# Patient Record
Sex: FEMALE | Race: WHITE | NOT HISPANIC OR LATINO | Employment: OTHER | URBAN - METROPOLITAN AREA
[De-identification: names, ages, dates, MRNs, and addresses within clinical notes are randomized per-mention and may not be internally consistent; named-entity substitution may affect disease eponyms.]

---

## 2017-01-04 ENCOUNTER — PRE VISIT (OUTPATIENT)
Dept: SURGERY | Facility: CLINIC | Age: 82
End: 2017-01-04

## 2017-01-04 NOTE — TELEPHONE ENCOUNTER
1.  Date/reason for appt: 17 - Lung Nodule, Mediastinal Mass  2.  Referring provider: Dr Julien  3.  Call to patient (Yes / No - short description): Yes  4.  Previous care at / records requested from: /[facility], [facility] - records in Epic  5.  Recent Imagin16 CT chest, 16 Xray chest, 16 PET whole body, 16 CT chest - in Epic

## 2017-01-12 ENCOUNTER — RECORDS - HEALTHEAST (OUTPATIENT)
Dept: ADMINISTRATIVE | Facility: OTHER | Age: 82
End: 2017-01-12

## 2017-01-12 ENCOUNTER — OFFICE VISIT (OUTPATIENT)
Dept: SURGERY | Facility: CLINIC | Age: 82
End: 2017-01-12
Attending: STUDENT IN AN ORGANIZED HEALTH CARE EDUCATION/TRAINING PROGRAM
Payer: COMMERCIAL

## 2017-01-12 VITALS
OXYGEN SATURATION: 96 % | BODY MASS INDEX: 28.96 KG/M2 | DIASTOLIC BLOOD PRESSURE: 65 MMHG | SYSTOLIC BLOOD PRESSURE: 169 MMHG | WEIGHT: 184.97 LBS | RESPIRATION RATE: 20 BRPM | TEMPERATURE: 98.3 F | HEART RATE: 89 BPM

## 2017-01-12 DIAGNOSIS — J98.59 MEDIASTINAL MASS: Primary | ICD-10-CM

## 2017-01-12 PROCEDURE — 99212 OFFICE O/P EST SF 10 MIN: CPT | Mod: ZF

## 2017-01-12 RX ORDER — CLINDAMYCIN PHOSPHATE 900 MG/50ML
900 INJECTION, SOLUTION INTRAVENOUS SEE ADMIN INSTRUCTIONS
Status: CANCELLED | OUTPATIENT
Start: 2017-01-12

## 2017-01-12 RX ORDER — POLYETHYLENE GLYCOL 3350 17 G/17G
17 POWDER, FOR SOLUTION ORAL DAILY PRN
COMMUNITY

## 2017-01-12 RX ORDER — CLINDAMYCIN PHOSPHATE 900 MG/50ML
900 INJECTION, SOLUTION INTRAVENOUS
Status: CANCELLED | OUTPATIENT
Start: 2017-01-12

## 2017-01-12 ASSESSMENT — PAIN SCALES - GENERAL: PAINLEVEL: NO PAIN (0)

## 2017-01-12 NOTE — NURSING NOTE
"Mary Corral is a 81 year old female who presents for:  Chief Complaint   Patient presents with     Oncology Clinic Visit     Referrel for Mediastinal Mass        Initial Vitals:  /65 mmHg  Pulse 89  Temp(Src) 98.3  F (36.8  C) (Tympanic)  Resp 20  Wt 83.9 kg (184 lb 15.5 oz)  SpO2 96% Estimated body mass index is 28.96 kg/(m^2) as calculated from the following:    Height as of 11/21/16: 1.702 m (5' 7\").    Weight as of this encounter: 83.9 kg (184 lb 15.5 oz).. There is no height on file to calculate BSA. BP completed using cuff size: large  No Pain (0) No LMP recorded. Patient is postmenopausal. Allergies and medications reviewed.     Medications: Medication refills not needed today.  Pharmacy name entered into [employer]: [id number] [[address]    Comments:     6  minutes for nursing intake (face to face time)   Natasha Floyd MA          "

## 2017-01-12 NOTE — Clinical Note
1/12/2017      RE: Mary Corral  [address] [facility] [address]         THORACIC SURGERY - FOLLOW UP OFFICE VISIT           Dear Dr. Hernández,    I saw Ms. Corral in follow up for the evaluation and treatment of a large mediastinal mass    HPI  Ms. Mary Corral is a 81 year old year-old female who initially presented with voice changes and almost lost her voice which prompted her to seek medical attention. 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 Biopsy of this mass is consistent with thyroid tissue. She reports feeling more tired than normal with bothersome dyspnea on exertion. She has been a very active person and the dyspnea is limiting her activities.  She has also been having issues with controlling her blood pressure recently and is seeing her PMD for the same.    Previsit Tests  CT (11/3/2016)     Repeat CT (1/12/2017): Mass stable, right ground glass opacity resolved, left nodule stable    EBUS (11/21/2016): 4R: lymphoid tissue and thyroid tissue, Mediastinal mass: lymphoid tissue and thyroid tissue        PMH  Asthma, COPD, hypertension     Past Medical History     No past medical history on file.         ETOH social  TOB former    Physical examination  BMI 30.7      From a personal perspective, she is here with her niece who is very involved with her care. She lives alone and is very active physically.    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 Corral and her niece, more than 50% of which were spent in counseling, coordination of care, and face-to-face time. I reviewed the plan as  follows:  1.) Procedure planned: Tentative plan for thyroidectomy, thoracotomy vs partial sternotomy for mediastinal component. 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 Hope to arrange a combined case for sometime next month pending evaluation by ENT and anesthesia. Will plan to have patient back to clinic in follow up closer to that date.  2.) Regarding left lower lobe nodule, we will discuss at nodule conference tomorrow and discuss recommendations with the patient afterward.  All expressed questions were answered and all parties present were in agreement with the plan.  I appreciate the opportunity to participate in the care of your patient and will keep you updated.  Sincerely,    Lucia Quinteros MD

## 2017-01-12 NOTE — PROGRESS NOTES
THORACIC SURGERY - FOLLOW UP OFFICE VISIT           Dear Dr. Hernández,    I saw Ms. Corral in follow up for the evaluation and treatment of a large mediastinal mass    HPI  Ms. Mary Corral is a 81 year old year-old female who initially presented with voice changes and almost lost her voice which prompted her to seek medical attention. 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 Biopsy of this mass is consistent with thyroid tissue. She reports feeling more tired than normal with bothersome dyspnea on exertion. She has been a very active person and the dyspnea is limiting her activities.  She has also been having issues with controlling her blood pressure recently and is seeing her PMD for the same.    Previsit Tests  CT (11/3/2016)     Repeat CT (1/12/2017): Mass stable, right ground glass opacity resolved, left nodule stable    EBUS (11/21/2016): 4R: lymphoid tissue and thyroid tissue, Mediastinal mass: lymphoid tissue and thyroid tissue        PMH  Asthma, COPD, hypertension     Past Medical History     No past medical history on file.         ETOH social  TOB former    Physical examination  BMI 30.7      From a personal perspective, she is here with her niece who is very involved with her care. She lives alone and is very active physically.    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 Corral and her niece, more than 50% of which were spent in counseling, coordination of care, and face-to-face time. I reviewed the plan as follows:  1.) Procedure planned: Tentative plan for thyroidectomy, thoracotomy vs  partial sternotomy for mediastinal component. 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 Hope to arrange a combined case for sometime next month pending evaluation by ENT and anesthesia. Will plan to have patient back to clinic in follow up closer to that date.  2.) Regarding left lower lobe nodule, we will discuss at nodule conference tomorrow and discuss recommendations with the patient afterward.  All expressed questions were answered and all parties present were in agreement with the plan.  I appreciate the opportunity to participate in the care of your patient and will keep you updated.  Sincerely,

## 2017-01-12 NOTE — Clinical Note
1/12/2017       RE: Mary Corral  [address] [address]     Dear Colleague,    Thank you for referring your patient, Mary Corral, to the [facility]. Please see a copy of my visit note below.      THORACIC SURGERY - FOLLOW UP OFFICE VISIT           Dear Dr. Hernández,    I saw Ms. Corral in follow up for the evaluation and treatment of a large mediastinal mass    HPI  Ms. Mary Corral is a 81 year old year-old female who initially presented with voice changes and almost lost her voice which prompted her to seek medical attention. 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 Biopsy of this mass is consistent with thyroid tissue. She reports feeling more tired than normal with bothersome dyspnea on exertion. She has been a very active person and the dyspnea is limiting her activities.  She has also been having issues with controlling her blood pressure recently and is seeing her PMD for the same.    Previsit Tests  CT (11/3/2016)     Repeat CT (1/12/2017): Mass stable, right ground glass opacity resolved, left nodule stable    EBUS (11/21/2016): 4R: lymphoid tissue and thyroid tissue, Mediastinal mass: lymphoid tissue and thyroid tissue        PMH  Asthma, COPD, hypertension     Past Medical History     No past medical history on file.         ETOH social  TOB former    Physical examination  BMI 30.7      From a personal perspective, she is here with her niece who is very involved with her care. She lives alone and is very active physically.    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 Corral and her niece, more than 50% of which were spent in counseling, coordination of care, and face-to-face time. I reviewed the plan as follows:  1.) Procedure planned: Tentative plan for thyroidectomy, thoracotomy vs partial sternotomy for mediastinal component. 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 Hope to arrange a combined case for sometime next month pending evaluation by ENT and anesthesia. Will plan to have patient back to clinic in follow up closer to that date.  2.) Regarding left lower lobe nodule, we will discuss at nodule conference tomorrow and discuss recommendations with the patient afterward.  All expressed questions were answered and all parties present were in agreement with the plan.  I appreciate the opportunity to participate in the care of your patient and will keep you updated.  Sincerely,      Lucia Quinteros MD

## 2017-01-13 ENCOUNTER — TEAM CONFERENCE (OUTPATIENT)
Dept: SURGERY | Facility: CLINIC | Age: 82
End: 2017-01-13

## 2017-01-13 DIAGNOSIS — R91.8 LUNG NODULES: Primary | ICD-10-CM

## 2017-01-13 NOTE — TELEPHONE ENCOUNTER
Pulmonary Nodule Conference      Patient Name: Mary Corral    Reason for conference discussion (brief overview): 81 year old female with a mediastinal mass, suspected to be thyroid. Also with LLL nodule that has remained stable on imaging. Was mildly hypermetabolic on PET however, it is very small and centrally located. Former Smoker. Very active.    Specific Question:  What is the recommended follow up/intervention regarding the LLL nodule?    Pertinent Histology:  None    Referring Physician: Dr. Quinteros    The patient's case was presented at the multidisciplinary conference for the above noted reason.        There was a consensus recommendation for the following actions:     The group recommends that a new baseline chest CT be done 3 months after her mediastinal mass is resected. Re-present at this conference at that time.    Case Lead:  Louisa Man    Interventional Radiology Staff Present: Perry Lainez MD

## 2017-01-31 ENCOUNTER — OFFICE VISIT (OUTPATIENT)
Dept: SURGERY | Facility: CLINIC | Age: 82
End: 2017-01-31

## 2017-01-31 ENCOUNTER — OFFICE VISIT (OUTPATIENT)
Dept: OTOLARYNGOLOGY | Facility: CLINIC | Age: 82
End: 2017-01-31

## 2017-01-31 ENCOUNTER — ANESTHESIA EVENT (OUTPATIENT)
Dept: SURGERY | Facility: CLINIC | Age: 82
End: 2017-01-31

## 2017-01-31 ENCOUNTER — ALLIED HEALTH/NURSE VISIT (OUTPATIENT)
Dept: SURGERY | Facility: CLINIC | Age: 82
End: 2017-01-31

## 2017-01-31 ENCOUNTER — RECORDS - HEALTHEAST (OUTPATIENT)
Dept: ADMINISTRATIVE | Facility: OTHER | Age: 82
End: 2017-01-31

## 2017-01-31 VITALS
BODY MASS INDEX: 29.15 KG/M2 | TEMPERATURE: 97.5 F | DIASTOLIC BLOOD PRESSURE: 77 MMHG | RESPIRATION RATE: 16 BRPM | SYSTOLIC BLOOD PRESSURE: 138 MMHG | WEIGHT: 185.7 LBS | OXYGEN SATURATION: 95 % | HEIGHT: 67 IN | HEART RATE: 93 BPM

## 2017-01-31 VITALS — BODY MASS INDEX: 34.23 KG/M2 | HEIGHT: 62 IN | WEIGHT: 186 LBS

## 2017-01-31 DIAGNOSIS — D23.5 BENIGN NEOPLASM OF SKIN OF TRUNK, EXCEPT SCROTUM: Primary | ICD-10-CM

## 2017-01-31 DIAGNOSIS — J98.59 MEDIASTINAL MASS: ICD-10-CM

## 2017-01-31 DIAGNOSIS — Z01.818 PREOP EXAMINATION: Primary | ICD-10-CM

## 2017-01-31 LAB
ERYTHROCYTE [DISTWIDTH] IN BLOOD BY AUTOMATED COUNT: 13.2 % (ref 10–15)
HCT VFR BLD AUTO: 36.3 % (ref 35–47)
HGB BLD-MCNC: 11.8 G/DL (ref 11.7–15.7)
MCH RBC QN AUTO: 29.7 PG (ref 26.5–33)
MCHC RBC AUTO-ENTMCNC: 32.5 G/DL (ref 31.5–36.5)
MCV RBC AUTO: 91 FL (ref 78–100)
PLATELET # BLD AUTO: 165 10E9/L (ref 150–450)
RBC # BLD AUTO: 3.97 10E12/L (ref 3.8–5.2)
WBC # BLD AUTO: 9.2 10E9/L (ref 4–11)

## 2017-01-31 RX ORDER — ALBUTEROL SULFATE 90 UG/1
2 AEROSOL, METERED RESPIRATORY (INHALATION) EVERY 4 HOURS PRN
COMMUNITY

## 2017-01-31 RX ORDER — ACETAMINOPHEN 325 MG/1
975 TABLET ORAL ONCE
Status: CANCELLED | OUTPATIENT
Start: 2017-01-31 | End: 2017-01-31

## 2017-01-31 RX ORDER — GABAPENTIN 300 MG/1
300 CAPSULE ORAL ONCE
Status: CANCELLED | OUTPATIENT
Start: 2017-01-31 | End: 2017-01-31

## 2017-01-31 RX ORDER — CELECOXIB 100 MG/1
200 CAPSULE ORAL ONCE
Status: CANCELLED | OUTPATIENT
Start: 2017-01-31 | End: 2017-01-31

## 2017-01-31 RX ORDER — CHLORHEXIDINE GLUCONATE ORAL RINSE 1.2 MG/ML
15 SOLUTION DENTAL ONCE
Status: CANCELLED | OUTPATIENT
Start: 2017-01-31 | End: 2017-01-31

## 2017-01-31 RX ORDER — AMLODIPINE BESYLATE 5 MG/1
5 TABLET ORAL AT BEDTIME
COMMUNITY

## 2017-01-31 ASSESSMENT — PAIN SCALES - GENERAL: PAINLEVEL: NO PAIN (0)

## 2017-01-31 ASSESSMENT — LIFESTYLE VARIABLES: TOBACCO_USE: 1

## 2017-01-31 ASSESSMENT — ENCOUNTER SYMPTOMS: SEIZURES: 0

## 2017-01-31 ASSESSMENT — COPD QUESTIONNAIRES: COPD: 1

## 2017-01-31 NOTE — MR AVS SNAPSHOT
After Visit Summary   1/31/2017    Mary Corral    MRN: [id number]           Patient Information     Date Of Birth          1935        Visit Information        Provider Department      1/31/2017 9:30 AM Migel Bowling MD [facility]        Today's Diagnoses     Benign neoplasm of skin of trunk, except scrotum    -  1    Mediastinal mass          Care Instructions    The plan will be to have Dr Bowling involved in the upcoming surgery if thoracic needs his help.  For questions or concerns please call the nurse at [phone].        Follow-ups after your visit        Your next 10 appointments already scheduled     Mar 14, 2017   Procedure with Lucia Quinteros MD   [facility], [place], Same Day Surgery (--)    [address] [facility] [place] [address] [phone]              Who to contact     Please call your clinic at [phone] to:    Ask questions about your health    Make or cancel appointments    Discuss your medicines    Learn about your test results    Speak to your doctor   If you have compliments or concerns about an experience at your clinic, or if you wish to file a complaint, please contact [facility] Patient Relations at [phone] or email us at [email or web address]         Additional Information About Your Visit        MyChart Information     [employer] gives you secure access to your electronic health record. If you see a primary care provider, you can also send messages to your care team and make appointments. If you have questions, please call your primary care clinic.  If you do not have a primary care provider, please call [phone] and they will assist you.      [employer] is an electronic gateway that provides easy, online access to your medical records. With [employer], you can request a clinic appointment, read your test results, renew a prescription or communicate with your care team.     To access your existing account,  "please contact your [facility] or call [phone] for assistance.        Care EveryWhere ID     This is your Care EveryWhere ID. This could be used by other organizations to access your [facility] medical records  [id number]        Your Vitals Were     Height BMI (Body Mass Index)                1.57 m (5' 1.81\") 34.23 kg/m2           Blood Pressure from Last 3 Encounters:   01/31/17 138/77   01/12/17 169/65   11/21/16 139/74    Weight from Last 3 Encounters:   01/31/17 84.4 kg (186 lb)   01/31/17 84.2 kg (185 lb 11.2 oz)   01/12/17 83.9 kg (184 lb 15.5 oz)              We Performed the Following     LARYNGOSCOPY FLEX FIBEROPTIC, DIAGNOSTIC        Primary Care Provider Office Phone # Fax #    Jessica Fonseca [phone]       [address] [address]        Thank you!     Thank you for choosing [facility]  for your care. Our goal is always to provide you with excellent care. Hearing back from our patients is one way we can continue to improve our services. Please take a few minutes to complete the written survey that you may receive in the mail after your visit with us. Thank you!             Your Updated Medication List - Protect others around you: Learn how to safely use, store and throw away your medicines at www.disposemymeds.org.          This list is accurate as of: 1/31/17 11:59 PM.  Always use your most recent med list.                   Brand Name Dispense Instructions for use    * albuterol (2.5 MG/3ML) 0.083% neb solution      Take 1 vial by nebulization every 4 hours as needed for shortness of breath / dyspnea or wheezing       * albuterol 108 (90 BASE) MCG/ACT Inhaler    PROAIR HFA/PROVENTIL HFA/VENTOLIN HFA     Inhale 2 puffs into the lungs every 6 hours       ALLOPURINOL PO      Take 100 mg by mouth 2 times daily       AMLODIPINE BESYLATE PO      Take 5 mg by mouth 2 times daily       ASPIRIN PO      " Take 81 mg by mouth daily       cetirizine 10 MG tablet    zyrTEC     Take 10 mg by mouth 2 times daily       cloNIDine 0.1 MG/24HR WK patch    CATAPRES-TTS1     Place 1 patch onto the skin once a week 2 patches daily       EPINEPHrine 0.3 MG/0.3ML injection      Inject 0.3 mg into the muscle as needed for anaphylaxis       fluticasone-salmeterol 250-50 MCG/DOSE diskus inhaler    ADVAIR     Inhale 1 puff into the lungs 2 times daily       INDOMETHACIN PO      Take 25 mg by mouth 3 times daily       IRON SUPPLEMENT PO      Take 45 mg by mouth       LOSARTAN POTASSIUM PO      Take 25 mg by mouth 2 times daily       Multi-vitamin Tabs tablet      Take 1 tablet by mouth daily       polyethylene glycol powder    MIRALAX/GLYCOLAX     Take 17 g by mouth daily as needed for constipation       RANITIDINE HCL PO      Take 150 mg by mouth 2 times daily       SIMVASTATIN PO      Take 20 mg by mouth At Bedtime       VALTREX PO      Take 500 mg by mouth as needed       VITAMIN D3 PO      Take 1,000 Units by mouth daily       * Notice:  This list has 2 medication(s) that are the same as other medications prescribed for you. Read the directions carefully, and ask your doctor or other care provider to review them with you.

## 2017-01-31 NOTE — H&P
Pre-Operative H & P     CC:  Preoperative exam to assess for increased cardiopulmonary risk while undergoing surgery and anesthesia.    Date of Encounter: 1/31/2017  Primary Care Physician:  Jessica Fonseca  Mary Corral is a 81 year old female who presents for pre-operative H & P in preparation for flexible broncoscopy, sternotomy, possible right thoracotomy with Dr. Quinteros on date TBD, at [facility]. Surgical planning continues as patient is to be evaluated by ENT, Dr. Bowling today. History is obtained from the patient.   Patient who presented in 11/2016 with voice changes, almost losing her voice. She was evaluated with CXR, followed by CT which demonstrated a large mediastinal mass and a solitary pulmonary nodule in the RUL, <1 cm and minimally spiculated. Patient is s/p endobronchial and endoscopic US with biopsies. Biopsies taken and revealed lymphocytes with thyroid folllicale. Referred to Dr. Quinteros and ENT with above procedure now planned. A team conference determined that pulmonary nodule should be followed at this time with repeat imaging.  Patient's history is otherwise complicated by HTN, asthma, COPD, and mild renal insufficiency.  Past Medical History  Past Medical History   Diagnosis Date     Hypertension      Uncomplicated asthma      Mediastinal mass      COPD (chronic obstructive pulmonary disease) (H)      CRI (chronic renal insufficiency)      mild     Pulmonary nodule      Seasonal allergies      Gastroesophageal reflux disease      Hearing problem      Allergic rhinitis      Chronic sinusitis      Nasal polyps      Conductive hearing loss        Past Surgical History  Past Surgical History   Procedure Laterality Date     Cholecystectomy       Biopsy mass neck N/A 11/21/2016     Procedure: BIOPSY MASS NECK;  Surgeon: Lucia Quinteros MD;  Location: [facility] OR     Endoscopic ultrasound upper gastrointestinal tract (gi) N/A 11/21/2016     Procedure:  ENDOSCOPIC ULTRASOUND, ESOPHAGOSCOPY / UPPER GASTROINTESTINAL TRACT (GI);  Surgeon: Lucia Quinteros MD;  Location: [facility] OR     Cataract iol, rt/lt Bilateral 2016     Rotator cuff repair rt/lt Right 30 years ago       Hx of Blood transfusions/reactions: None.     Hx of abnormal bleeding or anti-platelet use: ASA 81 mg daily.    Menstrual history: No LMP recorded. Patient is postmenopausal.    Steroid use in the last year: Yes, last in 11/2016    Personal or FH with difficulty with Anesthesia:  Denies.    Prior to Admission Medications  Current Outpatient Prescriptions   Medication Sig Dispense Refill     AMLODIPINE BESYLATE PO Take 5 mg by mouth 2 times daily       cloNIDine (CATAPRES-TTS1) 0.1 MG/24HR WK patch Place 1 patch onto the skin once a week 2 patches daily       polyethylene glycol (MIRALAX/GLYCOLAX) powder Take 17 g by mouth daily as needed for constipation       INDOMETHACIN PO Take 25 mg by mouth 3 times daily       LOSARTAN POTASSIUM PO Take 25 mg by mouth 2 times daily        ALLOPURINOL PO Take 100 mg by mouth 2 times daily        albuterol (2.5 MG/3ML) 0.083% nebulizer solution Take 1 vial by nebulization every 4 hours as needed for shortness of breath / dyspnea or wheezing       fluticasone-salmeterol (ADVAIR) 250-50 MCG/DOSE diskus inhaler Inhale 1 puff into the lungs 2 times daily       Cholecalciferol (VITAMIN D3 PO) Take 1,000 Units by mouth daily       ASPIRIN PO Take 81 mg by mouth daily       EPINEPHrine 0.3 MG/0.3ML injection 2-pack Inject 0.3 mg into the muscle as needed for anaphylaxis       SIMVASTATIN PO Take 20 mg by mouth At Bedtime       cetirizine (ZYRTEC) 10 MG tablet Take 10 mg by mouth 2 times daily       RANITIDINE HCL PO Take 150 mg by mouth 2 times daily       ValACYclovir HCl (VALTREX PO) Take 500 mg by mouth as needed       multivitamin, therapeutic with minerals (MULTI-VITAMIN) TABS Take 1 tablet by mouth daily       Ferrous Sulfate (IRON SUPPLEMENT PO) Take 45 mg by mouth        albuterol (PROAIR HFA/PROVENTIL HFA/VENTOLIN HFA) 108 (90 BASE) MCG/ACT Inhaler Inhale 2 puffs into the lungs every 6 hours       [DISCONTINUED] CLONIDINE HCL PO Take 0.1 mg by mouth 2 times daily         Allergies  Allergies   Allergen Reactions     Amoxicillin Hives     Keflex [Cephalexin] Hives     Lisinopril Shortness Of Breath     Penicillins Hives       Social History  Social History     Social History     Marital Status: Single     Spouse Name: N/A     Number of Children: N/A     Years of Education: N/A     Occupational History     Not on file.     Social History Main Topics     Smoking status: Former Smoker -- 0.50 packs/day for 1 years     Types: Cigarettes     Start date: 09/05/1955     Quit date: 11/18/1958     Smokeless tobacco: Not on file      Comment: smoked twice a week for a couple years while in college, less than 1/2 pack      Alcohol Use: 0.0 oz/week      Comment: a beer a week     Drug Use: Yes     Sexual Activity: No     Other Topics Concern     Not on file     Social History Narrative       Family History  Family History   Problem Relation Age of Onset     Esophageal Cancer Mother      Colon Cancer Mother      Parkinsonism Father      MENTAL ILLNESS Mother      MENTAL ILLNESS Father      MENTAL ILLNESS Sister      Dementia Mother      Dementia Sister      Unknown/Adopted Mother      Unknown/Adopted Father      Unknown/Adopted Sister      Asthma Mother      Asthma Father      Asthma Sister      CANCER Father      DIABETES Mother      Hypertension Father      CEREBROVASCULAR DISEASE Mother      CEREBROVASCULAR DISEASE Father      CEREBROVASCULAR DISEASE Sister      Thyroid Disease Mother      Thyroid Disease Father      Thyroid Disease Sister      Congenital Anomalies Mother      Congenital Anomalies Father      Congenital Anomalies Sister      Bleeding Disorder Mother      Bleeding Disorder Father      Bleeding Disorder Sister      Migraines Mother      Migraines Father      Migraines  "Sister      Muscular Disorder Mother      Muscular Disorder Father      Muscular Disorder Sister        Review of Systems  The complete review of systems is negative other than noted in the HPI or here.   Constitutional: Denies fever, chills, weight loss.  HEENT: Wears glasses for reading. No swallowing difficulties in throat. Sometimes feels that food gets stuck in mid chest. Voice weakness.  Respiratory: Denies cough. Recent increased shortness of breath with exertion. Was walking 2 miles every other day. Asthma triggered by exercise. Currently using Albuterol nebs in am, Advair TWICE DAILY and Albuterol inhaler before exercise. Treated for pneumonia in 11/2016. Seasonal allergies.  CV: Denies chest pain or irregular HR. Has been active until recently with walking and golfing. Reports neg cardiac work up in 3/2016.  GI: Denies abdominal pain or bowel issues. Occasional GERD.  : Denies dysuria.  M/S: History of right rotator cuff surgery 30 years ago. Very good result.    Temp: 97.5  F (36.4  C) Temp src: Oral BP: 138/77 mmHg Pulse: 93   Resp: 16 SpO2: 95 %         185 lbs 11.2 oz  5' 6.5\"   Body mass index is 29.53 kg/(m^2).       Physical Exam  Constitutional: Awake, alert, cooperative, no apparent distress, and appears stated age. Accompanied by friend.  Eyes: Pupils equal, round and reactive to light, extra ocular muscles intact, sclera clear, conjunctiva normal.  HENT: Normocephalic, oral pharynx with moist mucus membranes, good dentition. Mild raspy voice.  Respiratory: Clear to auscultation bilaterally, no crackles or wheezing. Diminished upper lobes.  Cardiovascular: Regular rate and rhythm, normal S1 and S2, and no murmur noted. Carotids, no bruits. No edema. Palpable pulses to radial  DP and PT arteries.   GI: Normal bowel sounds, soft, non-distended, non-tender, no masses palpated, no hepatosplenomegaly.   Lymph/Hematologic: No cervical lymphadenopathy and no supraclavicular " lymphadenopathy.  Genitourinary:  Deferred.  Skin: Warm and dry.  No rashes at anticipated surgical site.   Musculoskeletal: Limited neck extension. There is no redness, warmth, or swelling of the joints. Gross motor strength is normal. Some arthritic changes of bilateral hands.  Neurologic: Awake, alert, oriented to name, place and time. Cranial nerves II-XII are grossly intact. Gait is normal.   Neuropsychiatric: Calm, cooperative. Normal affect.     Labs: (personally reviewed) OSH 2/17/17  WBC 10.0  Hgb: 12.3  Na 142  K 4.6  Cl 110  Glu 100  Cr 1.15  GFR 46  LFTs normal  Protein 6.5  Albumin 3.8  11/7/16 A1C 5.8  11/2016  T3 84  T4 Free 1.0  TSH 0.23  6/22/16  Uric acid 6.5  WBC      9.2   1/31/2017  RBC     3.97   1/31/2017  HGB     11.8   1/31/2017  HCT     36.3   1/31/2017  MCV       91   1/31/2017  MCH     29.7   1/31/2017  MCHC     32.5   1/31/2017  RDW     13.2   1/31/2017  PLT      165   1/31/2017    EKG: Personally reviewed 2/17/16 Sinus rhythm, occasional ectopic ventricular beat, RBBB    PFT's:  1/31/17  Most Recent [place] Pulmonary Function Testing    FVC-PRED   Date Value Ref Range Status   01/31/2017 2.52 L      FVC-PRE   Date Value Ref Range Status   01/31/2017 2.30 L      FVC-%PRED-PRE   Date Value Ref Range Status   01/31/2017 91 %      FEV1-PRE   Date Value Ref Range Status   01/31/2017 1.18 L      FEV1-%PRED-PRE   Date Value Ref Range Status   01/31/2017 60 %      FEV1FVC-PRED   Date Value Ref Range Status   01/31/2017 78 %      FEV1FVC-PRE   Date Value Ref Range Status   01/31/2017 51 %        FEFMAX-PRED   Date Value Ref Range Status   01/31/2017 5.15 L/sec      FEFMAX-PRE   Date Value Ref Range Status   01/31/2017 2.99 L/sec      FEFMAX-%PRED-PRE   Date Value Ref Range Status   01/31/2017 58 %      EXPTIME-PRE   Date Value Ref Range Status   01/31/2017 7.75 sec      FIFMAX-PRE   Date Value Ref Range Status   01/31/2017 3.95 L/sec      FEV1FEV6-PRED   Date Value Ref Range Status   01/31/2017  78 %      FEV1FEV6-PRE   Date Value Ref Range Status   01/31/2017 54 %        CT Chest 1/12/17  There is a large heterogeneous mass in the mediastinum which is  continuous with the left lobe of the thyroid gland and is  multilobulated, partially calcified, and with areas of central  necrosis. Currently the mass measures approximately 8.9 x 4.7 by  approximately 10.5 cm, previously 9.4 x 4.5 10.5 cm. This causes mass  effect on the trachea anteriorly and displaces the esophagus  posteriorly.     There is minimal residual abnormal attenuation in the right upper lobe  in the area of the previously noted part solid nodule (series 5, image  169). Mild bilateral lower lobe bronchial wall thickening. Stable 7 mm  left lower lobe pulmonary nodule (series 5, image 261). Right basilar  platelike atelectasis.     IMPRESSION:    1. 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 There is no significant change in the overall size of  the mass.  2. Interval near complete resolution of groundglass nodule in the  right upper lobe, likely related to resolving infection.  3. Persistent subpleural nodule in the left lower lobe. Attention on  follow up versus repeat dedicated chest CT in 6 months.    11/17/16 PET/CT  IMPRESSION:    1. Right upper lobe spiculated groundglass nodule measuring up to 0.9  cm, not FDG avid with an SUV max of 1.3. This is indeterminate and  recommend continued attention on follow-up in 3 months.  2. Solid subpleural nodule measuring 0.8 centimeters in the left lung  base which is mildly FDG avid. This is also indeterminate and should  be given attention on follow-up.  3. Massive FDG avid avid heterogeneous mass causing associated mass  effect in the superior mediastinum continuous with the left  hemithyroid. Favor that this represents a massive goiter although  thyroid malignancy is not excluded but  "considered less likely.  4. No evidence of disease in the abdomen or pelvis.  Outside records reviewed from: Care Everywhere    ASSESSMENT and PLAN  Mary Corral is a 81 year old female scheduled to undergo  flexible broncoscopy, sternotomy, possible right thoracotomy with Dr. Quinteros on date TBD. She will be seeing Dr. Bowling for continued surgical planning. She has the following specific operative considerations:   - RCRI : No serious cardiac risks.   - Anesthesia considerations:  Refer to PAC assessment in anesthesia records  - VTE risk: 0.5-3%  - ASHLEY # of risks 3/8 = Intermediate  - Risk of PONV score = 2.  If > 2, anti-emetic intervention recommended.     --Mediastinal mass, causing mass effect on the trachea anteriorly and displaces esophagus posteriorly. Above procedure planned. Baseline weak, raspy voice.      --History of difficult airway. Anesthesia record from 11/20/16 notes \"anterior\" airway with CMAC required as adjunct, 2 attempts. Final evaluation and decisions by Anesthesia on DOS.   --HLD. Simvastatin. HTN. Recently more difficult control. Clonidine patch. Will take Amlodipine, but will hold Losartan. ASA 81 mg with planned hold for surgery. No other cardiac history, symptoms or meds. Good activity tolerance. Patient reports neg cardiac work up in 3/2016 but unable to find/obtain records after multiple attempts.   --Former smoker but quit in the 50s. COPD. Asthma, triggered by exertion. Will take Advair and Albuterol neb on DOS. Will bring Albuterol inhaler.   --GERD, occasional. Ranitidine prn.   --Mild CRI Cr 1.15. GFR 46. Will require careful monitoring of renal function during periop period.   --Pain management. Discussed possibility of nerve block if appropriate for patient's procedure. Final counseling and decisions by regional team on DOS.   --Enhanced recovery pathway initiated.  Type and screen drawn today.  Arrival time, NPO, shower and medication instructions provided by nursing staff today. " Preparing For Your Surgery handout given.    Patient was discussed with Dr ALLA Ramos.    DENISE Thorpe CNS  Preoperative Assessment Center  [facility]  [facility]  Phone: [phone]  Fax: [phone]

## 2017-01-31 NOTE — MR AVS SNAPSHOT
After Visit Summary   2017    Mary Corral    MRN: [id number]           Patient Information     Date Of Birth          1935        Visit Information        Provider Department      2017 7:30 AM Rn, [facility]        Care Instructions    Preparing for Your Surgery      Name:  Mary Corral   MRN:  [id number]   :  1935   Today's Date:  2017     Arriving for surgery:  We will call you with date, time, location, and diet  Surgery date:    Surgery time:    Arrival time:           What can I eat or drink? Follow Enhanced Recovery After Surgery instructions below.  -  You may have solid food or milk products until 8 hours prior to your surgery.  -  You may have clear gatorade and  until 2 hours prior to your surgery.    Which medicines can I take? No Aspirin, vitamins, or supplements for 7 days before surgery. No Indomethacin or Ibuprofen for 24 hours before surgery.    -  Do NOT take these medications the day of surgery:Losartan,  Miralax      -  It is OKAY to take these medications (If regularly scheduled morning medications): Amlodipine, Allopurinol, Combivent inhaler, albuterol neb, Advair diskus, Nasonex nasal spray, Simvastatin, Zyrtec, Ranitidine, Valacyclovir, Clonidine patch      How do I prepare myself?  -  Take two showers: one the night before surgery; and one the morning of surgery.         Use Scrubcare or Hibiclens to wash from neck down.  You may use your own soap, shampoo, and conditioner first. No other hair products.   -  Do NOT use lotion, powder, deodorant, or antiperspirant the day of your surgery.  -  Do NOT wear any makeup, fingernail polish or jewelry.  -  Begin using Incentive Spirometer 1 week prior to surgery.  Use 4 times per day, up to 5-10 breaths each time.  Bring Incentive Spirometer to hospital.  -Do not bring your own medications to the hospital, except for inhalers.  -  Bring your ID and insurance  card.    Questions or Concerns:  If you have questions or concerns, please call the  [facility], Monday-Friday 7AM-7PM:  [phone]      Using an Incentive Spirometer  Soon after your surgery, a nurse or therapist will teach you breathing exercises. These keep your lungs clear, strengthen your breathing muscles, and help prevent complications.  The exercises include doing a deep-breathing exercise using a device called an incentive spirometer.  To do these exercises, you will breathe in through your mouth and not your nose. The incentive spirometer only works correctly if you breathe in through your mouth.  Four steps to clear lungs  1. Exhale normally.    Relax and breathe out.  2. Place your lips tightly around the mouthpiece.    Make sure the device is upright and not tilted.  3. Inhale as much air as you can through the mouthpiece (don't breath through your nose).    Inhale slowly and deeply.    Hold your breath long enough to keep the balls or disk raised for at least 3 seconds.    If you re inhaling too quickly, your device may make a tone. If you hear this tone, inhale more slowly.  4. Repeat the exercise regularly.    Do this exercise every hour while you're awake, or as your health care provider instructs.    You will also be taught coughing exercises and be asked to do them regularly on your own.    5928-5295 [employer]. [address] [place] [address], [place] [address]. All rights reserved. This information is not intended as a substitute for professional medical care. Always follow your healthcare professional's instructions.   Enhanced Recovery After Surgery     This is a team effort, including you, to get you back on your feet, eating and drinking normally and out of the hospital as quickly as possible.  The goals are: 1) NO INFECTIONS and   2) RETURN TO NORMAL DIET    How can we achieve these goals?  1) STAY ACTIVE: Walk every day before your surgery; try to increase the  amount every day.  Walk after surgery as much as you can-the nurses will help you.  Walking speeds healing and gets you home quicker, you heal better at home and have less risk of infection.     2) INCENTIVE SPIROMETER: Practice your incentive spirometer 4 times per day with 5-10 repetitions each time.  Using the incentive spirometer can strengthen your muscles between your ribs and help you have a strong cough after surgery.  A more effective cough can help prevent problems with your lungs.    3) STAY HYDRATED: Drink clear liquids up until 2 hours before your surgery. We would like you to purchase a drink such as Gatorade.  Drink this before bedtime and on the way into the hospital, drink between 8-12 ounces or until you feel hydrated.  Keeping well hydrated leads to your veins being plump, you wake up faster, and you are less likely to be nauseated. Start drinking water as soon as you can after surgery and advance to clear liquids and food as tolerated.  IV fluids contain salt, drinking fluids will minimize the amount of IV fluids you need and decrease the amount of salt you get.     4) PAIN MANAGEMENT: If we minimize the amount of opioids and narcotics, and use regional blocks (which numb the area where your surgery is) along with oral pain medications; you will have less side effects of nausea and constipation. Narcotics can slow down your bowels and cause you to stay in the hospital longer.     Our goal is to keep you comfortable; eating and drinking normally and back home safely.     AFTER YOUR SURGERY  Breathing exercises   Breathing exercises help you recover faster. Take deep breaths and let the air out slowly. This will:     Help you wake up after surgery.    Help prevent complications like pneumonia.  Preventing complications will help you go home sooner.   We may give you a breathing device (incentive spirometer) to encourage you to breathe deeply.   Nausea and vomiting   You may feel sick to your  stomach after surgery; if so, let your nurse know.    Pain control:  After surgery, you may have pain. Our goal is to help you manage your pain. Pain medicine will help you feel comfortable enough to do activities that will help you heal.  These activities may include breathing exercises, walking and physical therapy.   To help your health care team treat your pain we will ask: 1) If you have pain  2) where it is located 3) describe your pain in your words  Methods of pain control include medications given by mouth, vein or by nerve block for some surgeries.  We may give you a pain control pump that will:  1) Deliver the medicine through a tube placed in your vein  2) Control the amount of medicine you receive  3) Allow you to push a button to deliver a dose of pain medicine  Sequential Compression Device (SCD) or Pneumo Boots:  You may need to wear SCD S on your legs or feet. These are wraps connected to a machine that pumps in air and releases it. The repeated pumping helps prevent blood clots from forming.               Follow-ups after your visit        Your next 10 appointments already scheduled     Jan 31, 2017  7:30 AM   (Arrive by 7:15 AM)   PAC RN ASSESSMENT with Kan Pac Rn   [facility] [place] ([facility])    [address] [place] [id number]   [phone]            Jan 31, 2017  8:00 AM   (Arrive by 7:45 AM)   PAC EVALUATION with Kan Pac Denice 1   [facility] [place] ([facility])    [address] [place] [address]   [phone]            Jan 31, 2017  8:30 AM   (Arrive by 8:15 AM)   PAC Pharmacist with Kan Pac Pharmacist   [facility] [place] ([facility])    [address] [place] [id number]   [phone]            Jan 31, 2017  9:00 AM   (Arrive by 8:45 AM)   PAC Anesthesia Consult with Kan Pac  Anesthesiologist   [facility] ([facility])    [address] [facility] [id number] [phone]            Jan 31, 2017  9:30 AM   (Arrive by 9:15 AM)   New Patient Visit with Migel Bowling MD   [facility] Ear Nose and Throat ([facility])    [address]  [address] [facility] [id number] [phone]            Jan 31, 2017 10:30 AM   FULL PULMONARY FUNCTION with  PFL B   [facility] Pulmonary Function Testing ([facility])    [address]  [address] [facility] [id number] [phone]              Who to contact     Please call your clinic at [phone] to:    Ask questions about your health    Make or cancel appointments    Discuss your medicines    Learn about your test results    Speak to your doctor   If you have compliments or concerns about an experience at your clinic, or if you wish to file a complaint, please contact [facility] Patient Relations at [phone] or email us at [email or web address]         Additional Information About Your Visit        [employer] Information     [employer] gives you secure access to your electronic health record. If you see a primary care provider, you can also send messages to your care team and make appointments. If you have questions, please call your primary care clinic.  If you do not have a primary care provider, please call [phone] and they will assist you.      [employer] is an electronic gateway that provides easy, online access to your medical records. With [employer], you can request a clinic appointment, read your test results, renew a prescription or communicate with your care team.     To access your existing account, please contact your [facility] Clinic or call [phone] for assistance.        Care EveryWhere ID     This is your Care EveryWhere  ID. This could be used by other organizations to access your [facility] medical records  [id number]         Blood Pressure from Last 3 Encounters:   01/12/17 169/65   11/21/16 139/74   11/16/16 130/71    Weight from Last 3 Encounters:   01/12/17 83.9 kg (184 lb 15.5 oz)   11/21/16 84.5 kg (186 lb 4.6 oz)   11/16/16 86.229 kg (190 lb 1.6 oz)              Today, you had the following     No orders found for display       Primary Care Provider Office Phone # Fax #    Jessica Fonseca [phone]       [facility] [address] [facility] [address]        Thank you!     Thank you for choosing [facility] PREOPER[facility] [place]  for your care. Our goal is always to provide you with excellent care. Hearing back from our patients is one way we can continue to improve our services. Please take a few minutes to complete the written survey that you may receive in the mail after your visit with us. Thank you!             Your Updated Medication List - Protect others around you: Learn how to safely use, store and throw away your medicines at www.disposemymeds.org.          This list is accurate as of: 1/31/17  7:26 AM.  Always use your most recent med list.                   Brand Name Dispense Instructions for use    albuterol (2.5 MG/3ML) 0.083% neb solution      Take 1 vial by nebulization every 4 hours as needed for shortness of breath / dyspnea or wheezing       ALLOPURINOL PO      Take 100 mg by mouth 2 times daily       AMLODIPINE BESYLATE PO      Take 5 mg by mouth 2 times daily       ASPIRIN PO      Take 81 mg by mouth daily       cetirizine 10 MG tablet    zyrTEC     Take 10 mg by mouth 2 times daily       cloNIDine 0.1 MG/24HR WK patch    CATAPRES-TTS1     Place 1 patch onto the skin once a week 2 patches daily       EPINEPHrine 0.3 MG/0.3ML injection      Inject 0.3 mg into the muscle as needed for anaphylaxis       fluticasone-salmeterol 250-50 MCG/DOSE diskus inhaler    ADVAIR      Inhale 1 puff into the lungs 2 times daily       INDOMETHACIN PO      Take 25 mg by mouth 3 times daily       Ipratropium-Albuterol  MCG/ACT inhaler    COMBIVENT RESPIMAT     Inhale 2 puffs into the lungs as needed for shortness of breath / dyspnea or wheezing       IRON SUPPLEMENT PO      Take 45 mg by mouth       LOSARTAN POTASSIUM PO      Take 25 mg by mouth 2 times daily       Multi-vitamin Tabs tablet      Take 1 tablet by mouth daily       polyethylene glycol powder    MIRALAX/GLYCOLAX     Take 17 g by mouth daily as needed for constipation       RANITIDINE HCL PO      Take 150 mg by mouth 2 times daily       SIMVASTATIN PO      Take 20 mg by mouth At Bedtime       VALTREX PO      Take 500 mg by mouth as needed       VITAMIN D3 PO      Take 1,000 Units by mouth daily

## 2017-01-31 NOTE — PATIENT INSTRUCTIONS
Preparing for Your Surgery      Name:  Mary Corral   MRN:  [id number]   :  1935   Today's Date:  2017     Arriving for surgery:  We will call you with date, time, location, and diet  Surgery date:    Surgery time:    Arrival time:           What can I eat or drink? Follow Enhanced Recovery After Surgery instructions below.  -  You may have solid food or milk products until 8 hours prior to your surgery.  -  You may have clear gatorade and  until 2 hours prior to your surgery.    Which medicines can I take? No Aspirin, vitamins, or supplements for 7 days before surgery. No Indomethacin or Ibuprofen for 24 hours before surgery.    -  Do NOT take these medications the day of surgery:Losartan,  Miralax      -  It is OKAY to take these medications (If regularly scheduled morning medications): Amlodipine, Allopurinol, Combivent inhaler, albuterol neb, Advair diskus, Nasonex nasal spray, Simvastatin, Zyrtec, Ranitidine, Valacyclovir, Clonidine patch      How do I prepare myself?  -  Take two showers: one the night before surgery; and one the morning of surgery.         Use Scrubcare or Hibiclens to wash from neck down.  You may use your own soap, shampoo, and conditioner first. No other hair products.   -  Do NOT use lotion, powder, deodorant, or antiperspirant the day of your surgery.  -  Do NOT wear any makeup, fingernail polish or jewelry.  -  Begin using Incentive Spirometer 1 week prior to surgery.  Use 4 times per day, up to 5-10 breaths each time.  Bring Incentive Spirometer to hospital.  -Do not bring your own medications to the hospital, except for inhalers.  -  Bring your ID and insurance card.    Questions or Concerns:  If you have questions or concerns, please call the  [facility], Monday-Friday 7AM-7PM:  [phone]      Using an Incentive Spirometer  Soon after your surgery, a nurse or therapist will teach you breathing exercises. These keep your lungs clear, strengthen your  breathing muscles, and help prevent complications.  The exercises include doing a deep-breathing exercise using a device called an incentive spirometer.  To do these exercises, you will breathe in through your mouth and not your nose. The incentive spirometer only works correctly if you breathe in through your mouth.  Four steps to clear lungs  1. Exhale normally.    Relax and breathe out.  2. Place your lips tightly around the mouthpiece.    Make sure the device is upright and not tilted.  3. Inhale as much air as you can through the mouthpiece (don't breath through your nose).    Inhale slowly and deeply.    Hold your breath long enough to keep the balls or disk raised for at least 3 seconds.    If you re inhaling too quickly, your device may make a tone. If you hear this tone, inhale more slowly.  4. Repeat the exercise regularly.    Do this exercise every hour while you're awake, or as your health care provider instructs.    You will also be taught coughing exercises and be asked to do them regularly on your own.    [id number] [employer]. [address] [place] [address]. All rights reserved. This information is not intended as a substitute for professional medical care. Always follow your healthcare professional's instructions.   Enhanced Recovery After Surgery     This is a team effort, including you, to get you back on your feet, eating and drinking normally and out of the hospital as quickly as possible.  The goals are: 1) NO INFECTIONS and   2) RETURN TO NORMAL DIET    How can we achieve these goals?  1) STAY ACTIVE: Walk every day before your surgery; try to increase the amount every day.  Walk after surgery as much as you can-the nurses will help you.  Walking speeds healing and gets you home quicker, you heal better at home and have less risk of infection.     2) INCENTIVE SPIROMETER: Practice your incentive spirometer 4 times per day with 5-10 repetitions each time.  Using the  incentive spirometer can strengthen your muscles between your ribs and help you have a strong cough after surgery.  A more effective cough can help prevent problems with your lungs.    3) STAY HYDRATED: Drink clear liquids up until 2 hours before your surgery. We would like you to purchase a drink such as Gatorade.  Drink this before bedtime and on the way into the hospital, drink between 8-12 ounces or until you feel hydrated.  Keeping well hydrated leads to your veins being plump, you wake up faster, and you are less likely to be nauseated. Start drinking water as soon as you can after surgery and advance to clear liquids and food as tolerated.  IV fluids contain salt, drinking fluids will minimize the amount of IV fluids you need and decrease the amount of salt you get.     4) PAIN MANAGEMENT: If we minimize the amount of opioids and narcotics, and use regional blocks (which numb the area where your surgery is) along with oral pain medications; you will have less side effects of nausea and constipation. Narcotics can slow down your bowels and cause you to stay in the hospital longer.     Our goal is to keep you comfortable; eating and drinking normally and back home safely.     AFTER YOUR SURGERY  Breathing exercises   Breathing exercises help you recover faster. Take deep breaths and let the air out slowly. This will:     Help you wake up after surgery.    Help prevent complications like pneumonia.  Preventing complications will help you go home sooner.   We may give you a breathing device (incentive spirometer) to encourage you to breathe deeply.   Nausea and vomiting   You may feel sick to your stomach after surgery; if so, let your nurse know.    Pain control:  After surgery, you may have pain. Our goal is to help you manage your pain. Pain medicine will help you feel comfortable enough to do activities that will help you heal.  These activities may include breathing exercises, walking and physical therapy.    To help your health care team treat your pain we will ask: 1) If you have pain  2) where it is located 3) describe your pain in your words  Methods of pain control include medications given by mouth, vein or by nerve block for some surgeries.  We may give you a pain control pump that will:  1) Deliver the medicine through a tube placed in your vein  2) Control the amount of medicine you receive  3) Allow you to push a button to deliver a dose of pain medicine  Sequential Compression Device (SCD) or Pneumo Boots:  You may need to wear SCD S on your legs or feet. These are wraps connected to a machine that pumps in air and releases it. The repeated pumping helps prevent blood clots from forming.

## 2017-01-31 NOTE — ANESTHESIA PREPROCEDURE EVALUATION
Anesthesia Evaluation     . Pt has had prior anesthetic. Type: General and MAC    History of anesthetic complications  - difficult intubation    ROS/MED HX    ENT/Pulmonary:     (+)ASHLEY risk factors snores loudly, hypertension, other ENT- voice changes, tobacco use, Past use quit in the 50s packs/day  asthma Treatment: Inhaler daily, Inhaler prn and Nebulizer prn,  COPD, , . .    Neurologic:  - neg neurologic ROS    (-) seizures and CVA   Cardiovascular: Comment: Patient previously walked 2 miles per day, has had some increased SOB with the mass. Reportedly neg cardiac work up in 3/2016. Unable to obtain records.    (+) Dyslipidemia, hypertension-range: 150/60, ---. Taking blood thinners Pt has received instructions: Instructions Given to patient: Planned hold for surgery. . . :. . Previous cardiac testing date:results:date: results:ECG reviewed date:11/21/17 results:ST, rate 103, RBBB date: results:          METS/Exercise Tolerance:  3 - Able to walk 1-2 blocks without stopping   Hematologic:  - neg hematologic  ROS       Musculoskeletal:   (+) , , other musculoskeletal- History of right rotator cuff surgery      GI/Hepatic: Comment: Occasional GERD    (+) GERD Asymptomatic on medication,      (-) liver disease   Renal/Genitourinary:  - ROS Renal section negative   (+) chronic renal disease, type: CRI, Pt does not require dialysis, Pt has no history of transplant,       Endo:  - neg endo ROS       Psychiatric:  - neg psychiatric ROS       Infectious Disease:  - neg infectious disease ROS       Malignancy:   (+)   Mediastinal mass        Other:    (+) No chance of pregnancy C-spine cleared: N/A, no H/O Chronic Pain,no other significant disability              Physical Exam      Airway   Mallampati: III  TM distance: >3 FB  Neck ROM: limited    Dental   (+) partials  Comment: Upper sides permanent bridges    Cardiovascular   Rhythm and rate: regular and normal      Pulmonary    breath sounds clear to auscultation(+)  "decreased breath sounds       Other findings: For further details of assessment, testing, and physical exam please see H and P completed on same date.             PAC Discussion and Assessment    ASA Classification: 3  Case is suitable for: [place]  Anesthetic techniques and relevant risks discussed: GA and GA with regional block for post-op pain control  Invasive monitoring and risk discussed: Yes  Types:   Possibility and Risk of blood transfusion discussed: Yes  NPO instructions given:   Additional anesthetic preparation and risks discussed:   Needs early admission to pre-op area:   Other:     PAC Resident/NP Anesthesia Assessment:  Mary Corral is a 81 year old female scheduled to undergo  flexible broncoscopy, sternotomy, possible right thoracotomy with Dr. Quinteros on date TBD. She will be seeing Dr. Bowling for continued surgical planning. She has the following specific operative considerations:   - RCRI : No serious cardiac risks.   - VTE risk: 0.5-3%  - ASHLEY # of risks 3/8 = Intermediate  - Risk of PONV score = 2.  If > 2, anti-emetic intervention recommended.     --Mediastinal mass, causing mass effect on the trachea anteriorly and displaces esophagus posteriorly. Above procedure planned. Baseline weak, raspy voice.      --History of difficult airway. Anesthesia record from 11/20/16 notes \"anterior\" airway with CMAC required as adjunct, 2 attempts. Final evaluation and decisions by Anesthesia on DOS.     --HLD. Simvastatin. HTN. Recently more difficult control. Clonidine patch. Will take Amlodipine, but will hold Losartan. ASA 81 mg with planned hold for surgery. No other cardiac history, symptoms or meds. Good activity tolerance. Patient reports neg cardiac work up in 3/2016 but unable to find/obtain records after multiple attempts.     --Former smoker but quit in the 50s. COPD. Asthma, triggered by exertion. Will take Advair and Albuterol neb on DOS. Will bring Albuterol inhaler.     --GERD, occasional. " Ranitidine prn.     --Mild CRI Cr 1.15. GFR 46. Will require careful monitoring of renal function during periop period.     --Pain management. Discussed possibility of nerve block if appropriate for patient's procedure. Final counseling and decisions by regional team on DOS.     --Enhanced recovery pathway initiated.  Type and screen drawn today.    Patient was discussed with Dr ALLA Ramos.        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  She recently underwent GA for endobronchial biopsy without complications.  Perioperative considerations:  A. Cardiac: HTN- controlled, no history of CAD or heart failure. Good functional capacity (patient reports being active, rides stationary bike). HACKETT progressive in past several months likely due to compressing mediastinal mass.  B. Pulmonary: long standing asthma controlled with Advair, Albuterol nebulizer and inhaler prn; Recent pneumonia - resolved.    PFTs pending    Chest CT: large heterogeneous mass in the mediastinum which is continuous with the left lobe of the thyroid gland and is multilobulated, partially calcified, and with areas of central necrosis. Currently the mass measures approximately 8.9 x 4.7 x 10.5 cm, and causes mass effect on the trachea anteriorly and displaces the esophagus posteriorly. There is minimal residual abnormal attenuation in the right upper lobe in the area of the previously noted part solid nodule (series 5, image 169). Mild bilateral lower lobe bronchial wall thickening. Stable 7 mm left lower lobe pulmonary nodule (series 5, image 261). Right basilar platelike atelectasis.      C. Airway; limited neck extension,  "small moth opening, short TMD, dental implants; CMAC used for last intubation, 2 attempts due to \"anterior larynx\"    D. Renal: mild CRI Cr 1.15, GFR 45.    E. ERAS protocol for thoracic surgery    1. Clear liquids until 2 hours prior to surgery  2. Multimodal anesthesia (regional block, epidural or TAP)  3. Limit IV solutions in holding room (willl implement GDT in OR)  4. Avoid premedication with midazolam or fentanyl, can use propofol for sedation    5. Tylenol 1 gm prior to incision IV  6. If epidural in place, use planned postoperative rate/concentration throughout case (no bolus)  7. Goal directed therapy with FloTrac (if arterial line)  8. Remove NG at end of case if surgeon approves    General anesthetic approach for thoracic surgery discussed with patient (including invasive monitors), her questions were answered.  Final plan will be made by the anesthesiologist on DOS. PFTs, labs, T+S pending.    Reviewed and Signed by PAC Anesthesiologist  Anesthesiologist:   Date:   Time:   Pass/Fail:   Disposition: PFTs pending, DOS pending, final surgical plan pending    PAC Pharmacist Assessment:        Pharmacist:   Date:   Time:                           .  "

## 2017-01-31 NOTE — PATIENT INSTRUCTIONS
The plan will be to have Dr Bowling involved in the upcoming surgery if thoracic needs his help.  For questions or concerns please call the nurse at [phone].

## 2017-01-31 NOTE — LETTER
1/31/2017       RE: Mary Corral  [address] [address]     Dear Colleague,    Thank you for referring your patient, Mary Corral, to the [facility] EAR NOSE AND THROAT at [facility]. Please see a copy of my visit note below.    January 31, 2017         Lucia Quinteros MD   [facility]   [address] [id number]      Dear Dr. Quinteros:      I had the pleasure of seeing Mary Corral in clinic today.      HISTORY OF PRESENT ILLNESS:  She is a very interesting lady who has this 8 cm mass in the anterior mediastinum.  It is pedunculated off of the left lower aspect of her thyroid gland.  She has not had any thyroid symptoms at all.  The only symptoms she has had are the ones that have been related to  potential airway distress from compression, and they are not really that distressing but she lacks exercise tolerance at the present time.  No other current complaints at the present time today.      PHYSICAL EXAMINATION:  The patient is alert, oriented x3 and pleasant.  Skin of the face, lips, and neck on her is quite normal.  Neck exam shows no masses or adenopathy.      PROCEDURE:  I did a flexible direct scope exam on her as well with topical anesthesia.  The nasal cavity, nasopharynx, oral cavity, oropharynx, hypopharynx are all clear.  There is good vocal cord mobility throughout.  No other masses or lesions or any other problems are found in the hypopharynx area.      ASSESSMENT AND PLAN:  Patient with what may be a very large goiter pedunculated off the left lower aspect of her thyroid gland.  This may not need a full thyroidectomy.  I am happy to be available to potentially perform hemithyroidectomy or just detach the mass from the inferior aspect of the thyroid gland to make sure it is not that intimately related.  This is a phenomenon that I have seen a couple of times in my career where a person will have not a classic substernal  goiter but they will develop a goiter-like mass that is related to a portion of the thyroid gland, almost like a bud.  There have been a couple of cases like this that I have had to do in the last 20 years with cardiovascular surgical colleagues.      Thank you very much for the opportunity to see this patient.      Sincerely,      Migel Bowling M.D., Ph.D.    Director, Research & Clinical Studies        Otolaryngology-Head and Neck Surgery    [phone]      FO/ms         Last 2 Scores for Patient-Answered VHI Questionnaire  No flowsheet data found.    Last 2 Scores for Patient-Answered CSI Questionnaire  No flowsheet data found.      Last 2 Scores for Patient-Answered EAT Questionnaire  No flowsheet data found.        PAST MEDICAL HISTORY:   Past Medical History   Diagnosis Date     Hypertension      Uncomplicated asthma      Mediastinal mass      COPD (chronic obstructive pulmonary disease) (H)      CRI (chronic renal insufficiency)      mild     Pulmonary nodule      Seasonal allergies      Gastroesophageal reflux disease      Hearing problem      Allergic rhinitis      Chronic sinusitis      Nasal polyps      Conductive hearing loss        PAST SURGICAL HISTORY:   Past Surgical History   Procedure Laterality Date     Cholecystectomy       Biopsy mass neck N/A 11/21/2016     Procedure: BIOPSY MASS NECK;  Surgeon: Lucia Quinteros MD;  Location: [facility] OR     Endoscopic ultrasound upper gastrointestinal tract (gi) N/A 11/21/2016     Procedure: ENDOSCOPIC ULTRASOUND, ESOPHAGOSCOPY / UPPER GASTROINTESTINAL TRACT (GI);  Surgeon: Lucia Quinteros MD;  Location: [facility] OR     Cataract iol, rt/lt Bilateral 2016     Rotator cuff repair rt/lt Right 30 years ago       FAMILY HISTORY:   Family History   Problem Relation Age of Onset     Esophageal Cancer Mother      Colon Cancer Mother      Parkinsonism Father      MENTAL ILLNESS Mother      MENTAL ILLNESS Father      MENTAL ILLNESS Sister      Dementia Mother       Dementia Sister      Unknown/Adopted Mother      Unknown/Adopted Father      Unknown/Adopted Sister      Asthma Mother      Asthma Father      Asthma Sister      CANCER Father      DIABETES Mother      Hypertension Father      CEREBROVASCULAR DISEASE Mother      CEREBROVASCULAR DISEASE Father      CEREBROVASCULAR DISEASE Sister      Thyroid Disease Mother      Thyroid Disease Father      Thyroid Disease Sister      Congenital Anomalies Mother      Congenital Anomalies Father      Congenital Anomalies Sister      Bleeding Disorder Mother      Bleeding Disorder Father      Bleeding Disorder Sister      Migraines Mother      Migraines Father      Migraines Sister      Muscular Disorder Mother      Muscular Disorder Father      Muscular Disorder Sister        SOCIAL HISTORY:   Social History   Substance Use Topics     Smoking status: Former Smoker -- 0.50 packs/day for 1 years     Types: Cigarettes     Start date: 09/05/1955     Quit date: 11/18/1958     Smokeless tobacco: Not on file      Comment: smoked twice a week for a couple years while in college, less than 1/2 pack      Alcohol Use: 0.0 oz/week      Comment: a beer a week       REVIEW OF SYSTEMS: Ten point review of systems was performed and is negative except for:   [facility] ENT ROS 1/31/2017   Constitutional Problems with sleep   Cardiopulmonary Cough, Breathing problems, Wheezing   Endocrine Frequent urination        ALLERGIES: Amoxicillin; Keflex; Lisinopril; and Penicillins    MEDICATIONS:   Current Outpatient Prescriptions   Medication Sig Dispense Refill     AMLODIPINE BESYLATE PO Take 5 mg by mouth 2 times daily       albuterol (PROAIR HFA/PROVENTIL HFA/VENTOLIN HFA) 108 (90 BASE) MCG/ACT Inhaler Inhale 2 puffs into the lungs every 6 hours       cloNIDine (CATAPRES-TTS1) 0.1 MG/24HR WK patch Place 1 patch onto the skin once a week 2 patches daily       polyethylene glycol (MIRALAX/GLYCOLAX) powder Take 17 g by mouth daily as needed for constipation        INDOMETHACIN PO Take 25 mg by mouth 3 times daily       LOSARTAN POTASSIUM PO Take 25 mg by mouth 2 times daily        ALLOPURINOL PO Take 100 mg by mouth 2 times daily        albuterol (2.5 MG/3ML) 0.083% nebulizer solution Take 1 vial by nebulization every 4 hours as needed for shortness of breath / dyspnea or wheezing       fluticasone-salmeterol (ADVAIR) 250-50 MCG/DOSE diskus inhaler Inhale 1 puff into the lungs 2 times daily       Cholecalciferol (VITAMIN D3 PO) Take 1,000 Units by mouth daily       ASPIRIN PO Take 81 mg by mouth daily       EPINEPHrine 0.3 MG/0.3ML injection 2-pack Inject 0.3 mg into the muscle as needed for anaphylaxis       SIMVASTATIN PO Take 20 mg by mouth At Bedtime       cetirizine (ZYRTEC) 10 MG tablet Take 10 mg by mouth 2 times daily       RANITIDINE HCL PO Take 150 mg by mouth 2 times daily       ValACYclovir HCl (VALTREX PO) Take 500 mg by mouth as needed       multivitamin, therapeutic with minerals (MULTI-VITAMIN) TABS Take 1 tablet by mouth daily       Ferrous Sulfate (IRON SUPPLEMENT PO) Take 45 mg by mouth       [DISCONTINUED] CLONIDINE HCL PO Take 0.1 mg by mouth 2 times daily           PHYSICAL EXAMINATION:  She  is awake, alert and in no apparent distress.    Her tympanic membranes are clear and intact bilaterally. External auditory canals are clear.  Nasal exam shows a mild septal deviation without obstruction.  Examination of the oral cavity shows no suspicious lesions.  There is symmetric movement of the tongue and soft palate.    The oropharynx is clear.  Her neck is supple without significant adenopathy.  Pulse is regular.  Upper airway is clear.  Cranial nerves II-XII are grossly intact.              Again, thank you for allowing me to participate in the care of your patient.      Sincerely,    Migel Bowling MD

## 2017-01-31 NOTE — PROGRESS NOTES
January 31, 2017         Lucia Quinteros MD   [id number] [place] [address]      Dear Dr. Quinteros:      I had the pleasure of seeing Mary Corral in clinic today.      HISTORY OF PRESENT ILLNESS:  She is a very interesting lady who has this 8 cm mass in the anterior mediastinum.  It is pedunculated off of the left lower aspect of her thyroid gland.  She has not had any thyroid symptoms at all.  The only symptoms she has had are the ones that have been related to  potential airway distress from compression, and they are not really that distressing but she lacks exercise tolerance at the present time.  No other current complaints at the present time today.      PHYSICAL EXAMINATION:  The patient is alert, oriented x3 and pleasant.  Skin of the face, lips, and neck on her is quite normal.  Neck exam shows no masses or adenopathy.      PROCEDURE:  I did a flexible direct scope exam on her as well with topical anesthesia.  The nasal cavity, nasopharynx, oral cavity, oropharynx, hypopharynx are all clear.  There is good vocal cord mobility throughout.  No other masses or lesions or any other problems are found in the hypopharynx area.      ASSESSMENT AND PLAN:  Patient with what may be a very large goiter pedunculated off the left lower aspect of her thyroid gland.  This may not need a full thyroidectomy.  I am happy to be available to potentially perform hemithyroidectomy or just detach the mass from the inferior aspect of the thyroid gland to make sure it is not that intimately related.  This is a phenomenon that I have seen a couple of times in my career where a person will have not a classic substernal goiter but they will develop a goiter-like mass that is related to a portion of the thyroid gland, almost like a bud.  There have been a couple of cases like this that I have had to do in the last 20 years with cardiovascular surgical colleagues.      Thank you very much for the  opportunity to see this patient.      Sincerely,      Migel Bowling M.D., Ph.D.    Director, Research & Clinical Studies        Otolaryngology-Head and Neck Surgery    [phone]      FO/ms         Last 2 Scores for Patient-Answered VHI Questionnaire  No flowsheet data found.    Last 2 Scores for Patient-Answered CSI Questionnaire  No flowsheet data found.      Last 2 Scores for Patient-Answered EAT Questionnaire  No flowsheet data found.        PAST MEDICAL HISTORY:   Past Medical History   Diagnosis Date     Hypertension      Uncomplicated asthma      Mediastinal mass      COPD (chronic obstructive pulmonary disease) (H)      CRI (chronic renal insufficiency)      mild     Pulmonary nodule      Seasonal allergies      Gastroesophageal reflux disease      Hearing problem      Allergic rhinitis      Chronic sinusitis      Nasal polyps      Conductive hearing loss        PAST SURGICAL HISTORY:   Past Surgical History   Procedure Laterality Date     Cholecystectomy       Biopsy mass neck N/A 11/21/2016     Procedure: BIOPSY MASS NECK;  Surgeon: Lucia Quinteros MD;  Location: [facility] OR     Endoscopic ultrasound upper gastrointestinal tract (gi) N/A 11/21/2016     Procedure: ENDOSCOPIC ULTRASOUND, ESOPHAGOSCOPY / UPPER GASTROINTESTINAL TRACT (GI);  Surgeon: Lucia Quinteros MD;  Location: [facility] OR     Cataract iol, rt/lt Bilateral 2016     Rotator cuff repair rt/lt Right 30 years ago       FAMILY HISTORY:   Family History   Problem Relation Age of Onset     Esophageal Cancer Mother      Colon Cancer Mother      Parkinsonism Father      MENTAL ILLNESS Mother      MENTAL ILLNESS Father      MENTAL ILLNESS Sister      Dementia Mother      Dementia Sister      Unknown/Adopted Mother      Unknown/Adopted Father      Unknown/Adopted Sister      Asthma Mother      Asthma Father      Asthma Sister      CANCER Father      DIABETES Mother      Hypertension Father      CEREBROVASCULAR DISEASE Mother       CEREBROVASCULAR DISEASE Father      CEREBROVASCULAR DISEASE Sister      Thyroid Disease Mother      Thyroid Disease Father      Thyroid Disease Sister      Congenital Anomalies Mother      Congenital Anomalies Father      Congenital Anomalies Sister      Bleeding Disorder Mother      Bleeding Disorder Father      Bleeding Disorder Sister      Migraines Mother      Migraines Father      Migraines Sister      Muscular Disorder Mother      Muscular Disorder Father      Muscular Disorder Sister        SOCIAL HISTORY:   Social History   Substance Use Topics     Smoking status: Former Smoker -- 0.50 packs/day for 1 years     Types: Cigarettes     Start date: 09/05/1955     Quit date: 11/18/1958     Smokeless tobacco: Not on file      Comment: smoked twice a week for a couple years while in college, less than 1/2 pack      Alcohol Use: 0.0 oz/week      Comment: a beer a week       REVIEW OF SYSTEMS: Ten point review of systems was performed and is negative except for:   [facility] E[facility] ROS 1/31/2017   Constitutional Problems with sleep   Cardiopulmonary Cough, Breathing problems, Wheezing   Endocrine Frequent urination        ALLERGIES: Amoxicillin; Keflex; Lisinopril; and Penicillins    MEDICATIONS:   Current Outpatient Prescriptions   Medication Sig Dispense Refill     AMLODIPINE BESYLATE PO Take 5 mg by mouth 2 times daily       albuterol (PROAIR HFA/PROVENTIL HFA/VENTOLIN HFA) 108 (90 BASE) MCG/ACT Inhaler Inhale 2 puffs into the lungs every 6 hours       cloNIDine (CATAPRES-TTS1) 0.1 MG/24HR WK patch Place 1 patch onto the skin once a week 2 patches daily       polyethylene glycol (MIRALAX/GLYCOLAX) powder Take 17 g by mouth daily as needed for constipation       INDOMETHACIN PO Take 25 mg by mouth 3 times daily       LOSARTAN POTASSIUM PO Take 25 mg by mouth 2 times daily        ALLOPURINOL PO Take 100 mg by mouth 2 times daily        albuterol (2.5 MG/3ML) 0.083% nebulizer solution Take 1 vial by nebulization every 4 hours  as needed for shortness of breath / dyspnea or wheezing       fluticasone-salmeterol (ADVAIR) 250-50 MCG/DOSE diskus inhaler Inhale 1 puff into the lungs 2 times daily       Cholecalciferol (VITAMIN D3 PO) Take 1,000 Units by mouth daily       ASPIRIN PO Take 81 mg by mouth daily       EPINEPHrine 0.3 MG/0.3ML injection 2-pack Inject 0.3 mg into the muscle as needed for anaphylaxis       SIMVASTATIN PO Take 20 mg by mouth At Bedtime       cetirizine (ZYRTEC) 10 MG tablet Take 10 mg by mouth 2 times daily       RANITIDINE HCL PO Take 150 mg by mouth 2 times daily       ValACYclovir HCl (VALTREX PO) Take 500 mg by mouth as needed       multivitamin, therapeutic with minerals (MULTI-VITAMIN) TABS Take 1 tablet by mouth daily       Ferrous Sulfate (IRON SUPPLEMENT PO) Take 45 mg by mouth       [DISCONTINUED] CLONIDINE HCL PO Take 0.1 mg by mouth 2 times daily           PHYSICAL EXAMINATION:  She  is awake, alert and in no apparent distress.    Her tympanic membranes are clear and intact bilaterally. External auditory canals are clear.  Nasal exam shows a mild septal deviation without obstruction.  Examination of the oral cavity shows no suspicious lesions.  There is symmetric movement of the tongue and soft palate.    The oropharynx is clear.  Her neck is supple without significant adenopathy.  Pulse is regular.  Upper airway is clear.  Cranial nerves II-XII are grossly intact.

## 2017-02-01 LAB
ABO + RH BLD: NORMAL
ABO + RH BLD: NORMAL
BLD GP AB SCN SERPL QL: NORMAL
BLOOD BANK CMNT PATIENT-IMP: NORMAL
BLOOD BANK CMNT PATIENT-IMP: NORMAL
SPECIMEN EXP DATE BLD: NORMAL

## 2017-02-03 DIAGNOSIS — J98.59 MEDIASTINAL MASS: Primary | ICD-10-CM

## 2017-02-03 RX ORDER — CLINDAMYCIN PHOSPHATE 900 MG/50ML
900 INJECTION, SOLUTION INTRAVENOUS
Status: CANCELLED | OUTPATIENT
Start: 2017-02-03

## 2017-02-03 RX ORDER — CLINDAMYCIN PHOSPHATE 900 MG/50ML
900 INJECTION, SOLUTION INTRAVENOUS SEE ADMIN INSTRUCTIONS
Status: CANCELLED | OUTPATIENT
Start: 2017-02-03

## 2017-02-24 ENCOUNTER — DOCUMENTATION ONLY (OUTPATIENT)
Dept: SURGERY | Facility: CLINIC | Age: 82
End: 2017-02-24

## 2017-02-24 RX ORDER — LOSARTAN POTASSIUM 50 MG/1
50 TABLET ORAL DAILY
Qty: 30 TABLET | COMMUNITY
Start: 2017-02-24 | End: 2024-08-09

## 2017-03-06 LAB
DLCOUNC-%PRED-PRE: 70 %
DLCOUNC-PRE: 14.37 ML/MIN/MMHG
DLCOUNC-PRED: 20.28 ML/MIN/MMHG
ERV-%PRED-PRE: 142 %
ERV-PRE: 0.66 L
ERV-PRED: 0.46 L
EXPTIME-PRE: 7.75 SEC
FEF2575-%PRED-POST: 54 %
FEF2575-%PRED-PRE: 31 %
FEF2575-POST: 0.84 L/SEC
FEF2575-PRE: 0.49 L/SEC
FEF2575-PRED: 1.55 L/SEC
FEFMAX-%PRED-PRE: 58 %
FEFMAX-PRE: 2.99 L/SEC
FEFMAX-PRED: 5.15 L/SEC
FEV1-%PRED-PRE: 60 %
FEV1-PRE: 1.18 L
FEV1FEV6-PRE: 54 %
FEV1FEV6-PRED: 78 %
FEV1FVC-PRE: 51 %
FEV1FVC-PRED: 78 %
FEV1SVC-PRE: 44 %
FEV1SVC-PRED: 62 %
FIFMAX-PRE: 3.95 L/SEC
FRCPLETH-%PRED-PRE: 140 %
FRCPLETH-PRE: 3.97 L
FRCPLETH-PRED: 2.83 L
FVC-%PRED-PRE: 91 %
FVC-PRE: 2.3 L
FVC-PRED: 2.52 L
IC-%PRED-PRE: 75 %
IC-PRE: 2.01 L
IC-PRED: 2.66 L
RVPLETH-%PRED-PRE: 143 %
RVPLETH-PRE: 3.31 L
RVPLETH-PRED: 2.31 L
TLCPLETH-%PRED-PRE: 113 %
TLCPLETH-PRE: 5.98 L
TLCPLETH-PRED: 5.27 L
VA-%PRED-PRE: 82 %
VA-PRE: 4.44 L
VC-%PRED-PRE: 85 %
VC-PRE: 2.67 L
VC-PRED: 3.12 L

## 2017-03-07 DIAGNOSIS — J98.59 MEDIASTINAL MASS: Primary | ICD-10-CM

## 2017-03-09 ENCOUNTER — RADIANT APPOINTMENT (OUTPATIENT)
Dept: CARDIOLOGY | Facility: CLINIC | Age: 82
End: 2017-03-09
Attending: CLINICAL NURSE SPECIALIST

## 2017-03-09 DIAGNOSIS — J98.59 MEDIASTINAL MASS: ICD-10-CM

## 2017-03-12 ENCOUNTER — ANESTHESIA EVENT (OUTPATIENT)
Dept: SURGERY | Facility: CLINIC | Age: 82
End: 2017-03-12
Payer: COMMERCIAL

## 2017-03-13 ENCOUNTER — OFFICE VISIT (OUTPATIENT)
Dept: SURGERY | Facility: CLINIC | Age: 82
End: 2017-03-13

## 2017-03-13 ENCOUNTER — ALLIED HEALTH/NURSE VISIT (OUTPATIENT)
Dept: SURGERY | Facility: CLINIC | Age: 82
End: 2017-03-13

## 2017-03-13 ENCOUNTER — OFFICE VISIT (OUTPATIENT)
Dept: OTOLARYNGOLOGY | Facility: CLINIC | Age: 82
End: 2017-03-13

## 2017-03-13 ENCOUNTER — RECORDS - HEALTHEAST (OUTPATIENT)
Dept: ADMINISTRATIVE | Facility: OTHER | Age: 82
End: 2017-03-13

## 2017-03-13 VITALS — BODY MASS INDEX: 28.77 KG/M2 | HEIGHT: 66 IN | WEIGHT: 179 LBS

## 2017-03-13 VITALS
TEMPERATURE: 98.1 F | WEIGHT: 179 LBS | HEIGHT: 66 IN | RESPIRATION RATE: 18 BRPM | DIASTOLIC BLOOD PRESSURE: 66 MMHG | HEART RATE: 97 BPM | SYSTOLIC BLOOD PRESSURE: 140 MMHG | BODY MASS INDEX: 28.77 KG/M2

## 2017-03-13 DIAGNOSIS — E04.9 GOITER: ICD-10-CM

## 2017-03-13 DIAGNOSIS — E04.9 GOITER: Primary | ICD-10-CM

## 2017-03-13 DIAGNOSIS — Z01.818 PRE-OP EXAM: Primary | ICD-10-CM

## 2017-03-13 LAB
T4 FREE SERPL-MCNC: 1.75 NG/DL (ref 0.76–1.46)
TSH SERPL DL<=0.005 MIU/L-ACNC: <0.01 MU/L (ref 0.4–4)

## 2017-03-13 RX ORDER — IPRATROPIUM BROMIDE AND ALBUTEROL SULFATE 2.5; .5 MG/3ML; MG/3ML
3 SOLUTION RESPIRATORY (INHALATION)
Status: CANCELLED | OUTPATIENT
Start: 2017-03-13

## 2017-03-13 ASSESSMENT — COPD QUESTIONNAIRES
CAT_SEVERITY: MODERATE
COPD: 1

## 2017-03-13 ASSESSMENT — LIFESTYLE VARIABLES: TOBACCO_USE: 1

## 2017-03-13 ASSESSMENT — PAIN SCALES - GENERAL: PAINLEVEL: NO PAIN (0)

## 2017-03-13 NOTE — H&P
Pre-Operative H & P     CC:  Preoperative exam to assess for increased cardiopulmonary risk while undergoing surgery and anesthesia.    Date of Encounter: 3/13/2017  Primary Care Physician:  Jessica Fonseca  Mary Corral is a 81 year old female who presents for pre-operative H & P in preparation for Flexible Bronchoscopy, Right Thoracotomy, Excision Of Mediastinal Mass Possible Neck Exploration Possible Left Hemithyroidectomy Anesthesia general with block  with Dr. Aldair Julien on 03/14/17 at [facility].   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  Biopsies showed lymphocytes with thyroid cells.   Her thyroid studies have been negative and she denies any symptoms, however will re-check today.  She has developed some symptoms of HACKETT d/t the compression from the mass that developed around 11/2016.  Prior to that she has been quite active.  She has a small pulmonary nodule in the RUL that is <1 cm, as of right now pulmonology will continue follow this nodule.    She also has a history of HTN, asthma/COPD and stable renal insufficieny.    She denies current symptoms of cough, congestion, sore throat or recent fever/chills.    Past Medical History  Past Medical History   Diagnosis Date     Allergic rhinitis      Chronic sinusitis      Conductive hearing loss      COPD (chronic obstructive pulmonary disease) (H)      CRI (chronic renal insufficiency)      mild     Gastroesophageal reflux disease      Hearing problem      Hypertension      Mediastinal mass      Nasal polyps      Pulmonary nodule      Seasonal allergies      Uncomplicated asthma        Past Surgical History  Past Surgical History   Procedure Laterality Date     Cholecystectomy       Biopsy mass neck N/A 11/21/2016     Procedure: BIOPSY MASS NECK;  Surgeon: Lucia Quinteros MD;  Location: [facility] OR     Endoscopic ultrasound upper gastrointestinal tract (gi) N/A 11/21/2016      Procedure: ENDOSCOPIC ULTRASOUND, ESOPHAGOSCOPY / UPPER GASTROINTESTINAL TRACT (GI);  Surgeon: Lucia Quinteros MD;  Location: [facility] OR     Cataract iol, rt/lt Bilateral 2016     Rotator cuff repair rt/lt Right 30 years ago       Hx of Blood transfusions/reactions: Denies    Hx of abnormal bleeding or anti-platelet use: Aspirin- has been holding    Menstrual history: No LMP recorded. Patient is postmenopausal.:     Steroid use in the last year: Denies    Personal or FH with difficulty with Anesthesia:  Denies    Prior to Admission Medications  Current Outpatient Prescriptions   Medication Sig Dispense Refill     losartan (COZAAR) 50 MG tablet Take 1 tablet (50 mg) by mouth 2 times daily 30 tablet      cloNIDine (CATAPRES-TTS2) 0.2 MG/24HR WK patch Place 1 patch onto the skin daily 4 patch 1     AMLODIPINE BESYLATE PO Take 5 mg by mouth 2 times daily       albuterol (PROAIR HFA/PROVENTIL HFA/VENTOLIN HFA) 108 (90 BASE) MCG/ACT Inhaler Inhale 2 puffs into the lungs every 6 hours       polyethylene glycol (MIRALAX/GLYCOLAX) powder Take 17 g by mouth daily as needed for constipation       INDOMETHACIN PO Take 25 mg by mouth 3 times daily       ALLOPURINOL PO Take 100 mg by mouth 2 times daily        albuterol (2.5 MG/3ML) 0.083% nebulizer solution Take 1 vial by nebulization every 4 hours as needed for shortness of breath / dyspnea or wheezing       fluticasone-salmeterol (ADVAIR) 250-50 MCG/DOSE diskus inhaler Inhale 1 puff into the lungs 2 times daily       Cholecalciferol (VITAMIN D3 PO) Take 1,000 Units by mouth daily       ASPIRIN PO Take 81 mg by mouth daily       EPINEPHrine 0.3 MG/0.3ML injection 2-pack Inject 0.3 mg into the muscle as needed for anaphylaxis       SIMVASTATIN PO Take 20 mg by mouth At Bedtime       cetirizine (ZYRTEC) 10 MG tablet Take 10 mg by mouth 2 times daily       RANITIDINE HCL PO Take 150 mg by mouth 2 times daily       ValACYclovir HCl (VALTREX PO) Take 500 mg by mouth as needed        multivitamin, therapeutic with minerals (MULTI-VITAMIN) TABS Take 1 tablet by mouth daily       Ferrous Sulfate (IRON SUPPLEMENT PO) Take 45 mg by mouth         Allergies  Allergies   Allergen Reactions     Amoxicillin Hives     Keflex [Cephalexin] Hives     Lisinopril Shortness Of Breath     Penicillins Hives       Social History  Social History     Social History     Marital status: Single     Spouse name: N/A     Number of children: N/A     Years of education: N/A     Occupational History     Not on file.     Social History Main Topics     Smoking status: Former Smoker     Packs/day: 0.50     Years: 1.00     Types: Cigarettes     Start date: 9/5/1955     Quit date: 11/18/1958     Smokeless tobacco: Never Used      Comment: smoked twice a week for a couple years while in college, less than 1/2 pack      Alcohol use 0.0 oz/week      Comment: a beer a week     Drug use: Yes     Sexual activity: No     Other Topics Concern     Not on file     Social History Narrative       Family History  Family History   Problem Relation Age of Onset     Esophageal Cancer Mother      Colon Cancer Mother      Parkinsonism Father      MENTAL ILLNESS Mother      MENTAL ILLNESS Father      MENTAL ILLNESS Sister      Dementia Mother      Dementia Sister      Unknown/Adopted Mother      Unknown/Adopted Father      Unknown/Adopted Sister      Asthma Mother      Asthma Father      Asthma Sister      CANCER Father      DIABETES Mother      Hypertension Father      CEREBROVASCULAR DISEASE Mother      CEREBROVASCULAR DISEASE Father      CEREBROVASCULAR DISEASE Sister      Thyroid Disease Mother      Thyroid Disease Father      Thyroid Disease Sister      Congenital Anomalies Mother      Congenital Anomalies Father      Congenital Anomalies Sister      Bleeding Disorder Mother      Bleeding Disorder Father      Bleeding Disorder Sister      Migraines Mother      Migraines Father      Migraines Sister      Muscular Disorder Mother      Muscular  "Disorder Father      Muscular Disorder Sister        Review of Systems  Functional status: dependent in ADL's.   METS > 4 .  If no, limited by .    The complete review of systems is negative other than noted in the HPI or here.     C: NEGATIVE for fever, chills, change in weight  INTEGUMENTARY/SKIN: NEGATIVE for worrisome rashes, moles or lesions  E/M: NEGATIVE for ear, mouth and throat problems  R: NEGATIVE for significant cough.  See HPI   CV: NEGATIVE for chest pain, palpitations or peripheral edema  GI: NEGATIVE for nausea, abdominal pain, heartburn, or change in bowel habits  MUSCULOSKELETAL: NEGATIVE for significant arthralgias or myalgia  NEURO: NEGATIVE for weakness, dizziness or paresthesias  ENDOCRINE: NEGATIVE for temperature intolerance, skin/hair changes  HEME/ALLERGY/IMMUNE: NEGATIVE for bleeding problems  PSYCHIATRIC: NEGATIVE for changes in mood or affect      Temp: 98.1  F (36.7  C) Temp src: Oral BP: 161/68 Pulse: 97   Resp: 18           179 lbs 0 oz  5' 6\"   Body mass index is 28.89 kg/(m^2).       Physical Exam  Constitutional: Awake, alert, cooperative, no apparent distress, and appears stated age.  Eyes: Pupils equal, round and reactive to light, extra ocular muscles intact, sclera clear, conjunctiva normal.  HENT: Normocephalic, oral pharynx with moist mucus membranes, good dentition. No goiter appreciated.   Respiratory: Clear to auscultation except for mild scattered expiratory wheezing.   Cardiovascular: Regular rate and rhythm, normal S1 and S2, and no murmur noted.  Carotids +2, no bruits. No edema. Palpable pulses to radial  DP and PT arteries.   GI: Normal bowel sounds, soft, non-distended, non-tender, no masses palpated, no hepatosplenomegaly.  Surgical scars:N/A   Lymph/Hematologic: No cervical lymphadenopathy and no supraclavicular lymphadenopathy.  Genitourinary:  N/A  Skin: Warm and dry.  No rashes at anticipated surgical site.   Musculoskeletal: Full ROM of neck. There is no " redness, warmth, or swelling of the joints. Gross motor strength is normal.    Neurologic: Awake, alert, oriented to name, place and time. Cranial nerves II-XII are grossly intact. Gait is normal.   Neuropsychiatric: Calm, cooperative. Normal affect.     Labs: (personally reviewed)  CBC RESULTS:   Recent Labs   Lab Test  01/31/17   1221   WBC  9.2   RBC  3.97   HGB  11.8   HCT  36.3   MCV  91   MCH  29.7   MCHC  32.5   RDW  13.2   PLT  165     11/2016  Lab Units 11/04/16  1525   LN-THYROID STIMULATING HORMONE uIU/mL 0.06*   LN-T3 FREE pg/mL 2.4   LN-FREE T4 ng/dL 1.0        EKG: Sinus Tach, R BBB 11/2016  Cardiac echo: 03/2017 Interpretation Summary  Global and regional left ventricular function is normal with an EF of 60-65%.  Right ventricular function, chamber size, wall motion, and thickness are  normal.  No pericardial effusion is present.  _____________________________________________________________________________    PFT's: 01/2017 The FEV1 and FEV1/FVC ratio are reduced.  FVC is normal.  The inspiratory flow rates are within normal limits.   While the TLC, FRC and SVC are within normal limits, the RV is increased.  Following administration of bronchodilators, there is a   significant response.  The diffusing capacity is normal.  However, the diffusing capacity was not corrected for the patient's hemoglobin.  IMPRESSION:  Moderate  Airflow Obstruction.  There is air trapping without hyperinflation.  Normal diffusion.  ____________________________________________    03/2017 CT-Impression:   1. 9.1 x 5.9 x 12 cm mediastinal mass contiguous with the left lobe of  thyroid. It abuts and causes mass effect on the trachea and the  esophagus. There appears to be a fat plane between this mass and the  thoracic vasculature.  2. Lungs are clear. No mediastinal adenopathy.    ASSESSMENT and PLAN  Mary Corral is a 81 year old female scheduled to undergo Flexible Bronchoscopy, Right Thoracotomy, Excision Of Mediastinal  Mass Possible Neck Exploration Possible Left Hemithyroidectomy Anesthesia general with block  with Dr. Aldair Julien on 03/14/17 at [facility].   . She has the following specific operative considerations:   1. Cardiac- HTN- stable on 3 medications, HLD.  No other cardiac history.  > 4 METS this summer however with compression from tumor she has been limited  - Leave Clonidine patch on per Dr. Freed  -Take Amlodipine the DOS  - Hold Losartan the DOS  -ASA- hold   2. RESP- Asthma/COPD- exercise induced asthma, uses her albuterol inhaler 3-4 times per day. PFTs- moderate airway obstruction.  Former smoker quit in the 1950s.  - Continue Advair and Albuterol the DOS  -Neb ordered the DOS  3. GERD- well managed  - continue Ranitidine the DOS  4. RENAL- Cr. 1.4, GFR -36, has been stable avoid nephrotoxic medications  5. ENDO- no thyroid symptoms.  Thyroid studies for the most part negative in 11/2016- pending today  6. Anesthesia- denies previous complications from anesthesia.  Previous difficult airway, used CMAC.    ERAS pathway started all medications ordered at last visit.     - RCRI : 0.4% risk of major adverse cardiac event.   - Anesthesia considerations:  Refer to PAC assessment in anesthesia records  - VTE risk:0.5%  - ASHLEY # of risks /8 = Low risk  - Risk of PONV score = 2 If > 2, anti-emetic intervention recommended.      Patient was discussed with Dr. Freed.    DENISE Palacio CNP  Preoperative Assessment Center  [facility]  Phone: [phone]  Fax: [phone]

## 2017-03-13 NOTE — LETTER
March 13, 2017         Lucia Quinteros MD   Thoracic Surgery      Franko Guerrero MD   [facility] [address]      Dear Doctors:      Thank you for requesting consultation on Mary Corral.      HISTORY OF PRESENT ILLNESS:  As you know, she is a patient who saw Dr. 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  The patient initially was going to try to live with it as she never knew that she had it.  She has been diagnosed with asthma which may be a false diagnosis given this large mass.  However, she has been finding recently that she has had more difficulty taking her walks and even climbing a flight of stairs, such that she has to rest afterwards.  She thinks she is not getting enough air as a result of this mass.  She denies any odynophagia but occasionally states that food does get stuck but goes down with water.  She says she is hoarse intermittently, but there has been no persistent hoarseness.     HPI      Past Medical History   Diagnosis Date     Allergic rhinitis      Chronic sinusitis      Conductive hearing loss      COPD (chronic obstructive pulmonary disease) (H)      CRI (chronic renal insufficiency)      mild     Gastroesophageal reflux disease      Hearing problem      Hypertension      Mediastinal mass      Nasal polyps      Pulmonary nodule      Seasonal allergies      Uncomplicated asthma      Past Surgical History   Procedure Laterality Date     Cholecystectomy       Biopsy mass neck N/A 11/21/2016     Procedure: BIOPSY MASS NECK;  Surgeon: Lucia Quinteros MD;  Location: [facility] OR     Endoscopic ultrasound upper gastrointestinal tract (gi) N/A 11/21/2016     Procedure: ENDOSCOPIC ULTRASOUND, ESOPHAGOSCOPY / UPPER GASTROINTESTINAL TRACT (GI);  Surgeon: Lucia Quinteros MD;  Location: [facility] OR     Cataract iol, rt/lt Bilateral 2016     Rotator cuff  repair rt/lt Right 30 years ago       Current Outpatient Prescriptions:      losartan (COZAAR) 50 MG tablet, Take 1 tablet (50 mg) by mouth 2 times daily, Disp: 30 tablet, Rfl:      cloNIDine (CATAPRES-TTS2) 0.2 MG/24HR WK patch, Place 1 patch onto the skin daily, Disp: 4 patch, Rfl: 1     AMLODIPINE BESYLATE PO, Take 5 mg by mouth 2 times daily, Disp: , Rfl:      albuterol (PROAIR HFA/PROVENTIL HFA/VENTOLIN HFA) 108 (90 BASE) MCG/ACT Inhaler, Inhale 2 puffs into the lungs every 6 hours, Disp: , Rfl:      polyethylene glycol (MIRALAX/GLYCOLAX) powder, Take 17 g by mouth daily as needed for constipation, Disp: , Rfl:      INDOMETHACIN PO, Take 25 mg by mouth 3 times daily, Disp: , Rfl:      ALLOPURINOL PO, Take 100 mg by mouth 2 times daily , Disp: , Rfl:      albuterol (2.5 MG/3ML) 0.083% nebulizer solution, Take 1 vial by nebulization every 4 hours as needed for shortness of breath / dyspnea or wheezing, Disp: , Rfl:      fluticasone-salmeterol (ADVAIR) 250-50 MCG/DOSE diskus inhaler, Inhale 1 puff into the lungs 2 times daily, Disp: , Rfl:      Cholecalciferol (VITAMIN D3 PO), Take 1,000 Units by mouth daily, Disp: , Rfl:      ASPIRIN PO, Take 81 mg by mouth daily, Disp: , Rfl:      EPINEPHrine 0.3 MG/0.3ML injection 2-pack, Inject 0.3 mg into the muscle as needed for anaphylaxis, Disp: , Rfl:      SIMVASTATIN PO, Take 20 mg by mouth At Bedtime, Disp: , Rfl:      cetirizine (ZYRTEC) 10 MG tablet, Take 10 mg by mouth 2 times daily, Disp: , Rfl:      RANITIDINE HCL PO, Take 150 mg by mouth 2 times daily, Disp: , Rfl:      ValACYclovir HCl (VALTREX PO), Take 500 mg by mouth as needed, Disp: , Rfl:      multivitamin, therapeutic with minerals (MULTI-VITAMIN) TABS, Take 1 tablet by mouth daily, Disp: , Rfl:      Ferrous Sulfate (IRON SUPPLEMENT PO), Take 45 mg by mouth, Disp: , Rfl:   Allergies   Allergen Reactions     Amoxicillin Hives     Keflex [Cephalexin] Hives     Lisinopril Shortness Of Breath     Penicillins  Hives     Social History   Substance Use Topics     Smoking status: Former Smoker     Packs/day: 0.50     Years: 1.00     Types: Cigarettes     Start date: 9/5/1955     Quit date: 11/18/1958     Smokeless tobacco: Never Used      Comment: smoked twice a week for a couple years while in college, less than 1/2 pack      Alcohol use 0.0 oz/week      Comment: a beer a week     ROS    REVIEW OF SYSTEMS:  A 12-point review of systems is negative for additional findings.      PHYSICAL EXAMINATION:  She is well appearing and in no distress.  Weight is 179 pounds.  Examination of the oral cavity reveals normal mucosa of the tongue, floor of mouth, hard and soft palate, gingival and buccal mucosa, retromolar trigone and posterior pharyngeal wall.  Palpation of floor of mouth, tongue and base of tongue are negative.  She cannot tolerate mirror exam.  Examination of the neck reveals some fullness in the left paratracheal region but no discrete mass is palpated today.  There is no lateral neck lymphadenopathy.      PROCEDURE:  Flexible endoscopy is performed through the right nasal cavity after anesthetization and vasoconstriction using Bib-Synephrine and Xylocaine.  This reveals a normal nasopharynx, oropharynx and hypopharynx.  In viewing the larynx, both vocal folds are mobile; however, the left is hypomobile compared to the right.  This prevents them from meeting completely in the midline.      IMAGING:  The patient has an updated chest CT which continues to show a massive thyroid goiter with  posterior and mediastinal extension with significant displacement of the esophagus to the right.      IMPRESSION AND PLAN:    1.  We will plan for attempted transcervical excision tomorrow.  Daryl uQinteros and Cesar will be on standby for possible sternotomy versus VADs to assist with excision of the posterior and inferior aspects.  I did  the patient extensively regarding the risks of surgery which include but are not limited to  bleeding, infection, recurrent laryngeal nerve injury, and hypoparathyroidism.   We currently are only planning to do the left side.  I think the risk of hypoparathyroidism is relatively low; however, there is significant risk to the recurrent nerve given the size of this mass and the fact that it does appear to be stretched based on our physical exam showing a little bit of hypomobility on the left side.  She understands this risk and is willing to proceed.      Sincerely,      Misbah Julien M.D.        Otolaryngology-Head & Neck Surgery    [phone]      SK/ms

## 2017-03-13 NOTE — Clinical Note
Dr Quinteros and Dr. Julien,  I had the pleasure of seeing your patient in the PAC. Please find the attached H&P.  If you have any questions or concerns I can be reached in the PAC.  Best Regards,  NIRAV Molina  [facility] Phone: [phone] Fax: [phone]

## 2017-03-13 NOTE — PATIENT INSTRUCTIONS
Using an Incentive Spirometer: 5x's/day and 5 times each time. Bring to the hospital.  Soon after your surgery, a nurse or therapist will teach you breathing exercises. These keep your lungs clear, strengthen your breathing muscles, and help prevent complications.  The exercises include doing a deep-breathing exercise using a device called an incentive spirometer.  To do these exercises, you will breathe in through your mouth and not your nose. The incentive spirometer only works correctly if you breathe in through your mouth.  Four steps to clear lungs     Deep breathing expands the lungs, aids circulation, and helps prevent pneumonia.   1. Exhale normally.    Relax and breathe out.  2. Place your lips tightly around the mouthpiece.    Make sure the device is upright and not tilted.  3. Inhale as much air as you can through the mouthpiece (don't breath through your nose).    Inhale slowly and deeply.    Hold your breath long enough to keep the balls or disk raised for at least 3 seconds.    If you re inhaling too quickly, your device may make a tone. If you hear this tone, inhale more slowly.  4. Repeat the exercise regularly.    Do this exercise every hour while you're awake, or as your health care provider instructs.    You will also be taught coughing exercises and be asked to do them regularly on your own.    1808-9428 [employer]. [address] [place] [address], [place] [id number]. All rights reserved. This information is not intended as a substitute for professional medical care. Always follow your healthcare professional's instructions.    AFTER YOUR SURGERY  Breathing exercises   Breathing exercises help you recover faster. Take deep breaths and let the air out slowly. This will:     Help you wake up after surgery.    Help prevent complications like pneumonia.  Preventing complications will help you go home sooner.   We may give you a breathing device (incentive spirometer) to encourage you to  breathe deeply.   Nausea and vomiting   You may feel sick to your stomach after surgery; if so, let your nurse know.    Pain control:  After surgery, you may have pain. Our goal is to help you manage your pain. Pain medicine will help you feel comfortable enough to do activities that will help you heal.  These activities may include breathing exercises, walking and physical therapy.   To help your health care team treat your pain we will ask: 1) If you have pain  2) where it is located 3) describe your pain in your words  Methods of pain control include medications given by mouth, vein or by nerve block for some surgeries.  We may give you a pain control pump that will:  1) Deliver the medicine through a tube placed in your vein  2) Control the amount of medicine you receive  3) Allow you to push a button to deliver a dose of pain medicine  Sequential Compression Device (SCD) or Pneumo Boots:  You may need to wear SCD S on your legs or feet. These are wraps connected to a machine that pumps in air and releases it. The repeated pumping helps prevent blood clots from forming.     Enhanced Recovery After Surgery     This is a team effort, including you, to get you back on your feet, eating and drinking normally and out of the hospital as quickly as possible.  The goals are: 1) NO INFECTIONS and   2) RETURN TO NORMAL DIET    How can we achieve these goals?  1) STAY ACTIVE: Walk every day before your surgery; try to increase the amount every day.  Walk after surgery as much as you can-the nurses will help you.  Walking speeds healing and gets you home quicker, you heal better at home and have less risk of infection.     2) INCENTIVE SPIROMETER: Practice your incentive spirometer 4 times per day with 5-10 repetitions each time.  Using the incentive spirometer can strengthen your muscles between your ribs and help you have a strong cough after surgery.  A more effective cough can help prevent problems with your  lungs.    3) STAY HYDRATED: Drink clear liquids up until 2 hours before your surgery. We would like you to purchase a drink such as Gatorade.  Drink this before bedtime and on the way into the hospital, drink between 8-12 ounces or until you feel hydrated.  Keeping well hydrated leads to your veins being plump, you wake up faster, and you are less likely to be nauseated. Start drinking water as soon as you can after surgery and advance to clear liquids and food as tolerated.  IV fluids contain salt, drinking fluids will minimize the amount of IV fluids you need and decrease the amount of salt you get.     4) PAIN MANAGEMENT: If we minimize the amount of opioids and narcotics, and use regional blocks (which numb the area where your surgery is) along with oral pain medications; you will have less side effects of nausea and constipation. Narcotics can slow down your bowels and cause you to stay in the hospital longer.     Our goal is to keep you comfortable; eating and drinking normally and back home safely.     Preparing for Your Surgery      Name:  Mary Corral   MRN:  [id number]   :  1935   Today's Date:  3/13/2017     Arriving for surgery:  Surgery date: 17  Surgery time:  0800 AM  Arrival time: 0600 AM  Please come to:       [facility] [address] [place] [address]  [address]    -   parking is available in front of the hospital from 5:15 am to 8:00 pm    -  Stop at the Information Desk in the lobby    -   Inform the information person that you are here for surgery. An escort to  will be provided. If you would not like an escort, please proceed to 3C on the 3rd floor. [phone]     What can I eat or drink?  -  You may have solid food or milk products until 8 hours prior to your surgery.  12 [place] Monday harpreet.  -  You may have water, apple juice or 7up/Sprite until 2 hours prior to your surgery. 0600 AM Tuesday AM    Which medicines can I take?  -  Do  NOT take these medications in the morning, the day of surgery:   HOLD Losartan, VIt.D, Multi-vit. AM of surg.    -  Please take these medications the day of surgery:  Amlodipine, Leave Clonidine  patch on,   Inhalers and scheduled meds.    How do I prepare myself?  -  Take two showers: one the night before surgery; and one the morning of surgery.         Use Scrubcare or Hibiclens to wash from neck down.  You may use your own shampoo and conditioner. No other hair products.   -  Do NOT use lotion, powder, deodorant, or antiperspirant the day of your surgery.  -  Do NOT wear any makeup, fingernail polish or jewelry.  -  Begin using Incentive Spirometer 1 week prior to surgery.  Use 4 times per day, up to 5-10 breaths each time.  Bring Incentive Spirometer to hospital.  -Do not bring your own medications to the hospital, except for inhalers and eye drops.  -  Bring your ID and insurance card.    Questions or Concerns:  If you have questions or concerns, please call the  Preoperative Assessment Center, Monday-Friday 7AM-7PM:  [phone]

## 2017-03-13 NOTE — MR AVS SNAPSHOT
After Visit Summary   3/13/2017    Mary Corral    MRN: [id number]           Patient Information     Date Of Birth          1935        Visit Information        Provider Department      3/13/2017 11:00 AM Rn, [facility]        Care Instructions      Using an Incentive Spirometer: 5x's/day and 5 times each time. Bring to the hospital.  Soon after your surgery, a nurse or therapist will teach you breathing exercises. These keep your lungs clear, strengthen your breathing muscles, and help prevent complications.  The exercises include doing a deep-breathing exercise using a device called an incentive spirometer.  To do these exercises, you will breathe in through your mouth and not your nose. The incentive spirometer only works correctly if you breathe in through your mouth.  Four steps to clear lungs     Deep breathing expands the lungs, aids circulation, and helps prevent pneumonia.   1. Exhale normally.    Relax and breathe out.  2. Place your lips tightly around the mouthpiece.    Make sure the device is upright and not tilted.  3. Inhale as much air as you can through the mouthpiece (don't breath through your nose).    Inhale slowly and deeply.    Hold your breath long enough to keep the balls or disk raised for at least 3 seconds.    If you re inhaling too quickly, your device may make a tone. If you hear this tone, inhale more slowly.  4. Repeat the exercise regularly.    Do this exercise every hour while you're awake, or as your health care provider instructs.    You will also be taught coughing exercises and be asked to do them regularly on your own.    5625-2937 [employer]. [address] [place] [address], [place] [address]. All rights reserved. This information is not intended as a substitute for professional medical care. Always follow your healthcare professional's instructions.    AFTER YOUR SURGERY  Breathing exercises   Breathing exercises help  you recover faster. Take deep breaths and let the air out slowly. This will:     Help you wake up after surgery.    Help prevent complications like pneumonia.  Preventing complications will help you go home sooner.   We may give you a breathing device (incentive spirometer) to encourage you to breathe deeply.   Nausea and vomiting   You may feel sick to your stomach after surgery; if so, let your nurse know.    Pain control:  After surgery, you may have pain. Our goal is to help you manage your pain. Pain medicine will help you feel comfortable enough to do activities that will help you heal.  These activities may include breathing exercises, walking and physical therapy.   To help your health care team treat your pain we will ask: 1) If you have pain  2) where it is located 3) describe your pain in your words  Methods of pain control include medications given by mouth, vein or by nerve block for some surgeries.  We may give you a pain control pump that will:  1) Deliver the medicine through a tube placed in your vein  2) Control the amount of medicine you receive  3) Allow you to push a button to deliver a dose of pain medicine  Sequential Compression Device (SCD) or Pneumo Boots:  You may need to wear SCD S on your legs or feet. These are wraps connected to a machine that pumps in air and releases it. The repeated pumping helps prevent blood clots from forming.     Enhanced Recovery After Surgery     This is a team effort, including you, to get you back on your feet, eating and drinking normally and out of the hospital as quickly as possible.  The goals are: 1) NO INFECTIONS and   2) RETURN TO NORMAL DIET    How can we achieve these goals?  1) STAY ACTIVE: Walk every day before your surgery; try to increase the amount every day.  Walk after surgery as much as you can-the nurses will help you.  Walking speeds healing and gets you home quicker, you heal better at home and have less risk of infection.     2)  INCENTIVE SPIROMETER: Practice your incentive spirometer 4 times per day with 5-10 repetitions each time.  Using the incentive spirometer can strengthen your muscles between your ribs and help you have a strong cough after surgery.  A more effective cough can help prevent problems with your lungs.    3) STAY HYDRATED: Drink clear liquids up until 2 hours before your surgery. We would like you to purchase a drink such as Gatorade.  Drink this before bedtime and on the way into the hospital, drink between 8-12 ounces or until you feel hydrated.  Keeping well hydrated leads to your veins being plump, you wake up faster, and you are less likely to be nauseated. Start drinking water as soon as you can after surgery and advance to clear liquids and food as tolerated.  IV fluids contain salt, drinking fluids will minimize the amount of IV fluids you need and decrease the amount of salt you get.     4) PAIN MANAGEMENT: If we minimize the amount of opioids and narcotics, and use regional blocks (which numb the area where your surgery is) along with oral pain medications; you will have less side effects of nausea and constipation. Narcotics can slow down your bowels and cause you to stay in the hospital longer.     Our goal is to keep you comfortable; eating and drinking normally and back home safely.     Preparing for Your Surgery      Name:  Mary Corral   MRN:  [id number]   :  1935   Today's Date:  3/13/2017     Arriving for surgery:  Surgery date: 17  Surgery time:  0800 AM  Arrival time: 0600 AM  Please come to:       [facility] [address] [place] [address]  [address]    [facility] parking is available in front of the hospital from 5:15 am to 8:00 pm    -  Stop at the Information Desk in the lobby    -   Inform the information person that you are here for surgery. An escort to 3c will be provided. If you would not like an escort, please proceed to 3C on the   floor. [phone]     What can I eat or drink?  -  You may have solid food or milk products until 8 hours prior to your surgery.  12 [place] Monday harpreet.  -  You may have water, apple juice or 7up/Sprite until 2 hours prior to your surgery. 0600 AM Tuesday AM    Which medicines can I take?  -  Do NOT take these medications in the morning, the day of surgery:   HOLD Losartan, VIt.D, Multi-vit. AM of surg.    -  Please take these medications the day of surgery:  Amlodipine, Leave Clonidine  patch on,   Inhalers and scheduled meds.    How do I prepare myself?  -  Take two showers: one the night before surgery; and one the morning of surgery.         Use Scrubcare or Hibiclens to wash from neck down.  You may use your own shampoo and conditioner. No other hair products.   -  Do NOT use lotion, powder, deodorant, or antiperspirant the day of your surgery.  -  Do NOT wear any makeup, fingernail polish or jewelry.  -  Begin using Incentive Spirometer 1 week prior to surgery.  Use 4 times per day, up to 5-10 breaths each time.  Bring Incentive Spirometer to hospital.  -Do not bring your own medications to the hospital, except for inhalers and eye drops.  -  Bring your ID and insurance card.    Questions or Concerns:  If you have questions or concerns, please call the  Preoperative Assessment Center, Monday-Friday 7AM-7PM:  [phone]                        Follow-ups after your visit        Your next 10 appointments already scheduled     Mar 13, 2017 12:50 PM CDT   (Arrive by 12:35 PM)   PAC Anesthesia Consult with  Pac Anesthesiologist   [facility] ([facility])    [address] [place] [address]  4th Floor  [facility] [address] [phone]            Mar 13, 2017  1:00 PM CDT   LAB with  LAB   [facility] ([facility])    [address] [place] [address] [phone]           Patient must bring picture ID.  Patient  should be prepared to give a urine specimen  Please do not eat 10-12 hours before your appointment if you are coming in fasting for labs on lipids, cholesterol, or glucose (sugar).  Pregnant women should follow their Care Team instructions. Water with medications is okay. Do not drink coffee or other fluids.   If you have concerns about taking  your medications, please ask at office or if scheduling via [employer], send a message by clicking on Secure Messaging, Message Your Care Team.            Mar 14, 2017   Procedure with Lucia Quinteros MD   [facility], [place], Same Day Surgery (--)    [address]  [place] [id number] [phone]              Future tests that were ordered for you today     Open Future Orders        Priority Expected Expires Ordered    TSH with free T4 reflex Routine  3/13/2018 3/13/2017            Who to contact     Please call your clinic at [phone] to:    Ask questions about your health    Make or cancel appointments    Discuss your medicines    Learn about your test results    Speak to your doctor   If you have compliments or concerns about an experience at your clinic, or if you wish to file a complaint, please contact [facility] Patient Relations at [phone] or email us at [email or web address]         Additional Information About Your Visit        JotSpot Information     [employer]Spot gives you secure access to your electronic health record. If you see a primary care provider, you can also send messages to your care team and make appointments. If you have questions, please call your primary care clinic.  If you do not have a primary care provider, please call [phone] and they will assist you.      J[employer]Spot is an electronic gateway that provides easy, online access to your medical records. With J[employer]Spot, you can request a clinic appointment, read your test results, renew a prescription or communicate with your care team.     To access your existing  account, please contact your [facility] or call [phone] for assistance.        Care EveryWhere ID     This is your Care EveryWhere ID. This could be used by other organizations to access your [facility] medical records  [id number]         Blood Pressure from Last 3 Encounters:   03/13/17 161/68   01/31/17 138/77   01/12/17 169/65    Weight from Last 3 Encounters:   03/13/17 81.2 kg (179 lb)   03/13/17 81.2 kg (179 lb)   01/31/17 84.4 kg (186 lb)              Today, you had the following     No orders found for display       Primary Care Provider Office Phone # Fax #    Jessica Fonseca [phone]       [address] [address]        Thank you!     Thank you for choosing [facility] PREOPERATIVE ASSESSMENT [place]  for your care. Our goal is always to provide you with excellent care. Hearing back from our patients is one way we can continue to improve our services. Please take a few minutes to complete the written survey that you may receive in the mail after your visit with us. Thank you!             Your Updated Medication List - Protect others around you: Learn how to safely use, store and throw away your medicines at www.disposemymeds.org.          This list is accurate as of: 3/13/17 12:17 PM.  Always use your most recent med list.                   Brand Name Dispense Instructions for use    * albuterol (2.5 MG/3ML) 0.083% neb solution      Take 1 vial by nebulization every 4 hours as needed for shortness of breath / dyspnea or wheezing       * albuterol 108 (90 BASE) MCG/ACT Inhaler    PROAIR HFA/PROVENTIL HFA/VENTOLIN HFA     Inhale 2 puffs into the lungs every 6 hours       ALLOPURINOL PO      Take 100 mg by mouth 2 times daily       AMLODIPINE BESYLATE PO      Take 5 mg by mouth 2 times daily       ASPIRIN PO      Take 81 mg by mouth daily       cetirizine 10 MG tablet    zyrTEC     Take 10 mg by mouth 2 times daily        cloNIDine 0.2 MG/24HR WK patch    CATAPRES-TTS2    4 patch    Place 1 patch onto the skin daily       EPINEPHrine 0.3 MG/0.3ML injection      Inject 0.3 mg into the muscle as needed for anaphylaxis       fluticasone-salmeterol 250-50 MCG/DOSE diskus inhaler    ADVAIR     Inhale 1 puff into the lungs 2 times daily       INDOMETHACIN PO      Take 25 mg by mouth 3 times daily as needed       IRON SUPPLEMENT PO      Take 45 mg by mouth       losartan 50 MG tablet    COZAAR    30 tablet    Take 1 tablet (50 mg) by mouth 2 times daily       Multi-vitamin Tabs tablet      Take 1 tablet by mouth daily       polyethylene glycol powder    MIRALAX/GLYCOLAX     Take 17 g by mouth daily as needed for constipation       RANITIDINE HCL PO      Take 150 mg by mouth 2 times daily       SIMVASTATIN PO      Take 20 mg by mouth At Bedtime       VALTREX PO      Take 500 mg by mouth as needed       VITAMIN D3 PO      Take 1,000 Units by mouth daily       * Notice:  This list has 2 medication(s) that are the same as other medications prescribed for you. Read the directions carefully, and ask your doctor or other care provider to review them with you.

## 2017-03-13 NOTE — PATIENT INSTRUCTIONS
Surgery scheduled for tomorrow  Follow instructions given  Labs to be done today  You will be called with the results  Call me if ?'s arise [phone] Marcie Lees RN

## 2017-03-13 NOTE — PROGRESS NOTES
March 13, 2017         Lucia Quinteros MD   Thoracic Surgery      Franko Guerrero MD   [facility] [address]      Dear Doctors:      Thank you for requesting consultation on Mary Corral.      HISTORY OF PRESENT ILLNESS:  As you know, she is a patient who saw Dr. 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  The patient initially was going to try to live with it as she never knew that she had it.  She has been diagnosed with asthma which may be a false diagnosis given this large mass.  However, she has been finding recently that she has had more difficulty taking her walks and even climbing a flight of stairs, such that she has to rest afterwards.  She thinks she is not getting enough air as a result of this mass.  She denies any odynophagia but occasionally states that food does get stuck but goes down with water.  She says she is hoarse intermittently, but there has been no persistent hoarseness.     HPI      Past Medical History   Diagnosis Date     Allergic rhinitis      Chronic sinusitis      Conductive hearing loss      COPD (chronic obstructive pulmonary disease) (H)      CRI (chronic renal insufficiency)      mild     Gastroesophageal reflux disease      Hearing problem      Hypertension      Mediastinal mass      Nasal polyps      Pulmonary nodule      Seasonal allergies      Uncomplicated asthma      Past Surgical History   Procedure Laterality Date     Cholecystectomy       Biopsy mass neck N/A 11/21/2016     Procedure: BIOPSY MASS NECK;  Surgeon: Lucia Quinteros MD;  Location: [facility] OR     Endoscopic ultrasound upper gastrointestinal tract (gi) N/A 11/21/2016     Procedure: ENDOSCOPIC ULTRASOUND, ESOPHAGOSCOPY / UPPER GASTROINTESTINAL TRACT (GI);  Surgeon: Lucia Quinteros MD;  Location: [facility] OR     Cataract iol, rt/lt Bilateral 2016     Rotator cuff  repair rt/lt Right 30 years ago       Current Outpatient Prescriptions:      losartan (COZAAR) 50 MG tablet, Take 1 tablet (50 mg) by mouth 2 times daily, Disp: 30 tablet, Rfl:      cloNIDine (CATAPRES-TTS2) 0.2 MG/24HR WK patch, Place 1 patch onto the skin daily, Disp: 4 patch, Rfl: 1     AMLODIPINE BESYLATE PO, Take 5 mg by mouth 2 times daily, Disp: , Rfl:      albuterol (PROAIR HFA/PROVENTIL HFA/VENTOLIN HFA) 108 (90 BASE) MCG/ACT Inhaler, Inhale 2 puffs into the lungs every 6 hours, Disp: , Rfl:      polyethylene glycol (MIRALAX/GLYCOLAX) powder, Take 17 g by mouth daily as needed for constipation, Disp: , Rfl:      INDOMETHACIN PO, Take 25 mg by mouth 3 times daily, Disp: , Rfl:      ALLOPURINOL PO, Take 100 mg by mouth 2 times daily , Disp: , Rfl:      albuterol (2.5 MG/3ML) 0.083% nebulizer solution, Take 1 vial by nebulization every 4 hours as needed for shortness of breath / dyspnea or wheezing, Disp: , Rfl:      fluticasone-salmeterol (ADVAIR) 250-50 MCG/DOSE diskus inhaler, Inhale 1 puff into the lungs 2 times daily, Disp: , Rfl:      Cholecalciferol (VITAMIN D3 PO), Take 1,000 Units by mouth daily, Disp: , Rfl:      ASPIRIN PO, Take 81 mg by mouth daily, Disp: , Rfl:      EPINEPHrine 0.3 MG/0.3ML injection 2-pack, Inject 0.3 mg into the muscle as needed for anaphylaxis, Disp: , Rfl:      SIMVASTATIN PO, Take 20 mg by mouth At Bedtime, Disp: , Rfl:      cetirizine (ZYRTEC) 10 MG tablet, Take 10 mg by mouth 2 times daily, Disp: , Rfl:      RANITIDINE HCL PO, Take 150 mg by mouth 2 times daily, Disp: , Rfl:      ValACYclovir HCl (VALTREX PO), Take 500 mg by mouth as needed, Disp: , Rfl:      multivitamin, therapeutic with minerals (MULTI-VITAMIN) TABS, Take 1 tablet by mouth daily, Disp: , Rfl:      Ferrous Sulfate (IRON SUPPLEMENT PO), Take 45 mg by mouth, Disp: , Rfl:   Allergies   Allergen Reactions     Amoxicillin Hives     Keflex [Cephalexin] Hives     Lisinopril Shortness Of Breath     Penicillins  Hives     Social History   Substance Use Topics     Smoking status: Former Smoker     Packs/day: 0.50     Years: 1.00     Types: Cigarettes     Start date: 9/5/1955     Quit date: 11/18/1958     Smokeless tobacco: Never Used      Comment: smoked twice a week for a couple years while in college, less than 1/2 pack      Alcohol use 0.0 oz/week      Comment: a beer a week     ROS    REVIEW OF SYSTEMS:  A 12-point review of systems is negative for additional findings.      PHYSICAL EXAMINATION:  She is well appearing and in no distress.  Weight is 179 pounds.  Examination of the oral cavity reveals normal mucosa of the tongue, floor of mouth, hard and soft palate, gingival and buccal mucosa, retromolar trigone and posterior pharyngeal wall.  Palpation of floor of mouth, tongue and base of tongue are negative.  She cannot tolerate mirror exam.  Examination of the neck reveals some fullness in the left paratracheal region but no discrete mass is palpated today.  There is no lateral neck lymphadenopathy.      PROCEDURE:  Flexible endoscopy is performed through the right nasal cavity after anesthetization and vasoconstriction using Bib-Synephrine and Xylocaine.  This reveals a normal nasopharynx, oropharynx and hypopharynx.  In viewing the larynx, both vocal folds are mobile; however, the left is hypomobile compared to the right.  This prevents them from meeting completely in the midline.      IMAGING:  The patient has an updated chest CT which continues to show a massive thyroid goiter with  posterior and mediastinal extension with significant displacement of the esophagus to the right.      IMPRESSION AND PLAN:    1.  We will plan for attempted transcervical excision tomorrow.  Daryl Quinteros and Cesar will be on standby for possible sternotomy versus VADs to assist with excision of the posterior and inferior aspects.  I did  the patient extensively regarding the risks of surgery which include but are not limited to  bleeding, infection, recurrent laryngeal nerve injury, and hypoparathyroidism.   We currently are only planning to do the left side.  I think the risk of hypoparathyroidism is relatively low; however, there is significant risk to the recurrent nerve given the size of this mass and the fact that it does appear to be stretched based on our physical exam showing a little bit of hypomobility on the left side.  She understands this risk and is willing to proceed.      Sincerely,      Misbah Julien M.D.        Otolaryngology-Head & Neck Surgery    [phone]      SK/ms

## 2017-03-13 NOTE — ANESTHESIA PREPROCEDURE EVALUATION
Anesthesia Evaluation     . Pt has had prior anesthetic. Type: General    History of anesthetic complications  - difficult intubation    ROS/MED HX    ENT/Pulmonary: Comment: Chronic post nasal    (+)tobacco use, Past use in the 50's packs/day  asthma Treatment: Inhaler daily,  moderate COPD, , . .    Neurologic:  - neg neurologic ROS     Cardiovascular:     (+) Dyslipidemia, hypertension----. : . . . :. .       METS/Exercise Tolerance: Comment: Prior to mass > 4METs,  Played golf     Hematologic:  - neg hematologic  ROS       Musculoskeletal:   (+) arthritis, , , -       GI/Hepatic:     (+) GERD Asymptomatic on medication,       Renal/Genitourinary:     (+) chronic renal disease, type: CRI, Pt does not require dialysis, Pt has no history of transplant,       Endo:  - neg endo ROS       Psychiatric:  - neg psychiatric ROS       Infectious Disease:  - neg infectious disease ROS       Malignancy:   (+) Malignancy History of Skin       - no malignancy   Other:    (+) No chance of pregnancy C-spine cleared: N/A, no H/O Chronic Pain,no other significant disability              Physical Exam  Normal systems: cardiovascular and pulmonary    Airway   Mallampati: II  TM distance: >3 FB  Neck ROM: full    Dental   Comment: Implants  Permanent bridge    Cardiovascular   Rhythm and rate: regular and normal      Pulmonary (+) wheezes     PE comment: Mild expiratory wheezing               PAC Discussion and Assessment    ASA Classification: 3  Case is suitable for: [place]  Anesthetic techniques and relevant risks discussed: GA  Invasive monitoring and risk discussed: Yes  Types:   Possibility and Risk of blood transfusion discussed: Yes  NPO instructions given: NPO after midnight  Additional anesthetic preparation and risks discussed: Invasive monitoring  Needs early admission to pre-op area:   Other:     PAC Resident/NP Anesthesia Assessment:  ASSESSMENT and PLAN  Mary Corral is a 81 year old female scheduled to undergo  "Flexible Bronchoscopy, Right Thoracotomy, Excision Of Mediastinal Mass Possible Neck Exploration Possible Left Hemithyroidectomy Anesthesia general with block  with Dr. Aldair Julien on 03/14/17 at [facility].      She has the following specific operative considerations:   1. Cardiac- HTN- stable on 3 medications, HLD.  No other cardiac history.  > 4 METS this summer however with compression from tumor she has been limited  - Leave Clonidine patch on per Dr. Freed  -Take Amlodipine the DOS  - Hold Losartan the DOS  -ASA- hold   2. RESP- Asthma/COPD- exercise induced asthma, uses her albuterol inhaler 3-4 times per day. PFTs- moderate airway obstruction.  Former smoker quit in the 1950s.  - Continue Advair and Albuterol the DOS  -Neb ordered the DOS  3. GERD- well managed  - continue Ranitidine the DOS   4. RENAL- Cr. 1.4, GFR -36, has been stable avoid nephrotoxic medications  5. ENDO- no thyroid symptoms.  Thyroid studies for the most part negative in 11/2016- pending today   ADD: TSH 0.01 Low today, Free T4- 1.75 mildly elevated  6. Anesthesia- denies previous complications from anesthesia.  Previous difficult airway, used CMAC.    ERAS pathway started (all medications ordered at last visit).     - RCRI : 0.4% risk of major adverse cardiac event.   - VTE risk:0.5%  - ASHLEY # of risks /8 = Low risk  - Risk of PONV score = 2 If > 2, anti-emetic intervention recommended.      Patient was discussed with Dr. Freed.    DENISE Palacio CNP        Mid-Level Provider/Resident:   Date:   Time:     Attending Anesthesiologist Anesthesia Assessment:  STAFF:  81 y.o. Spunky woman with thyroid tumor/disease with mediastinal extension for RESECTION by Dr. Quinteros  using general anesthesia.   History summarized above.  # obstructive \"asthma\" from mass with moderate obstructive airflow pattern by PFT's ... No flow-volume loops that I can find.  The patient can lie flat, but " really is symptomatic with HACKETT.   # Difficult intubation by Kassie LABOY And Russel for her previous procedure.   # NORMAL TFT's as of last November  #HTN  #Stable CRI  Instructions given and questions answered.   Final plans by anesthesiology team on DOS.   ---rcp      Reviewed and Signed by PAC Anesthesiologist  Anesthesiologist: angela  Date: 3/13  Time:   Pass/Fail: Pass  Disposition:     PAC Pharmacist Assessment:        Pharmacist:   Date:   Time:      Anesthesia Plan      History & Physical Review  History and physical reviewed and following examination; no interval change.    ASA Status:  3 .    NPO Status:  > 8 hours    Plan for General and ETT with Intravenous and Propofol induction. Maintenance will be Balanced.    PONV prophylaxis:  Ondansetron (or other 5HT-3) and Dexamethasone or Solumedrol  Additional equipment: Videolaryngoscope, 2nd IV, Arterial Line, Double Lumen ETT, Central Line and Fiberoptic bronchoscope      Postoperative Care  Postoperative pain management:  IV analgesics, Oral pain medications and Multi-modal analgesia.      Consents  Anesthetic plan, risks, benefits and alternatives discussed with:  Patient.  Use of blood products discussed: Yes.   Use of blood products discussed with Patient.  Consented to blood products.  .        I have seen and evaluated the patient myself and agree with the above assessment and plan.  I have explained the risks/benefits of anesthesia to the patient who understands and agrees to proceed.    Kassie Kaufman MD  Staff Anesthesiologist  Pager [phone]                      .

## 2017-03-13 NOTE — MR AVS SNAPSHOT
After Visit Summary   3/13/2017    Mary Corral    MRN: [id number]           Patient Information     Date Of Birth          1935        Visit Information        Provider Department      3/13/2017 10:45 AM Misbah Julien MD  [facility]        Today's Diagnoses     Goiter    -  1      Care Instructions    Surgery scheduled for tomorrow  Follow instructions given  Labs to be done today  You will be called with the results  Call me if ?'s arise [phone] Marcie Lees RN          Follow-ups after your visit        Who to contact     Please call your clinic at [phone] to:    Ask questions about your health    Make or cancel appointments    Discuss your medicines    Learn about your test results    Speak to your doctor   If you have compliments or concerns about an experience at your clinic, or if you wish to file a complaint, please contact [facility] Patient Relations at [phone] or email us at [email or web address]         Additional Information About Your Visit        MyChart Information     [employer] gives you secure access to your electronic health record. If you see a primary care provider, you can also send messages to your care team and make appointments. If you have questions, please call your primary care clinic.  If you do not have a primary care provider, please call [phone] and they will assist you.      [employer] is an electronic gateway that provides easy, online access to your medical records. With [employer], you can request a clinic appointment, read your test results, renew a prescription or communicate with your care team.     To access your existing account, please contact your [facility] Clinic or call [phone] for assistance.        Care EveryWhere ID     This is your Care EveryWhere ID. This could be used by other organizations to access your [place] medical records  [id number]       "  Your Vitals Were     Height BMI (Body Mass Index)                1.67 m (5' 5.75\") 29.11 kg/m2           Blood Pressure from Last 3 Encounters:   03/15/17 118/52   03/13/17 140/66   01/31/17 138/77    Weight from Last 3 Encounters:   03/20/17 80.7 kg (178 lb)   03/14/17 82.8 kg (182 lb 8.7 oz)   03/13/17 81.2 kg (179 lb)              We Performed the Following     LARYNGOSCOPY FLEX FIBEROPTIC, DIAGNOSTIC     LARYNGOSCOPY FLEX FIBEROPTIC, DIAGNOSTIC        Primary Care Provider Office Phone # Fax #    Jessica Fonseca [phone]       [address] [address]        Thank you!     Thank you for choosing [facility]  for your care. Our goal is always to provide you with excellent care. Hearing back from our patients is one way we can continue to improve our services. Please take a few minutes to complete the written survey that you may receive in the mail after your visit with us. Thank you!             Your Updated Medication List - Protect others around you: Learn how to safely use, store and throw away your medicines at www.disposemymeds.org.          This list is accurate as of: 3/13/17 11:59 PM.  Always use your most recent med list.                   Brand Name Dispense Instructions for use    acetaminophen 325 MG tablet    TYLENOL    100 tablet    Take 2 tablets (650 mg) by mouth every 4 hours as needed for other (surgical pain)       * albuterol (2.5 MG/3ML) 0.083% neb solution      Take 1 vial by nebulization every 4 hours as needed for shortness of breath / dyspnea or wheezing       * albuterol 108 (90 BASE) MCG/ACT Inhaler    PROAIR HFA/PROVENTIL HFA/VENTOLIN HFA     Inhale 2 puffs into the lungs every 6 hours       ALLOPURINOL PO      Take 100 mg by mouth 2 times daily       AMLODIPINE BESYLATE PO      Take 5 mg by mouth 2 times daily       ASPIRIN PO      Take 81 mg by mouth daily       cetirizine 10 MG tablet    zyrTEC     Take 10 mg " by mouth 2 times daily       cloNIDine 0.2 MG/24HR WK patch    CATAPRES-TTS2    4 patch    Place 1 patch onto the skin daily       EPINEPHrine 0.3 MG/0.3ML injection      Inject 0.3 mg into the muscle as needed for anaphylaxis       fluticasone-salmeterol 250-50 MCG/DOSE diskus inhaler    ADVAIR     Inhale 1 puff into the lungs 2 times daily       IRON SUPPLEMENT PO      Take 45 mg by mouth       losartan 50 MG tablet    COZAAR    30 tablet    Take 1 tablet (50 mg) by mouth 2 times daily       mineral oil-hydrophilic petrolatum     50 g    Apply topically every 8 hours       Multi-vitamin Tabs tablet      Take 1 tablet by mouth daily       oxyCODONE 5 MG IR tablet    ROXICODONE    40 tablet    Take 1 tablet (5 mg) by mouth every 3 hours as needed for moderate to severe pain       polyethylene glycol powder    MIRALAX/GLYCOLAX     Take 17 g by mouth daily as needed for constipation       RANITIDINE HCL PO      Take 150 mg by mouth 2 times daily       SIMVASTATIN PO      Take 20 mg by mouth At Bedtime       VALTREX PO      Take 500 mg by mouth as needed       VITAMIN D3 PO      Take 1,000 Units by mouth daily       * Notice:  This list has 2 medication(s) that are the same as other medications prescribed for you. Read the directions carefully, and ask your doctor or other care provider to review them with you.

## 2017-03-14 ENCOUNTER — HOSPITAL ENCOUNTER (OUTPATIENT)
Facility: CLINIC | Age: 82
Discharge: HOME | End: 2017-03-15
Attending: STUDENT IN AN ORGANIZED HEALTH CARE EDUCATION/TRAINING PROGRAM | Admitting: OTOLARYNGOLOGY
Payer: COMMERCIAL

## 2017-03-14 ENCOUNTER — APPOINTMENT (OUTPATIENT)
Dept: GENERAL RADIOLOGY | Facility: CLINIC | Age: 82
End: 2017-03-14
Attending: STUDENT IN AN ORGANIZED HEALTH CARE EDUCATION/TRAINING PROGRAM
Payer: COMMERCIAL

## 2017-03-14 ENCOUNTER — ANESTHESIA (OUTPATIENT)
Dept: SURGERY | Facility: CLINIC | Age: 82
End: 2017-03-14
Payer: COMMERCIAL

## 2017-03-14 DIAGNOSIS — Z98.890 POSTOPERATIVE STATE: ICD-10-CM

## 2017-03-14 DIAGNOSIS — G89.18 ACUTE POST-OPERATIVE PAIN: Primary | ICD-10-CM

## 2017-03-14 PROBLEM — E04.9 SUBSTERNAL GOITER: Status: ACTIVE | Noted: 2017-03-14

## 2017-03-14 LAB
ABO + RH BLD: NORMAL
ABO + RH BLD: NORMAL
BASE DEFICIT BLDA-SCNC: 5.5 MMOL/L
BLD GP AB SCN SERPL QL: NORMAL
BLOOD BANK CMNT PATIENT-IMP: NORMAL
CA-I BLD-MCNC: 5.1 MG/DL (ref 4.4–5.2)
CREAT SERPL-MCNC: 1.32 MG/DL (ref 0.52–1.04)
CREAT SERPL-MCNC: 1.5 MG/DL (ref 0.52–1.04)
GFR SERPL CREATININE-BSD FRML MDRD: 33 ML/MIN/1.7M2
GFR SERPL CREATININE-BSD FRML MDRD: 39 ML/MIN/1.7M2
GLUCOSE BLD-MCNC: 137 MG/DL (ref 70–99)
HCO3 BLD-SCNC: 20 MMOL/L (ref 21–28)
HGB BLD-MCNC: 10.2 G/DL (ref 11.7–15.7)
MAGNESIUM SERPL-MCNC: 1.8 MG/DL (ref 1.6–2.3)
O2/TOTAL GAS SETTING VFR VENT: 74 %
PCO2 BLD: 38 MM HG (ref 35–45)
PH BLD: 7.33 PH (ref 7.35–7.45)
PHOSPHATE SERPL-MCNC: 4.4 MG/DL (ref 2.5–4.5)
PLATELET # BLD AUTO: 120 10E9/L (ref 150–450)
PO2 BLD: 307 MM HG (ref 80–105)
POTASSIUM BLD-SCNC: 4 MMOL/L (ref 3.4–5.3)
POTASSIUM SERPL-SCNC: 4 MMOL/L (ref 3.4–5.3)
SODIUM BLD-SCNC: 137 MMOL/L (ref 133–144)
SPECIMEN EXP DATE BLD: NORMAL

## 2017-03-14 PROCEDURE — 25000132 ZZH RX MED GY IP 250 OP 250 PS 637: Performed by: STUDENT IN AN ORGANIZED HEALTH CARE EDUCATION/TRAINING PROGRAM

## 2017-03-14 PROCEDURE — 84100 ASSAY OF PHOSPHORUS: CPT | Performed by: ANESTHESIOLOGY

## 2017-03-14 PROCEDURE — 25000132 ZZH RX MED GY IP 250 OP 250 PS 637: Performed by: CLINICAL NURSE SPECIALIST

## 2017-03-14 PROCEDURE — 36000062 ZZH SURGERY LEVEL 4 1ST 30 MIN - UMMC: Performed by: STUDENT IN AN ORGANIZED HEALTH CARE EDUCATION/TRAINING PROGRAM

## 2017-03-14 PROCEDURE — 36415 COLL VENOUS BLD VENIPUNCTURE: CPT | Performed by: NURSE PRACTITIONER

## 2017-03-14 PROCEDURE — 25000125 ZZHC RX 250: Performed by: NURSE ANESTHETIST, CERTIFIED REGISTERED

## 2017-03-14 PROCEDURE — S0077 INJECTION, CLINDAMYCIN PHOSP: HCPCS | Performed by: CLINICAL NURSE SPECIALIST

## 2017-03-14 PROCEDURE — 25800025 ZZH RX 258: Performed by: STUDENT IN AN ORGANIZED HEALTH CARE EDUCATION/TRAINING PROGRAM

## 2017-03-14 PROCEDURE — 82330 ASSAY OF CALCIUM: CPT | Performed by: STUDENT IN AN ORGANIZED HEALTH CARE EDUCATION/TRAINING PROGRAM

## 2017-03-14 PROCEDURE — 25000128 H RX IP 250 OP 636: Performed by: NURSE ANESTHETIST, CERTIFIED REGISTERED

## 2017-03-14 PROCEDURE — 40000275 ZZH STATISTIC RCP TIME EA 10 MIN

## 2017-03-14 PROCEDURE — 12000008 ZZH R&B INTERMEDIATE UMMC

## 2017-03-14 PROCEDURE — 40000170 ZZH STATISTIC PRE-PROCEDURE ASSESSMENT II: Performed by: STUDENT IN AN ORGANIZED HEALTH CARE EDUCATION/TRAINING PROGRAM

## 2017-03-14 PROCEDURE — 25000128 H RX IP 250 OP 636: Performed by: OTOLARYNGOLOGY

## 2017-03-14 PROCEDURE — 82947 ASSAY GLUCOSE BLOOD QUANT: CPT | Performed by: STUDENT IN AN ORGANIZED HEALTH CARE EDUCATION/TRAINING PROGRAM

## 2017-03-14 PROCEDURE — 25000125 ZZHC RX 250: Performed by: CLINICAL NURSE SPECIALIST

## 2017-03-14 PROCEDURE — 82803 BLOOD GASES ANY COMBINATION: CPT | Performed by: STUDENT IN AN ORGANIZED HEALTH CARE EDUCATION/TRAINING PROGRAM

## 2017-03-14 PROCEDURE — 25000125 ZZHC RX 250: Performed by: STUDENT IN AN ORGANIZED HEALTH CARE EDUCATION/TRAINING PROGRAM

## 2017-03-14 PROCEDURE — 88307 TISSUE EXAM BY PATHOLOGIST: CPT | Performed by: STUDENT IN AN ORGANIZED HEALTH CARE EDUCATION/TRAINING PROGRAM

## 2017-03-14 PROCEDURE — 82565 ASSAY OF CREATININE: CPT | Mod: 91 | Performed by: STUDENT IN AN ORGANIZED HEALTH CARE EDUCATION/TRAINING PROGRAM

## 2017-03-14 PROCEDURE — 86850 RBC ANTIBODY SCREEN: CPT | Performed by: CLINICAL NURSE SPECIALIST

## 2017-03-14 PROCEDURE — 84132 ASSAY OF SERUM POTASSIUM: CPT | Performed by: NURSE PRACTITIONER

## 2017-03-14 PROCEDURE — 25000128 H RX IP 250 OP 636: Performed by: STUDENT IN AN ORGANIZED HEALTH CARE EDUCATION/TRAINING PROGRAM

## 2017-03-14 PROCEDURE — 71000014 ZZH RECOVERY PHASE 1 LEVEL 2 FIRST HR: Performed by: STUDENT IN AN ORGANIZED HEALTH CARE EDUCATION/TRAINING PROGRAM

## 2017-03-14 PROCEDURE — 71010 XR CHEST PORT 1 VW: CPT

## 2017-03-14 PROCEDURE — 85049 AUTOMATED PLATELET COUNT: CPT | Performed by: STUDENT IN AN ORGANIZED HEALTH CARE EDUCATION/TRAINING PROGRAM

## 2017-03-14 PROCEDURE — 40000014 ZZH STATISTIC ARTERIAL MONITORING DAILY

## 2017-03-14 PROCEDURE — 37000009 ZZH ANESTHESIA TECHNICAL FEE, EACH ADDTL 15 MIN: Performed by: STUDENT IN AN ORGANIZED HEALTH CARE EDUCATION/TRAINING PROGRAM

## 2017-03-14 PROCEDURE — 86901 BLOOD TYPING SEROLOGIC RH(D): CPT | Performed by: CLINICAL NURSE SPECIALIST

## 2017-03-14 PROCEDURE — 36000064 ZZH SURGERY LEVEL 4 EA 15 ADDTL MIN - UMMC: Performed by: STUDENT IN AN ORGANIZED HEALTH CARE EDUCATION/TRAINING PROGRAM

## 2017-03-14 PROCEDURE — 84295 ASSAY OF SERUM SODIUM: CPT | Performed by: STUDENT IN AN ORGANIZED HEALTH CARE EDUCATION/TRAINING PROGRAM

## 2017-03-14 PROCEDURE — 36415 COLL VENOUS BLD VENIPUNCTURE: CPT | Performed by: STUDENT IN AN ORGANIZED HEALTH CARE EDUCATION/TRAINING PROGRAM

## 2017-03-14 PROCEDURE — 27210794 ZZH OR GENERAL SUPPLY STERILE: Performed by: STUDENT IN AN ORGANIZED HEALTH CARE EDUCATION/TRAINING PROGRAM

## 2017-03-14 PROCEDURE — 40000196 ZZH STATISTIC RAPCV CVP MONITORING

## 2017-03-14 PROCEDURE — 86900 BLOOD TYPING SEROLOGIC ABO: CPT | Performed by: CLINICAL NURSE SPECIALIST

## 2017-03-14 PROCEDURE — 82565 ASSAY OF CREATININE: CPT | Performed by: NURSE PRACTITIONER

## 2017-03-14 PROCEDURE — 84132 ASSAY OF SERUM POTASSIUM: CPT | Performed by: STUDENT IN AN ORGANIZED HEALTH CARE EDUCATION/TRAINING PROGRAM

## 2017-03-14 PROCEDURE — 83735 ASSAY OF MAGNESIUM: CPT | Performed by: ANESTHESIOLOGY

## 2017-03-14 PROCEDURE — 25000566 ZZH SEVOFLURANE, EA 15 MIN: Performed by: STUDENT IN AN ORGANIZED HEALTH CARE EDUCATION/TRAINING PROGRAM

## 2017-03-14 PROCEDURE — 37000008 ZZH ANESTHESIA TECHNICAL FEE, 1ST 30 MIN: Performed by: STUDENT IN AN ORGANIZED HEALTH CARE EDUCATION/TRAINING PROGRAM

## 2017-03-14 RX ORDER — ACETAMINOPHEN 325 MG/1
975 TABLET ORAL EVERY 8 HOURS
Status: DISCONTINUED | OUTPATIENT
Start: 2017-03-14 | End: 2017-03-15 | Stop reason: HOSPADM

## 2017-03-14 RX ORDER — PROPOFOL 10 MG/ML
INJECTION, EMULSION INTRAVENOUS PRN
Status: DISCONTINUED | OUTPATIENT
Start: 2017-03-14 | End: 2017-03-14

## 2017-03-14 RX ORDER — LIDOCAINE 40 MG/G
CREAM TOPICAL
Status: DISCONTINUED | OUTPATIENT
Start: 2017-03-14 | End: 2017-03-15 | Stop reason: HOSPADM

## 2017-03-14 RX ORDER — CHLORHEXIDINE GLUCONATE ORAL RINSE 1.2 MG/ML
15 SOLUTION DENTAL ONCE
Status: COMPLETED | OUTPATIENT
Start: 2017-03-14 | End: 2017-03-14

## 2017-03-14 RX ORDER — LABETALOL HYDROCHLORIDE 5 MG/ML
10 INJECTION, SOLUTION INTRAVENOUS
Status: DISCONTINUED | OUTPATIENT
Start: 2017-03-14 | End: 2017-03-14 | Stop reason: HOSPADM

## 2017-03-14 RX ORDER — ONDANSETRON 4 MG/1
4 TABLET, ORALLY DISINTEGRATING ORAL EVERY 6 HOURS PRN
Status: DISCONTINUED | OUTPATIENT
Start: 2017-03-14 | End: 2017-03-15 | Stop reason: HOSPADM

## 2017-03-14 RX ORDER — SODIUM CHLORIDE, SODIUM LACTATE, POTASSIUM CHLORIDE, CALCIUM CHLORIDE 600; 310; 30; 20 MG/100ML; MG/100ML; MG/100ML; MG/100ML
INJECTION, SOLUTION INTRAVENOUS CONTINUOUS
Status: DISCONTINUED | OUTPATIENT
Start: 2017-03-14 | End: 2017-03-14 | Stop reason: HOSPADM

## 2017-03-14 RX ORDER — CELECOXIB 200 MG/1
200 CAPSULE ORAL ONCE
Status: COMPLETED | OUTPATIENT
Start: 2017-03-14 | End: 2017-03-14

## 2017-03-14 RX ORDER — SENNOSIDES 8.6 MG
8.6 TABLET ORAL 2 TIMES DAILY PRN
Status: DISCONTINUED | OUTPATIENT
Start: 2017-03-14 | End: 2017-03-15 | Stop reason: HOSPADM

## 2017-03-14 RX ORDER — HYDROMORPHONE HYDROCHLORIDE 1 MG/ML
.3-.5 INJECTION, SOLUTION INTRAMUSCULAR; INTRAVENOUS; SUBCUTANEOUS
Status: DISCONTINUED | OUTPATIENT
Start: 2017-03-14 | End: 2017-03-15 | Stop reason: HOSPADM

## 2017-03-14 RX ORDER — GABAPENTIN 300 MG/1
300 CAPSULE ORAL ONCE
Status: COMPLETED | OUTPATIENT
Start: 2017-03-14 | End: 2017-03-14

## 2017-03-14 RX ORDER — ALLOPURINOL 100 MG/1
100 TABLET ORAL 2 TIMES DAILY
Status: DISCONTINUED | OUTPATIENT
Start: 2017-03-14 | End: 2017-03-15 | Stop reason: HOSPADM

## 2017-03-14 RX ORDER — ALBUTEROL SULFATE 90 UG/1
2 AEROSOL, METERED RESPIRATORY (INHALATION) EVERY 6 HOURS
Status: DISCONTINUED | OUTPATIENT
Start: 2017-03-14 | End: 2017-03-15 | Stop reason: HOSPADM

## 2017-03-14 RX ORDER — GINSENG 100 MG
CAPSULE ORAL EVERY 8 HOURS
Status: DISCONTINUED | OUTPATIENT
Start: 2017-03-14 | End: 2017-03-15 | Stop reason: HOSPADM

## 2017-03-14 RX ORDER — DEXAMETHASONE SODIUM PHOSPHATE 4 MG/ML
INJECTION, SOLUTION INTRA-ARTICULAR; INTRALESIONAL; INTRAMUSCULAR; INTRAVENOUS; SOFT TISSUE PRN
Status: DISCONTINUED | OUTPATIENT
Start: 2017-03-14 | End: 2017-03-14

## 2017-03-14 RX ORDER — MAGNESIUM SULFATE HEPTAHYDRATE 40 MG/ML
INJECTION, SOLUTION INTRAVENOUS PRN
Status: DISCONTINUED | OUTPATIENT
Start: 2017-03-14 | End: 2017-03-14

## 2017-03-14 RX ORDER — AMLODIPINE BESYLATE 5 MG/1
5 TABLET ORAL 2 TIMES DAILY
Status: DISCONTINUED | OUTPATIENT
Start: 2017-03-15 | End: 2017-03-15 | Stop reason: HOSPADM

## 2017-03-14 RX ORDER — CLINDAMYCIN PHOSPHATE 900 MG/50ML
900 INJECTION, SOLUTION INTRAVENOUS SEE ADMIN INSTRUCTIONS
Status: DISCONTINUED | OUTPATIENT
Start: 2017-03-14 | End: 2017-03-14 | Stop reason: HOSPADM

## 2017-03-14 RX ORDER — OXYCODONE HYDROCHLORIDE 5 MG/1
5 TABLET ORAL
Status: DISCONTINUED | OUTPATIENT
Start: 2017-03-14 | End: 2017-03-15 | Stop reason: HOSPADM

## 2017-03-14 RX ORDER — FENTANYL CITRATE 50 UG/ML
25-50 INJECTION, SOLUTION INTRAMUSCULAR; INTRAVENOUS
Status: DISCONTINUED | OUTPATIENT
Start: 2017-03-14 | End: 2017-03-14 | Stop reason: HOSPADM

## 2017-03-14 RX ORDER — MINERAL OIL/HYDROPHIL PETROLAT
OINTMENT (GRAM) TOPICAL EVERY 8 HOURS
Status: DISCONTINUED | OUTPATIENT
Start: 2017-03-15 | End: 2017-03-15 | Stop reason: HOSPADM

## 2017-03-14 RX ORDER — NALOXONE HYDROCHLORIDE 0.4 MG/ML
.1-.4 INJECTION, SOLUTION INTRAMUSCULAR; INTRAVENOUS; SUBCUTANEOUS
Status: DISCONTINUED | OUTPATIENT
Start: 2017-03-14 | End: 2017-03-15 | Stop reason: HOSPADM

## 2017-03-14 RX ORDER — PROPOFOL 10 MG/ML
INJECTION, EMULSION INTRAVENOUS CONTINUOUS PRN
Status: DISCONTINUED | OUTPATIENT
Start: 2017-03-14 | End: 2017-03-14

## 2017-03-14 RX ORDER — ACETAMINOPHEN 325 MG/1
650 TABLET ORAL EVERY 4 HOURS PRN
Status: DISCONTINUED | OUTPATIENT
Start: 2017-03-17 | End: 2017-03-15 | Stop reason: HOSPADM

## 2017-03-14 RX ORDER — EPHEDRINE SULFATE 50 MG/ML
INJECTION, SOLUTION INTRAMUSCULAR; INTRAVENOUS; SUBCUTANEOUS PRN
Status: DISCONTINUED | OUTPATIENT
Start: 2017-03-14 | End: 2017-03-14

## 2017-03-14 RX ORDER — FENTANYL CITRATE 50 UG/ML
INJECTION, SOLUTION INTRAMUSCULAR; INTRAVENOUS PRN
Status: DISCONTINUED | OUTPATIENT
Start: 2017-03-14 | End: 2017-03-14

## 2017-03-14 RX ORDER — ONDANSETRON 4 MG/1
4 TABLET, ORALLY DISINTEGRATING ORAL EVERY 30 MIN PRN
Status: DISCONTINUED | OUTPATIENT
Start: 2017-03-14 | End: 2017-03-14 | Stop reason: HOSPADM

## 2017-03-14 RX ORDER — LOSARTAN POTASSIUM 50 MG/1
50 TABLET ORAL
Status: DISCONTINUED | OUTPATIENT
Start: 2017-03-15 | End: 2017-03-15 | Stop reason: HOSPADM

## 2017-03-14 RX ORDER — ALBUTEROL SULFATE 0.83 MG/ML
1 SOLUTION RESPIRATORY (INHALATION) EVERY 4 HOURS PRN
Status: DISCONTINUED | OUTPATIENT
Start: 2017-03-14 | End: 2017-03-15 | Stop reason: HOSPADM

## 2017-03-14 RX ORDER — CLINDAMYCIN PHOSPHATE 900 MG/50ML
900 INJECTION, SOLUTION INTRAVENOUS
Status: DISCONTINUED | OUTPATIENT
Start: 2017-03-14 | End: 2017-03-14 | Stop reason: HOSPADM

## 2017-03-14 RX ORDER — HYDROMORPHONE HYDROCHLORIDE 1 MG/ML
.3-.5 INJECTION, SOLUTION INTRAMUSCULAR; INTRAVENOUS; SUBCUTANEOUS EVERY 5 MIN PRN
Status: DISCONTINUED | OUTPATIENT
Start: 2017-03-14 | End: 2017-03-14 | Stop reason: HOSPADM

## 2017-03-14 RX ORDER — ASPIRIN 81 MG/1
81 TABLET, CHEWABLE ORAL DAILY
Status: DISCONTINUED | OUTPATIENT
Start: 2017-03-15 | End: 2017-03-15 | Stop reason: HOSPADM

## 2017-03-14 RX ORDER — NALOXONE HYDROCHLORIDE 0.4 MG/ML
.1-.4 INJECTION, SOLUTION INTRAMUSCULAR; INTRAVENOUS; SUBCUTANEOUS
Status: DISCONTINUED | OUTPATIENT
Start: 2017-03-14 | End: 2017-03-14 | Stop reason: HOSPADM

## 2017-03-14 RX ORDER — SODIUM CHLORIDE, SODIUM LACTATE, POTASSIUM CHLORIDE, CALCIUM CHLORIDE 600; 310; 30; 20 MG/100ML; MG/100ML; MG/100ML; MG/100ML
INJECTION, SOLUTION INTRAVENOUS CONTINUOUS PRN
Status: DISCONTINUED | OUTPATIENT
Start: 2017-03-14 | End: 2017-03-14

## 2017-03-14 RX ORDER — DOCUSATE SODIUM 100 MG/1
100 CAPSULE, LIQUID FILLED ORAL 2 TIMES DAILY
Status: DISCONTINUED | OUTPATIENT
Start: 2017-03-14 | End: 2017-03-15 | Stop reason: HOSPADM

## 2017-03-14 RX ORDER — ONDANSETRON 2 MG/ML
4 INJECTION INTRAMUSCULAR; INTRAVENOUS EVERY 6 HOURS PRN
Status: DISCONTINUED | OUTPATIENT
Start: 2017-03-14 | End: 2017-03-15 | Stop reason: HOSPADM

## 2017-03-14 RX ORDER — ONDANSETRON 2 MG/ML
INJECTION INTRAMUSCULAR; INTRAVENOUS PRN
Status: DISCONTINUED | OUTPATIENT
Start: 2017-03-14 | End: 2017-03-14

## 2017-03-14 RX ORDER — ONDANSETRON 2 MG/ML
4 INJECTION INTRAMUSCULAR; INTRAVENOUS EVERY 30 MIN PRN
Status: DISCONTINUED | OUTPATIENT
Start: 2017-03-14 | End: 2017-03-14 | Stop reason: HOSPADM

## 2017-03-14 RX ORDER — ACETAMINOPHEN 325 MG/1
975 TABLET ORAL ONCE
Status: COMPLETED | OUTPATIENT
Start: 2017-03-14 | End: 2017-03-14

## 2017-03-14 RX ORDER — SIMVASTATIN 20 MG
20 TABLET ORAL AT BEDTIME
Status: DISCONTINUED | OUTPATIENT
Start: 2017-03-14 | End: 2017-03-15 | Stop reason: HOSPADM

## 2017-03-14 RX ORDER — IPRATROPIUM BROMIDE AND ALBUTEROL SULFATE 2.5; .5 MG/3ML; MG/3ML
3 SOLUTION RESPIRATORY (INHALATION)
Status: DISCONTINUED | OUTPATIENT
Start: 2017-03-14 | End: 2017-03-14 | Stop reason: HOSPADM

## 2017-03-14 RX ORDER — CLINDAMYCIN PHOSPHATE 900 MG/50ML
900 INJECTION, SOLUTION INTRAVENOUS
Status: COMPLETED | OUTPATIENT
Start: 2017-03-14 | End: 2017-03-14

## 2017-03-14 RX ORDER — LIDOCAINE HYDROCHLORIDE 20 MG/ML
INJECTION, SOLUTION INFILTRATION; PERINEURAL PRN
Status: DISCONTINUED | OUTPATIENT
Start: 2017-03-14 | End: 2017-03-14

## 2017-03-14 RX ADMIN — FENTANYL CITRATE 25 MCG: 50 INJECTION, SOLUTION INTRAMUSCULAR; INTRAVENOUS at 09:44

## 2017-03-14 RX ADMIN — GABAPENTIN 300 MG: 300 CAPSULE ORAL at 07:19

## 2017-03-14 RX ADMIN — PHENYLEPHRINE HYDROCHLORIDE 50 MCG: 10 INJECTION, SOLUTION INTRAMUSCULAR; INTRAVENOUS; SUBCUTANEOUS at 14:15

## 2017-03-14 RX ADMIN — Medication 5 MG: at 15:30

## 2017-03-14 RX ADMIN — CLINDAMYCIN PHOSPHATE 900 MG: 18 INJECTION, SOLUTION INTRAVENOUS at 09:30

## 2017-03-14 RX ADMIN — PHENYLEPHRINE HYDROCHLORIDE 50 MCG: 10 INJECTION, SOLUTION INTRAMUSCULAR; INTRAVENOUS; SUBCUTANEOUS at 13:57

## 2017-03-14 RX ADMIN — CHLORHEXIDINE GLUCONATE 15 ML: 1.2 RINSE ORAL at 07:19

## 2017-03-14 RX ADMIN — PHENYLEPHRINE HYDROCHLORIDE 50 MCG: 10 INJECTION, SOLUTION INTRAMUSCULAR; INTRAVENOUS; SUBCUTANEOUS at 12:54

## 2017-03-14 RX ADMIN — PHENYLEPHRINE HYDROCHLORIDE 100 MCG: 10 INJECTION, SOLUTION INTRAMUSCULAR; INTRAVENOUS; SUBCUTANEOUS at 10:00

## 2017-03-14 RX ADMIN — CELECOXIB 200 MG: 200 CAPSULE ORAL at 07:19

## 2017-03-14 RX ADMIN — PHENYLEPHRINE HYDROCHLORIDE 100 MCG: 10 INJECTION, SOLUTION INTRAMUSCULAR; INTRAVENOUS; SUBCUTANEOUS at 09:08

## 2017-03-14 RX ADMIN — PHENYLEPHRINE HYDROCHLORIDE 100 MCG: 10 INJECTION, SOLUTION INTRAMUSCULAR; INTRAVENOUS; SUBCUTANEOUS at 09:20

## 2017-03-14 RX ADMIN — PHENYLEPHRINE HYDROCHLORIDE 50 MCG: 10 INJECTION, SOLUTION INTRAMUSCULAR; INTRAVENOUS; SUBCUTANEOUS at 13:25

## 2017-03-14 RX ADMIN — PROPOFOL: 10 INJECTION, EMULSION INTRAVENOUS at 11:51

## 2017-03-14 RX ADMIN — PROPOFOL 20 MG: 10 INJECTION, EMULSION INTRAVENOUS at 09:00

## 2017-03-14 RX ADMIN — PHENYLEPHRINE HYDROCHLORIDE 0.03 MCG/KG/MIN: 10 INJECTION, SOLUTION INTRAMUSCULAR; INTRAVENOUS; SUBCUTANEOUS at 10:05

## 2017-03-14 RX ADMIN — MAGNESIUM SULFATE IN WATER 2 G: 40 INJECTION, SOLUTION INTRAVENOUS at 11:15

## 2017-03-14 RX ADMIN — SUCCINYLCHOLINE CHLORIDE 100 MG: 20 INJECTION, SOLUTION INTRAMUSCULAR; INTRAVENOUS at 08:41

## 2017-03-14 RX ADMIN — ONDANSETRON 4 MG: 2 INJECTION INTRAMUSCULAR; INTRAVENOUS at 15:35

## 2017-03-14 RX ADMIN — ACETAMINOPHEN 975 MG: 325 TABLET, FILM COATED ORAL at 18:51

## 2017-03-14 RX ADMIN — SIMVASTATIN 20 MG: 20 TABLET, FILM COATED ORAL at 21:28

## 2017-03-14 RX ADMIN — DOCUSATE SODIUM 100 MG: 100 CAPSULE, LIQUID FILLED ORAL at 21:28

## 2017-03-14 RX ADMIN — PROPOFOL 30 MG: 10 INJECTION, EMULSION INTRAVENOUS at 08:51

## 2017-03-14 RX ADMIN — PHENYLEPHRINE HYDROCHLORIDE 50 MCG: 10 INJECTION, SOLUTION INTRAMUSCULAR; INTRAVENOUS; SUBCUTANEOUS at 13:04

## 2017-03-14 RX ADMIN — FENTANYL CITRATE 25 MCG: 50 INJECTION, SOLUTION INTRAMUSCULAR; INTRAVENOUS at 16:53

## 2017-03-14 RX ADMIN — BACITRACIN: 500 OINTMENT TOPICAL at 18:52

## 2017-03-14 RX ADMIN — PROPOFOL: 10 INJECTION, EMULSION INTRAVENOUS at 14:30

## 2017-03-14 RX ADMIN — SODIUM CHLORIDE, POTASSIUM CHLORIDE, SODIUM LACTATE AND CALCIUM CHLORIDE: 600; 310; 30; 20 INJECTION, SOLUTION INTRAVENOUS at 08:22

## 2017-03-14 RX ADMIN — ACETAMINOPHEN 975 MG: 325 TABLET, FILM COATED ORAL at 07:19

## 2017-03-14 RX ADMIN — PHENYLEPHRINE HYDROCHLORIDE 50 MCG: 10 INJECTION, SOLUTION INTRAMUSCULAR; INTRAVENOUS; SUBCUTANEOUS at 14:03

## 2017-03-14 RX ADMIN — PROPOFOL 20 MG: 10 INJECTION, EMULSION INTRAVENOUS at 08:50

## 2017-03-14 RX ADMIN — FENTANYL CITRATE 75 MCG: 50 INJECTION, SOLUTION INTRAMUSCULAR; INTRAVENOUS at 08:35

## 2017-03-14 RX ADMIN — PHENYLEPHRINE HYDROCHLORIDE 100 MCG: 10 INJECTION, SOLUTION INTRAMUSCULAR; INTRAVENOUS; SUBCUTANEOUS at 09:36

## 2017-03-14 RX ADMIN — LIDOCAINE HYDROCHLORIDE 60 MG: 20 INJECTION, SOLUTION INFILTRATION; PERINEURAL at 08:39

## 2017-03-14 RX ADMIN — PHENYLEPHRINE HYDROCHLORIDE 100 MCG: 10 INJECTION, SOLUTION INTRAMUSCULAR; INTRAVENOUS; SUBCUTANEOUS at 10:17

## 2017-03-14 RX ADMIN — PROPOFOL 30 MG: 10 INJECTION, EMULSION INTRAVENOUS at 15:37

## 2017-03-14 RX ADMIN — PHENYLEPHRINE HYDROCHLORIDE 50 MCG: 10 INJECTION, SOLUTION INTRAMUSCULAR; INTRAVENOUS; SUBCUTANEOUS at 13:44

## 2017-03-14 RX ADMIN — PROPOFOL 30 MG: 10 INJECTION, EMULSION INTRAVENOUS at 09:08

## 2017-03-14 RX ADMIN — SENNOSIDES 8.6 MG: 8.6 TABLET, FILM COATED ORAL at 21:28

## 2017-03-14 RX ADMIN — FENTANYL CITRATE 25 MCG: 50 INJECTION, SOLUTION INTRAMUSCULAR; INTRAVENOUS at 11:20

## 2017-03-14 RX ADMIN — PHENYLEPHRINE HYDROCHLORIDE 100 MCG: 10 INJECTION, SOLUTION INTRAMUSCULAR; INTRAVENOUS; SUBCUTANEOUS at 13:10

## 2017-03-14 RX ADMIN — FENTANYL CITRATE 25 MCG: 50 INJECTION, SOLUTION INTRAMUSCULAR; INTRAVENOUS at 13:29

## 2017-03-14 RX ADMIN — FENTANYL CITRATE 50 MCG: 50 INJECTION, SOLUTION INTRAMUSCULAR; INTRAVENOUS at 15:19

## 2017-03-14 RX ADMIN — PHENYLEPHRINE HYDROCHLORIDE 100 MCG: 10 INJECTION, SOLUTION INTRAMUSCULAR; INTRAVENOUS; SUBCUTANEOUS at 09:01

## 2017-03-14 RX ADMIN — ALBUTEROL SULFATE 2 PUFF: 90 AEROSOL, METERED RESPIRATORY (INHALATION) at 19:03

## 2017-03-14 RX ADMIN — REMIFENTANIL HYDROCHLORIDE 0.05 MCG/KG/MIN: 1 INJECTION, POWDER, LYOPHILIZED, FOR SOLUTION INTRAVENOUS at 09:35

## 2017-03-14 RX ADMIN — PHENYLEPHRINE HYDROCHLORIDE 100 MCG: 10 INJECTION, SOLUTION INTRAMUSCULAR; INTRAVENOUS; SUBCUTANEOUS at 13:36

## 2017-03-14 RX ADMIN — PHENYLEPHRINE HYDROCHLORIDE 100 MCG: 10 INJECTION, SOLUTION INTRAMUSCULAR; INTRAVENOUS; SUBCUTANEOUS at 09:51

## 2017-03-14 RX ADMIN — PHENYLEPHRINE HYDROCHLORIDE 50 MCG: 10 INJECTION, SOLUTION INTRAMUSCULAR; INTRAVENOUS; SUBCUTANEOUS at 13:00

## 2017-03-14 RX ADMIN — RANITIDINE HYDROCHLORIDE 150 MG: 150 TABLET, FILM COATED ORAL at 18:53

## 2017-03-14 RX ADMIN — PHENYLEPHRINE HYDROCHLORIDE 50 MCG: 10 INJECTION, SOLUTION INTRAMUSCULAR; INTRAVENOUS; SUBCUTANEOUS at 12:49

## 2017-03-14 RX ADMIN — PROPOFOL 100 MCG/KG/MIN: 10 INJECTION, EMULSION INTRAVENOUS at 09:35

## 2017-03-14 RX ADMIN — PROPOFOL 100 MG: 10 INJECTION, EMULSION INTRAVENOUS at 08:39

## 2017-03-14 RX ADMIN — DEXAMETHASONE SODIUM PHOSPHATE 10 MG: 4 INJECTION, SOLUTION INTRAMUSCULAR; INTRAVENOUS at 12:25

## 2017-03-14 NOTE — ANESTHESIA PROCEDURE NOTES
Central Line Procedure Note  Staff:     Anesthesiologist:  ATA OHARA    Resident/CRNA:  ZEV DONOVAN    Central line placed by Resident/CRNA in the presence of a teaching physician    Location: In OR after induction  Procedure Start/Stop Times:     patient identified, IV checked, site marked, risks and benefits discussed, informed consent, monitors and equipment checked, pre-op evaluation and at physician/surgeon's request      Correct Patient: Yes      Correct Position: Yes      Correct Site: Yes      Correct Procedure: Yes      Correct Laterality:  Yes    Site Marked:  Yes  Line Placement:     Procedure:  Central Line    Insertion laterality:  Right    Insertion site:  Femoral    Position:  Trendelenburg      Maximal Sterile Barriers: All elements of maximal sterile barrier technique followed      (Maximal sterile barriers include:   Sterile gown, Sterile Gloves, Mask, Cap, Whole body draped, hand hygiene and acceptable skin prep).Skin Prep: Chloraprep         Injection Technique:  Ultrasound guided and Seldinger Technique    Sterile Ultrasound Technique:  Sterile probe cover and Sterile gel    Vein evaluated via U/S for patency/adequacy of catheter insertion and is adequate.  Using realtime U/S imaging the vein was punctured, and needle was observed entering vein on U/S      Permanent Image entered into patient's record      Local skin infiltration:  None    Catheter size:  9 Fr, 2 lumen 11.5 cm (MAC)    Cath secured with: suture      Dressing:  Biopatch and Tegaderm    Complications:  None obvious    Blood aspirated all lumens: Yes      All Lumens Flushed: Yes      Tip termination: other

## 2017-03-14 NOTE — ANESTHESIA PROCEDURE NOTES
Arterial Line Procedure Note  Staff:     Anesthesiologist:  ATA OHARA    Resident/CRNA:  ZEV DONOVAN    Arterial line performed by resident/CRNA in presence of a teaching physician    Location: In OR After Induction  Procedure Start/Stop Times:     patient identified, IV checked, site marked, risks and benefits discussed, informed consent, monitors and equipment checked, pre-op evaluation and at physician/surgeon's request      Correct Patient: Yes      Correct Position: Yes      Correct Site: Yes      Correct Procedure: Yes      Correct Laterality:  Yes    Site Marked:  Yes  Line Placement:     Procedure:  Arterial Line    Insertion Site:  Radial    Insertion laterality:  Right    Skin Prep: Chloraprep      Patient Prep: patient draped, mask and sterile gloves      Local skin infiltration:  None    Ultrasound Guided?: No      Catheter size:  20 gauge, Quick cath    Dressing:  Tegaderm    Complications:  None obvious    Arterial waveform: Yes      IBP within 10% of NIBP: Yes

## 2017-03-14 NOTE — BRIEF OP NOTE
[facility], [place]    Brief Operative Note    Pre-operative diagnosis: Mediastinal Mass   Post-operative diagnosis Substernal thyroid goiter  Procedure: Procedure(s):  Expoloration and Left Hemithyriodectomy, EGD - Wound Class: I-Clean  Surgeon: Surgeon(s) and Role:  Panel 1:     * Lucia Quinteros MD - Primary     * Franko Guerrero MD - Assisting     * Chun Macedo - Resident - Assisting    Panel 2:     * Misbah Julien MD - Primary     * Ivory Bruno DDS - Resident - Observing  Anesthesia: General   Estimated blood loss: 100 ml  Drains: None  Specimens:   ID Type Source Tests Collected by Time Destination   A : LEFT THYROID Tissue Neck SURGICAL PATHOLOGY EXAM Lucia Quinteros MD 3/14/2017 12:07 PM      Findings:     1. 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 Left recurrent laryngeal nerve identified and protected, able to stimulate at the most proximal point in identified at the surgical field with 0.5 mA  3. EGD by the end of the case did not show esophageal perforation  Complications: None.  Implants: None.

## 2017-03-14 NOTE — ANESTHESIA CARE TRANSFER NOTE
Patient: Mary Corral    Procedure(s):  Expoloration and Left Hemithyriodectomy, EGD - Wound Class: I-Clean    Diagnosis: Mediastinal Mass   Diagnosis Additional Information: No value filed.    Anesthesia Type:   General, ETT     Note:  Airway :Face Mask  Patient transferred to:PACU  Comments: To PACU on supplemental O2 with + air exchange, placed on monitor. Report given to RN, questions answered, VSS, patient alert and comfortable.      Vitals: (Last set prior to Anesthesia Care Transfer)    CRNA VITALS  3/14/2017 1531 - 3/14/2017 1607      3/14/2017             Resp Rate (set): 10            Electronically Signed By: DENISE Blackman CRNA  March 14, 2017  4:07 PM

## 2017-03-14 NOTE — ANESTHESIA POSTPROCEDURE EVALUATION
Patient: Mary Corral    Procedure(s):   Left Hemithyriodectomy,  Sub Sternal Goiter Removal, EGD - Wound Class: I-Clean    Diagnosis:Mediastinal Mass   Diagnosis Additional Information: No value filed.    Anesthesia Type:  General, ETT    Note:  Anesthesia Post Evaluation    Patient location during evaluation: PACU  Patient participation: Able to fully participate in evaluation  Level of consciousness: awake and alert  Pain management: adequate  Airway patency: patent  Cardiovascular status: acceptable  Respiratory status: acceptable  Hydration status: acceptable  PONV: none     Anesthetic complications: None          Last vitals:  Vitals:    03/14/17 1630 03/14/17 1645 03/14/17 1700   BP: 131/58 133/76    Resp: 14 16 16   Temp: 36.7  C (98  F)     SpO2: 98% 97% 96%         Electronically Signed By: Jose Brennan MD  March 14, 2017  5:03 PM

## 2017-03-14 NOTE — IP AVS SNAPSHOT
[address] [address]    Phone:  [phone]                                       After Visit Summary   3/14/2017    Mary Corral    MRN: [id number]           After Visit Summary Signature Page     I have received my discharge instructions, and my questions have been answered. I have discussed any challenges I see with this plan with the nurse or doctor.    ..........................................................................................................................................  Patient/Patient Representative Signature      ..........................................................................................................................................  Patient Representative Print Name and Relationship to Patient    ..................................................               ................................................  Date                                            Time    ..........................................................................................................................................  Reviewed by Signature/Title    ...................................................              ..............................................  Date                                                            Time

## 2017-03-14 NOTE — IP AVS SNAPSHOT
"                  MRN:[id number]                      After Visit Summary   3/14/2017    Mary Corral    MRN: [id number]           Thank you!     Thank you for choosing [facility] for your care. Our goal is always to provide you with excellent care. Hearing back from our patients is one way we can continue to improve our services. Please take a few minutes to complete the written survey that you may receive in the mail after you visit with us. Thank you!        Patient Information     Date Of Birth          1935        About your hospital stay     You were admitted on:  March 14, 2017 You last received care in the:  [address] [facility]    You were discharged on:  March 15, 2017        Reason for your hospital stay       Post operative care                  Who to Call     For medical emergencies, please call 911.  For non-urgent questions about your medical care, please call your primary care provider or clinic, [phone]  For questions related to your surgery, please call your surgery clinic        Attending Provider     Provider Lucia Harkins MD Thoracic Diseases    Misbah Julien MD Otolaryngology       Primary Care Provider Office Phone # Fax #    Jessica Fonseca [phone]       [address] [address]         When to contact your care team       Please notify your doctor if you experience wound breakdown, sustained bleeding from the wound site, or increasing redness, swelling, and/or purulent malorodorous discharge from the wound site which may indicate infection. If you feel it is acute, or experience sudden changes in breathing, chest pain, or excessive sleepiness/somnolence please return to the emergency department or call 911. If you have questions or concerns during the day please call ENT clinic and [phone]. If at night you can call [facility] at [phone] and ask for the \"ENT resident on call\".             "      After Care Instructions     Activity       Your activity upon discharge: No heavy lifting greater than 10 lbs and no strenuous exercise for 2 weeks or until follow up appointment. No driving while taking narcotic pain medications.            Diet       Follow this diet upon discharge: Regular diet            Monitor and record       You are going home with surgical drains in place. Empty the drains and record output 3 times per day. Squeeze the bulb of the drain and replace cap.  If you have multiple drains, record the outputs separately. Once output is less than 30 mL in 24 hours, please contact the ENT clinic to schedule an appointment for drain removal.            Wound care and dressings       Instructions to care for your wound at home: Keep incisions clean and dry. Apply Aquaphor ointment to incisions three times daily to keep moist. You may shower, do not soak, scrub, or submerge incisions under water. If you have a surgical drain, do not get the drain site wet until 24 hours after the drain has been removed.                  Follow-up Appointments     Adult [facility] Follow-up and recommended labs and tests       Follow up in ENT clinic with Dr. Julien on Monday 3/20/17. Please call the clinic with questions/concerns: [phone].    Otolaryngology/ENT Clinic:  [facility]  [address] [place] [address]      Appointments on [place] [facility]or [facility] (with [facility] or [facility] provider or service). Call [phone] if you haven't heard regarding these appointments within 7 days of discharge.                  Pending Results     Date and Time Order Name Status Description    3/14/2017 1448 Surgical pathology exam In process             Statement of Approval     Ordered          03/15/17 3816  I have reviewed and agree with all the recommendations and orders detailed in this document.  EFFECTIVE NOW     Approved and electronically  "signed by:  Imelda Seaman PA-C             Admission Information     Date & Time Provider Department Dept. Phone    3/14/2017 Misbah Julien MD [address] [facility] [phone]      Your Vitals Were     Blood Pressure Pulse Temperature Respirations Height Weight    118/52 (BP Location: Left arm) 80 98.8  F (37.1  C) (Oral) 18 1.676 m (5' 6\") 82.8 kg (182 lb 8.7 oz)    Pulse Oximetry BMI (Body Mass Index)                93% 29.46 kg/m2          MyChart Information     [facility] gives you secure access to your electronic health record. If you see a primary care provider, you can also send messages to your care team and make appointments. If you have questions, please call your primary care clinic.  If you do not have a primary care provider, please call [phone] and they will assist you.        Care EveryWhere ID     This is your Care EveryWhere ID. This could be used by other organizations to access your [facility] medical records  [id number]           Review of your medicines      START taking        Dose / Directions    acetaminophen 325 MG tablet   Commonly known as:  TYLENOL   Used for:  Acute post-operative pain        Dose:  650 mg   Start taking on:  3/17/2017   Take 2 tablets (650 mg) by mouth every 4 hours as needed for other (surgical pain)   Quantity:  100 tablet   Refills:  0       mineral oil-hydrophilic petrolatum   Used for:  Postoperative state        Apply topically every 8 hours   Quantity:  50 g   Refills:  0       oxyCODONE 5 MG IR tablet   Commonly known as:  ROXICODONE   Used for:  Acute post-operative pain        Dose:  5 mg   Take 1 tablet (5 mg) by mouth every 3 hours as needed for moderate to severe pain   Quantity:  40 tablet   Refills:  0         CONTINUE these medicines which have NOT CHANGED        Dose / Directions    * albuterol (2.5 MG/3ML) 0.083% neb solution        Dose:  1 vial   Take 1 vial by nebulization every 4 hours as needed for shortness of breath / dyspnea or " wheezing   Refills:  0       * albuterol 108 (90 BASE) MCG/ACT Inhaler   Commonly known as:  PROAIR HFA/PROVENTIL HFA/VENTOLIN HFA        Dose:  2 puff   Inhale 2 puffs into the lungs every 6 hours   Refills:  0       ALLOPURINOL PO        Dose:  100 mg   Take 100 mg by mouth 2 times daily   Refills:  0       AMLODIPINE BESYLATE PO        Dose:  5 mg   Take 5 mg by mouth 2 times daily   Refills:  0       ASPIRIN PO        Dose:  81 mg   Take 81 mg by mouth daily   Refills:  0       cetirizine 10 MG tablet   Commonly known as:  zyrTEC        Dose:  10 mg   Take 10 mg by mouth 2 times daily   Refills:  0       cloNIDine 0.2 MG/24HR WK patch   Commonly known as:  CATAPRES-TTS2   Indication:  Changes every Sunday Morning        Dose:  1 patch   Place 1 patch onto the skin daily   Quantity:  4 patch   Refills:  1       EPINEPHrine 0.3 MG/0.3ML injection        Dose:  0.3 mg   Inject 0.3 mg into the muscle as needed for anaphylaxis   Refills:  0       fluticasone-salmeterol 250-50 MCG/DOSE diskus inhaler   Commonly known as:  ADVAIR        Dose:  1 puff   Inhale 1 puff into the lungs 2 times daily   Refills:  0       IRON SUPPLEMENT PO        Dose:  45 mg   Take 45 mg by mouth   Refills:  0       losartan 50 MG tablet   Commonly known as:  COZAAR        Dose:  50 mg   Take 1 tablet (50 mg) by mouth 2 times daily   Quantity:  30 tablet   Refills:  0       Multi-vitamin Tabs tablet        Dose:  1 tablet   Take 1 tablet by mouth daily   Refills:  0       polyethylene glycol powder   Commonly known as:  MIRALAX/GLYCOLAX        Dose:  17 g   Take 17 g by mouth daily as needed for constipation   Refills:  0       RANITIDINE HCL PO        Dose:  150 mg   Take 150 mg by mouth 2 times daily   Refills:  0       SIMVASTATIN PO        Dose:  20 mg   Take 20 mg by mouth At Bedtime   Refills:  0       VALTREX PO        Dose:  500 mg   Take 500 mg by mouth as needed   Refills:  0       VITAMIN D3 PO        Dose:  1000 Units   Take  1,000 Units by mouth daily   Refills:  0       * Notice:  This list has 2 medication(s) that are the same as other medications prescribed for you. Read the directions carefully, and ask your doctor or other care provider to review them with you.         Where to get your medicines      These medications were sent to [facility] -  - [address] [facility]  [address] [facility] [address]     Phone:  [phone]     acetaminophen 325 MG tablet    mineral oil-hydrophilic petrolatum         Some of these will need a paper prescription and others can be bought over the counter. Ask your nurse if you have questions.     Bring a paper prescription for each of these medications     oxyCODONE 5 MG IR tablet                Protect others around you: Learn how to safely use, store and throw away your medicines at www.disposemymeds.org.             Medication List: This is a list of all your medications and when to take them. Check marks below indicate your daily home schedule. Keep this list as a reference.      Medications           Morning Afternoon Evening Bedtime As Needed    acetaminophen 325 MG tablet   Commonly known as:  TYLENOL   Take 2 tablets (650 mg) by mouth every 4 hours as needed for other (surgical pain)   Start taking on:  3/17/2017   Last time this was given:  975 mg on 3/15/2017 11:38 AM                                * albuterol (2.5 MG/3ML) 0.083% neb solution   Take 1 vial by nebulization every 4 hours as needed for shortness of breath / dyspnea or wheezing   Last time this was given:  2.5 mg on 3/15/2017  6:29 AM                                * albuterol 108 (90 BASE) MCG/ACT Inhaler   Commonly known as:  PROAIR HFA/PROVENTIL HFA/VENTOLIN HFA   Inhale 2 puffs into the lungs every 6 hours   Last time this was given:  1 puff on 3/15/2017  1:37 PM                                ALLOPURINOL PO   Take 100 mg by mouth 2 times daily                                 AMLODIPINE BESYLATE PO   Take 5 mg by mouth 2 times daily   Last time this was given:  5 mg on 3/15/2017  8:10 AM                                ASPIRIN PO   Take 81 mg by mouth daily   Last time this was given:  81 mg on 3/15/2017  8:09 AM                                cetirizine 10 MG tablet   Commonly known as:  zyrTEC   Take 10 mg by mouth 2 times daily                                cloNIDine 0.2 MG/24HR WK patch   Commonly known as:  CATAPRES-TTS2   Place 1 patch onto the skin daily                                EPINEPHrine 0.3 MG/0.3ML injection   Inject 0.3 mg into the muscle as needed for anaphylaxis                                fluticasone-salmeterol 250-50 MCG/DOSE diskus inhaler   Commonly known as:  ADVAIR   Inhale 1 puff into the lungs 2 times daily                                IRON SUPPLEMENT PO   Take 45 mg by mouth                                losartan 50 MG tablet   Commonly known as:  COZAAR   Take 1 tablet (50 mg) by mouth 2 times daily   Last time this was given:  50 mg on 3/15/2017  8:10 AM                                mineral oil-hydrophilic petrolatum   Apply topically every 8 hours                                Multi-vitamin Tabs tablet   Take 1 tablet by mouth daily                                oxyCODONE 5 MG IR tablet   Commonly known as:  ROXICODONE   Take 1 tablet (5 mg) by mouth every 3 hours as needed for moderate to severe pain                                polyethylene glycol powder   Commonly known as:  MIRALAX/GLYCOLAX   Take 17 g by mouth daily as needed for constipation                                RANITIDINE HCL PO   Take 150 mg by mouth 2 times daily   Last time this was given:  150 mg on 3/14/2017  6:53 PM                                SIMVASTATIN PO   Take 20 mg by mouth At Bedtime   Last time this was given:  20 mg on 3/14/2017  9:28 PM                                VALTREX PO   Take 500 mg by mouth as needed                                VITAMIN D3 PO    Take 1,000 Units by mouth daily                                * Notice:  This list has 2 medication(s) that are the same as other medications prescribed for you. Read the directions carefully, and ask your doctor or other care provider to review them with you.

## 2017-03-15 ENCOUNTER — CARE COORDINATION (OUTPATIENT)
Dept: OTOLARYNGOLOGY | Facility: CLINIC | Age: 82
End: 2017-03-15

## 2017-03-15 VITALS
WEIGHT: 182.54 LBS | HEIGHT: 66 IN | SYSTOLIC BLOOD PRESSURE: 118 MMHG | OXYGEN SATURATION: 93 % | HEART RATE: 80 BPM | BODY MASS INDEX: 29.34 KG/M2 | DIASTOLIC BLOOD PRESSURE: 52 MMHG | TEMPERATURE: 98.8 F | RESPIRATION RATE: 18 BRPM

## 2017-03-15 PROBLEM — E89.0 S/P THYROIDECTOMY: Status: ACTIVE | Noted: 2017-03-15

## 2017-03-15 LAB
ANION GAP SERPL CALCULATED.3IONS-SCNC: 12 MMOL/L (ref 3–14)
BUN SERPL-MCNC: 41 MG/DL (ref 7–30)
CALCIUM SERPL-MCNC: 8.5 MG/DL (ref 8.5–10.1)
CHLORIDE SERPL-SCNC: 112 MMOL/L (ref 94–109)
CO2 SERPL-SCNC: 18 MMOL/L (ref 20–32)
CREAT SERPL-MCNC: 1.42 MG/DL (ref 0.52–1.04)
ERYTHROCYTE [DISTWIDTH] IN BLOOD BY AUTOMATED COUNT: 13.5 % (ref 10–15)
GFR SERPL CREATININE-BSD FRML MDRD: 36 ML/MIN/1.7M2
GLUCOSE BLDC GLUCOMTR-MCNC: 161 MG/DL (ref 70–99)
GLUCOSE SERPL-MCNC: 167 MG/DL (ref 70–99)
HCT VFR BLD AUTO: 31.2 % (ref 35–47)
HGB BLD-MCNC: 10.4 G/DL (ref 11.7–15.7)
MCH RBC QN AUTO: 28.9 PG (ref 26.5–33)
MCHC RBC AUTO-ENTMCNC: 33.3 G/DL (ref 31.5–36.5)
MCV RBC AUTO: 87 FL (ref 78–100)
PLATELET # BLD AUTO: 127 10E9/L (ref 150–450)
POTASSIUM SERPL-SCNC: 4.6 MMOL/L (ref 3.4–5.3)
RBC # BLD AUTO: 3.6 10E12/L (ref 3.8–5.2)
SODIUM SERPL-SCNC: 142 MMOL/L (ref 133–144)
WBC # BLD AUTO: 9.6 10E9/L (ref 4–11)

## 2017-03-15 PROCEDURE — 80048 BASIC METABOLIC PNL TOTAL CA: CPT | Performed by: STUDENT IN AN ORGANIZED HEALTH CARE EDUCATION/TRAINING PROGRAM

## 2017-03-15 PROCEDURE — 82962 GLUCOSE BLOOD TEST: CPT

## 2017-03-15 PROCEDURE — 25000128 H RX IP 250 OP 636: Performed by: STUDENT IN AN ORGANIZED HEALTH CARE EDUCATION/TRAINING PROGRAM

## 2017-03-15 PROCEDURE — 36415 COLL VENOUS BLD VENIPUNCTURE: CPT | Performed by: STUDENT IN AN ORGANIZED HEALTH CARE EDUCATION/TRAINING PROGRAM

## 2017-03-15 PROCEDURE — 25000132 ZZH RX MED GY IP 250 OP 250 PS 637: Performed by: STUDENT IN AN ORGANIZED HEALTH CARE EDUCATION/TRAINING PROGRAM

## 2017-03-15 PROCEDURE — 94640 AIRWAY INHALATION TREATMENT: CPT

## 2017-03-15 PROCEDURE — 25000132 ZZH RX MED GY IP 250 OP 250 PS 637: Performed by: OTOLARYNGOLOGY

## 2017-03-15 PROCEDURE — 25000125 ZZHC RX 250: Performed by: STUDENT IN AN ORGANIZED HEALTH CARE EDUCATION/TRAINING PROGRAM

## 2017-03-15 PROCEDURE — 40000275 ZZH STATISTIC RCP TIME EA 10 MIN

## 2017-03-15 PROCEDURE — 85027 COMPLETE CBC AUTOMATED: CPT | Performed by: STUDENT IN AN ORGANIZED HEALTH CARE EDUCATION/TRAINING PROGRAM

## 2017-03-15 RX ORDER — OXYCODONE HYDROCHLORIDE 5 MG/1
5 TABLET ORAL
Qty: 40 TABLET | Refills: 0 | Status: ON HOLD | OUTPATIENT
Start: 2017-03-15 | End: 2024-04-27

## 2017-03-15 RX ORDER — BISACODYL 10 MG
10 SUPPOSITORY, RECTAL RECTAL ONCE
Status: DISCONTINUED | OUTPATIENT
Start: 2017-03-15 | End: 2017-03-15

## 2017-03-15 RX ORDER — ACETAMINOPHEN 325 MG/1
650 TABLET ORAL EVERY 4 HOURS PRN
Qty: 100 TABLET | Refills: 0 | Status: SHIPPED | OUTPATIENT
Start: 2017-03-17

## 2017-03-15 RX ORDER — MINERAL OIL/HYDROPHIL PETROLAT
OINTMENT (GRAM) TOPICAL EVERY 8 HOURS
Qty: 50 G | Refills: 0 | Status: ON HOLD | OUTPATIENT
Start: 2017-03-15 | End: 2024-04-27

## 2017-03-15 RX ADMIN — AMLODIPINE BESYLATE 5 MG: 5 TABLET ORAL at 08:10

## 2017-03-15 RX ADMIN — ENOXAPARIN SODIUM 40 MG: 40 INJECTION SUBCUTANEOUS at 10:12

## 2017-03-15 RX ADMIN — DOCUSATE SODIUM 100 MG: 100 CAPSULE, LIQUID FILLED ORAL at 08:10

## 2017-03-15 RX ADMIN — ALBUTEROL SULFATE 2 PUFF: 90 AEROSOL, METERED RESPIRATORY (INHALATION) at 00:18

## 2017-03-15 RX ADMIN — ALBUTEROL SULFATE 1 PUFF: 90 AEROSOL, METERED RESPIRATORY (INHALATION) at 13:37

## 2017-03-15 RX ADMIN — FLUTICASONE FUROATE AND VILANTEROL TRIFENATATE 1 PUFF: 100; 25 POWDER RESPIRATORY (INHALATION) at 08:10

## 2017-03-15 RX ADMIN — ASPIRIN 81 MG CHEWABLE TABLET 81 MG: 81 TABLET CHEWABLE at 08:09

## 2017-03-15 RX ADMIN — BACITRACIN: 500 OINTMENT TOPICAL at 10:05

## 2017-03-15 RX ADMIN — ACETAMINOPHEN 975 MG: 325 TABLET, FILM COATED ORAL at 11:38

## 2017-03-15 RX ADMIN — LOSARTAN POTASSIUM 50 MG: 50 TABLET, FILM COATED ORAL at 08:10

## 2017-03-15 RX ADMIN — BACITRACIN: 500 OINTMENT TOPICAL at 00:25

## 2017-03-15 RX ADMIN — ALBUTEROL SULFATE 2.5 MG: 2.5 SOLUTION RESPIRATORY (INHALATION) at 06:29

## 2017-03-15 RX ADMIN — ACETAMINOPHEN 975 MG: 325 TABLET, FILM COATED ORAL at 03:13

## 2017-03-15 ASSESSMENT — ACTIVITIES OF DAILY LIVING (ADL)
AMBULATION: 0-->INDEPENDENT
RETIRED_COMMUNICATION: 0-->UNDERSTANDS/COMMUNICATES WITHOUT DIFFICULTY
COGNITION: 0 - NO COGNITION ISSUES REPORTED
TOILETING: 0-->INDEPENDENT
SWALLOWING: 0-->SWALLOWS FOODS/LIQUIDS WITHOUT DIFFICULTY
FALL_HISTORY_WITHIN_LAST_SIX_MONTHS: YES
DRESS: 0-->INDEPENDENT
RETIRED_EATING: 0-->INDEPENDENT
BATHING: 0-->INDEPENDENT
TRANSFERRING: 0-->INDEPENDENT
NUMBER_OF_TIMES_PATIENT_HAS_FALLEN_WITHIN_LAST_SIX_MONTHS: 1

## 2017-03-15 NOTE — PROGRESS NOTES
Otolaryngology Progress Note  March 15, 2017    S:   No acute events overnight.  Pain is well controlled. Coughed up green phlegm. Tolerating full liquid diet.    O:  Temp:  [96  F (35.6  C)-98.7  F (37.1  C)] 96  F (35.6  C)  Pulse:  [80] 80  Heart Rate:  [76-86] 86  Resp:  [14-18] 16  BP: (102-135)/(47-76) 135/56  SpO2:  [95 %-100 %] 95 %    I/O last 3 completed shifts:  In: 2760 [P.O.:460; I.V.:2300]  Out: 723 [Urine:525; Drains:98; Blood:100]    Constitutional: Lying in bed comfortably, no acute distress, pleasant affect  HEENT: Neck incision c/d/i, neck flat no signs of hematoma. DANIEL holding suction, sanguinous output.  Pulmonary: Non-labored breathing in room air. No stridor. Voice strong    Labs:  Results for orders placed or performed during the hospital encounter of 03/14/17 (from the past 24 hour(s))   Magnesium   Result Value Ref Range    Magnesium 1.8 1.6 - 2.3 mg/dL   Phosphorus   Result Value Ref Range    Phosphorus 4.4 2.5 - 4.5 mg/dL   Arterial Panel   Result Value Ref Range    pH Arterial 7.33 (L) 7.35 - 7.45 pH    pCO2 Arterial 38 35 - 45 mm Hg    pO2 Arterial 307 (H) 80 - 105 mm Hg    Bicarbonate Arterial 20 (L) 21 - 28 mmol/L    Base Deficit Art 5.5 mmol/L    FIO2 74     Sodium 137 133 - 144 mmol/L    Potassium 4.0 3.4 - 5.3 mmol/L    Hemoglobin 10.2 (L) 11.7 - 15.7 g/dL    Glucose 137 (H) 70 - 99 mg/dL    Calcium Ionized Whole Blood 5.1 4.4 - 5.2 mg/dL   Platelet count   Result Value Ref Range    Platelet Count 120 (L) 150 - 450 10e9/L   Creatinine   Result Value Ref Range    Creatinine 1.32 (H) 0.52 - 1.04 mg/dL    GFR Estimate 39 (L) >60 mL/min/1.7m2    GFR Estimate If Black 47 (L) >60 mL/min/1.7m2   Glucose by meter   Result Value Ref Range    Glucose 161 (H) 70 - 99 mg/dL       Images:  3/15/17 Chest XR: result pending    A/P:  81 year old female with PMH of HTN, Ashtma, COPD, GERD, CKD, large substernal thyroid goiter, POD #1 s/p left hemithyroidectomy and removal of substernal goiter. 1 DANIEL  drain in place     Neuro:   - Pain control: acetaminophen PRN, oxycodone PRN, IV dilaudid for breakthrough pain  HEENT:  - Neck incision wound care with Bacitracin for 24 hours then transition to Aquaphor  - Monitor DANIEL drain output  - Monitor voice  CV:   - HDS, continue to monitor  - PMH of HTN, HLD, Resumed home ASA, Amlodipine, Losartan, Simvastatin  Resp:   - Supplemental O2 PRN to keep sats > 92%  - IS and deep breathing  - PMH of asthma and COPD, resumed home Albuterol, Advir  GI/FEN:   - ADAT  - Bowel regimen: Senna, Colace  - Resumed home Ranitidine  Renal:   - UOP is adequate, Cr pending   Endo:   - No history of diabetes  Heme:   - Post op Hgb pending  ID:    - No fever  - No antibiotics needed  PPX: SCD, Lovenox  Dispo: Pending DANIEL management, PT eval, likely d/c later today or tomorrow    Assessment and plan discussed with staff Dr. Anaya Puente  Otolaryngology Resident - PGY3  [phone]  Please page ENT with questions by dialing [phone] and entering job code [phone] when prompted

## 2017-03-15 NOTE — PLAN OF CARE
Problem: Goal Outcome Summary  Goal: Goal Outcome Summary  Outcome: Improving  Patient is POD 1 transcervical resection of mediastinal goiter. VSS. Neuros intact. Anterior neck incision is sutured and intact with some redness around site, FAUSTINA. JPx1 with serosanguinous drainage. Bacitracin applied. Pain controlled with scheduled Tylenol. On one liter of oxygen, instructed patient to use incentive spirometer. Advanced to full liquids for the morning. Up with assist of 1 and walker. Voiding spontaneously. Will continue to monitor.

## 2017-03-15 NOTE — PLAN OF CARE
Problem: Goal Outcome Summary  Goal: Goal Outcome Summary  Transcervical resection of mediastinal goiter  Pt AVSS, sats 96% on RA . Sl x2. slt hypotensive late in shift, L)neck DANIEL w/ serosang. Drainage to bulb SX, anterior neck incision CDI / sutured and FAUSTINA/ bacitracin applied. Voice clear and strong, able to tolerate clr liquids w/ out problems. VDSP on bed pan then spilt ( bladderscan for 140mls  PVR)

## 2017-03-15 NOTE — PROGRESS NOTES
"  Care Coordinator Progress Note     Admission Date/Time:  3/14/2017  Attending MD:  Dr Misbah Julien     Data  Received call from Utilization Review; pt has been changed from Inpatient to Outpatient status and needs notification.   Spoke with MELINDA Artis and plan is discharge today.     Patient was admitted for:  Large thyroid/mediastinal goiter.  Procedures 3-14-17:  Transcervical resection of mediastinal goiter; left thyroid lobectomy with removal of mediastinal & retrotracheal component. (Combined surgery with ENT & Thoracic Surgery.)     Concerns with insurance coverage for discharge needs: None.  Pt's insurance is [employer].   Current Living Situation: Patient lives alone.  Transportation: Family or Friend will provide  Barriers to Discharge:  None    Coordination of Care and Referrals   Introduced myself to pt. Explained she was changed from Inpatient to Outpatient Status. Gave her the \"What is Outpatient/Observation?\" notice.     Pt said she plans to discharge home today. Alert, pleasant, visitors present.        Assessment  Pt notified of Outpatient Status. No discharge needs at this time.      Plan  Anticipated Discharge Date:  Today  Anticipated Discharge Plan:   Discharge home.       Adelita Varela RN Care Coordinator  [address], [facility]            "

## 2017-03-15 NOTE — PROGRESS NOTES
DISCHARGE:    D:  Pt has completed orders for discharge and friends here to provide ride home.  I:  Reviewed all discharge paperwork including medications and schedule, follow-up appointments, instructions, and home care for drain and incision.  Provided with supplies for drain and incision care along with written DANIEL care instructions.  PIVs removed from R and L hands.  Copies of discharge papers given to pt and retained in chart.  Room cleared of all clothing and personal belongings.  A: Pt is alert, oriented x4.  States understanding of all medications, follow-up appointments, post-surgery instructions, and cares.  Pt also stated understanding of whom to contact with questions or concerns after returning home.  Verbalized accurately the steps for emptying drain and performing incision care.  P:  Follow up appointment with surgeon scheduled for 3.20.17.  Medications ready for  at discharge pharmacy.  Pt left unit in w/c accompanied by friend, Ynes.

## 2017-03-15 NOTE — PROGRESS NOTES
THORACIC & FOREGUT SURGERY    S:  No acute overnight events.  Pt seen at bedside resting comfortably.       O:  Vitals:    03/14/17 2043 03/14/17 2244 03/15/17 0324 03/15/17 0803   BP: 114/54 102/47 135/56 129/50   BP Location: Left arm  Left arm Left arm   Pulse:       Resp: 16 18 16 18   Temp: 97.7  F (36.5  C) 98.7  F (37.1  C) 96  F (35.6  C) 96.6  F (35.9  C)   TempSrc: Oral Axillary Oral Oral   SpO2: 97% 96% 95% 93%   Weight:       Height:           A&Ox3, NAD  Breathing non-labored  Soft, NDNT  Distal extremities warm    A/P: Mary Corral is a 81 year old female POD# 1 s/p left hemithyroidectomy and removal of substernal goiter. Thoracic surgery assisted with chest portion of goiter.  -Incisions healthy appearing  -Thoracic surgery to follow peripherally  -Please feel free to call with questions     Thiago Taveras PA-C  P: [phone]

## 2017-03-15 NOTE — OP NOTE
DATE OF SERVICE:  03/14/2017      ATTENDING SURGEON:  Misbah Julien MD      OTHER SURGEONS:  Lucia Quinteros MD, and Franko Guerrero MD.      ASSISTANT:  Chun Macedo MD.      POSTOPERATIVE DIAGNOSIS:  Massive left-sided thyroid goiter with significant retrotracheal extension and mediastinal extension.      ANESTHESIA:  General.      PROCEDURE:     1.  Left thyroid lobectomy with removal of mediastinal and retrotracheal component, assisted by doctors Aldair and Cesar.     2.  Recurrent laryngeal nerve monitoring from 10:00 a.m. to 12:30 p.m.   3.  Superior mediastinal dissection done by Thoracic colleague.      ANESTHESIA:  General.      OPERATIVE INDICATIONS:  Mary Corral is a patient who recently identified a large thyroid goiter which was initially managed expectantly but she began having symptoms such as shortness of breath when she goes for a walk or climbs the stairs, and it was attributed to this mass.  She therefore requests that it be removed after initial attempt at watchful waiting did not allow her to resume her activities of daily living.      OPERATIVE PROCEDURE:  The patient was brought to the operating room and induced under general endotracheal anesthesia.  A timeout was performed.  I should note that we used a nerve monitoring tube and hooked it up for monitoring from 10 a.m. to 12:30 p.m.  Anesthesia placed lines in the groin and an arterial line.  She was prepped and draped in the usual sterile fashion and we designed a curvilinear incision about 1 fingerbreadth below the cricoid cartilage.  After performing a timeout, we made the above-named incision, raised subplatysmal flaps in superior and inferior direction and divided the strap muscle and identified the left thyroid lobe.  We dissected around the goiterous superior pole and ligated the superior pole vessels.  Because of the massive retrotracheal extension, I was concerned that it would be very difficult to find the nerve from a lateral approach  which is typical.  Therefore, I elected to perform a combination of lateral and medial approach with the expectation of finding the nerve from a medial approach.  This started by dividing the thyroid at the isthmus and lifting it off of the trachea towards Berry's ligament.  The medial portions of Berry's ligament were divided and the adhesions and fascial bands attaching it to the trachea were divided as well.  We then spent 30-60 minutes trying to identify the recurrent laryngeal nerve and trying to steadily increase our exposure by taking down portions of the gland which became very difficult as I looked very carefully in the typical position of the nerve, which was the cricothyroid joint and did not identify the nerve here.  Certainly I was concerned that it would be out of position given the massive thyroid goiter present and also the fact that her preoperative exam did show a little bit sluggish nature to the left vocal fold.  I then began dissecting bluntly between the trachea and the mass.  The mass had significant retrotracheal extension all the way over to the right side and I bluntly dissected this with my finger.  This allowed me to free up the trachea somewhat here and after 60 minutes of searching for the nerve, we identified the nerve in a highly atypical retrotracheal position lying on the retrotracheal portion of the thyroid gland.  This was a very risky position to the nerve as it could easily have been ligated in this out-of-natural-position state.  Nevertheless, the nerve was identified and preserved at this portion of the procedure and preserved until the end of the case.  I then continued to attempt to extract the mediastinal portion of the gland out of the chest.  It did experience some breakage of the capsule.  At this point, I did ask Dr. Quinteros to take a look and see if she would have any additional luck getting this out of the chest without rupturing the gland any further.  Dr. Guerrero  entered the case also and offered the suggestion of the TEMLA retractor, which was then used.  I exited the case for some time at this point with the recurrent laryngeal nerve now safely protected.  The thoracic team then removed the remainder of the gland after I had removed the superior pole and a portion of the mediastinal component.  We then placed a #10 flat drain and closed using 3-0 Vicryl and 4-0 nylon.  Blood loss for my portion of the procedure was about 75 mL.      COUNTS:  All sponge, instrument and needle counts were correct.      COMPLICATIONS:  There were no immediate complications.      Please see Dr. Quinteros's and Dr. Guerrero's note for details of their portion of the procedure.         JENNIFER CADENA MD             D: 2017 22:13   T: 03/15/2017 01:03   MT:       Name:     GOSIA PEREZ   MRN:      [id number]        Account:        [id number]   :      1935           Procedure Date: 2017      Document: [id number]

## 2017-03-15 NOTE — PLAN OF CARE
Problem: Goal Outcome Summary  Goal: Goal Outcome Summary  PT 6A: Stopped by to address functional concerns with pt and she has none. Ok to d/c home. RN in agreement. Will sign off.

## 2017-03-15 NOTE — UTILIZATION REVIEW
"  Admission Status; Secondary Review Determination     Admission Date: 3/14/2017  5:56 AM      Under the authority of the Utilization Management Committee, the utilization review process indicated a secondary review on the above patient.  The review outcome is based on review of the medical records, discussions with staff, and applying clinical experience noted on the date of the review.        ()      Inpatient Status Appropriate - This patient's medical care is consistent with medical management for inpatient care and reasonable inpatient medical practice.      (x) Outpatient Status Appropriate - This patient does not meet hospital inpatient criteria and is placed in outpatient status. If this patient's primary payer is Medicare and was admitted as an inpatient, Condition Code 44 should be used and patient status changed to \"outpatient\".   () Admission Status NOT Appropriate - This patient's medical care is not consistent with medical management for Inpatient or Observation Status.          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  Procedure was uncomplicated.  She was initially placed in the hospital as an inpatient.  This procedure is reported to usually be done in the outpatient setting.  Patient is expected to discharge from the hospital later today.  As this patient underwent the above-noted procedure, which is usually done in the outpatient setting, has not had any noted complications, and is expected to discharge from the hospital later today, outpatient status at the hospital is the appropriate hospital status for this patient's routine postoperative care.      The severity of illness, intensity of service provided, expected LOS and risk for adverse outcome make the care " appropriate for outpatient level care at the hospital.        The information on this document is developed by the utilization review team in order for the business office to ensure compliance.  This only denotes the appropriateness of proper admission status and does not reflect the quality of care rendered.         The definitions of Inpatient Status and Observation Status used in making the determination above are those provided in the CMS Coverage Manual, Chapter 1 and Chapter 6, section 70.4.      Sincerely,     Tomás Lowe D.O.  Utilization Review/ Case Management  [facility].

## 2017-03-15 NOTE — PROGRESS NOTES
I called  to discuss how she was doing post operative;y.  She was glad to be home.  We discussed her pain medications  At this time she is only taking tylenol and it is covering her pain.  She has had a bowel movement  She understands how to empty her drainage tube  I will call her Friday morning to check on the drainage output and get her in for drain removal.  She will f/u with Dr. John on Monday the 20th at 945am

## 2017-03-15 NOTE — OP NOTE
DATE OF PROCEDURE:  2017      PREOPERATIVE DIAGNOSIS:  Large mediastinal goiter.      PROCEDURE  PERFORMED:  Transcervical resection of mediastinal goiter.      SURGEON:  Yenni Valerio MD (I am dictating as cosurgeon for Dr. Julien and Dr. Quinteros, I only helped Dr. Quinteros with the final dissection of the goiter on the right posterior tracheal portion).      DESCRIPTION OF PROCEDURE:  I assisted Dr. Quinteros with the final dissection of the retrotracheal goiter.  Dr. Quinteros and the ENT team had dissected the recurrent laryngeal nerve off the mass in its entirety and had removed the cervical portion and mobilized all the left paratracheal portion of it.  I helped with the final dissection.  I bluntly dissected down the prevertebral plane and then the retrotracheal plane and was able to scoop out the whole retrotracheal and right-sided portion of the goiter in its entirety.      Please refer to Dr. Quinteros's operative report for further details.         YENNI VALERIO MD             D: 2017 20:21   T: 2017 22:04   MT: FARAZ      Name:     GOSIA PEREZ   MRN:      [id number]        Account:        [id number]   :      1935           Procedure Date: 2017      Document: [id number]       cc: [facility] Billing

## 2017-03-15 NOTE — PLAN OF CARE
Problem: Skin and Soft Tissue Infection (Adult)  Goal: Signs and Symptoms of Listed Potential Problems Will be Absent or Manageable (Skin and Soft Tissue Infection)  Signs and symptoms of listed potential problems will be absent or manageable by discharge/transition of care (reference Skin and Soft Tissue Infection (Adult) CPG).      Afebrile with stable VS.  Weaned off supplemental O2 this AM, sats 93-95% with RA.  Denies pain, continues on scheduled acetaminophen.  Incision remains CDI, open to air with all sutures intact.  DANIEL draining moderate amount sanguinous drainage.  Up in room with SBA, ambulating to bathroom with walker.  Voiding spontaneously, BM x1.  Tolerating regular diet.  Using I.S. independently.  Drain care teaching performed and pt given supplies and written instructions.  Will be discharging to home this afternoon.  Continue plan of care until time of discharge.  Update ENT team prn any concerns or change in pt status.

## 2017-03-15 NOTE — DISCHARGE SUMMARY
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 Left thyroid lobectomy with removal of mediastinal and retrotracheal component, assisted by doctors Aldair and Cesar.   2. Recurrent laryngeal nerve monitoring from 10:00 a.m. to 12:30 p.m.   3. Superior mediastinal dissection done by Thoracic colleague.     Pathology: pending    HPI: Mary Corral is a 81 year old female with history of COPD, asthma, and a large thyroid goiter causing symptoms of shortness of breath. It was recommended that she undergo operative intervention and the patient consented to the above procedure after detailed explanation of the risks and benefits of said procedure.    Hospital Course: The patient was admitted to the hospital and underwent the above mentioned procedure. She tolerated the procedure without any intra- or harjeet-operative complications. Please see the operative report for full details of the procedure. The patient was admitted for post-operative monitoring. Her postoperative course was uneventful. At discharge, the patient's pain was well controlled, the patient was voiding on her own, and was ambulating and tolerating a regular diet.     Discharge Exam:  Vitals:    03/14/17 2043 03/14/17 2244 03/15/17 0324 03/15/17 0803   BP: 114/54 102/47 135/56 129/50   BP Location: Left arm  Left arm Left arm   Pulse:       Resp: 16 18 16 18   Temp: 97.7  F (36.5  C) 98.7  F (37.1  C) 96  F (35.6  C) 96.6  F (35.9  C)   TempSrc: Oral Axillary Oral Oral   SpO2: 97% 96% 95% 93%   Weight:       Height:           General: A&O x 3, No acute distress  HEENT:EOMI. Neck soft and flat without signs of hematoma or fluid collection. Incision is clean, dry, and intact; no bleeding no drainage. DANIEL drain holding bulb suction with serosanguinous drainage in bulb.    Respiratory: Breathing non-labored on room air, no stridor, no accessory muscle use.     Discharge Medications:   Mary Corral   Home Medication Instructions YAYA:[id number]    Printed on:03/15/17 1007   Medication Information                      acetaminophen (TYLENOL) 325 MG tablet  Take 2 tablets (650 mg) by mouth every 4 hours as needed for other (surgical pain)             albuterol (2.5 MG/3ML) 0.083% nebulizer solution  Take 1 vial by nebulization every 4 hours as needed for shortness of breath / dyspnea or wheezing             albuterol (PROAIR HFA/PROVENTIL HFA/VENTOLIN HFA) 108 (90 BASE) MCG/ACT Inhaler  Inhale 2 puffs into the lungs every 6 hours             ALLOPURINOL PO  Take 100 mg by mouth 2 times daily              AMLODIPINE BESYLATE PO  Take 5 mg by mouth 2 times daily             ASPIRIN PO  Take 81 mg by mouth daily             cetirizine (ZYRTEC) 10 MG tablet  Take 10 mg by mouth 2 times daily             Cholecalciferol (VITAMIN D3 PO)  Take 1,000 Units by mouth daily             cloNIDine (CATAPRES-TTS2) 0.2 MG/24HR WK patch  Place 1 patch onto the skin daily              EPINEPHrine 0.3 MG/0.3ML injection 2-pack  Inject 0.3 mg into the muscle as needed for anaphylaxis             Ferrous Sulfate (IRON SUPPLEMENT PO)  Take 45 mg by mouth             fluticasone-salmeterol (ADVAIR) 250-50 MCG/DOSE diskus inhaler  Inhale 1 puff into the lungs 2 times daily             losartan (COZAAR) 50 MG tablet  Take 1 tablet (50 mg) by mouth 2 times daily             mineral oil-hydrophilic petrolatum (AQUAPHOR)  Apply topically every 8 hours             multivitamin, therapeutic with minerals (MULTI-VITAMIN) TABS  Take 1 tablet by mouth daily             oxyCODONE (ROXICODONE) 5 MG IR tablet  Take 1 tablet (5 mg) by mouth every 3 hours as needed for moderate to severe pain             polyethylene glycol (MIRALAX/GLYCOLAX) powder  Take 17 g by mouth daily as needed for constipation            "  RANITIDINE HCL PO  Take 150 mg by mouth 2 times daily             SIMVASTATIN PO  Take 20 mg by mouth At Bedtime             ValACYclovir HCl (VALTREX PO)  Take 500 mg by mouth as needed                   Discharge Procedure Orders  Reason for your hospital stay   Order Comments: Post operative care     Activity   Order Comments: Your activity upon discharge: No heavy lifting greater than 10 lbs and no strenuous exercise for 2 weeks or until follow up appointment. No driving while taking narcotic pain medications.   Order Specific Question Answer Comments   Is discharge order? Yes      When to contact your care team   Order Comments: Please notify your doctor if you experience wound breakdown, sustained bleeding from the wound site, or increasing redness, swelling, and/or purulent malorodorous discharge from the wound site which may indicate infection. If you feel it is acute, or experience sudden changes in breathing, chest pain, or excessive sleepiness/somnolence please return to the emergency department or call 911. If you have questions or concerns during the day please call ENT clinic and [phone]. If at night you can call [facility] at [phone] and ask for the \"ENT resident on call\".     Monitor and record   Order Comments: You are going home with surgical drains in place. Empty the drains and record output 3 times per day. Squeeze the bulb of the drain and replace cap.  If you have multiple drains, record the outputs separately. Once output is less than 30 mL in 24 hours, please contact the ENT clinic to schedule an appointment for drain removal.     Wound care and dressings   Order Comments: Instructions to care for your wound at home: Keep incisions clean and dry. Apply Aquaphor ointment to incisions three times daily to keep moist. You may shower, do not soak, scrub, or submerge incisions under water. If you have a surgical drain, do not get the drain site wet until 24 hours after the " drain has been removed.     Adult [facility] Follow-up and recommended labs and tests   Order Comments: Follow up in ENT clinic with Dr. Julien on Monday 3/20/17. Please call the clinic with questions/concerns: [phone].    Otolaryngology/ENT Clinic:  [facility]  [address] [place] [address]      Appointments on [place] and/or [facility] (with [facility] or [facility] provider or service). Call [phone] if you haven't heard regarding these appointments within 7 days of discharge.     Full Code     Diet   Order Comments: Follow this diet upon discharge: Regular diet   Order Specific Question Answer Comments   Is discharge order? Yes          Dispo: To home in good condition. All of the patient's questions/concerns have been addressed at this time.     Imelda Seaman PA-C  Otolaryngology-Head & Neck Surgery  Please contact ENT by dialing [phone] and entering job code [phone].

## 2017-03-16 ENCOUNTER — CARE COORDINATION (OUTPATIENT)
Dept: CARDIOLOGY | Facility: CLINIC | Age: 82
End: 2017-03-16

## 2017-03-16 LAB — COPATH REPORT: NORMAL

## 2017-03-16 NOTE — PROGRESS NOTES
"[facility]  \"Hello, my name is Lucero Graves , and I am calling from the [facility].  I want to check in and see how you are doing, after leaving the hospital.  You may also receive a call from your Care Coordinator (care team), but I want to make sure you don t have any urgent needs.  I have a couple questions to review with you:     Post-Discharge Outreach                                                    Mary Corral is a 81 year old female     Follow-up Appointments           Adult [facility] Follow-up and recommended labs and tests       Follow up in ENT clinic with Dr. Julien on Monday 3/20/17. Please call the clinic with questions/concerns: [phone].                   Care Team:    Patient Care Team       Relationship Specialty Notifications Start End    Jessica Fonseca PCP - General Family Practice  11/8/16     Phone: [phone] Fax: [phone]         [facility] [address] [facility] [address]    Sergei Hernández   Surgery  11/8/16     Phone: [phone] Fax: [phone]         [facility] [id number] [facility] [address]            Transition of Care Review                                                      Patient was contacted by an RN for post DC follow up so no duplicate post DC follow up call will be made        Marcie Lees, RN   Nurse        I called  to discuss how she was doing post operatively. She was glad to be home. We discussed her pain medications At this time she is only taking tylenol and it is covering her pain. She has had a bowel movement She understands how to empty her drainage tube I will call her Friday morning to check on the drainage output and get her in for drain removal. She will f/u with Dr. John on Monday the 20th at 945am                    Plan                                                      Thanks for your time.  Your Care Coordinator may follow-up within the next couple " days.  In the meantime if you have questions, concerns or problems call your care team.        Lucero Graves

## 2017-03-17 ENCOUNTER — ALLIED HEALTH/NURSE VISIT (OUTPATIENT)
Dept: OTOLARYNGOLOGY | Facility: CLINIC | Age: 82
End: 2017-03-17

## 2017-03-17 DIAGNOSIS — E04.9 GOITER: Primary | ICD-10-CM

## 2017-03-17 NOTE — MR AVS SNAPSHOT
After Visit Summary   3/17/2017    Mary Corral    MRN: [id number]           Patient Information     Date Of Birth          1935        Visit Information        Provider Department      3/17/2017 10:00 AM Nurse, [facility] Ear Nose and Throat        Today's Diagnoses     Goiter    -  1      Care Instructions    Follow up with Dr. Julien on Monday  Call me if ?'s arise [phone]  Marcie Lees rn        Follow-ups after your visit        Who to contact     Please call your clinic at [phone] to:    Ask questions about your health    Make or cancel appointments    Discuss your medicines    Learn about your test results    Speak to your doctor   If you have compliments or concerns about an experience at your clinic, or if you wish to file a complaint, please contact [facility] Patient Relations at [phone] or email us at [email or web address]         Additional Information About Your Visit        MyChart Information     [employer] gives you secure access to your electronic health record. If you see a primary care provider, you can also send messages to your care team and make appointments. If you have questions, please call your primary care clinic.  If you do not have a primary care provider, please call [phone] and they will assist you.      [employer] is an electronic gateway that provides easy, online access to your medical records. With [employer], you can request a clinic appointment, read your test results, renew a prescription or communicate with your care team.     To access your existing account, please contact your [facility] or call [phone] for assistance.        Care EveryWhere ID     This is your Care EveryWhere ID. This could be used by other organizations to access your [place] medical records  [id number]         Blood Pressure from Last 3 Encounters:   03/15/17 118/52   03/13/17 140/66   01/31/17  138/77    Weight from Last 3 Encounters:   03/20/17 80.7 kg (178 lb)   03/14/17 82.8 kg (182 lb 8.7 oz)   03/13/17 81.2 kg (179 lb)              Today, you had the following     No orders found for display       Primary Care Provider Office Phone # Fax #    Jessica Fonseca [phone]       [facility] [address] [facility] [address]        Thank you!     Thank you for choosing [facility] EAR NOSE AND THROAT  for your care. Our goal is always to provide you with excellent care. Hearing back from our patients is one way we can continue to improve our services. Please take a few minutes to complete the written survey that you may receive in the mail after your visit with us. Thank you!             Your Updated Medication List - Protect others around you: Learn how to safely use, store and throw away your medicines at www.disposemymeds.org.          This list is accurate as of: 3/17/17 11:59 PM.  Always use your most recent med list.                   Brand Name Dispense Instructions for use    acetaminophen 325 MG tablet    TYLENOL    100 tablet    Take 2 tablets (650 mg) by mouth every 4 hours as needed for other (surgical pain)       * albuterol (2.5 MG/3ML) 0.083% neb solution      Take 1 vial by nebulization every 4 hours as needed for shortness of breath / dyspnea or wheezing       * albuterol 108 (90 BASE) MCG/ACT Inhaler    PROAIR HFA/PROVENTIL HFA/VENTOLIN HFA     Inhale 2 puffs into the lungs every 6 hours       ALLOPURINOL PO      Take 100 mg by mouth 2 times daily       AMLODIPINE BESYLATE PO      Take 5 mg by mouth 2 times daily       ASPIRIN PO      Take 81 mg by mouth daily       cetirizine 10 MG tablet    zyrTEC     Take 10 mg by mouth 2 times daily       cloNIDine 0.2 MG/24HR WK patch    CATAPRES-TTS2    4 patch    Place 1 patch onto the skin daily       EPINEPHrine 0.3 MG/0.3ML injection      Inject 0.3 mg into the muscle as needed for anaphylaxis        fluticasone-salmeterol 250-50 MCG/DOSE diskus inhaler    ADVAIR     Inhale 1 puff into the lungs 2 times daily       IRON SUPPLEMENT PO      Take 45 mg by mouth       losartan 50 MG tablet    COZAAR    30 tablet    Take 1 tablet (50 mg) by mouth 2 times daily       mineral oil-hydrophilic petrolatum     50 g    Apply topically every 8 hours       Multi-vitamin Tabs tablet      Take 1 tablet by mouth daily       oxyCODONE 5 MG IR tablet    ROXICODONE    40 tablet    Take 1 tablet (5 mg) by mouth every 3 hours as needed for moderate to severe pain       polyethylene glycol powder    MIRALAX/GLYCOLAX     Take 17 g by mouth daily as needed for constipation       RANITIDINE HCL PO      Take 150 mg by mouth 2 times daily       SIMVASTATIN PO      Take 20 mg by mouth At Bedtime       VALTREX PO      Take 500 mg by mouth as needed       VITAMIN D3 PO      Take 1,000 Units by mouth daily       * Notice:  This list has 2 medication(s) that are the same as other medications prescribed for you. Read the directions carefully, and ask your doctor or other care provider to review them with you.

## 2017-03-17 NOTE — OP NOTE
DATE OF PROCEDURE:  2017       I am dictating this as co-surgeon with Dr. Julien on Gosia Corral.      ASSISTING SURGEON:  Nadir Garcia MD      PREOPERATIVE DIAGNOSIS:  Mediastinal mass.      POSTOPERATIVE DIAGNOSIS:  Mediastinal mass.      PROCEDURE PERFORMED:  Resection of mediastinal mass, likely mediastinal goiter through a transcervical approach.      DESCRIPTION OF PROCEDURE:  I assisted Dr. Julien with Dr. Guerrero with dissection of the retrosternal goiter.  Dr. Julien had dissected the recurrent laryngeal nerve and lifted it off the mass for a good distance.  Using a combination of blunt and sharp dissection, we freed up the lateral and inferior attachments of the mass; however, the capsule here was broken and it was hard to get around the mass.  We proceeded then to put in a hook retractor on the sternum after dissecting the inferior portion of the manubrium and this helped open up the superior mediastinum.  With this device and further extension of the neck, we were able to more clearly visualize the retrosternal goiter and the superior mediastinum.  Dr. Guerrero then assisted with delivering the right-sided extension of this mediastinal goiter and we were able to remove the mass in its entirety leaving the recurrent laryngeal nerve intact.  We then took down the paratracheal attachments and the superior pole with the LigaSure device.  After the mass had been removed, hemostasis was ensured.  The final conclusion had been performed by the ENT team.  Please refer to Dr. Julien's operative report for further details.         GUILLERMINA GOODWIN MD             D: 2017 14:35   T: 2017 18:08   MT:       Name:     GOSIA CORRAL   MRN:      [id number]        Account:        [id number]   :      1935           Procedure Date: 2017      Document: [id number]

## 2017-03-20 ENCOUNTER — OFFICE VISIT (OUTPATIENT)
Dept: OTOLARYNGOLOGY | Facility: CLINIC | Age: 82
End: 2017-03-20

## 2017-03-20 ENCOUNTER — RECORDS - HEALTHEAST (OUTPATIENT)
Dept: ADMINISTRATIVE | Facility: OTHER | Age: 82
End: 2017-03-20

## 2017-03-20 VITALS — WEIGHT: 178 LBS | BODY MASS INDEX: 29.66 KG/M2 | HEIGHT: 65 IN

## 2017-03-20 DIAGNOSIS — E04.9 GOITER: Primary | ICD-10-CM

## 2017-03-20 ASSESSMENT — PAIN SCALES - GENERAL: PAINLEVEL: NO PAIN (0)

## 2017-03-20 NOTE — MR AVS SNAPSHOT
"              After Visit Summary   3/20/2017    Mary Corral    MRN: [id number]           Patient Information     Date Of Birth          1935        Visit Information        Provider Department      3/20/2017 9:45 AM Misbah Julien MD [facility] Ear Nose and Throat        Today's Diagnoses     Goiter    -  1      Care Instructions    Follow up with Dr. Julien as needed  Call me if ?'s arise [phone]  Barb Lees RN        Follow-ups after your visit        Who to contact     Please call your clinic at [phone] to:    Ask questions about your health    Make or cancel appointments    Discuss your medicines    Learn about your test results    Speak to your doctor   If you have compliments or concerns about an experience at your clinic, or if you wish to file a complaint, please contact [facility] Patient Relations at [phone] or email us at [email or web address]         Additional Information About Your Visit        [employer] Information     [employer] gives you secure access to your electronic health record. If you see a primary care provider, you can also send messages to your care team and make appointments. If you have questions, please call your primary care clinic.  If you do not have a primary care provider, please call [phone] and they will assist you.      [employer] is an electronic gateway that provides easy, online access to your medical records. With [employer], you can request a clinic appointment, read your test results, renew a prescription or communicate with your care team.     To access your existing account, please contact your [facility] or call [phone] for assistance.        Care EveryWhere ID     This is your Care EveryWhere ID. This could be used by other organizations to access your [place] medical records  [id number]        Your Vitals Were     Height BMI (Body Mass Index)                1.65 m (5' 4.96\") " 29.66 kg/m2           Blood Pressure from Last 3 Encounters:   03/15/17 118/52   03/13/17 140/66   01/31/17 138/77    Weight from Last 3 Encounters:   03/20/17 80.7 kg (178 lb)   03/14/17 82.8 kg (182 lb 8.7 oz)   03/13/17 81.2 kg (179 lb)              We Performed the Following     LARYNGOSCOPY FLEX FIBEROPTIC, DIAGNOSTIC        Primary Care Provider Office Phone # Fax Etienne Fonseca [phone]       [facility] [address] [facility] [address]        Thank you!     Thank you for choosing [facility] EAR NOSE AND THROAT  for your care. Our goal is always to provide you with excellent care. Hearing back from our patients is one way we can continue to improve our services. Please take a few minutes to complete the written survey that you may receive in the mail after your visit with us. Thank you!             Your Updated Medication List - Protect others around you: Learn how to safely use, store and throw away your medicines at www.disposemymeds.org.          This list is accurate as of: 3/20/17 11:59 PM.  Always use your most recent med list.                   Brand Name Dispense Instructions for use    acetaminophen 325 MG tablet    TYLENOL    100 tablet    Take 2 tablets (650 mg) by mouth every 4 hours as needed for other (surgical pain)       * albuterol (2.5 MG/3ML) 0.083% neb solution      Take 1 vial by nebulization every 4 hours as needed for shortness of breath / dyspnea or wheezing       * albuterol 108 (90 BASE) MCG/ACT Inhaler    PROAIR HFA/PROVENTIL HFA/VENTOLIN HFA     Inhale 2 puffs into the lungs every 6 hours       ALLOPURINOL PO      Take 100 mg by mouth 2 times daily       AMLODIPINE BESYLATE PO      Take 5 mg by mouth 2 times daily       ASPIRIN PO      Take 81 mg by mouth daily       cetirizine 10 MG tablet    zyrTEC     Take 10 mg by mouth 2 times daily       cloNIDine 0.2 MG/24HR WK patch    CATAPRES-TTS2    4 patch    Place 1 patch onto the skin daily        EPINEPHrine 0.3 MG/0.3ML injection      Inject 0.3 mg into the muscle as needed for anaphylaxis       fluticasone-salmeterol 250-50 MCG/DOSE diskus inhaler    ADVAIR     Inhale 1 puff into the lungs 2 times daily       IRON SUPPLEMENT PO      Take 45 mg by mouth       losartan 50 MG tablet    COZAAR    30 tablet    Take 1 tablet (50 mg) by mouth 2 times daily       mineral oil-hydrophilic petrolatum     50 g    Apply topically every 8 hours       Multi-vitamin Tabs tablet      Take 1 tablet by mouth daily       oxyCODONE 5 MG IR tablet    ROXICODONE    40 tablet    Take 1 tablet (5 mg) by mouth every 3 hours as needed for moderate to severe pain       polyethylene glycol powder    MIRALAX/GLYCOLAX     Take 17 g by mouth daily as needed for constipation       RANITIDINE HCL PO      Take 150 mg by mouth 2 times daily       SIMVASTATIN PO      Take 20 mg by mouth At Bedtime       VALTREX PO      Take 500 mg by mouth as needed       VITAMIN D3 PO      Take 1,000 Units by mouth daily       * Notice:  This list has 2 medication(s) that are the same as other medications prescribed for you. Read the directions carefully, and ask your doctor or other care provider to review them with you.

## 2017-03-20 NOTE — LETTER
3/20/2017     RE: Mary Corral  [address] [address]     Dear Colleague,    Thank you for referring your patient, Mary Corral, to the [facility] EAR NOSE AND THROAT at [facility]. Please see a copy of my visit note below.    HISTORY OF PRESENT ILLNESS:  Ms. 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  She was symptomatic from it causing shortness of breath.  She underwent left thyroid lobectomy with removal of the mediastinal and retrotracheal goiter with the assistance of Dr. Quinteros and Dr. Guerrero on March 14, 2017.  She is here today for follow-up.  She reports doing well.  Her pain was minimal and was able to be controlled with Tylenol.  She reports stable mild edema of the anterior neck.  She has been using Aquaphor to the incision site.  She denies any signs of infection such as fevers, chills, drainage from the wound or redness from the wound.  She reports that her breathing is getting better since surgery.        PHYSICAL EXAMINATION:  The patient is sitting in the chair in no acute distress.  Neck incision is well-healed.  Sutures were removed today.  There is the mild expected edema without fluctuance.        PROCEDURE NOTE:  Flexible laryngoscopy was performed and noted that bilateral vocal cords were fully mobile.      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  Healing well.        PLAN:  We would like Ms. Corral to be seen by her primary care physician in 3-4 weeks to have her TSH checked.  She is only one week out from the surgery.  Therefore, we would like her to continue to have weight lifting restrictions and no strenuous activity for an additional 10 days.  We will see her back on an as needed basis.      Chun Macedo MD   Otolaryngology Resident         JENNIFER CADENA MD        D: 03/20/2017 10:03   T: 03/20/2017 15:36   MT: ms    Name:     CHRIS  GOSIA   MRN:      [id number]        Account:      [id number]   :      1935           Service Date: 2017    Document: [id number]

## 2017-03-20 NOTE — PROGRESS NOTES
HISTORY OF PRESENT ILLNESS:  Ms. 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  She was symptomatic from it causing shortness of breath.  She underwent left thyroid lobectomy with removal of the mediastinal and retrotracheal goiter with the assistance of Dr. Quinteros and Dr. Guerrero on March 14, 2017.  She is here today for follow-up.  She reports doing well.  Her pain was minimal and was able to be controlled with Tylenol.  She reports stable mild edema of the anterior neck.  She has been using Aquaphor to the incision site.  She denies any signs of infection such as fevers, chills, drainage from the wound or redness from the wound.  She reports that her breathing is getting better since surgery.        PHYSICAL EXAMINATION:  The patient is sitting in the chair in no acute distress.  Neck incision is well-healed.  Sutures were removed today.  There is the mild expected edema without fluctuance.        PROCEDURE NOTE:  Flexible laryngoscopy was performed and noted that bilateral vocal cords were fully mobile.      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  Healing well.        PLAN:  We would like Ms. Corral to be seen by her primary care physician in 3-4 weeks to have her TSH checked.  She is only one week out from the surgery.  Therefore, we would like her to continue to have weight lifting restrictions and no strenuous activity for an additional 10 days.  We will see her back on an as needed basis.      Chun Macedo MD   Otolaryngology Resident      I, Misbah Julien MD, saw this patient with the resident/fellow and agree with the resident s findings and plan of care as documented in the resident s/fellow s note. I was present with the patient for the entire viewing portion of the endoscopy procedure (including scope insertion and withdrawal) and agree with the interpretation and report as documented by the resident.     MISBAH PRIEST  MD SURINDER             D: 2017 10:03   T: 2017 15:36   MT: ms      Name:     GOSIA PEREZ   MRN:      [id number]        Account:      [id number]   :      1935           Service Date: 2017      Document: [id number]

## 2017-03-23 NOTE — NURSING NOTE
came in post operatively with DANIEL drain  SHe had very little output in the last 24 hours   The drain was removed without a problem.  Instructions given  She will f/u with Dr. Julien on Monday

## 2017-03-31 ENCOUNTER — TEAM CONFERENCE (OUTPATIENT)
Dept: SURGERY | Facility: CLINIC | Age: 82
End: 2017-03-31

## 2017-03-31 DIAGNOSIS — J98.59 MEDIASTINAL MASS: Primary | ICD-10-CM

## 2017-03-31 NOTE — TELEPHONE ENCOUNTER
Pulmonary Nodule Conference      Patient Name: Mary Corral    Reason for conference discussion (brief overview): Patient previously seen by Thoracic Surgery for large mediastinal mass, had resection which was goiter/thyroid, no malignancy.   Incidental small lung nodules noted during work-up.    Specific Question:  What f/u is indicated for lung nodules?    Pertinent Histology:  Multinodular goiter, one benign lymph node    Referring Physician: Dr. Lucia Quinteros    The patient's case was presented at the multidisciplinary conference for the above noted reason.        There was a consensus recommendation for the following actions:     Recommended she get a 3 month chest CT scan as new baseline, follow Fleischner guideline for further f/u.    Case Lead:  Samantha Hunt    Interventional Radiology Staff Present: Amanuel Smith MD

## 2017-03-31 NOTE — TELEPHONE ENCOUNTER
I called Ms. Corral to discuss our lung nodule conference today and the recommendation for her to get a new baseline chest CT scan in 3 months to further monitor small lung nodules noted prior to her surgery for goiter.   She is low risk and should be followed per Fleischner guidelines for a few years.     She asked if her primary MD could do this.   She is seeing her in mid-April and will discuss it with her then.   I will fax relevant recent records and mail images to Dr. Fonseca as requested, along with a letter asking her to assume follow-up of these nodules.   Dr. Fonseca's contact information was added to the SnapShot tab.  Patient appreciated the call and expressed satisfaction with everyone who was involved in her care.

## 2017-03-31 NOTE — LETTER
2017    Regarding:  Mary Corral    :  1935  [address] [id number]      Dear Dr. Fonseca,         I am enclosing relevant medical records from your patients' recent work-up and surgery done for a large mediastinal mass which was a benign goiter.   During the work-up, it was also demonstrated that she had small indeterminate lung nodules that should be followed, per Fleishner guidelines, for 2 years in this low-risk patient.            Ms. Corral is seeing you in April and asked if you could assume follow-up of the lung nodules.   She would be due for a new baseline chest CT w/o contrast in mid-to-late , and then 9-12 months later.  She hoped to get this done at [facility].   I am having the images done here in the past few months (2 chest CT scans and a PET scan) copied and mailed to your office for your records, as well.           It was a pleasure taking care of Ms. Corral (who thinks VERY highly of you, by the way) and we are happy to be involved in the future, as needed.   Please call me if you need any further records or if you would prefer that her lung nodules be followed here and I can arrange that, as well.    Sincerely,    Samantha Hunt, DENISE-CNS   Fax'd to Dr. Fonseca at [phone]

## 2017-06-20 ENCOUNTER — RECORDS - HEALTHEAST (OUTPATIENT)
Dept: LAB | Facility: CLINIC | Age: 82
End: 2017-06-20

## 2017-06-20 LAB
CHOLEST SERPL-MCNC: 190 MG/DL
FASTING STATUS PATIENT QL REPORTED: NORMAL
HDLC SERPL-MCNC: 53 MG/DL
LDLC SERPL CALC-MCNC: 114 MG/DL
TRIGL SERPL-MCNC: 116 MG/DL

## 2017-07-13 ENCOUNTER — DOCUMENTATION ONLY (OUTPATIENT)
Dept: OTHER | Facility: CLINIC | Age: 82
End: 2017-07-13

## 2017-07-13 PROBLEM — Z71.89 ACP (ADVANCE CARE PLANNING): Chronic | Status: ACTIVE | Noted: 2017-07-13

## 2018-02-22 ENCOUNTER — RECORDS - HEALTHEAST (OUTPATIENT)
Dept: LAB | Facility: CLINIC | Age: 83
End: 2018-02-22

## 2018-02-22 LAB
ALBUMIN SERPL-MCNC: 3.6 G/DL (ref 3.5–5)
ALP SERPL-CCNC: 73 U/L (ref 45–120)
ALT SERPL W P-5'-P-CCNC: 19 U/L (ref 0–45)
ANION GAP SERPL CALCULATED.3IONS-SCNC: 9 MMOL/L (ref 5–18)
AST SERPL W P-5'-P-CCNC: 22 U/L (ref 0–40)
BILIRUB SERPL-MCNC: 0.4 MG/DL (ref 0–1)
BUN SERPL-MCNC: 33 MG/DL (ref 8–28)
CALCIUM SERPL-MCNC: 9.4 MG/DL (ref 8.5–10.5)
CHLORIDE BLD-SCNC: 110 MMOL/L (ref 98–107)
CO2 SERPL-SCNC: 22 MMOL/L (ref 22–31)
CREAT SERPL-MCNC: 1.65 MG/DL (ref 0.6–1.1)
GFR SERPL CREATININE-BSD FRML MDRD: 30 ML/MIN/1.73M2
GLUCOSE BLD-MCNC: 82 MG/DL (ref 70–125)
POTASSIUM BLD-SCNC: 4.9 MMOL/L (ref 3.5–5)
PROT SERPL-MCNC: 6.8 G/DL (ref 6–8)
SODIUM SERPL-SCNC: 141 MMOL/L (ref 136–145)
TSH SERPL DL<=0.005 MIU/L-ACNC: 2.19 UIU/ML (ref 0.3–5)

## 2018-05-22 ENCOUNTER — RECORDS - HEALTHEAST (OUTPATIENT)
Dept: LAB | Facility: CLINIC | Age: 83
End: 2018-05-22

## 2018-05-22 LAB
ALBUMIN SERPL-MCNC: 3.7 G/DL (ref 3.5–5)
ALP SERPL-CCNC: 95 U/L (ref 45–120)
ALT SERPL W P-5'-P-CCNC: 15 U/L (ref 0–45)
ANION GAP SERPL CALCULATED.3IONS-SCNC: 11 MMOL/L (ref 5–18)
AST SERPL W P-5'-P-CCNC: 19 U/L (ref 0–40)
BILIRUB SERPL-MCNC: 0.5 MG/DL (ref 0–1)
BUN SERPL-MCNC: 38 MG/DL (ref 8–28)
CALCIUM SERPL-MCNC: 9.6 MG/DL (ref 8.5–10.5)
CHLORIDE BLD-SCNC: 109 MMOL/L (ref 98–107)
CHOLEST SERPL-MCNC: 168 MG/DL
CO2 SERPL-SCNC: 20 MMOL/L (ref 22–31)
CREAT SERPL-MCNC: 1.59 MG/DL (ref 0.6–1.1)
FASTING STATUS PATIENT QL REPORTED: NORMAL
GFR SERPL CREATININE-BSD FRML MDRD: 31 ML/MIN/1.73M2
GLUCOSE BLD-MCNC: 108 MG/DL (ref 70–125)
HDLC SERPL-MCNC: 50 MG/DL
LDLC SERPL CALC-MCNC: 92 MG/DL
POTASSIUM BLD-SCNC: 4.9 MMOL/L (ref 3.5–5)
PROT SERPL-MCNC: 6.9 G/DL (ref 6–8)
SODIUM SERPL-SCNC: 140 MMOL/L (ref 136–145)
TRIGL SERPL-MCNC: 128 MG/DL
TSH SERPL DL<=0.005 MIU/L-ACNC: 1.53 UIU/ML (ref 0.3–5)

## 2018-10-17 ENCOUNTER — RECORDS - HEALTHEAST (OUTPATIENT)
Dept: LAB | Facility: CLINIC | Age: 83
End: 2018-10-17

## 2018-10-17 LAB
ALBUMIN SERPL-MCNC: 3.6 G/DL (ref 3.5–5)
ALP SERPL-CCNC: 80 U/L (ref 45–120)
ALT SERPL W P-5'-P-CCNC: 14 U/L (ref 0–45)
ANION GAP SERPL CALCULATED.3IONS-SCNC: 10 MMOL/L (ref 5–18)
AST SERPL W P-5'-P-CCNC: 17 U/L (ref 0–40)
BILIRUB SERPL-MCNC: 0.7 MG/DL (ref 0–1)
BUN SERPL-MCNC: 28 MG/DL (ref 8–28)
CALCIUM SERPL-MCNC: 9.1 MG/DL (ref 8.5–10.5)
CHLORIDE BLD-SCNC: 109 MMOL/L (ref 98–107)
CO2 SERPL-SCNC: 21 MMOL/L (ref 22–31)
CREAT SERPL-MCNC: 1.56 MG/DL (ref 0.6–1.1)
GFR SERPL CREATININE-BSD FRML MDRD: 32 ML/MIN/1.73M2
GLUCOSE BLD-MCNC: 120 MG/DL (ref 70–125)
POTASSIUM BLD-SCNC: 4.4 MMOL/L (ref 3.5–5)
PROT SERPL-MCNC: 6.5 G/DL (ref 6–8)
SODIUM SERPL-SCNC: 140 MMOL/L (ref 136–145)

## 2019-05-29 ENCOUNTER — RECORDS - HEALTHEAST (OUTPATIENT)
Dept: LAB | Facility: CLINIC | Age: 84
End: 2019-05-29

## 2019-05-29 LAB
ALBUMIN SERPL-MCNC: 3.9 G/DL (ref 3.5–5)
ALP SERPL-CCNC: 75 U/L (ref 45–120)
ALT SERPL W P-5'-P-CCNC: 15 U/L (ref 0–45)
ANION GAP SERPL CALCULATED.3IONS-SCNC: 11 MMOL/L (ref 5–18)
AST SERPL W P-5'-P-CCNC: 20 U/L (ref 0–40)
BILIRUB SERPL-MCNC: 0.4 MG/DL (ref 0–1)
BUN SERPL-MCNC: 39 MG/DL (ref 8–28)
CALCIUM SERPL-MCNC: 9.6 MG/DL (ref 8.5–10.5)
CHLORIDE BLD-SCNC: 109 MMOL/L (ref 98–107)
CHOLEST SERPL-MCNC: 174 MG/DL
CO2 SERPL-SCNC: 23 MMOL/L (ref 22–31)
CREAT SERPL-MCNC: 1.66 MG/DL (ref 0.6–1.1)
FASTING STATUS PATIENT QL REPORTED: ABNORMAL
GFR SERPL CREATININE-BSD FRML MDRD: 30 ML/MIN/1.73M2
GLUCOSE BLD-MCNC: 102 MG/DL (ref 70–125)
HDLC SERPL-MCNC: 55 MG/DL
LDLC SERPL CALC-MCNC: 89 MG/DL
POTASSIUM BLD-SCNC: 4.8 MMOL/L (ref 3.5–5)
PROT SERPL-MCNC: 7.2 G/DL (ref 6–8)
SODIUM SERPL-SCNC: 143 MMOL/L (ref 136–145)
TRIGL SERPL-MCNC: 151 MG/DL
TSH SERPL DL<=0.005 MIU/L-ACNC: 0.64 UIU/ML (ref 0.3–5)

## 2020-02-25 ENCOUNTER — RECORDS - HEALTHEAST (OUTPATIENT)
Dept: LAB | Facility: CLINIC | Age: 85
End: 2020-02-25

## 2020-02-25 LAB
ALBUMIN SERPL-MCNC: 3.7 G/DL (ref 3.5–5)
ALP SERPL-CCNC: 82 U/L (ref 45–120)
ALT SERPL W P-5'-P-CCNC: 13 U/L (ref 0–45)
ANION GAP SERPL CALCULATED.3IONS-SCNC: 10 MMOL/L (ref 5–18)
AST SERPL W P-5'-P-CCNC: 16 U/L (ref 0–40)
BILIRUB SERPL-MCNC: 0.7 MG/DL (ref 0–1)
BUN SERPL-MCNC: 27 MG/DL (ref 8–28)
CALCIUM SERPL-MCNC: 9.4 MG/DL (ref 8.5–10.5)
CHLORIDE BLD-SCNC: 108 MMOL/L (ref 98–107)
CHOLEST SERPL-MCNC: 179 MG/DL
CO2 SERPL-SCNC: 26 MMOL/L (ref 22–31)
CREAT SERPL-MCNC: 1.39 MG/DL (ref 0.6–1.1)
FASTING STATUS PATIENT QL REPORTED: NORMAL
GFR SERPL CREATININE-BSD FRML MDRD: 36 ML/MIN/1.73M2
GLUCOSE BLD-MCNC: 114 MG/DL (ref 70–125)
HDLC SERPL-MCNC: 57 MG/DL
LDLC SERPL CALC-MCNC: 103 MG/DL
POTASSIUM BLD-SCNC: 4.4 MMOL/L (ref 3.5–5)
PROT SERPL-MCNC: 6.8 G/DL (ref 6–8)
SODIUM SERPL-SCNC: 144 MMOL/L (ref 136–145)
TRIGL SERPL-MCNC: 96 MG/DL
TSH SERPL DL<=0.005 MIU/L-ACNC: 1.14 UIU/ML (ref 0.3–5)

## 2020-03-10 ENCOUNTER — HEALTH MAINTENANCE LETTER (OUTPATIENT)
Age: 85
End: 2020-03-10

## 2020-09-09 ENCOUNTER — RECORDS - HEALTHEAST (OUTPATIENT)
Dept: LAB | Facility: CLINIC | Age: 85
End: 2020-09-09

## 2020-09-09 LAB
ALBUMIN SERPL-MCNC: 3.6 G/DL (ref 3.5–5)
ALP SERPL-CCNC: 82 U/L (ref 45–120)
ALT SERPL W P-5'-P-CCNC: 13 U/L (ref 0–45)
ANION GAP SERPL CALCULATED.3IONS-SCNC: 13 MMOL/L (ref 5–18)
AST SERPL W P-5'-P-CCNC: 18 U/L (ref 0–40)
BILIRUB SERPL-MCNC: 0.5 MG/DL (ref 0–1)
BUN SERPL-MCNC: 19 MG/DL (ref 8–28)
CALCIUM SERPL-MCNC: 9 MG/DL (ref 8.5–10.5)
CHLORIDE BLD-SCNC: 111 MMOL/L (ref 98–107)
CO2 SERPL-SCNC: 20 MMOL/L (ref 22–31)
CREAT SERPL-MCNC: 1.51 MG/DL (ref 0.6–1.1)
GFR SERPL CREATININE-BSD FRML MDRD: 33 ML/MIN/1.73M2
GLUCOSE BLD-MCNC: 114 MG/DL (ref 70–125)
POTASSIUM BLD-SCNC: 4.3 MMOL/L (ref 3.5–5)
PROT SERPL-MCNC: 6.8 G/DL (ref 6–8)
SODIUM SERPL-SCNC: 144 MMOL/L (ref 136–145)

## 2020-12-27 ENCOUNTER — HEALTH MAINTENANCE LETTER (OUTPATIENT)
Age: 85
End: 2020-12-27

## 2021-03-10 ENCOUNTER — RECORDS - HEALTHEAST (OUTPATIENT)
Dept: LAB | Facility: CLINIC | Age: 86
End: 2021-03-10

## 2021-03-10 LAB
ALBUMIN SERPL-MCNC: 3.8 G/DL (ref 3.5–5)
ALP SERPL-CCNC: 80 U/L (ref 45–120)
ALT SERPL W P-5'-P-CCNC: 17 U/L (ref 0–45)
ANION GAP SERPL CALCULATED.3IONS-SCNC: 11 MMOL/L (ref 5–18)
AST SERPL W P-5'-P-CCNC: 19 U/L (ref 0–40)
BILIRUB SERPL-MCNC: 0.5 MG/DL (ref 0–1)
BUN SERPL-MCNC: 32 MG/DL (ref 8–28)
CALCIUM SERPL-MCNC: 9.3 MG/DL (ref 8.5–10.5)
CHLORIDE BLD-SCNC: 110 MMOL/L (ref 98–107)
CHOLEST SERPL-MCNC: 172 MG/DL
CO2 SERPL-SCNC: 23 MMOL/L (ref 22–31)
CREAT SERPL-MCNC: 1.5 MG/DL (ref 0.6–1.1)
FASTING STATUS PATIENT QL REPORTED: NORMAL
GFR SERPL CREATININE-BSD FRML MDRD: 33 ML/MIN/1.73M2
GLUCOSE BLD-MCNC: 100 MG/DL (ref 70–125)
HDLC SERPL-MCNC: 54 MG/DL
LDLC SERPL CALC-MCNC: 94 MG/DL
POTASSIUM BLD-SCNC: 4.4 MMOL/L (ref 3.5–5)
PROT SERPL-MCNC: 7 G/DL (ref 6–8)
SODIUM SERPL-SCNC: 144 MMOL/L (ref 136–145)
TRIGL SERPL-MCNC: 121 MG/DL
TSH SERPL DL<=0.005 MIU/L-ACNC: 1.06 UIU/ML (ref 0.3–5)

## 2021-04-24 ENCOUNTER — HEALTH MAINTENANCE LETTER (OUTPATIENT)
Age: 86
End: 2021-04-24

## 2021-05-24 ENCOUNTER — RECORDS - HEALTHEAST (OUTPATIENT)
Dept: ADMINISTRATIVE | Facility: CLINIC | Age: 86
End: 2021-05-24

## 2021-05-25 ENCOUNTER — RECORDS - HEALTHEAST (OUTPATIENT)
Dept: ADMINISTRATIVE | Facility: CLINIC | Age: 86
End: 2021-05-25

## 2021-05-26 ENCOUNTER — RECORDS - HEALTHEAST (OUTPATIENT)
Dept: ADMINISTRATIVE | Facility: CLINIC | Age: 86
End: 2021-05-26

## 2021-05-27 ENCOUNTER — RECORDS - HEALTHEAST (OUTPATIENT)
Dept: ADMINISTRATIVE | Facility: CLINIC | Age: 86
End: 2021-05-27

## 2021-05-28 ENCOUNTER — RECORDS - HEALTHEAST (OUTPATIENT)
Dept: ADMINISTRATIVE | Facility: CLINIC | Age: 86
End: 2021-05-28

## 2021-07-13 ENCOUNTER — RECORDS - HEALTHEAST (OUTPATIENT)
Dept: ADMINISTRATIVE | Facility: CLINIC | Age: 86
End: 2021-07-13

## 2021-07-21 ENCOUNTER — RECORDS - HEALTHEAST (OUTPATIENT)
Dept: ADMINISTRATIVE | Facility: CLINIC | Age: 86
End: 2021-07-21

## 2021-10-09 ENCOUNTER — HEALTH MAINTENANCE LETTER (OUTPATIENT)
Age: 86
End: 2021-10-09

## 2021-10-11 ENCOUNTER — LAB REQUISITION (OUTPATIENT)
Dept: LAB | Facility: CLINIC | Age: 86
End: 2021-10-11

## 2021-10-11 DIAGNOSIS — I12.9 HYPERTENSIVE CHRONIC KIDNEY DISEASE WITH STAGE 1 THROUGH STAGE 4 CHRONIC KIDNEY DISEASE, OR UNSPECIFIED CHRONIC KIDNEY DISEASE: ICD-10-CM

## 2021-10-11 LAB
ALBUMIN SERPL-MCNC: 3.9 G/DL (ref 3.5–5)
ALP SERPL-CCNC: 83 U/L (ref 45–120)
ALT SERPL W P-5'-P-CCNC: 14 U/L (ref 0–45)
ANION GAP SERPL CALCULATED.3IONS-SCNC: 12 MMOL/L (ref 5–18)
AST SERPL W P-5'-P-CCNC: 19 U/L (ref 0–40)
BILIRUB SERPL-MCNC: 0.6 MG/DL (ref 0–1)
BUN SERPL-MCNC: 29 MG/DL (ref 8–28)
CALCIUM SERPL-MCNC: 9.8 MG/DL (ref 8.5–10.5)
CHLORIDE BLD-SCNC: 110 MMOL/L (ref 98–107)
CO2 SERPL-SCNC: 20 MMOL/L (ref 22–31)
CREAT SERPL-MCNC: 1.59 MG/DL (ref 0.6–1.1)
GFR SERPL CREATININE-BSD FRML MDRD: 30 ML/MIN/1.73M2
GLUCOSE BLD-MCNC: 121 MG/DL (ref 70–125)
POTASSIUM BLD-SCNC: 4.6 MMOL/L (ref 3.5–5)
PROT SERPL-MCNC: 7.2 G/DL (ref 6–8)
SODIUM SERPL-SCNC: 142 MMOL/L (ref 136–145)

## 2021-10-11 PROCEDURE — 82040 ASSAY OF SERUM ALBUMIN: CPT | Performed by: FAMILY MEDICINE

## 2022-04-18 ENCOUNTER — LAB REQUISITION (OUTPATIENT)
Dept: LAB | Facility: CLINIC | Age: 87
End: 2022-04-18

## 2022-04-18 DIAGNOSIS — E04.9 NONTOXIC GOITER, UNSPECIFIED: ICD-10-CM

## 2022-04-18 DIAGNOSIS — I12.9 HYPERTENSIVE CHRONIC KIDNEY DISEASE WITH STAGE 1 THROUGH STAGE 4 CHRONIC KIDNEY DISEASE, OR UNSPECIFIED CHRONIC KIDNEY DISEASE: ICD-10-CM

## 2022-04-18 DIAGNOSIS — E78.5 HYPERLIPIDEMIA, UNSPECIFIED: ICD-10-CM

## 2022-04-18 LAB
ALBUMIN SERPL-MCNC: 3.4 G/DL (ref 3.5–5)
ALP SERPL-CCNC: 90 U/L (ref 45–120)
ALT SERPL W P-5'-P-CCNC: 12 U/L (ref 0–45)
ANION GAP SERPL CALCULATED.3IONS-SCNC: 10 MMOL/L (ref 5–18)
AST SERPL W P-5'-P-CCNC: 15 U/L (ref 0–40)
BILIRUB SERPL-MCNC: 0.6 MG/DL (ref 0–1)
BUN SERPL-MCNC: 28 MG/DL (ref 8–28)
CALCIUM SERPL-MCNC: 9.1 MG/DL (ref 8.5–10.5)
CHLORIDE BLD-SCNC: 110 MMOL/L (ref 98–107)
CHOLEST SERPL-MCNC: 166 MG/DL
CO2 SERPL-SCNC: 24 MMOL/L (ref 22–31)
CREAT SERPL-MCNC: 1.53 MG/DL (ref 0.6–1.1)
FASTING STATUS PATIENT QL REPORTED: NORMAL
GFR SERPL CREATININE-BSD FRML MDRD: 33 ML/MIN/1.73M2
GLUCOSE BLD-MCNC: 115 MG/DL (ref 70–125)
HDLC SERPL-MCNC: 54 MG/DL
LDLC SERPL CALC-MCNC: 85 MG/DL
POTASSIUM BLD-SCNC: 4.8 MMOL/L (ref 3.5–5)
PROT SERPL-MCNC: 6.6 G/DL (ref 6–8)
SODIUM SERPL-SCNC: 144 MMOL/L (ref 136–145)
TRIGL SERPL-MCNC: 135 MG/DL
TSH SERPL DL<=0.005 MIU/L-ACNC: 1.26 UIU/ML (ref 0.3–5)

## 2022-04-18 PROCEDURE — 80053 COMPREHEN METABOLIC PANEL: CPT | Performed by: FAMILY MEDICINE

## 2022-04-18 PROCEDURE — 84443 ASSAY THYROID STIM HORMONE: CPT | Performed by: FAMILY MEDICINE

## 2022-04-18 PROCEDURE — 80061 LIPID PANEL: CPT | Performed by: FAMILY MEDICINE

## 2022-05-16 ENCOUNTER — HEALTH MAINTENANCE LETTER (OUTPATIENT)
Age: 87
End: 2022-05-16

## 2022-09-11 ENCOUNTER — HEALTH MAINTENANCE LETTER (OUTPATIENT)
Age: 87
End: 2022-09-11

## 2022-10-25 ENCOUNTER — LAB REQUISITION (OUTPATIENT)
Dept: LAB | Facility: CLINIC | Age: 87
End: 2022-10-25

## 2022-10-25 DIAGNOSIS — N18.32 CHRONIC KIDNEY DISEASE, STAGE 3B (H): ICD-10-CM

## 2022-10-25 PROCEDURE — 80053 COMPREHEN METABOLIC PANEL: CPT | Performed by: FAMILY MEDICINE

## 2022-10-26 LAB
ALBUMIN SERPL BCG-MCNC: 4.1 G/DL (ref 3.5–5.2)
ALP SERPL-CCNC: 96 U/L (ref 35–104)
ALT SERPL W P-5'-P-CCNC: 14 U/L (ref 10–35)
ANION GAP SERPL CALCULATED.3IONS-SCNC: 14 MMOL/L (ref 7–15)
AST SERPL W P-5'-P-CCNC: 18 U/L (ref 10–35)
BILIRUB SERPL-MCNC: 0.4 MG/DL
BUN SERPL-MCNC: 32.3 MG/DL (ref 8–23)
CALCIUM SERPL-MCNC: 9.3 MG/DL (ref 8.8–10.2)
CHLORIDE SERPL-SCNC: 108 MMOL/L (ref 98–107)
CREAT SERPL-MCNC: 1.57 MG/DL (ref 0.51–0.95)
DEPRECATED HCO3 PLAS-SCNC: 20 MMOL/L (ref 22–29)
GFR SERPL CREATININE-BSD FRML MDRD: 32 ML/MIN/1.73M2
GLUCOSE SERPL-MCNC: 105 MG/DL (ref 70–99)
POTASSIUM SERPL-SCNC: 4.7 MMOL/L (ref 3.4–5.3)
PROT SERPL-MCNC: 6.5 G/DL (ref 6.4–8.3)
SODIUM SERPL-SCNC: 142 MMOL/L (ref 136–145)

## 2023-01-23 RX ORDER — PREDNISONE 20 MG/1
20 TABLET ORAL DAILY PRN
COMMUNITY

## 2023-01-23 RX ORDER — PANTOPRAZOLE SODIUM 40 MG/1
40 TABLET, DELAYED RELEASE ORAL DAILY PRN
COMMUNITY

## 2023-02-06 ENCOUNTER — HOSPITAL ENCOUNTER (OUTPATIENT)
Facility: HOSPITAL | Age: 88
Discharge: HOME | End: 2023-02-06
Attending: INTERNAL MEDICINE | Admitting: INTERNAL MEDICINE
Payer: COMMERCIAL

## 2023-02-06 VITALS
OXYGEN SATURATION: 95 % | BODY MASS INDEX: 29.82 KG/M2 | TEMPERATURE: 97.7 F | DIASTOLIC BLOOD PRESSURE: 63 MMHG | HEIGHT: 67 IN | HEART RATE: 72 BPM | SYSTOLIC BLOOD PRESSURE: 115 MMHG | RESPIRATION RATE: 18 BRPM | WEIGHT: 190 LBS

## 2023-02-06 LAB — UPPER GI ENDOSCOPY: NORMAL

## 2023-02-06 PROCEDURE — 43245 EGD DILATE STRICTURE: CPT | Performed by: INTERNAL MEDICINE

## 2023-02-06 PROCEDURE — 250N000011 HC RX IP 250 OP 636: Performed by: INTERNAL MEDICINE

## 2023-02-06 PROCEDURE — 43248 EGD GUIDE WIRE INSERTION: CPT | Performed by: INTERNAL MEDICINE

## 2023-02-06 PROCEDURE — G0500 MOD SEDAT ENDO SERVICE >5YRS: HCPCS | Performed by: INTERNAL MEDICINE

## 2023-02-06 RX ORDER — FLUMAZENIL 0.1 MG/ML
0.2 INJECTION, SOLUTION INTRAVENOUS
Status: DISCONTINUED | OUTPATIENT
Start: 2023-02-06 | End: 2023-02-06 | Stop reason: HOSPADM

## 2023-02-06 RX ORDER — ONDANSETRON 4 MG/1
4 TABLET, ORALLY DISINTEGRATING ORAL EVERY 6 HOURS PRN
Status: CANCELLED | OUTPATIENT
Start: 2023-02-06

## 2023-02-06 RX ORDER — EPINEPHRINE 1 MG/ML
0.1 INJECTION, SOLUTION INTRAMUSCULAR; SUBCUTANEOUS
Status: DISCONTINUED | OUTPATIENT
Start: 2023-02-06 | End: 2023-02-06 | Stop reason: HOSPADM

## 2023-02-06 RX ORDER — NALOXONE HYDROCHLORIDE 0.4 MG/ML
0.2 INJECTION, SOLUTION INTRAMUSCULAR; INTRAVENOUS; SUBCUTANEOUS
Status: DISCONTINUED | OUTPATIENT
Start: 2023-02-06 | End: 2023-02-06 | Stop reason: HOSPADM

## 2023-02-06 RX ORDER — FLUMAZENIL 0.1 MG/ML
0.2 INJECTION, SOLUTION INTRAVENOUS
Status: CANCELLED | OUTPATIENT
Start: 2023-02-06 | End: 2023-02-06

## 2023-02-06 RX ORDER — ONDANSETRON 2 MG/ML
4 INJECTION INTRAMUSCULAR; INTRAVENOUS EVERY 6 HOURS PRN
Status: CANCELLED | OUTPATIENT
Start: 2023-02-06

## 2023-02-06 RX ORDER — PROCHLORPERAZINE MALEATE 5 MG
5 TABLET ORAL EVERY 6 HOURS PRN
Status: CANCELLED | OUTPATIENT
Start: 2023-02-06

## 2023-02-06 RX ORDER — ONDANSETRON 2 MG/ML
4 INJECTION INTRAMUSCULAR; INTRAVENOUS
Status: DISCONTINUED | OUTPATIENT
Start: 2023-02-06 | End: 2023-02-06 | Stop reason: HOSPADM

## 2023-02-06 RX ORDER — ATROPINE SULFATE 0.1 MG/ML
1 INJECTION INTRAVENOUS
Status: DISCONTINUED | OUTPATIENT
Start: 2023-02-06 | End: 2023-02-06 | Stop reason: HOSPADM

## 2023-02-06 RX ORDER — DIPHENHYDRAMINE HYDROCHLORIDE 50 MG/ML
25-50 INJECTION INTRAMUSCULAR; INTRAVENOUS
Status: DISCONTINUED | OUTPATIENT
Start: 2023-02-06 | End: 2023-02-06 | Stop reason: HOSPADM

## 2023-02-06 RX ORDER — FENTANYL CITRATE 50 UG/ML
50-100 INJECTION, SOLUTION INTRAMUSCULAR; INTRAVENOUS EVERY 5 MIN PRN
Status: DISCONTINUED | OUTPATIENT
Start: 2023-02-06 | End: 2023-02-06 | Stop reason: HOSPADM

## 2023-02-06 RX ORDER — LIDOCAINE 40 MG/G
CREAM TOPICAL
Status: DISCONTINUED | OUTPATIENT
Start: 2023-02-06 | End: 2023-02-06 | Stop reason: HOSPADM

## 2023-02-06 RX ORDER — NALOXONE HYDROCHLORIDE 0.4 MG/ML
0.4 INJECTION, SOLUTION INTRAMUSCULAR; INTRAVENOUS; SUBCUTANEOUS
Status: DISCONTINUED | OUTPATIENT
Start: 2023-02-06 | End: 2023-02-06 | Stop reason: HOSPADM

## 2023-02-06 RX ORDER — FENTANYL CITRATE 50 UG/ML
INJECTION, SOLUTION INTRAMUSCULAR; INTRAVENOUS PRN
Status: DISCONTINUED | OUTPATIENT
Start: 2023-02-06 | End: 2023-02-06 | Stop reason: HOSPADM

## 2023-02-06 RX ORDER — SIMETHICONE 40MG/0.6ML
133 SUSPENSION, DROPS(FINAL DOSAGE FORM)(ML) ORAL
Status: DISCONTINUED | OUTPATIENT
Start: 2023-02-06 | End: 2023-02-06 | Stop reason: HOSPADM

## 2023-02-06 ASSESSMENT — ACTIVITIES OF DAILY LIVING (ADL): ADLS_ACUITY_SCORE: 35

## 2023-02-06 NOTE — PROGRESS NOTES
PRE-PROCEDURE NOTE      REASON FOR PROCEDURE: Dysphagia    History and Physical: Reviewed, no changes    Pre-sedation Assessment:     VS: AVSS  General: Alert, NAD  Airway: normal  Heart: RRR  Lungs: CTA    Previous Reaction to Sedation: None    Sedation Plan Based on Assessment: Moderate sedation    Mallampati: II    ASA Classification: 3      Impression: Patient deemed adequate candidate for moderate sedation    Plan: esophagogastroduodenoscopy              Kodak Huerta MD  Thank you for the opportunity to participate in the care of this patient.   Please feel free to call me with any questions or concerns.  Phone number [phone].            2/6/2023 8:50 AM

## 2023-02-15 ENCOUNTER — LAB REQUISITION (OUTPATIENT)
Dept: LAB | Facility: CLINIC | Age: 88
End: 2023-02-15

## 2023-02-15 DIAGNOSIS — R06.09 OTHER FORMS OF DYSPNEA: ICD-10-CM

## 2023-02-15 LAB
ALBUMIN SERPL BCG-MCNC: 4 G/DL (ref 3.5–5.2)
ALP SERPL-CCNC: 93 U/L (ref 35–104)
ALT SERPL W P-5'-P-CCNC: 13 U/L (ref 10–35)
ANION GAP SERPL CALCULATED.3IONS-SCNC: 14 MMOL/L (ref 7–15)
AST SERPL W P-5'-P-CCNC: 15 U/L (ref 10–35)
BILIRUB SERPL-MCNC: 0.4 MG/DL
BUN SERPL-MCNC: 24.2 MG/DL (ref 8–23)
CALCIUM SERPL-MCNC: 9.1 MG/DL (ref 8.8–10.2)
CHLORIDE SERPL-SCNC: 111 MMOL/L (ref 98–107)
CREAT SERPL-MCNC: 1.51 MG/DL (ref 0.51–0.95)
DEPRECATED HCO3 PLAS-SCNC: 21 MMOL/L (ref 22–29)
GFR SERPL CREATININE-BSD FRML MDRD: 34 ML/MIN/1.73M2
GLUCOSE SERPL-MCNC: 110 MG/DL (ref 70–99)
NT-PROBNP SERPL-MCNC: 1239 PG/ML (ref 0–1800)
POTASSIUM SERPL-SCNC: 4.6 MMOL/L (ref 3.4–5.3)
PROT SERPL-MCNC: 6.6 G/DL (ref 6.4–8.3)
SODIUM SERPL-SCNC: 146 MMOL/L (ref 136–145)
TSH SERPL DL<=0.005 MIU/L-ACNC: 1.28 UIU/ML (ref 0.3–4.2)

## 2023-02-15 PROCEDURE — 80053 COMPREHEN METABOLIC PANEL: CPT | Performed by: FAMILY MEDICINE

## 2023-02-15 PROCEDURE — 83880 ASSAY OF NATRIURETIC PEPTIDE: CPT | Performed by: FAMILY MEDICINE

## 2023-02-15 PROCEDURE — 84443 ASSAY THYROID STIM HORMONE: CPT | Performed by: FAMILY MEDICINE

## 2023-09-08 NOTE — SIGNIFICANT EVENT
SPIRITUAL HEALTH SERVICES Progress Note  [facility] 6A  Initial visit with pt and her relatives per request of [facility] [employer] . Pt confirmed her Uats[employer] fransico, and said that she was a member of the [employer] [facility] in [place]. She had a surgery yesterday and is hoping to be discharged today. Pt said that she had been anointed two weeks ago. Today she received the Holy Communion. Her Uatsdin fransico is very important for her. Pt appreciated the visit and expressed gratitude.                                                                                                                Father Stuart Engle     Medical Necessity Statement: Based on my medical judgement, Mohs surgery is the most appropriate treatment for this cancer compared to other treatments.

## 2023-10-26 ENCOUNTER — LAB REQUISITION (OUTPATIENT)
Dept: LAB | Facility: CLINIC | Age: 88
End: 2023-10-26

## 2023-10-26 DIAGNOSIS — E78.5 HYPERLIPIDEMIA, UNSPECIFIED: ICD-10-CM

## 2023-10-26 DIAGNOSIS — I12.9 HYPERTENSIVE CHRONIC KIDNEY DISEASE WITH STAGE 1 THROUGH STAGE 4 CHRONIC KIDNEY DISEASE, OR UNSPECIFIED CHRONIC KIDNEY DISEASE: ICD-10-CM

## 2023-10-26 LAB
ALBUMIN SERPL BCG-MCNC: 3.8 G/DL (ref 3.5–5.2)
ALP SERPL-CCNC: 71 U/L (ref 35–104)
ALT SERPL W P-5'-P-CCNC: 16 U/L (ref 0–50)
ANION GAP SERPL CALCULATED.3IONS-SCNC: 13 MMOL/L (ref 7–15)
AST SERPL W P-5'-P-CCNC: 17 U/L (ref 0–45)
BILIRUB SERPL-MCNC: 0.3 MG/DL
BUN SERPL-MCNC: 32.1 MG/DL (ref 8–23)
CALCIUM SERPL-MCNC: 9.4 MG/DL (ref 8.8–10.2)
CHLORIDE SERPL-SCNC: 106 MMOL/L (ref 98–107)
CHOLEST SERPL-MCNC: 160 MG/DL
CREAT SERPL-MCNC: 1.33 MG/DL (ref 0.51–0.95)
DEPRECATED HCO3 PLAS-SCNC: 21 MMOL/L (ref 22–29)
EGFRCR SERPLBLD CKD-EPI 2021: 39 ML/MIN/1.73M2
GLUCOSE SERPL-MCNC: 130 MG/DL (ref 70–99)
HDLC SERPL-MCNC: 64 MG/DL
LDLC SERPL CALC-MCNC: 72 MG/DL
NONHDLC SERPL-MCNC: 96 MG/DL
POTASSIUM SERPL-SCNC: 5 MMOL/L (ref 3.4–5.3)
PROT SERPL-MCNC: 6.9 G/DL (ref 6.4–8.3)
SODIUM SERPL-SCNC: 140 MMOL/L (ref 135–145)
TRIGL SERPL-MCNC: 119 MG/DL

## 2023-10-26 PROCEDURE — 80053 COMPREHEN METABOLIC PANEL: CPT | Performed by: FAMILY MEDICINE

## 2023-10-26 PROCEDURE — 80061 LIPID PANEL: CPT | Performed by: FAMILY MEDICINE

## 2023-12-16 ENCOUNTER — HEALTH MAINTENANCE LETTER (OUTPATIENT)
Age: 88
End: 2023-12-16

## 2024-04-02 ENCOUNTER — LAB REQUISITION (OUTPATIENT)
Dept: LAB | Facility: CLINIC | Age: 89
End: 2024-04-02

## 2024-04-02 DIAGNOSIS — I12.9 HYPERTENSIVE CHRONIC KIDNEY DISEASE WITH STAGE 1 THROUGH STAGE 4 CHRONIC KIDNEY DISEASE, OR UNSPECIFIED CHRONIC KIDNEY DISEASE: ICD-10-CM

## 2024-04-02 DIAGNOSIS — E04.9 NONTOXIC GOITER, UNSPECIFIED: ICD-10-CM

## 2024-04-02 LAB
ALBUMIN SERPL BCG-MCNC: 3.9 G/DL (ref 3.5–5.2)
ALP SERPL-CCNC: 93 U/L (ref 40–150)
ALT SERPL W P-5'-P-CCNC: 12 U/L (ref 0–50)
ANION GAP SERPL CALCULATED.3IONS-SCNC: 14 MMOL/L (ref 7–15)
AST SERPL W P-5'-P-CCNC: 16 U/L (ref 0–45)
BILIRUB SERPL-MCNC: 0.4 MG/DL
BUN SERPL-MCNC: 31.3 MG/DL (ref 8–23)
CALCIUM SERPL-MCNC: 9.2 MG/DL (ref 8.8–10.2)
CHLORIDE SERPL-SCNC: 108 MMOL/L (ref 98–107)
CREAT SERPL-MCNC: 1.36 MG/DL (ref 0.51–0.95)
DEPRECATED HCO3 PLAS-SCNC: 21 MMOL/L (ref 22–29)
EGFRCR SERPLBLD CKD-EPI 2021: 38 ML/MIN/1.73M2
GLUCOSE SERPL-MCNC: 114 MG/DL (ref 70–99)
POTASSIUM SERPL-SCNC: 5.5 MMOL/L (ref 3.4–5.3)
PROT SERPL-MCNC: 6.8 G/DL (ref 6.4–8.3)
SODIUM SERPL-SCNC: 143 MMOL/L (ref 135–145)
TSH SERPL DL<=0.005 MIU/L-ACNC: 2.04 UIU/ML (ref 0.3–4.2)

## 2024-04-02 PROCEDURE — 84443 ASSAY THYROID STIM HORMONE: CPT | Performed by: FAMILY MEDICINE

## 2024-04-02 PROCEDURE — 80053 COMPREHEN METABOLIC PANEL: CPT | Performed by: FAMILY MEDICINE

## 2024-04-27 ENCOUNTER — HOSPITAL ENCOUNTER (INPATIENT)
Facility: HOSPITAL | Age: 89
LOS: 6 days | Discharge: HOME | DRG: 322 | End: 2024-05-03
Attending: STUDENT IN AN ORGANIZED HEALTH CARE EDUCATION/TRAINING PROGRAM | Admitting: STUDENT IN AN ORGANIZED HEALTH CARE EDUCATION/TRAINING PROGRAM
Payer: COMMERCIAL

## 2024-04-27 ENCOUNTER — TRANSFERRED RECORDS (OUTPATIENT)
Dept: HEALTH INFORMATION MANAGEMENT | Facility: CLINIC | Age: 89
End: 2024-04-27

## 2024-04-27 DIAGNOSIS — Z95.5 PRESENCE OF DRUG-ELUTING STENT IN LEFT CIRCUMFLEX CORONARY ARTERY: ICD-10-CM

## 2024-04-27 DIAGNOSIS — I21.4 NSTEMI (NON-ST ELEVATED MYOCARDIAL INFARCTION) (H): Primary | ICD-10-CM

## 2024-04-27 PROBLEM — R07.9 CHEST PAIN: Status: ACTIVE | Noted: 2024-04-27

## 2024-04-27 LAB
ANION GAP SERPL CALCULATED.3IONS-SCNC: 11 MMOL/L (ref 7–15)
BUN SERPL-MCNC: 25.1 MG/DL (ref 8–23)
CALCIUM SERPL-MCNC: 9.2 MG/DL (ref 8.8–10.2)
CHLORIDE SERPL-SCNC: 106 MMOL/L (ref 98–107)
CHOLEST SERPL-MCNC: 151 MG/DL
CREAT SERPL-MCNC: 1.16 MG/DL (ref 0.51–0.95)
DEPRECATED HCO3 PLAS-SCNC: 21 MMOL/L (ref 22–29)
EGFRCR SERPLBLD CKD-EPI 2021: 46 ML/MIN/1.73M2
ERYTHROCYTE [DISTWIDTH] IN BLOOD BY AUTOMATED COUNT: 14.7 % (ref 10–15)
GLUCOSE BLDC GLUCOMTR-MCNC: 168 MG/DL (ref 70–99)
GLUCOSE SERPL-MCNC: 166 MG/DL (ref 70–99)
HBA1C MFR BLD: 6.1 %
HCT VFR BLD AUTO: 34.7 % (ref 35–47)
HDLC SERPL-MCNC: 57 MG/DL
HGB BLD-MCNC: 11.5 G/DL (ref 11.7–15.7)
HOLD SPECIMEN: NORMAL
LDLC SERPL CALC-MCNC: 84 MG/DL
MAGNESIUM SERPL-MCNC: 1.9 MG/DL (ref 1.7–2.3)
MCH RBC QN AUTO: 31.9 PG (ref 26.5–33)
MCHC RBC AUTO-ENTMCNC: 33.1 G/DL (ref 31.5–36.5)
MCV RBC AUTO: 96 FL (ref 78–100)
NONHDLC SERPL-MCNC: 94 MG/DL
NT-PROBNP SERPL-MCNC: 1076 PG/ML (ref 0–1800)
PHOSPHATE SERPL-MCNC: 3.5 MG/DL (ref 2.5–4.5)
PLATELET # BLD AUTO: 151 10E3/UL (ref 150–450)
PLATELET # BLD AUTO: ABNORMAL 10*3/UL
POTASSIUM SERPL-SCNC: 5.1 MMOL/L (ref 3.4–5.3)
RBC # BLD AUTO: 3.61 10E6/UL (ref 3.8–5.2)
SODIUM SERPL-SCNC: 138 MMOL/L (ref 135–145)
TRIGL SERPL-MCNC: 51 MG/DL
TROPONIN T SERPL HS-MCNC: 676 NG/L
TSH SERPL DL<=0.005 MIU/L-ACNC: 0.81 UIU/ML (ref 0.3–4.2)
WBC # BLD AUTO: 13.4 10E3/UL (ref 4–11)

## 2024-04-27 PROCEDURE — 93005 ELECTROCARDIOGRAM TRACING: CPT

## 2024-04-27 PROCEDURE — 250N000011 HC RX IP 250 OP 636: Performed by: STUDENT IN AN ORGANIZED HEALTH CARE EDUCATION/TRAINING PROGRAM

## 2024-04-27 PROCEDURE — 258N000003 HC RX IP 258 OP 636: Performed by: STUDENT IN AN ORGANIZED HEALTH CARE EDUCATION/TRAINING PROGRAM

## 2024-04-27 PROCEDURE — 83036 HEMOGLOBIN GLYCOSYLATED A1C: CPT | Performed by: STUDENT IN AN ORGANIZED HEALTH CARE EDUCATION/TRAINING PROGRAM

## 2024-04-27 PROCEDURE — 120N000004 HC R&B MS OVERFLOW

## 2024-04-27 PROCEDURE — 80061 LIPID PANEL: CPT | Performed by: STUDENT IN AN ORGANIZED HEALTH CARE EDUCATION/TRAINING PROGRAM

## 2024-04-27 PROCEDURE — 250N000013 HC RX MED GY IP 250 OP 250 PS 637: Performed by: STUDENT IN AN ORGANIZED HEALTH CARE EDUCATION/TRAINING PROGRAM

## 2024-04-27 PROCEDURE — 84484 ASSAY OF TROPONIN QUANT: CPT | Performed by: STUDENT IN AN ORGANIZED HEALTH CARE EDUCATION/TRAINING PROGRAM

## 2024-04-27 PROCEDURE — 83735 ASSAY OF MAGNESIUM: CPT | Performed by: STUDENT IN AN ORGANIZED HEALTH CARE EDUCATION/TRAINING PROGRAM

## 2024-04-27 PROCEDURE — 85048 AUTOMATED LEUKOCYTE COUNT: CPT | Performed by: STUDENT IN AN ORGANIZED HEALTH CARE EDUCATION/TRAINING PROGRAM

## 2024-04-27 PROCEDURE — 84443 ASSAY THYROID STIM HORMONE: CPT | Performed by: STUDENT IN AN ORGANIZED HEALTH CARE EDUCATION/TRAINING PROGRAM

## 2024-04-27 PROCEDURE — 99223 1ST HOSP IP/OBS HIGH 75: CPT | Performed by: STUDENT IN AN ORGANIZED HEALTH CARE EDUCATION/TRAINING PROGRAM

## 2024-04-27 PROCEDURE — 80048 BASIC METABOLIC PNL TOTAL CA: CPT | Performed by: STUDENT IN AN ORGANIZED HEALTH CARE EDUCATION/TRAINING PROGRAM

## 2024-04-27 PROCEDURE — 36415 COLL VENOUS BLD VENIPUNCTURE: CPT | Performed by: STUDENT IN AN ORGANIZED HEALTH CARE EDUCATION/TRAINING PROGRAM

## 2024-04-27 PROCEDURE — 93010 ELECTROCARDIOGRAM REPORT: CPT | Performed by: INTERNAL MEDICINE

## 2024-04-27 PROCEDURE — 84100 ASSAY OF PHOSPHORUS: CPT | Performed by: STUDENT IN AN ORGANIZED HEALTH CARE EDUCATION/TRAINING PROGRAM

## 2024-04-27 PROCEDURE — 83880 ASSAY OF NATRIURETIC PEPTIDE: CPT | Performed by: STUDENT IN AN ORGANIZED HEALTH CARE EDUCATION/TRAINING PROGRAM

## 2024-04-27 RX ORDER — OXYCODONE HYDROCHLORIDE 5 MG/1
5 TABLET ORAL EVERY 4 HOURS PRN
Status: DISCONTINUED | OUTPATIENT
Start: 2024-04-27 | End: 2024-04-29

## 2024-04-27 RX ORDER — HEPARIN SODIUM 10000 [USP'U]/100ML
0-5000 INJECTION, SOLUTION INTRAVENOUS CONTINUOUS
Status: DISCONTINUED | OUTPATIENT
Start: 2024-04-27 | End: 2024-04-29

## 2024-04-27 RX ORDER — NALOXONE HYDROCHLORIDE 0.4 MG/ML
0.4 INJECTION, SOLUTION INTRAMUSCULAR; INTRAVENOUS; SUBCUTANEOUS
Status: DISCONTINUED | OUTPATIENT
Start: 2024-04-27 | End: 2024-04-29

## 2024-04-27 RX ORDER — PANTOPRAZOLE SODIUM 40 MG/1
40 TABLET, DELAYED RELEASE ORAL DAILY PRN
Status: DISCONTINUED | OUTPATIENT
Start: 2024-04-27 | End: 2024-05-03 | Stop reason: HOSPADM

## 2024-04-27 RX ORDER — HYDRALAZINE HYDROCHLORIDE 20 MG/ML
10 INJECTION INTRAMUSCULAR; INTRAVENOUS EVERY 6 HOURS PRN
Status: DISCONTINUED | OUTPATIENT
Start: 2024-04-27 | End: 2024-04-30

## 2024-04-27 RX ORDER — LEVOTHYROXINE SODIUM 25 UG/1
50 TABLET ORAL DAILY
Status: DISCONTINUED | OUTPATIENT
Start: 2024-04-28 | End: 2024-05-03 | Stop reason: HOSPADM

## 2024-04-27 RX ORDER — MONTELUKAST SODIUM 10 MG/1
10 TABLET ORAL AT BEDTIME
Status: DISCONTINUED | OUTPATIENT
Start: 2024-04-27 | End: 2024-05-03 | Stop reason: HOSPADM

## 2024-04-27 RX ORDER — CLONIDINE HYDROCHLORIDE 0.1 MG/1
0.1 TABLET ORAL 2 TIMES DAILY
Status: DISCONTINUED | OUTPATIENT
Start: 2024-04-27 | End: 2024-04-28

## 2024-04-27 RX ORDER — AMOXICILLIN 250 MG
2 CAPSULE ORAL 2 TIMES DAILY PRN
Status: DISCONTINUED | OUTPATIENT
Start: 2024-04-27 | End: 2024-05-03 | Stop reason: HOSPADM

## 2024-04-27 RX ORDER — SIMVASTATIN 10 MG
20 TABLET ORAL AT BEDTIME
Status: DISCONTINUED | OUTPATIENT
Start: 2024-04-27 | End: 2024-04-27

## 2024-04-27 RX ORDER — LIDOCAINE 40 MG/G
CREAM TOPICAL
Status: DISCONTINUED | OUTPATIENT
Start: 2024-04-27 | End: 2024-05-03 | Stop reason: HOSPADM

## 2024-04-27 RX ORDER — ATORVASTATIN CALCIUM 40 MG/1
40 TABLET, FILM COATED ORAL EVERY EVENING
Status: DISCONTINUED | OUTPATIENT
Start: 2024-04-28 | End: 2024-05-03 | Stop reason: HOSPADM

## 2024-04-27 RX ORDER — AMOXICILLIN 250 MG
1 CAPSULE ORAL 2 TIMES DAILY PRN
Status: DISCONTINUED | OUTPATIENT
Start: 2024-04-27 | End: 2024-05-03 | Stop reason: HOSPADM

## 2024-04-27 RX ORDER — LEVOTHYROXINE SODIUM 50 UG/1
50 TABLET ORAL DAILY
COMMUNITY

## 2024-04-27 RX ORDER — METOPROLOL TARTRATE 25 MG/1
25 TABLET, FILM COATED ORAL 2 TIMES DAILY
Status: DISCONTINUED | OUTPATIENT
Start: 2024-04-27 | End: 2024-04-30

## 2024-04-27 RX ORDER — ALLOPURINOL 100 MG/1
100 TABLET ORAL 2 TIMES DAILY PRN
Status: DISCONTINUED | OUTPATIENT
Start: 2024-04-27 | End: 2024-05-03 | Stop reason: HOSPADM

## 2024-04-27 RX ORDER — ONDANSETRON 2 MG/ML
4 INJECTION INTRAMUSCULAR; INTRAVENOUS EVERY 6 HOURS PRN
Status: DISCONTINUED | OUTPATIENT
Start: 2024-04-27 | End: 2024-04-29

## 2024-04-27 RX ORDER — MONTELUKAST SODIUM 10 MG/1
10 TABLET ORAL AT BEDTIME
COMMUNITY

## 2024-04-27 RX ORDER — SODIUM CHLORIDE 9 MG/ML
INJECTION, SOLUTION INTRAVENOUS CONTINUOUS
Status: DISCONTINUED | OUTPATIENT
Start: 2024-04-27 | End: 2024-04-28

## 2024-04-27 RX ORDER — CLONIDINE HYDROCHLORIDE 0.1 MG/1
0.1 TABLET ORAL 2 TIMES DAILY
Status: ON HOLD | COMMUNITY
End: 2024-05-03

## 2024-04-27 RX ORDER — AMLODIPINE BESYLATE 5 MG/1
5 TABLET ORAL AT BEDTIME
Status: DISCONTINUED | OUTPATIENT
Start: 2024-04-27 | End: 2024-05-03 | Stop reason: HOSPADM

## 2024-04-27 RX ORDER — CALCIUM CARBONATE 500 MG/1
1000 TABLET, CHEWABLE ORAL 4 TIMES DAILY PRN
Status: DISCONTINUED | OUTPATIENT
Start: 2024-04-27 | End: 2024-05-03 | Stop reason: HOSPADM

## 2024-04-27 RX ORDER — UREA 10 %
45 LOTION (ML) TOPICAL DAILY
COMMUNITY

## 2024-04-27 RX ORDER — ONDANSETRON 4 MG/1
4 TABLET, ORALLY DISINTEGRATING ORAL EVERY 6 HOURS PRN
Status: DISCONTINUED | OUTPATIENT
Start: 2024-04-27 | End: 2024-04-29

## 2024-04-27 RX ORDER — NALOXONE HYDROCHLORIDE 0.4 MG/ML
0.2 INJECTION, SOLUTION INTRAMUSCULAR; INTRAVENOUS; SUBCUTANEOUS
Status: DISCONTINUED | OUTPATIENT
Start: 2024-04-27 | End: 2024-04-29

## 2024-04-27 RX ORDER — LOSARTAN POTASSIUM 50 MG/1
50 TABLET ORAL DAILY
Status: DISCONTINUED | OUTPATIENT
Start: 2024-04-28 | End: 2024-05-03 | Stop reason: HOSPADM

## 2024-04-27 RX ORDER — ACETAMINOPHEN 325 MG/1
650 TABLET ORAL EVERY 4 HOURS PRN
Status: DISCONTINUED | OUTPATIENT
Start: 2024-04-27 | End: 2024-04-29

## 2024-04-27 RX ADMIN — SODIUM CHLORIDE: 9 INJECTION, SOLUTION INTRAVENOUS at 23:54

## 2024-04-27 RX ADMIN — SIMVASTATIN 20 MG: 10 TABLET, FILM COATED ORAL at 21:21

## 2024-04-27 RX ADMIN — CLONIDINE HYDROCHLORIDE 0.1 MG: 0.1 TABLET ORAL at 21:21

## 2024-04-27 RX ADMIN — AMLODIPINE BESYLATE 5 MG: 5 TABLET ORAL at 21:21

## 2024-04-27 RX ADMIN — HEPARIN SODIUM 1200 UNITS/HR: 10000 INJECTION, SOLUTION INTRAVENOUS at 19:25

## 2024-04-27 RX ADMIN — MONTELUKAST 10 MG: 10 TABLET, FILM COATED ORAL at 21:21

## 2024-04-27 RX ADMIN — OXYCODONE HYDROCHLORIDE 5 MG: 5 TABLET ORAL at 19:42

## 2024-04-27 ASSESSMENT — ACTIVITIES OF DAILY LIVING (ADL)
ADLS_ACUITY_SCORE: 38
ADLS_ACUITY_SCORE: 38
ADLS_ACUITY_SCORE: 21
ADLS_ACUITY_SCORE: 38
ADLS_ACUITY_SCORE: 38

## 2024-04-27 NOTE — Clinical Note
The first balloon was inserted into the circumflex and middle circumflex.Max pressure = 6 petros. Total duration = 4 seconds.     Max pressure = 6 petros. Total duration = 5 seconds.    Balloon reinflated a second time: Max pressure = 6 petros. Total duration = 5 seconds.  Balloon reinflated a third time: Max pressure = 10 petros. Total duration = 5 seconds.

## 2024-04-27 NOTE — Clinical Note
The first balloon was inserted into the circumflex and middle circumflex.Max pressure = 12 petros. Total duration = 9 seconds. PAST MEDICAL HISTORY:  CAD (coronary artery disease)

## 2024-04-28 ENCOUNTER — APPOINTMENT (OUTPATIENT)
Dept: CARDIOLOGY | Facility: HOSPITAL | Age: 89
DRG: 322 | End: 2024-04-28
Attending: STUDENT IN AN ORGANIZED HEALTH CARE EDUCATION/TRAINING PROGRAM
Payer: COMMERCIAL

## 2024-04-28 LAB
ALBUMIN SERPL BCG-MCNC: 3.5 G/DL (ref 3.5–5.2)
ALP SERPL-CCNC: 75 U/L (ref 40–150)
ALT SERPL W P-5'-P-CCNC: 31 U/L (ref 0–50)
ANION GAP SERPL CALCULATED.3IONS-SCNC: 12 MMOL/L (ref 7–15)
AST SERPL W P-5'-P-CCNC: 177 U/L (ref 0–45)
ATRIAL RATE - MUSE: 74 BPM
BILIRUB SERPL-MCNC: 0.4 MG/DL
BUN SERPL-MCNC: 21.5 MG/DL (ref 8–23)
CALCIUM SERPL-MCNC: 9.1 MG/DL (ref 8.8–10.2)
CHLORIDE SERPL-SCNC: 108 MMOL/L (ref 98–107)
CREAT SERPL-MCNC: 1.1 MG/DL (ref 0.51–0.95)
DEPRECATED HCO3 PLAS-SCNC: 19 MMOL/L (ref 22–29)
DIASTOLIC BLOOD PRESSURE - MUSE: NORMAL MMHG
EGFRCR SERPLBLD CKD-EPI 2021: 49 ML/MIN/1.73M2
ERYTHROCYTE [DISTWIDTH] IN BLOOD BY AUTOMATED COUNT: 14.8 % (ref 10–15)
GLUCOSE SERPL-MCNC: 119 MG/DL (ref 70–99)
HCT VFR BLD AUTO: 33.8 % (ref 35–47)
HGB BLD-MCNC: 11 G/DL (ref 11.7–15.7)
INTERPRETATION ECG - MUSE: NORMAL
LVEF ECHO: NORMAL
MAGNESIUM SERPL-MCNC: 1.8 MG/DL (ref 1.7–2.3)
MCH RBC QN AUTO: 31.3 PG (ref 26.5–33)
MCHC RBC AUTO-ENTMCNC: 32.5 G/DL (ref 31.5–36.5)
MCV RBC AUTO: 96 FL (ref 78–100)
P AXIS - MUSE: 72 DEGREES
PHOSPHATE SERPL-MCNC: 3 MG/DL (ref 2.5–4.5)
PLATELET # BLD AUTO: 156 10E3/UL (ref 150–450)
POTASSIUM SERPL-SCNC: 4.8 MMOL/L (ref 3.4–5.3)
PR INTERVAL - MUSE: 178 MS
PROT SERPL-MCNC: 6.5 G/DL (ref 6.4–8.3)
QRS DURATION - MUSE: 116 MS
QT - MUSE: 408 MS
QTC - MUSE: 452 MS
R AXIS - MUSE: 37 DEGREES
RBC # BLD AUTO: 3.51 10E6/UL (ref 3.8–5.2)
SODIUM SERPL-SCNC: 139 MMOL/L (ref 135–145)
SYSTOLIC BLOOD PRESSURE - MUSE: NORMAL MMHG
T AXIS - MUSE: 43 DEGREES
UFH PPP CHRO-ACNC: 0.1 IU/ML
UFH PPP CHRO-ACNC: 0.28 IU/ML
UFH PPP CHRO-ACNC: 0.65 IU/ML
UFH PPP CHRO-ACNC: >1.1 IU/ML
VENTRICULAR RATE- MUSE: 74 BPM
WBC # BLD AUTO: 13.4 10E3/UL (ref 4–11)

## 2024-04-28 PROCEDURE — 93306 TTE W/DOPPLER COMPLETE: CPT | Mod: 26 | Performed by: INTERNAL MEDICINE

## 2024-04-28 PROCEDURE — 85520 HEPARIN ASSAY: CPT | Performed by: STUDENT IN AN ORGANIZED HEALTH CARE EDUCATION/TRAINING PROGRAM

## 2024-04-28 PROCEDURE — 83735 ASSAY OF MAGNESIUM: CPT | Performed by: STUDENT IN AN ORGANIZED HEALTH CARE EDUCATION/TRAINING PROGRAM

## 2024-04-28 PROCEDURE — 250N000011 HC RX IP 250 OP 636: Performed by: STUDENT IN AN ORGANIZED HEALTH CARE EDUCATION/TRAINING PROGRAM

## 2024-04-28 PROCEDURE — 99233 SBSQ HOSP IP/OBS HIGH 50: CPT | Performed by: INTERNAL MEDICINE

## 2024-04-28 PROCEDURE — 80053 COMPREHEN METABOLIC PANEL: CPT | Performed by: STUDENT IN AN ORGANIZED HEALTH CARE EDUCATION/TRAINING PROGRAM

## 2024-04-28 PROCEDURE — 36415 COLL VENOUS BLD VENIPUNCTURE: CPT | Performed by: STUDENT IN AN ORGANIZED HEALTH CARE EDUCATION/TRAINING PROGRAM

## 2024-04-28 PROCEDURE — 120N000004 HC R&B MS OVERFLOW

## 2024-04-28 PROCEDURE — C8929 TTE W OR WO FOL WCON,DOPPLER: HCPCS

## 2024-04-28 PROCEDURE — 85520 HEPARIN ASSAY: CPT | Performed by: INTERNAL MEDICINE

## 2024-04-28 PROCEDURE — 255N000002 HC RX 255 OP 636: Performed by: INTERNAL MEDICINE

## 2024-04-28 PROCEDURE — 36415 COLL VENOUS BLD VENIPUNCTURE: CPT | Performed by: INTERNAL MEDICINE

## 2024-04-28 PROCEDURE — 85027 COMPLETE CBC AUTOMATED: CPT | Performed by: STUDENT IN AN ORGANIZED HEALTH CARE EDUCATION/TRAINING PROGRAM

## 2024-04-28 PROCEDURE — 250N000013 HC RX MED GY IP 250 OP 250 PS 637: Performed by: INTERNAL MEDICINE

## 2024-04-28 PROCEDURE — 250N000013 HC RX MED GY IP 250 OP 250 PS 637: Performed by: STUDENT IN AN ORGANIZED HEALTH CARE EDUCATION/TRAINING PROGRAM

## 2024-04-28 PROCEDURE — 84100 ASSAY OF PHOSPHORUS: CPT | Performed by: STUDENT IN AN ORGANIZED HEALTH CARE EDUCATION/TRAINING PROGRAM

## 2024-04-28 PROCEDURE — 99222 1ST HOSP IP/OBS MODERATE 55: CPT | Performed by: STUDENT IN AN ORGANIZED HEALTH CARE EDUCATION/TRAINING PROGRAM

## 2024-04-28 RX ORDER — FLUTICASONE FUROATE AND VILANTEROL 100; 25 UG/1; UG/1
1 POWDER RESPIRATORY (INHALATION) DAILY
Status: DISCONTINUED | OUTPATIENT
Start: 2024-04-28 | End: 2024-05-03 | Stop reason: HOSPADM

## 2024-04-28 RX ORDER — ALBUTEROL SULFATE 0.83 MG/ML
2.5 SOLUTION RESPIRATORY (INHALATION) EVERY 4 HOURS PRN
Status: DISCONTINUED | OUTPATIENT
Start: 2024-04-28 | End: 2024-05-03 | Stop reason: HOSPADM

## 2024-04-28 RX ORDER — ALBUTEROL SULFATE 90 UG/1
2 AEROSOL, METERED RESPIRATORY (INHALATION) EVERY 4 HOURS PRN
Status: DISCONTINUED | OUTPATIENT
Start: 2024-04-28 | End: 2024-05-03 | Stop reason: HOSPADM

## 2024-04-28 RX ORDER — ASPIRIN 81 MG/1
81 TABLET ORAL DAILY
Status: DISCONTINUED | OUTPATIENT
Start: 2024-04-28 | End: 2024-05-03 | Stop reason: HOSPADM

## 2024-04-28 RX ADMIN — HYDRALAZINE HYDROCHLORIDE 10 MG: 20 INJECTION INTRAMUSCULAR; INTRAVENOUS at 04:40

## 2024-04-28 RX ADMIN — HEPARIN SODIUM 650 UNITS/HR: 10000 INJECTION, SOLUTION INTRAVENOUS at 19:23

## 2024-04-28 RX ADMIN — LOSARTAN POTASSIUM 50 MG: 50 TABLET, FILM COATED ORAL at 08:09

## 2024-04-28 RX ADMIN — FLUTICASONE FUROATE AND VILANTEROL TRIFENATATE 1 PUFF: 100; 25 POWDER RESPIRATORY (INHALATION) at 13:05

## 2024-04-28 RX ADMIN — ATORVASTATIN CALCIUM 40 MG: 40 TABLET, FILM COATED ORAL at 20:33

## 2024-04-28 RX ADMIN — HEPARIN SODIUM 500 UNITS/HR: 10000 INJECTION, SOLUTION INTRAVENOUS at 09:19

## 2024-04-28 RX ADMIN — METOPROLOL TARTRATE 25 MG: 25 TABLET, FILM COATED ORAL at 20:33

## 2024-04-28 RX ADMIN — AMLODIPINE BESYLATE 5 MG: 5 TABLET ORAL at 20:33

## 2024-04-28 RX ADMIN — METOPROLOL TARTRATE 25 MG: 25 TABLET, FILM COATED ORAL at 08:09

## 2024-04-28 RX ADMIN — LEVOTHYROXINE SODIUM 50 MCG: 0.03 TABLET ORAL at 08:09

## 2024-04-28 RX ADMIN — CLONIDINE HYDROCHLORIDE 0.1 MG: 0.1 TABLET ORAL at 08:09

## 2024-04-28 RX ADMIN — ASPIRIN 81 MG: 81 TABLET, COATED ORAL at 13:05

## 2024-04-28 RX ADMIN — HEPARIN SODIUM 650 UNITS/HR: 10000 INJECTION, SOLUTION INTRAVENOUS at 16:54

## 2024-04-28 RX ADMIN — PERFLUTREN 1.5 ML: 6.52 INJECTION, SUSPENSION INTRAVENOUS at 09:48

## 2024-04-28 RX ADMIN — ACETAMINOPHEN 650 MG: 325 TABLET ORAL at 07:34

## 2024-04-28 RX ADMIN — MONTELUKAST 10 MG: 10 TABLET, FILM COATED ORAL at 20:33

## 2024-04-28 ASSESSMENT — ACTIVITIES OF DAILY LIVING (ADL)
ADLS_ACUITY_SCORE: 21
ADLS_ACUITY_SCORE: 23
ADLS_ACUITY_SCORE: 21
DEPENDENT_IADLS:: INDEPENDENT
ADLS_ACUITY_SCORE: 21
ADLS_ACUITY_SCORE: 23
ADLS_ACUITY_SCORE: 21

## 2024-04-28 NOTE — H&P
[facility]    History and Physical - Hospitalist Service       Date of Admission:  4/27/2024    Assessment & Plan    Assessment:  Mary Corral is a 88 year old female admitted on 4/27/2024. She presented to the urgent care with complaint of chest pain since yesterday, Discussed with cardiology who recommended starting on heparin and metoprolol and cardiology will see patient in a.m., patient is going to be admitted for further evaluation and management of chest pain possibly in setting of NSTEMI.    Problem list and plan:  Chest pain possibly in setting of NSTEMI  Mild leukocytosis most likely reactive in the setting of above   Patient presented to the urgent care with complaint of chest pain since yesterday  She went to urgent care where she had labs done which showed mild leukocytosis, mild anemia, troponin was elevated at 0.085, mildly elevated creatinine  CTA chest was performed to rule out PE and showed no pulmonary embolism  EKG was performed which as per urgent care physician was negative for ischemic process  Patient was subsequently transferred to [facility] for further evaluation and management  In the Hospital patient acidosis and creatinine had improved   Troponin trended up  Leukocytosis slightly worsened   EKG was performed which showed some ST and T wave abnormalities in the anterior and inferior leads  Discussed with cardiology who recommended starting on heparin and metoprolol and cardiology will see patient in a.m.  Echocardiogram ordered  Monitor fever and WBC curve   Monitor on cardiac telemetry monitoring    CKD stage III  Normal anion gap metabolic acidosis  Creatinine currently appears to be near baseline  Monitor renal function closely  Gentle IV hydration as patient appears to be dehydrated  Monitor volume status closely  Monitor for worsening acidosis    Mild hyperglycemia most likely reactive  Follow-up hemoglobin A1c  Monitor blood sugar level  "intermittently    Normocytic anemia  Monitor CBC, follow-up iron panel    History of hypertension  Continue with amlodipine, clonidine, losartan, metoprolol  IV hydralazine as needed for elevated blood pressure  Monitor vital signs as per protocol    History of hyperlipidemia  Continue with patient on simvastatin, discontinued simvastatin and switch to Lipitor 40  Follow-up lipid panel    History of hypothyroidism  Continue with levothyroxine  Follow-up TSH and T4    History of gout  Continue with allopurinol    DVT PPx  Intermittent pneumatic compression  Heparin drip    CODE STATUS  Full code as per discussion with the patient        Diet: Combination Diet Low Saturated Fat Na <2400mg Diet, No Caffeine Diet  NPO per Anesthesia Guidelines for Procedure/Surgery Except for: Meds    DVT Prophylaxis: Pneumatic Compression Devices  Russo Catheter: Not present  Lines: None     Cardiac Monitoring: ACTIVE order. Indication: Chest pain/ ACS rule out (24 hours)  Code Status: Full Code    Mental status: Patient was alert awake and oriented x 3, patient is a good historian most of the history was obtained from the patient, some history was obtained from chart review and the rest of the history was obtained from discussion with the ER physician.  Clinically Significant Risk Factors Present on Admission                  # Hypertension: Home medication list includes antihypertensive(s)      # Obesity: Estimated body mass index is 30.04 kg/m  as calculated from the following:    Height as of this encounter: 1.702 m (5' 7\").    Weight as of this encounter: 87 kg (191 lb 12.8 oz).       # Asthma: noted on problem list      Disposition Plan     Medically Ready for Discharge: Anticipated in 2-4 Days     Saad J. Gondal, MD  Hospitalist Service  [facility]  Securely message with [employer] (more info)  Text page via [employer] Paging/Directory "     ______________________________________________________________________    Chief Complaint   Chest pain    History is obtained from the patient    History of Present Illness   Mary Corral is a 88 year old female with a past medical history documented below presented to the urgent care with complaint of chest pain since yesterday, as per patient she was all right till yesterday when she started having chest pain, it was present in the center of the chest as if something got stuck there, patient denied any radiation of pain or any shortness of breath or any other complaints, she stated that the pain stayed all night and then in the morning she discussed with her friend who told her to go to urgent care, she went to urgent care where she had labs done which showed mild leukocytosis, mild anemia, troponin was elevated at 0.085, mildly elevated creatinine, CTA chest was performed to rule out PE and showed no pulmonary embolism, EKG was performed which as per urgent care physician was negative for ischemic process, patient was subsequently transferred to [facility] for further evaluation and management, in the hospital patient acidosis and creatinine had improved, patient was seen to be having mild hyperglycemia, troponin trended up, leukocytosis slightly worsened but anemia stayed stable, EKG was performed which showed some ST and T wave abnormalities in the anterior and inferior leads, discussed with cardiology who recommended starting on heparin and metoprolol and cardiology will see patient in a.m., patient is going to be admitted for further evaluation and management of chest pain possibly in setting of NSTEMI.      Past Medical History    Past Medical History:   Diagnosis Date    Allergic rhinitis     Chronic sinusitis     Conductive hearing loss     COPD (chronic obstructive pulmonary disease) (H)     CRI (chronic renal insufficiency)     mild    Gastroesophageal reflux disease     Hearing problem      Hypertension     Mediastinal mass     Nasal polyps     Pulmonary nodule     Seasonal allergies     Uncomplicated asthma        Past Surgical History   Past Surgical History:   Procedure Laterality Date    APPENDECTOMY      BIOPSY MASS NECK N/A 11/21/2016    Procedure: BIOPSY MASS NECK;  Surgeon: Lucia Quinteros MD;  Location: [facility] OR    CATARACT IOL, RT/LT Bilateral 2016    ENDOSCOPIC ULTRASOUND UPPER GASTROINTESTINAL TRACT (GI) N/A 11/21/2016    Procedure: ENDOSCOPIC ULTRASOUND, ESOPHAGOSCOPY / UPPER GASTROINTESTINAL TRACT (GI);  Surgeon: Lucia Quinteros MD;  Location: [facility] OR    ESOPHAGOSCOPY, GASTROSCOPY, DUODENOSCOPY (EGD), DILATATION, COMBINED N/A 2/6/2023    Procedure: ESOPHAGOGASTRODUODENOSCOPY with esophageal dilitation;  Surgeon: Kodak Huerta MD, MD;  Location: [facility] GI    ROTATOR CUFF REPAIR RT/LT Right 30 years ago    THYROIDECTOMY N/A 3/14/2017    Procedure: THYROIDECTOMY;  Surgeon: Misbah Julien MD;  Location: [facility] OR       Prior to Admission Medications   Prior to Admission Medications   Prescriptions Last Dose Informant Patient Reported? Taking?   Vitamin D3 (CHOLECALCIFEROL) 25 mcg (1000 units) tablet 4/26/2024 at PM  Yes Yes   Sig: Take 1,000 Units by mouth daily   acetaminophen (TYLENOL) 325 MG tablet Past Week  No Yes   Sig: Take 2 tablets (650 mg) by mouth every 4 hours as needed for other (surgical pain)   acetaminophen-codeine (TYLENOL #3) 300-30 MG tablet More than a month  Yes Yes   Sig: Take 1 tablet by mouth every 6 hours as needed for severe pain (used when she has to walk long distances)   albuterol (2.5 MG/3ML) 0.083% nebulizer solution   Yes Yes   Sig: Take 1 vial by nebulization every 4 hours as needed for shortness of breath / dyspnea or wheezing   albuterol (PROAIR HFA/PROVENTIL HFA/VENTOLIN HFA) 108 (90 BASE) MCG/ACT Inhaler   Yes Yes   Sig: Inhale 2 puffs into the lungs every 4 hours as needed for shortness of breath   allopurinol (ZYLOPRIM) 100 MG tablet More than a month   Yes Yes   Sig: Take 100 mg by mouth 2 times daily as needed Gout flares   amLODIPine (NORVASC) 5 MG tablet 4/26/2024 at hs  Yes Yes   Sig: Take 5 mg by mouth at bedtime   cetirizine (ZYRTEC) 10 MG tablet Past Month  Yes Yes   Sig: Take 10 mg by mouth daily as needed for allergies   cloNIDine (CATAPRES) 0.1 MG tablet 4/27/2024 at am  Yes Yes   Sig: Take 0.1 mg by mouth 2 times daily   ferrous sulfate 45 MG TBCR CR tablet 4/26/2024 at pm  Yes Yes   Sig: Take 45 mg by mouth daily   fluticasone-salmeterol (ADVAIR) 250-50 MCG/DOSE diskus inhaler 4/27/2024 at am  Yes Yes   Sig: Inhale 1 puff into the lungs daily   levothyroxine (SYNTHROID/LEVOTHROID) 50 MCG tablet 4/27/2024 at 0600  Yes Yes   Sig: Take 50 mcg by mouth daily   losartan (COZAAR) 50 MG tablet 4/27/2024 at am  Yes Yes   Sig: Take 50 mg by mouth daily   montelukast (SINGULAIR) 10 MG tablet 4/26/2024 at hs  Yes Yes   Sig: Take 10 mg by mouth at bedtime   multivitamin w/minerals (THERA-VIT-M) tablet 4/26/2024 at PM  Yes Yes   Sig: Take 1 tablet by mouth daily   pantoprazole (PROTONIX) 40 MG EC tablet More than a month  Yes Yes   Sig: Take 40 mg by mouth daily as needed for heartburn   polyethylene glycol (MIRALAX/GLYCOLAX) powder More than a month  Yes Yes   Sig: Take 17 g by mouth daily as needed for constipation   predniSONE (DELTASONE) 20 MG tablet More than a month  Yes Yes   Sig: Take 20 mg by mouth daily as needed (Self doses in winter for lung infections.)   simvastatin (ZOCOR) 20 MG tablet 4/26/2024 at hs  Yes Yes   Sig: Take 20 mg by mouth at bedtime   valACYclovir (VALTREX) 1000 mg tablet More than a month  Yes Yes   Sig: Take 1,000 mg by mouth 2 times daily as needed (for one day)      Facility-Administered Medications: None        Review of Systems    The 10 point Review of Systems is negative other than noted in the HPI or here.  Chest pain    Physical Exam   Vital Signs: Temp: 97.5  F (36.4  C) Temp src: Oral BP: (!) 155/70 Pulse: 55   Resp: 24  SpO2: 99 % O2 Device: Nasal cannula Oxygen Delivery: 1 LPM  Weight: 191 lbs 12.8 oz    GENERAL: Patient was seen and examined at bedside with no acute distress chronically ill-appearing and frail  HENT:  Head is normocephalic, atraumatic.  Sunken eyes, pale conjunctival mucosa  EYES:  Eyes have anicteric sclerae without conjunctival injection   LUNGS: Bilateral air entry, clear to auscultation bilaterally  CARDIOVASCULAR:  Regular rate and rhythm with no murmurs, gallops or rubs.  ABDOMEN:  Normal bowel sounds. Soft; nontender   EXT: no edema    SKIN:  No acute rashes.  Dry scaly wrinkled skin  NEUROLOGIC:  Grossly nonfocal.      Medical Decision Making       80 MINUTES SPENT BY ME on the date of service doing chart review, history, exam, documentation & further activities per the note.      Data     I have personally reviewed the following data over the past 24 hrs:    13.4 (H)  \   11.5 (L)   / N/A     138 106 25.1 (H) /  168 (H)   5.1 21 (L) 1.16 (H) \     Trop: 676 (HH) BNP: 1,076       Imaging results reviewed over the past 24 hrs:   Recent Results (from the past 24 hour(s))   CTA Chest with Contrast    Narrative    For Patients: As a result of the 21st Century Cures Act, medical imaging exams and procedure reports are released immediately into your electronic medical record. You may view this report before your referring provider. If you have questions, please contact your health care provider.    EXAM: CTA CHEST  LOCATION: [facility] [place]  DATE: 4/27/2024    INDICATION: r/o pE, chest pain, positive dimer  COMPARISON: CT chest 03/09/2017  TECHNIQUE: CT chest pulmonary angiogram during arterial phase injection of IV contrast. Multiplanar reformats and MIP reconstructions were performed. Dose reduction techniques were used.   CONTRAST: IOPAMIDOL 300 MG/ML  ML BOTTLE: 100mL    FINDINGS:  ANGIOGRAM CHEST: Pulmonary arteries are normal caliber and negative for pulmonary emboli. Thoracic aorta  is negative for dissection. No CT evidence of right heart strain.    LUNGS AND PLEURA: No pleural effusions or pneumothorax. Scattered atelectasis in the right middle lobe, lingula, and at the lung bases.    MEDIASTINUM/AXILLAE: Small hiatal hernia. No lymphadenopathy.    CORONARY ARTERY CALCIFICATION: Cannot evaluate.    UPPER ABDOMEN: Unremarkable    MUSCULOSKELETAL: Degenerative changes throughout spine.    Impression    1.  No pulmonary embolism.

## 2024-04-28 NOTE — CONSULTS
[facility]ART [facility]   [address], [place] [address]   [email or web address]   OFFICE: [phone]     CARDIOLOGY INPATIENT CONSULT NOTE     Impression and Plan     Assessment/Plan:  Ms. Mary Corral is a 88 year old female with HTN, CKD, HLD, who presented to the hospital with chest pain and diagnosed with NSTEMI.    NSTEMI   - Continue heparin gtt   - Plan on LHC   - TTE to evaluate heart structure and function   - Cont BB   - LDL 84, on simvastatin 20mg at home.  Continue atorvastatin 40mg to target LDL goal <70    HTN   - BP elevated   - Continue current management for now.  Can increase amlodipine or ARB if BP remains high    Will continue to follow    Primary Cardiologist: None    History of Present Illness      Ms. Mary Corral is a 88 year old female with HTN, CKD, HLD, who presented to the hospital with chest pain.  The patient noted sudden onset of chest pain while she was sitting down watching basketball.  She describes a heaviness in the center of her chest, nonradiating.  She denies any other associated symptoms.  She has never had pain like this before.  She is typically pretty active, denies any exertional symptoms.      Review of Systems:  Further review of systems is otherwise negative/noncontributory (based on review of medical record (admission H&P) and 13 point review of systems reviewed. Pertinent positives noted).    Cardiac Diagnostics     ECG: Personally reviewed and interpreted: 4/27/2024  NSR  Low voltage, RBBB    Most recent:  Echocardiogram (results reviewed): 2/18/2018  Narrative & Impression    No previous study for comparison.    Technically somewhat challenging examination.    Normal left ventricular size.    Left ventricle ejection fraction is normal. The calculated left ventricular ejection fraction is 62%. No obvious regional wall motion abnormality detected.    Normal right ventricular systolic function.    Left ventricular diastolic  dysfunction consistent with impaired relaxation    E/e' > 15, suggesting elevated LV filling pressures    No significant valvular abnormality noted.    Trace pericardial effusion. Pericardial fat pad present.        Medical History  Surgical History Family History Social History   Past Medical History:   Diagnosis Date     Allergic rhinitis      Chronic sinusitis      Conductive hearing loss      COPD (chronic obstructive pulmonary disease) (H)      CRI (chronic renal insufficiency)     mild     Gastroesophageal reflux disease      Hearing problem      Hypertension      Mediastinal mass      Nasal polyps      Pulmonary nodule      Seasonal allergies      Uncomplicated asthma      Past Surgical History:   Procedure Laterality Date     APPENDECTOMY       BIOPSY MASS NECK N/A 11/21/2016    Procedure: BIOPSY MASS NECK;  Surgeon: Lucia Quinteros MD;  Location: [facility] OR     CATARACT IOL, RT/LT Bilateral 2016     ENDOSCOPIC ULTRASOUND UPPER GASTROINTESTINAL TRACT (GI) N/A 11/21/2016    Procedure: ENDOSCOPIC ULTRASOUND, ESOPHAGOSCOPY / UPPER GASTROINTESTINAL TRACT (GI);  Surgeon: Lucia Quinteros MD;  [facility]     ESOPHAGOSCOPY, GASTROSCOPY, DUODENOSCOPY (EGD), DILATATION, COMBINED N/A 2/6/2023    Procedure: ESOPHAGOGASTRODUODENOSCOPY with esophageal dilitation;  Surgeon: Kodak Huerta MD, MD;  Location: [facility]     ROTATOR CUFF REPAIR RT/LT Right 30 years ago     THYROIDECTOMY N/A 3/14/2017    Procedure: THYROIDECTOMY;  Surgeon: Misbah Julien MD;  Location: [facility] OR     Family History   Problem Relation Age of Onset     Esophageal Cancer Mother      Colon Cancer Mother      Mental Illness Mother      Dementia Mother      Unknown/Adopted Mother      Asthma Mother      Diabetes Mother      Cerebrovascular Disease Mother      Thyroid Disease Mother      Congenital Anomalies Mother      Bleeding Disorder Mother      Migraines Mother      Muscular Disorder Mother      Parkinsonism Father      Mental Illness Father   "    Unknown/Adopted Father      Asthma Father      Cancer Father      Hypertension Father      Cerebrovascular Disease Father      Thyroid Disease Father      Congenital Anomalies Father      Bleeding Disorder Father      Migraines Father      Muscular Disorder Father      Mental Illness Sister      Dementia Sister      Unknown/Adopted Sister      Asthma Sister      Cerebrovascular Disease Sister      Thyroid Disease Sister      Congenital Anomalies Sister      Bleeding Disorder Sister      Migraines Sister      Muscular Disorder Sister      Cancer Mother         esophageal           Social History     Socioeconomic History     Marital status: Single     Spouse name: Not on file     Number of children: Not on file     Years of education: Not on file     Highest education level: Not on file   Occupational History     Not on file   Tobacco Use     Smoking status: Never     Smokeless tobacco: Never     Tobacco comments:     smoked twice a week for a couple years while in college, less than 1/2 pack    Substance and Sexual Activity     Alcohol use: Yes     Alcohol/week: 0.0 standard drinks of alcohol     Comment: Alcoholic Drinks/day: occasional     Drug use: No     Sexual activity: Never   Other Topics Concern     Not on file   Social History Narrative     Not on file     Social Determinants of Health     Financial Resource Strain: Not on file   Food Insecurity: Not on file   Transportation Needs: Not on file   Physical Activity: Not on file   Stress: Not on file   Social Connections: Not on file   Interpersonal Safety: Not on file   Housing Stability: Not on file             Physical Examination   VITALS: BP (!) 152/66 (BP Location: Left arm)   Pulse 83   Temp 97.7  F (36.5  C) (Oral)   Resp 18   Ht 1.702 m (5' 7\")   Wt 86.8 kg (191 lb 6.4 oz)   SpO2 96%   BMI 29.98 kg/m    BMI: Body mass index is 29.98 kg/m .  Wt Readings from Last 3 Encounters:   04/28/24 86.8 kg (191 lb 6.4 oz)   02/06/23 86.2 kg (190 lb) "   03/20/17 80.7 kg (178 lb)       Intake/Output Summary (Last 24 hours) at 4/28/2024 0844  Last data filed at 4/28/2024 0442  Gross per 24 hour   Intake 200 ml   Output 750 ml   Net -550 ml       General: pleasant female. No acute distress.   HENT: external ears normal. Nares patent. Mucous membranes moist.  Neck: No JVD  Lungs: clear to auscultation  COR:  regular rate and rhythm, No murmurs, rubs, or gallops  Abd: nondistended, BS present  Extrem: No edema         Non-cardiac Imaging Studies Reviewed             Lab Results Reviewed    Chemistry/lipid CBC Cardiac Enzymes/BNP/TSH/INR   Recent Labs   Lab Test 04/27/24 2022   CHOL 151   HDL 57   LDL 84   TRIG 51     Recent Labs   Lab Test 04/27/24  2022 10/26/23  1205 04/18/22  1429   LDL 84 72 85     Recent Labs   Lab Test 04/28/24  0546      POTASSIUM 4.8   CHLORIDE 108*   CO2 19*   *   BUN 21.5   CR 1.10*   GFRESTIMATED 49*   GREG 9.1     Recent Labs   Lab Test 04/28/24  0546 04/27/24 2022 04/02/24  1205   CR 1.10* 1.16* 1.36*     Recent Labs   Lab Test 04/27/24 2022   A1C 6.1*          Recent Labs   Lab Test 04/28/24  0546   WBC 13.4*   HGB 11.0*   HCT 33.8*   MCV 96        Recent Labs   Lab Test 04/28/24  0546 04/27/24  1916 02/18/18  0641   HGB 11.0* 11.5* 10.9*    Recent Labs   Lab Test 02/18/18  0641 02/18/18  0025 02/17/18  2046   TROPONINI 0.02 <0.01 <0.01     Recent Labs   Lab Test 04/27/24  2022 02/15/23  1639   NTBNPI 1,076  --    NTBNP  --  1,239     Recent Labs   Lab Test 04/27/24 2022   TSH 0.81     Recent Labs   Lab Test 02/17/18  1905   INR 1.00           Current Inpatient Scheduled Medications   Scheduled Meds:  Current Facility-Administered Medications   Medication Dose Route Frequency Provider Last Rate Last Admin     amLODIPine (NORVASC) tablet 5 mg  5 mg Oral At Bedtime Gondal, Saad J, MD   5 mg at 04/27/24 2121     atorvastatin (LIPITOR) tablet 40 mg  40 mg Oral QPM Gondal, Saad J, MD         cloNIDine (CATAPRES) tablet  0.1 mg  0.1 mg Oral BID Gondal, Saad J, MD   0.1 mg at 04/28/24 0809     levothyroxine (SYNTHROID/LEVOTHROID) tablet 50 mcg  50 mcg Oral Daily Gondal, Saad J, MD   50 mcg at 04/28/24 0809     losartan (COZAAR) tablet 50 mg  50 mg Oral Daily Gondal, Saad J, MD   50 mg at 04/28/24 0809     metoprolol tartrate (LOPRESSOR) tablet 25 mg  25 mg Oral BID Gondal, Saad J, MD   25 mg at 04/28/24 0809     montelukast (SINGULAIR) tablet 10 mg  10 mg Oral At Bedtime Gondal, Saad J, MD   10 mg at 04/27/24 2121     sodium chloride (PF) 0.9% PF flush 3 mL  3 mL Intracatheter Q8H Gondal, Saad J, MD   3 mL at 04/27/24 1932     Continuous Infusions:  Current Facility-Administered Medications   Medication Dose Route Frequency Provider Last Rate Last Admin     heparin 25,000 units in 0.45% NaCl 250 mL ANTICOAGULANT infusion  0-5,000 Units/hr Intravenous Continuous Gondal, Saad J, MD   Paused at 04/28/24 0804     sodium chloride 0.9 % infusion   Intravenous Continuous Gondal, Saad J, MD 0 mL/hr at 04/28/24 0143 Rate Verify at 04/28/24 0143       No current outpatient medications on file.          Medications Prior to Admission   Prior to Admission medications    Medication Sig Start Date End Date Taking? Authorizing Provider   acetaminophen (TYLENOL) 325 MG tablet Take 2 tablets (650 mg) by mouth every 4 hours as needed for other (surgical pain) 3/17/17  Yes Imelda Calvo PA-C   acetaminophen-codeine (TYLENOL #3) 300-30 MG tablet Take 1 tablet by mouth every 6 hours as needed for severe pain (used when she has to walk long distances)   Yes Reported, Patient   albuterol (2.5 MG/3ML) 0.083% nebulizer solution Take 1 vial by nebulization every 4 hours as needed for shortness of breath / dyspnea or wheezing   Yes Reported, Patient   albuterol (PROAIR HFA/PROVENTIL HFA/VENTOLIN HFA) 108 (90 BASE) MCG/ACT Inhaler Inhale 2 puffs into the lungs every 4 hours as needed for shortness of breath   Yes Reported, Patient   allopurinol (ZYLOPRIM)  100 MG tablet Take 100 mg by mouth 2 times daily as needed Gout flares   Yes Reported, Patient   amLODIPine (NORVASC) 5 MG tablet Take 5 mg by mouth at bedtime   Yes Reported, Patient   cetirizine (ZYRTEC) 10 MG tablet Take 10 mg by mouth daily as needed for allergies   Yes Reported, Patient   cloNIDine (CATAPRES) 0.1 MG tablet Take 0.1 mg by mouth 2 times daily   Yes Unknown, Entered By History   ferrous sulfate 45 MG TBCR CR tablet Take 45 mg by mouth daily   Yes Unknown, Entered By History   fluticasone-salmeterol (ADVAIR) 250-50 MCG/DOSE diskus inhaler Inhale 1 puff into the lungs daily   Yes Reported, Patient   levothyroxine (SYNTHROID/LEVOTHROID) 50 MCG tablet Take 50 mcg by mouth daily   Yes Unknown, Entered By History   losartan (COZAAR) 50 MG tablet Take 50 mg by mouth daily 2/24/17  Yes Louisa Man, DENISE CNS   montelukast (SINGULAIR) 10 MG tablet Take 10 mg by mouth at bedtime   Yes Unknown, Entered By History   multivitamin w/minerals (THERA-VIT-M) tablet Take 1 tablet by mouth daily   Yes Reported, Patient   pantoprazole (PROTONIX) 40 MG EC tablet Take 40 mg by mouth daily as needed for heartburn   Yes Reported, Patient   polyethylene glycol (MIRALAX/GLYCOLAX) powder Take 17 g by mouth daily as needed for constipation   Yes Reported, Patient   predniSONE (DELTASONE) 20 MG tablet Take 20 mg by mouth daily as needed (Self doses in winter for lung infections.)   Yes Reported, Patient   simvastatin (ZOCOR) 20 MG tablet Take 20 mg by mouth at bedtime   Yes Reported, Patient   valACYclovir (VALTREX) 1000 mg tablet Take 1,000 mg by mouth 2 times daily as needed (for one day)   Yes Reported, Patient   Vitamin D3 (CHOLECALCIFEROL) 25 mcg (1000 units) tablet Take 1,000 Units by mouth daily   Yes Reported, Patient          Ata Mann DO formerly [facility]  Non-invasive Cardiologist  [facility]      Clinically Significant Risk Factors Present on Admission                  # Hypertension: Home  "medication list includes antihypertensive(s)      # Overweight: Estimated body mass index is 29.98 kg/m  as calculated from the following:    Height as of this encounter: 1.702 m (5' 7\").    Weight as of this encounter: 86.8 kg (191 lb 6.4 oz).       # Asthma: noted on problem list         "

## 2024-04-28 NOTE — PROGRESS NOTES
Transfer in from the Urgency Room  Pt having Chest pressure since last night she rated the pain 5/10 Also,  Verbalized to having balance issues leading to multiple falls and that she use cane at home .

## 2024-04-28 NOTE — CONSULTS
Care Management Initial Consult    General Information  Assessment completed with: Patient,    Type of CM/SW Visit: Initial Assessment    Primary Care Provider verified and updated as needed: Yes   Readmission within the last 30 days: no previous admission in last 30 days      Reason for Consult: discharge planning  Advance Care Planning:            Communication Assessment  Patient's communication style: spoken language (English or Bilingual)    Hearing Difficulty or Deaf: no   Wear Glasses or Blind: no    Cognitive  Cognitive/Neuro/Behavioral: WDL                      Living Environment:   People in home: alone     Current living Arrangements: house      Able to return to prior arrangements: yes       Family/Social Support:  Care provided by: self  Provides care for: no one  Marital Status: Single   ([place]ieces / Friends)          Description of Support System: Supportive, Involved         Current Resources:   Patient receiving home care services: No     Community Resources: None  Equipment currently used at home: none  Supplies currently used at home: None    Employment/Financial:  Employment Status: retired        Financial Concerns: none   Referral to Financial Worker: No       Does the patient's insurance plan have a 3 day qualifying hospital stay waiver?  No    Lifestyle & Psychosocial Needs:  Social Determinants of Health     Food Insecurity: Not on file   Depression: Not at risk (1/17/2019)    PHQ-2     PHQ-2 Score: 0   Housing Stability: Not on file   Tobacco Use: Low Risk  (2/6/2023)    Patient History     Smoking Tobacco Use: Never     Smokeless Tobacco Use: Never     Passive Exposure: Not on file   Financial Resource Strain: Not on file   Alcohol Use: Not on file   Transportation Needs: Not on file   Physical Activity: Not on file   Interpersonal Safety: Not on file   Stress: Not on file   Social Connections: Not on file   Health Literacy: Not on file       Functional Status:  Prior to admission patient  needed assistance:   Dependent ADLs:: Independent  Dependent IADLs:: Independent       Mental Health Status:  Mental Health Status: No Current Concerns       Chemical Dependency Status:  Chemical Dependency Status: No Current Concerns             Values/Beliefs:  Spiritual, Cultural Beliefs, Judaism Practices, Values that affect care: no               Additional Information:  SW introduced self and CM role to patient. Patient reports living in a house alone. At baseline, patient reports independence with all I/ADLs, uses a cane as needed.  Patient identifies positive support from friends and her nieces.  Patient denies any in home services or concerns regarding discharge to home.  Family transport. CM following care progression to assist as needed with safe discharge planning.     JOSY Camarillo

## 2024-04-28 NOTE — PHARMACY-ADMISSION MEDICATION HISTORY
Pharmacist Admission Medication History    Admission medication history is complete. The information provided in this note is only as accurate as the sources available at the time of the update.    Information Source(s): Patient, Clinic records, and CareEverywhere/SureScripts via in-person    Pertinent Information: none    Changes made to PTA medication list:  Added: clonidine, levothyroxine, montelukast, MVI  Deleted: oxycodone, Aquaphor  Changed: allopurinol, amlodipine, Zyrtec, Advair, losartan, Protonix, Valtrex, Zocor    Allergies reviewed with patient and updates made in EHR: yes    Medication History Completed By: Chi Gan [facility] 4/27/2024 7:43 PM    PTA Med List   Medication Sig Last Dose    acetaminophen (TYLENOL) 325 MG tablet Take 2 tablets (650 mg) by mouth every 4 hours as needed for other (surgical pain) Past Week    acetaminophen-codeine (TYLENOL #3) 300-30 MG tablet Take 1 tablet by mouth every 6 hours as needed for severe pain (used when she has to walk long distances) More than a month    albuterol (2.5 MG/3ML) 0.083% nebulizer solution Take 1 vial by nebulization every 4 hours as needed for shortness of breath / dyspnea or wheezing     albuterol (PROAIR HFA/PROVENTIL HFA/VENTOLIN HFA) 108 (90 BASE) MCG/ACT Inhaler Inhale 2 puffs into the lungs every 4 hours as needed for shortness of breath     allopurinol (ZYLOPRIM) 100 MG tablet Take 100 mg by mouth 2 times daily as needed Gout flares More than a month    amLODIPine (NORVASC) 5 MG tablet Take 5 mg by mouth at bedtime 4/26/2024 at hs    cetirizine (ZYRTEC) 10 MG tablet Take 10 mg by mouth daily as needed for allergies Past Month    cloNIDine (CATAPRES) 0.1 MG tablet Take 0.1 mg by mouth 2 times daily 4/27/2024 at am    ferrous sulfate 45 MG TBCR CR tablet Take 45 mg by mouth daily 4/26/2024 at pm    fluticasone-salmeterol (ADVAIR) 250-50 MCG/DOSE diskus inhaler Inhale 1 puff into the lungs daily 4/27/2024 at am    levothyroxine  (SYNTHROID/LEVOTHROID) 50 MCG tablet Take 50 mcg by mouth daily 4/27/2024 at 0600    losartan (COZAAR) 50 MG tablet Take 50 mg by mouth daily 4/27/2024 at am    montelukast (SINGULAIR) 10 MG tablet Take 10 mg by mouth at bedtime 4/26/2024 at hs    multivitamin w/minerals (THERA-VIT-M) tablet Take 1 tablet by mouth daily 4/26/2024 at PM    pantoprazole (PROTONIX) 40 MG EC tablet Take 40 mg by mouth daily as needed for heartburn More than a month    polyethylene glycol (MIRALAX/GLYCOLAX) powder Take 17 g by mouth daily as needed for constipation More than a month    predniSONE (DELTASONE) 20 MG tablet Take 20 mg by mouth daily as needed (Self doses in winter for lung infections.) More than a month    simvastatin (ZOCOR) 20 MG tablet Take 20 mg by mouth at bedtime 4/26/2024 at hs    valACYclovir (VALTREX) 1000 mg tablet Take 1,000 mg by mouth 2 times daily as needed (for one day) More than a month    Vitamin D3 (CHOLECALCIFEROL) 25 mcg (1000 units) tablet Take 1,000 Units by mouth daily 4/26/2024 at PM

## 2024-04-28 NOTE — PLAN OF CARE
"  Problem: Adult Inpatient Plan of Care  Goal: Plan of Care Review  Description: The Plan of Care Review/Shift note should be completed every shift.  The Outcome Evaluation is a brief statement about your assessment that the patient is improving, declining, or no change.  This information will be displayed automatically on your shift  note.  Outcome: Progressing  Goal: Patient-Specific Goal (Individualized)  Description: You can add care plan individualizations to a care plan. Examples of Individualization might be:  \"Parent requests to be called daily at 9am for status\", \"I have a hard time hearing out of my right ear\", or \"Do not touch me to wake me up as it startles  me\".  4/28/2024 1740 by Liseth Espinal RN  Outcome: Progressing  4/28/2024 1739 by Liseth Espinal RN  Outcome: Progressing  Goal: Absence of Hospital-Acquired Illness or Injury  Intervention: Identify and Manage Fall Risk  Recent Flowsheet Documentation  Taken 4/28/2024 1739 by Liseth Espinal RN  Safety Promotion/Fall Prevention:   activity supervised   mobility aid in reach   lighting adjusted   room door open   supervised activity   toileting scheduled  Taken 4/28/2024 1245 by Liseth Espinal RN  Safety Promotion/Fall Prevention:   activity supervised   mobility aid in reach   lighting adjusted   room door open   supervised activity   toileting scheduled  Taken 4/28/2024 0726 by Liseth Espinal RN  Safety Promotion/Fall Prevention:   activity supervised   mobility aid in reach   lighting adjusted   room door open   supervised activity   toileting scheduled  Intervention: Prevent Skin Injury  Recent Flowsheet Documentation  Taken 4/28/2024 1739 by Liseth Espinal RN  Body Position: position changed independently  Taken 4/28/2024 1245 by Liseth Espinal RN  Body Position: position changed independently  Taken 4/28/2024 0726 by Liseth Espinal RN  Body Position: position changed independently  Goal: Optimal Comfort and " Wellbeing  Intervention: Monitor Pain and Promote Comfort  Recent Flowsheet Documentation  Taken 4/28/2024 0724 by Liseth Espinal, RN  Pain Management Interventions: medication (see MAR)  Goal: Readiness for Transition of Care  Outcome: Progressing   Goal Outcome Evaluation:  Had  a headache with good control with Tylenol no reports of Chest pain will be NPO PMN for possible Angiogram in the AM

## 2024-04-28 NOTE — PROGRESS NOTES
"[facility]    Medicine Progress Note - Hospitalist Service    Date of Admission:  4/27/2024    Assessment & Plan   88-year-old with hypertension, CKD, hyperlipidemia who presenting with chest pain and was diagnosed with non-STEMI  She is currently on IV heparin awaiting angiogram    Chest pain likely due to non-STEMI  Leukocytosis likely due to above  -- Continue IV heparin, but add aspirin.  -- Patient started on atorvastatin and metoprolol in the hospital.  -- Will hold clonidine given little role in non-STEMI    Moderate persistent asthma  --Continue Breo, montelukast as needed nebs and inhaler  --Does not appear to be in exacerbation    GERD.    -Continue home medications    CKD 3  Creatinine   Date Value Ref Range Status   04/28/2024 1.10 (H) 0.51 - 0.95 mg/dL Final   03/15/2017 1.42 (H) 0.52 - 1.04 mg/dL Final     Leukocytosis likely due to 1    Essential hypertension  -Controlled  -- DC clonidine    Hypothyroidism controlled  TSH   Date Value Ref Range Status   04/27/2024 0.81 0.30 - 4.20 uIU/mL Final   04/18/2022 1.26 0.30 - 5.00 uIU/mL Final   03/13/2017 <0.01 (L) 0.40 - 4.00 mU/L Final     Gout--controlled   does not appear to be in exacerbation            Diet: Low Saturated Fat Na <2400 mg  NPO per Anesthesia Guidelines for Procedure/Surgery Except for: Meds    DVT Prophylaxis: IV heparin  Russo Catheter: Not present  Lines: None     Cardiac Monitoring: ACTIVE order. Indication: Chest pain/ ACS rule out (24 hours)  Code Status: Full Code      Clinically Significant Risk Factors Present on Admission                  # Hypertension: Home medication list includes antihypertensive(s)      # Overweight: Estimated body mass index is 29.98 kg/m  as calculated from the following:    Height as of this encounter: 1.702 m (5' 7\").    Weight as of this encounter: 86.8 kg (191 lb 6.4 oz).       # Financial/Environmental Concerns: none  # Asthma: noted on problem list        Disposition Plan "     Medically Ready for Discharge: Anticipated in 2-4 Days             Lucas Gunn MD  Hospitalist Service  [facility]  Securely message with [employer] (more info)  Text page via [employer] Paging/Directory   ______________________________________________________________________    Interval History   Patient new to me.  Chart reviewed.  Denies any chest pain.  Currently heparin drip.  Awaiting angiogram.  She is currently wheezing and asked for home medication.  Restarted home medicines.    Physical Exam   Vital Signs: Temp: 98.9  F (37.2  C) Temp src: Oral BP: (!) 156/67 Pulse: 83   Resp: 18 SpO2: 98 % O2 Device: Nasal cannula Oxygen Delivery: 1 LPM  Weight: 191 lbs 6.4 oz    Minimal wheezing  S1-S2 normal    Medical Decision Making       35 MINUTES SPENT BY ME on the date of service doing chart review, history, exam, documentation & further activities per the note.  MANAGEMENT DISCUSSED with the following over the past 24 hours: Patient   NOTE(S)/MEDICAL RECORDS REVIEWED over the past 24 hours: H&P       Data     I have personally reviewed the following data over the past 24 hrs:    13.4 (H)  \   11.0 (L)   / 156     139 108 (H) 21.5 /  119 (H)   4.8 19 (L) 1.10 (H) \     ALT: 31 AST: 177 (H) AP: 75 TBILI: 0.4   ALB: 3.5 TOT PROTEIN: 6.5 LIPASE: N/A     Trop: 676 (HH) BNP: 1,076     TSH: 0.81 T4: N/A A1C: 6.1 (H)       Imaging results reviewed over the past 24 hrs:   Recent Results (from the past 24 hour(s))   CTA CHEST WITH CONTRAST    Narrative    For Patients: As a result of the 21st Century Cures Act, medical imaging exams and procedure reports are released immediately into your electronic medical record. You may view this report before your referring provider. If you have questions, please contact your health care provider.    EXAM: CTA CHEST  LOCATION: [facility]  DATE: 4/27/2024    INDICATION: r/o pE, chest pain, positive dimer  COMPARISON: CT chest  03/09/2017  TECHNIQUE: CT chest pulmonary angiogram during arterial phase injection of IV contrast. Multiplanar reformats and MIP reconstructions were performed. Dose reduction techniques were used.   CONTRAST: IOPAMIDOL 300 MG/ML  ML BOTTLE: 100mL    FINDINGS:  ANGIOGRAM CHEST: Pulmonary arteries are normal caliber and negative for pulmonary emboli. Thoracic aorta is negative for dissection. No CT evidence of right heart strain.    LUNGS AND PLEURA: No pleural effusions or pneumothorax. Scattered atelectasis in the right middle lobe, lingula, and at the lung bases.    MEDIASTINUM/AXILLAE: Small hiatal hernia. No lymphadenopathy.    CORONARY ARTERY CALCIFICATION: Cannot evaluate.    UPPER ABDOMEN: Unremarkable    MUSCULOSKELETAL: Degenerative changes throughout spine.    Impression    1.  No pulmonary embolism.

## 2024-04-28 NOTE — PLAN OF CARE
Problem: Adult Inpatient Plan of Care  Goal: Absence of Hospital-Acquired Illness or Injury  Outcome: Met  Intervention: Identify and Manage Fall Risk  Recent Flowsheet Documentation  Taken 4/27/2024 2000 by Mirlande Bean RN  Safety Promotion/Fall Prevention:   activity supervised   mobility aid in reach   lighting adjusted   room door open   supervised activity   toileting scheduled  Goal: Optimal Comfort and Wellbeing  Outcome: Met  Intervention: Monitor Pain and Promote Comfort  Recent Flowsheet Documentation  Taken 4/27/2024 2042 by Mirlande Bean RN  Pain Management Interventions:   rest   relaxation techniques promoted  Taken 4/27/2024 1942 by Mirlande Bean RN  Pain Management Interventions: medication (see MAR)     Problem: Risk for Delirium  Goal: Optimal Coping  Outcome: Met   Goal Outcome Evaluation:       Pt. Continues on Heparin drip. Continues on telemetry. Has been sinus arianna in the 50's to NSR. PRN Oxycodone X1 effective for pain control. BP was elevated. MD updated. PRN Hydralazine ordered and given. Resting in bed. Purewick on for voiding. Pt. Using call light for all needs. Will continue to monitor.    Mirlande Bean RN

## 2024-04-29 ENCOUNTER — APPOINTMENT (OUTPATIENT)
Dept: CARDIOLOGY | Facility: HOSPITAL | Age: 89
DRG: 322 | End: 2024-04-29
Attending: NURSE PRACTITIONER
Payer: COMMERCIAL

## 2024-04-29 ENCOUNTER — APPOINTMENT (OUTPATIENT)
Dept: CARDIOLOGY | Facility: HOSPITAL | Age: 89
DRG: 322 | End: 2024-04-29
Attending: INTERNAL MEDICINE
Payer: COMMERCIAL

## 2024-04-29 PROBLEM — I21.4 NSTEMI (NON-ST ELEVATED MYOCARDIAL INFARCTION) (H): Status: ACTIVE | Noted: 2024-04-29

## 2024-04-29 LAB
ABO/RH(D): NORMAL
ACT BLD: 205 SECONDS (ref 74–150)
ACT BLD: 263 SECONDS (ref 74–150)
ACT BLD: 325 SECONDS (ref 74–150)
ANTIBODY SCREEN: NEGATIVE
GLUCOSE BLDC GLUCOMTR-MCNC: 120 MG/DL (ref 70–99)
GLUCOSE BLDC GLUCOMTR-MCNC: 157 MG/DL (ref 70–99)
HOLD SPECIMEN: NORMAL
HOLD SPECIMEN: NORMAL
LVEF ECHO: NORMAL
SPECIMEN EXPIRATION DATE: NORMAL
UFH PPP CHRO-ACNC: 0.18 IU/ML

## 2024-04-29 PROCEDURE — 250N000011 HC RX IP 250 OP 636: Performed by: NURSE PRACTITIONER

## 2024-04-29 PROCEDURE — 93308 TTE F-UP OR LMTD: CPT | Mod: 26 | Performed by: INTERNAL MEDICINE

## 2024-04-29 PROCEDURE — 250N000011 HC RX IP 250 OP 636: Performed by: STUDENT IN AN ORGANIZED HEALTH CARE EDUCATION/TRAINING PROGRAM

## 2024-04-29 PROCEDURE — 93325 DOPPLER ECHO COLOR FLOW MAPG: CPT | Mod: 26 | Performed by: INTERNAL MEDICINE

## 2024-04-29 PROCEDURE — 250N000013 HC RX MED GY IP 250 OP 250 PS 637: Performed by: STUDENT IN AN ORGANIZED HEALTH CARE EDUCATION/TRAINING PROGRAM

## 2024-04-29 PROCEDURE — 99153 MOD SED SAME PHYS/QHP EA: CPT | Performed by: INTERNAL MEDICINE

## 2024-04-29 PROCEDURE — 93325 DOPPLER ECHO COLOR FLOW MAPG: CPT

## 2024-04-29 PROCEDURE — 93458 L HRT ARTERY/VENTRICLE ANGIO: CPT | Performed by: INTERNAL MEDICINE

## 2024-04-29 PROCEDURE — C9600 PERC DRUG-EL COR STENT SING: HCPCS | Performed by: INTERNAL MEDICINE

## 2024-04-29 PROCEDURE — 4A023N7 MEASUREMENT OF CARDIAC SAMPLING AND PRESSURE, LEFT HEART, PERCUTANEOUS APPROACH: ICD-10-PCS | Performed by: INTERNAL MEDICINE

## 2024-04-29 PROCEDURE — 36415 COLL VENOUS BLD VENIPUNCTURE: CPT | Performed by: STUDENT IN AN ORGANIZED HEALTH CARE EDUCATION/TRAINING PROGRAM

## 2024-04-29 PROCEDURE — 027034Z DILATION OF CORONARY ARTERY, ONE ARTERY WITH DRUG-ELUTING INTRALUMINAL DEVICE, PERCUTANEOUS APPROACH: ICD-10-PCS | Performed by: INTERNAL MEDICINE

## 2024-04-29 PROCEDURE — 93458 L HRT ARTERY/VENTRICLE ANGIO: CPT | Mod: 26 | Performed by: INTERNAL MEDICINE

## 2024-04-29 PROCEDURE — 272N000001 HC OR GENERAL SUPPLY STERILE: Performed by: INTERNAL MEDICINE

## 2024-04-29 PROCEDURE — 255N000002 HC RX 255 OP 636: Performed by: INTERNAL MEDICINE

## 2024-04-29 PROCEDURE — 93308 TTE F-UP OR LMTD: CPT | Mod: 26 | Performed by: STUDENT IN AN ORGANIZED HEALTH CARE EDUCATION/TRAINING PROGRAM

## 2024-04-29 PROCEDURE — C1894 INTRO/SHEATH, NON-LASER: HCPCS | Performed by: INTERNAL MEDICINE

## 2024-04-29 PROCEDURE — 86900 BLOOD TYPING SEROLOGIC ABO: CPT | Performed by: STUDENT IN AN ORGANIZED HEALTH CARE EDUCATION/TRAINING PROGRAM

## 2024-04-29 PROCEDURE — 99152 MOD SED SAME PHYS/QHP 5/>YRS: CPT | Performed by: INTERNAL MEDICINE

## 2024-04-29 PROCEDURE — C1887 CATHETER, GUIDING: HCPCS | Performed by: INTERNAL MEDICINE

## 2024-04-29 PROCEDURE — C1725 CATH, TRANSLUMIN NON-LASER: HCPCS | Performed by: INTERNAL MEDICINE

## 2024-04-29 PROCEDURE — C1874 STENT, COATED/COV W/DEL SYS: HCPCS | Performed by: INTERNAL MEDICINE

## 2024-04-29 PROCEDURE — 250N000013 HC RX MED GY IP 250 OP 250 PS 637: Performed by: NURSE PRACTITIONER

## 2024-04-29 PROCEDURE — 250N000013 HC RX MED GY IP 250 OP 250 PS 637: Performed by: INTERNAL MEDICINE

## 2024-04-29 PROCEDURE — 210N000001 HC R&B IMCU HEART CARE

## 2024-04-29 PROCEDURE — 85347 COAGULATION TIME ACTIVATED: CPT

## 2024-04-29 PROCEDURE — 85520 HEPARIN ASSAY: CPT | Performed by: INTERNAL MEDICINE

## 2024-04-29 PROCEDURE — 02C03ZZ EXTIRPATION OF MATTER FROM CORONARY ARTERY, ONE ARTERY, PERCUTANEOUS APPROACH: ICD-10-PCS | Performed by: INTERNAL MEDICINE

## 2024-04-29 PROCEDURE — C1769 GUIDE WIRE: HCPCS | Performed by: INTERNAL MEDICINE

## 2024-04-29 PROCEDURE — 93321 DOPPLER ECHO F-UP/LMTD STD: CPT | Mod: 26 | Performed by: INTERNAL MEDICINE

## 2024-04-29 PROCEDURE — C1757 CATH, THROMBECTOMY/EMBOLECT: HCPCS | Performed by: INTERNAL MEDICINE

## 2024-04-29 PROCEDURE — B2111ZZ FLUOROSCOPY OF MULTIPLE CORONARY ARTERIES USING LOW OSMOLAR CONTRAST: ICD-10-PCS | Performed by: INTERNAL MEDICINE

## 2024-04-29 PROCEDURE — 99207 PR NO BILLABLE SERVICE THIS VISIT: CPT | Performed by: INTERNAL MEDICINE

## 2024-04-29 PROCEDURE — 93010 ELECTROCARDIOGRAM REPORT: CPT | Performed by: INTERNAL MEDICINE

## 2024-04-29 PROCEDURE — 250N000009 HC RX 250: Performed by: INTERNAL MEDICINE

## 2024-04-29 PROCEDURE — 250N000011 HC RX IP 250 OP 636: Performed by: INTERNAL MEDICINE

## 2024-04-29 PROCEDURE — 93005 ELECTROCARDIOGRAM TRACING: CPT

## 2024-04-29 PROCEDURE — 92928 PRQ TCAT PLMT NTRAC ST 1 LES: CPT | Mod: LC | Performed by: INTERNAL MEDICINE

## 2024-04-29 DEVICE — STENT CORONARY DES SYNERGY XD MR US 2.75X32MM H7493941832270: Type: IMPLANTABLE DEVICE | Status: FUNCTIONAL

## 2024-04-29 RX ORDER — ATROPINE SULFATE 0.1 MG/ML
0.5 INJECTION INTRAVENOUS
Status: ACTIVE | OUTPATIENT
Start: 2024-04-29 | End: 2024-04-29

## 2024-04-29 RX ORDER — HEPARIN SODIUM 1000 [USP'U]/ML
INJECTION, SOLUTION INTRAVENOUS; SUBCUTANEOUS
Status: DISCONTINUED | OUTPATIENT
Start: 2024-04-29 | End: 2024-04-29 | Stop reason: HOSPADM

## 2024-04-29 RX ORDER — NALOXONE HYDROCHLORIDE 0.4 MG/ML
0.4 INJECTION, SOLUTION INTRAMUSCULAR; INTRAVENOUS; SUBCUTANEOUS
Status: ACTIVE | OUTPATIENT
Start: 2024-04-29 | End: 2024-04-29

## 2024-04-29 RX ORDER — CLOPIDOGREL BISULFATE 75 MG/1
75 TABLET ORAL DAILY
Qty: 90 TABLET | Refills: 3 | Status: SHIPPED | OUTPATIENT
Start: 2024-04-30

## 2024-04-29 RX ORDER — NITROGLYCERIN 0.4 MG/1
0.4 TABLET SUBLINGUAL EVERY 5 MIN PRN
Status: DISCONTINUED | OUTPATIENT
Start: 2024-04-29 | End: 2024-05-03 | Stop reason: HOSPADM

## 2024-04-29 RX ORDER — ADENOSINE 3 MG/ML
INJECTION, SOLUTION INTRAVENOUS
Status: DISCONTINUED | OUTPATIENT
Start: 2024-04-29 | End: 2024-04-29 | Stop reason: HOSPADM

## 2024-04-29 RX ORDER — ASPIRIN 81 MG/1
81 TABLET ORAL DAILY
Qty: 30 TABLET | Refills: 3 | Status: SHIPPED | OUTPATIENT
Start: 2024-04-30

## 2024-04-29 RX ORDER — ASPIRIN 81 MG/1
243 TABLET, CHEWABLE ORAL ONCE
Status: COMPLETED | OUTPATIENT
Start: 2024-04-29 | End: 2024-04-29

## 2024-04-29 RX ORDER — ATORVASTATIN CALCIUM 40 MG/1
40 TABLET, FILM COATED ORAL DAILY
Qty: 90 TABLET | Refills: 3 | Status: SHIPPED | OUTPATIENT
Start: 2024-04-29

## 2024-04-29 RX ORDER — ASPIRIN 325 MG
325 TABLET ORAL ONCE
Status: COMPLETED | OUTPATIENT
Start: 2024-04-29 | End: 2024-04-29

## 2024-04-29 RX ORDER — FLUMAZENIL 0.1 MG/ML
0.2 INJECTION, SOLUTION INTRAVENOUS
Status: ACTIVE | OUTPATIENT
Start: 2024-04-29 | End: 2024-04-29

## 2024-04-29 RX ORDER — METOPROLOL TARTRATE 1 MG/ML
5 INJECTION, SOLUTION INTRAVENOUS
Status: DISCONTINUED | OUTPATIENT
Start: 2024-04-29 | End: 2024-05-03 | Stop reason: HOSPADM

## 2024-04-29 RX ORDER — IODIXANOL 320 MG/ML
INJECTION, SOLUTION INTRAVASCULAR
Status: DISCONTINUED | OUTPATIENT
Start: 2024-04-29 | End: 2024-04-29 | Stop reason: HOSPADM

## 2024-04-29 RX ORDER — SODIUM CHLORIDE 9 MG/ML
INJECTION, SOLUTION INTRAVENOUS CONTINUOUS
Status: DISCONTINUED | OUTPATIENT
Start: 2024-04-29 | End: 2024-04-29 | Stop reason: HOSPADM

## 2024-04-29 RX ORDER — OXYCODONE HYDROCHLORIDE 5 MG/1
10 TABLET ORAL EVERY 4 HOURS PRN
Status: DISCONTINUED | OUTPATIENT
Start: 2024-04-29 | End: 2024-05-03 | Stop reason: HOSPADM

## 2024-04-29 RX ORDER — HYDRALAZINE HYDROCHLORIDE 20 MG/ML
10 INJECTION INTRAMUSCULAR; INTRAVENOUS EVERY 4 HOURS PRN
Status: DISCONTINUED | OUTPATIENT
Start: 2024-04-29 | End: 2024-05-03 | Stop reason: HOSPADM

## 2024-04-29 RX ORDER — NALOXONE HYDROCHLORIDE 0.4 MG/ML
0.2 INJECTION, SOLUTION INTRAMUSCULAR; INTRAVENOUS; SUBCUTANEOUS
Status: ACTIVE | OUTPATIENT
Start: 2024-04-29 | End: 2024-04-29

## 2024-04-29 RX ORDER — FENTANYL CITRATE 50 UG/ML
INJECTION, SOLUTION INTRAMUSCULAR; INTRAVENOUS
Status: DISCONTINUED | OUTPATIENT
Start: 2024-04-29 | End: 2024-04-29 | Stop reason: HOSPADM

## 2024-04-29 RX ORDER — ACETAMINOPHEN 325 MG/1
650 TABLET ORAL EVERY 4 HOURS PRN
Status: DISCONTINUED | OUTPATIENT
Start: 2024-04-29 | End: 2024-05-03 | Stop reason: HOSPADM

## 2024-04-29 RX ORDER — ONDANSETRON 2 MG/ML
4 INJECTION INTRAMUSCULAR; INTRAVENOUS EVERY 6 HOURS PRN
Status: DISCONTINUED | OUTPATIENT
Start: 2024-04-29 | End: 2024-05-03 | Stop reason: HOSPADM

## 2024-04-29 RX ORDER — LIDOCAINE 40 MG/G
CREAM TOPICAL
Status: DISCONTINUED | OUTPATIENT
Start: 2024-04-29 | End: 2024-04-29 | Stop reason: HOSPADM

## 2024-04-29 RX ORDER — CLOPIDOGREL BISULFATE 75 MG/1
75 TABLET ORAL DAILY
Status: DISCONTINUED | OUTPATIENT
Start: 2024-04-30 | End: 2024-05-03 | Stop reason: HOSPADM

## 2024-04-29 RX ORDER — FENTANYL CITRATE 50 UG/ML
25 INJECTION, SOLUTION INTRAMUSCULAR; INTRAVENOUS
Status: DISCONTINUED | OUTPATIENT
Start: 2024-04-29 | End: 2024-04-29

## 2024-04-29 RX ORDER — CLOPIDOGREL BISULFATE 75 MG/1
TABLET ORAL
Status: DISCONTINUED | OUTPATIENT
Start: 2024-04-29 | End: 2024-04-29 | Stop reason: HOSPADM

## 2024-04-29 RX ORDER — PROTAMINE SULFATE 10 MG/ML
INJECTION, SOLUTION INTRAVENOUS
Status: DISCONTINUED | OUTPATIENT
Start: 2024-04-29 | End: 2024-04-29 | Stop reason: HOSPADM

## 2024-04-29 RX ORDER — ONDANSETRON 4 MG/1
4 TABLET, ORALLY DISINTEGRATING ORAL EVERY 6 HOURS PRN
Status: DISCONTINUED | OUTPATIENT
Start: 2024-04-29 | End: 2024-05-03 | Stop reason: HOSPADM

## 2024-04-29 RX ORDER — SODIUM CHLORIDE 9 MG/ML
INJECTION, SOLUTION INTRAVENOUS CONTINUOUS
Status: ACTIVE | OUTPATIENT
Start: 2024-04-29 | End: 2024-04-29

## 2024-04-29 RX ORDER — EPTIFIBATIDE 2 MG/ML
INJECTION, SOLUTION INTRAVENOUS
Status: DISCONTINUED | OUTPATIENT
Start: 2024-04-29 | End: 2024-04-29 | Stop reason: HOSPADM

## 2024-04-29 RX ORDER — OXYCODONE HYDROCHLORIDE 5 MG/1
5 TABLET ORAL EVERY 4 HOURS PRN
Status: DISCONTINUED | OUTPATIENT
Start: 2024-04-29 | End: 2024-05-03 | Stop reason: HOSPADM

## 2024-04-29 RX ADMIN — AMLODIPINE BESYLATE 5 MG: 5 TABLET ORAL at 20:34

## 2024-04-29 RX ADMIN — FLUTICASONE FUROATE AND VILANTEROL TRIFENATATE 1 PUFF: 100; 25 POWDER RESPIRATORY (INHALATION) at 09:27

## 2024-04-29 RX ADMIN — HYDRALAZINE HYDROCHLORIDE 10 MG: 20 INJECTION INTRAMUSCULAR; INTRAVENOUS at 19:23

## 2024-04-29 RX ADMIN — ASPIRIN 325 MG ORAL TABLET 325 MG: 325 PILL ORAL at 09:21

## 2024-04-29 RX ADMIN — MONTELUKAST 10 MG: 10 TABLET, FILM COATED ORAL at 20:34

## 2024-04-29 RX ADMIN — METOPROLOL TARTRATE 25 MG: 25 TABLET, FILM COATED ORAL at 20:34

## 2024-04-29 RX ADMIN — LOSARTAN POTASSIUM 50 MG: 50 TABLET, FILM COATED ORAL at 09:20

## 2024-04-29 RX ADMIN — ACETAMINOPHEN 650 MG: 325 TABLET ORAL at 20:09

## 2024-04-29 RX ADMIN — ATORVASTATIN CALCIUM 40 MG: 40 TABLET, FILM COATED ORAL at 20:34

## 2024-04-29 RX ADMIN — HEPARIN SODIUM 800 UNITS/HR: 10000 INJECTION, SOLUTION INTRAVENOUS at 07:47

## 2024-04-29 RX ADMIN — METOPROLOL TARTRATE 25 MG: 25 TABLET, FILM COATED ORAL at 09:20

## 2024-04-29 RX ADMIN — LEVOTHYROXINE SODIUM 50 MCG: 0.03 TABLET ORAL at 09:20

## 2024-04-29 ASSESSMENT — EJECTION FRACTION: EF_VALUE: .09

## 2024-04-29 ASSESSMENT — ACTIVITIES OF DAILY LIVING (ADL)
ADLS_ACUITY_SCORE: 29
ADLS_ACUITY_SCORE: 23
ADLS_ACUITY_SCORE: 29
ADLS_ACUITY_SCORE: 23
ADLS_ACUITY_SCORE: 29
ADLS_ACUITY_SCORE: 23
ADLS_ACUITY_SCORE: 23
ADLS_ACUITY_SCORE: 29
ADLS_ACUITY_SCORE: 23
ADLS_ACUITY_SCORE: 29
ADLS_ACUITY_SCORE: 23
ADLS_ACUITY_SCORE: 23
ADLS_ACUITY_SCORE: 29
ADLS_ACUITY_SCORE: 29
ADLS_ACUITY_SCORE: 23
ADLS_ACUITY_SCORE: 23
ADLS_ACUITY_SCORE: 29

## 2024-04-29 NOTE — PROGRESS NOTES
"[facility]    Medicine Progress Note - Hospitalist Service    Date of Admission:  4/27/2024    Assessment & Plan   88-year-old with hypertension, CKD, hyperlipidemia who presenting with chest pain and was diagnosed with non-STEMI  She is currently on IV heparin awaiting angiogram    Chest pain likely due to non-STEMI  Leukocytosis likely due to above  -- Continue IV heparin, and aspirin.  -- Patient started on atorvastatin and metoprolol in the hospital.  -- Will hold clonidine given little role in non-STEMI    Moderate persistent asthma  --Continue Breo, montelukast as needed nebs and inhaler  --Does not appear to be in exacerbation    GERD.    -Continue home medications    CKD 3  Creatinine   Date Value Ref Range Status   04/28/2024 1.10 (H) 0.51 - 0.95 mg/dL Final   03/15/2017 1.42 (H) 0.52 - 1.04 mg/dL Final     Leukocytosis likely due to 1  WBC   Date Value Ref Range Status   03/15/2017 9.6 4.0 - 11.0 10e9/L Final     WBC Count   Date Value Ref Range Status   04/28/2024 13.4 (H) 4.0 - 11.0 10e3/uL Final         Essential hypertension  -Controlled  -- DC clonidine    Hypothyroidism controlled  TSH   Date Value Ref Range Status   04/27/2024 0.81 0.30 - 4.20 uIU/mL Final   04/18/2022 1.26 0.30 - 5.00 uIU/mL Final   03/13/2017 <0.01 (L) 0.40 - 4.00 mU/L Final     Gout--controlled   does not appear to be in exacerbation            Diet: NPO per Anesthesia Guidelines for Procedure/Surgery Except for: Meds    DVT Prophylaxis: IV heparin  Russo Catheter: Not present  Lines: PRESENT           Cardiac Monitoring: None  Code Status: Full Code      Clinically Significant Risk Factors                         # Overweight: Estimated body mass index is 29.04 kg/m  as calculated from the following:    Height as of this encounter: 1.702 m (5' 7\").    Weight as of this encounter: 84.1 kg (185 lb 6.5 oz)., PRESENT ON ADMISSION       # Financial/Environmental Concerns: none  # Asthma: noted on problem list "        Disposition Plan     Medically Ready for Discharge: Anticipated in 2-4 Days             Lucas Gunn MD  Hospitalist Service  [facility]  Securely message with [employer] (more info)  Text page via [employer] Paging/Directory   ______________________________________________________________________    Interval History   Patient gone for coronary angiogram. chart reviewed.    Physical Exam   Vital Signs: Temp: 98.1  F (36.7  C) Temp src: Oral BP: (!) 163/76 Pulse: 69   Resp: 16 SpO2: 94 % O2 Device: Nasal cannula Oxygen Delivery: 2 LPM  Weight: 185 lbs 6.51 oz    Patient is not in the room.    Medical Decision Making       25 MINUTES SPENT BY ME on the date of service doing chart review, history, exam, documentation & further activities per the note.  MANAGEMENT DISCUSSED with the following over the past 24 hours: Patient   NOTE(S)/MEDICAL RECORDS REVIEWED over the past 24 hours: H&P       Data         Imaging results reviewed over the past 24 hrs:   No results found for this or any previous visit (from the past 24 hour(s)).

## 2024-04-29 NOTE — PRE-PROCEDURE
GENERAL PRE-PROCEDURE:   Procedure:  Coronary angiogram, possible percutaneous coronary intervention, left heart catheterization  Date/Time:  4/29/2024 9:30 AM    Written consent obtained?: Yes    Risks and benefits: Risks, benefits and alternatives were discussed    Consent given by:  Patient  Patient states understanding of procedure being performed: Yes    Patient's understanding of procedure matches consent: Yes    Procedure consent matches procedure scheduled: Yes    Expected level of sedation:  Moderate  Appropriately NPO:  Yes  ASA Class:  4 (NSTEMI, HTN, HLD, Asthma, CKD stage IIIb, high BMI 29.04kg/m2)  Mallampati  :  Grade 3- soft palate visible, posterior pharyngeal wall not visible  Lungs:  Lungs clear with good breath sounds bilaterally  Heart:  Normal heart sounds and rate  History & Physical reviewed:  History and physical reviewed and updates made (see comment)  H&P Comments:  Clinically Significant Risk Factors Present on Admission    Cardiovascular : NSTEMI, HTN, HLD, high BMI 29.04kg/m2    Fluid & Electrolyte Disorders : Not present on admission    Gastroenterology : Not present on admission    Hematology/Oncology : Not present on admission    Nephrology : CKD stage IIIb; will minimize contrast    Neurology : Not present on admission    Pulmonology : Asthma    Systemic : Not present on admission    Statement of review:  I have reviewed the lab findings, diagnostic data, medications, and the plan for sedation

## 2024-04-29 NOTE — PLAN OF CARE
Problem: Adult Inpatient Plan of Care  Goal: Plan of Care Review  Description: The Plan of Care Review/Shift note should be completed every shift.  The Outcome Evaluation is a brief statement about your assessment that the patient is improving, declining, or no change.  This information will be displayed automatically on your shift  note.  Outcome: Progressing     Problem: Chest Pain  Goal: Resolution of Chest Pain Symptoms  Outcome: Progressing     Problem: Adult Inpatient Plan of Care  Goal: Absence of Hospital-Acquired Illness or Injury  Intervention: Identify and Manage Fall Risk  Recent Flowsheet Documentation  Taken 4/29/2024 0750 by Cyndy Sandoval RN  Safety Promotion/Fall Prevention: activity supervised     Problem: Risk for Delirium  Goal: Improved Behavioral Control  Intervention: Minimize Safety Risk  Recent Flowsheet Documentation  Taken 4/29/2024 0750 by Cyndy Sandoval RN  Communication Enhancement Strategies: call light answered in person  Enhanced Safety Measures: assistive devices when indicated  Goal: Improved Attention and Thought Clarity  Intervention: Maximize Cognitive Function  Recent Flowsheet Documentation  Taken 4/29/2024 0750 by Cyndy Sandoval RN  Sensory Stimulation Regulation: care clustered  Reorientation Measures: calendar in view     Problem: Cardiac Catheterization (Diagnostic/Interventional)  Goal: Anesthesia/Sedation Recovery  Intervention: Optimize Anesthesia Recovery  Recent Flowsheet Documentation  Taken 4/29/2024 0750 by Cyndy Sandoval RN  Safety Promotion/Fall Prevention: activity supervised  Reorientation Measures: calendar in view  Goal: Absence of Vascular Access Complication  Intervention: Prevent and Manage Access Complications  Recent Flowsheet Documentation  Taken 4/29/2024 0750 by Cyndy Sandoval RN  Activity Management:   activity adjusted per tolerance   up ad ga   Goal Outcome Evaluation:       Patient is alert and able to let her  needs be known. Denies pain when asked. SR on the monitor. On heparin drip, 800u/hr continuous when left unit. VSS and on room air. NPO for angio. Prepped for procedure. Went down for angio around 1000. Currently still in CSC.

## 2024-04-29 NOTE — PLAN OF CARE
Problem: Chest Pain  Goal: Resolution of Chest Pain Symptoms  Outcome: Progressing     Problem: Risk for Delirium  Goal: Improved Attention and Thought Clarity  Outcome: Met  Intervention: Maximize Cognitive Function  Recent Flowsheet Documentation  Taken 4/29/2024 0035 by Mirlande Bean RN  Sensory Stimulation Regulation:   quiet environment promoted   lighting decreased  Reorientation Measures:   calendar in view   clock in view  Taken 4/28/2024 2030 by Mirlande Bean RN  Sensory Stimulation Regulation:   quiet environment promoted   lighting decreased  Reorientation Measures:   calendar in view   clock in view  Goal: Improved Sleep  Outcome: Met   Goal Outcome Evaluation:       Alert and oriented. Telemetry NSR. NPO midnight for angio. Prepcwork done.On Heparin drip. No pain over the night. Vital signs stable. Purewick on for urine. Using call light for all needs. Will continue to monitor.    Mirlande Bean RN

## 2024-04-30 ENCOUNTER — APPOINTMENT (OUTPATIENT)
Dept: OCCUPATIONAL THERAPY | Facility: HOSPITAL | Age: 89
DRG: 322 | End: 2024-04-30
Attending: NURSE PRACTITIONER
Payer: COMMERCIAL

## 2024-04-30 ENCOUNTER — APPOINTMENT (OUTPATIENT)
Dept: CARDIOLOGY | Facility: HOSPITAL | Age: 89
DRG: 322 | End: 2024-04-30
Attending: NURSE PRACTITIONER
Payer: COMMERCIAL

## 2024-04-30 DIAGNOSIS — Z95.5 S/P CORONARY ARTERY STENT PLACEMENT: Primary | ICD-10-CM

## 2024-04-30 LAB
ANION GAP SERPL CALCULATED.3IONS-SCNC: 16 MMOL/L (ref 7–15)
ATRIAL RATE - MUSE: 80 BPM
ATRIAL RATE - MUSE: 87 BPM
BUN SERPL-MCNC: 19.5 MG/DL (ref 8–23)
CALCIUM SERPL-MCNC: 9.3 MG/DL (ref 8.8–10.2)
CHLORIDE SERPL-SCNC: 103 MMOL/L (ref 98–107)
CREAT SERPL-MCNC: 1.15 MG/DL (ref 0.51–0.95)
DEPRECATED HCO3 PLAS-SCNC: 20 MMOL/L (ref 22–29)
DIASTOLIC BLOOD PRESSURE - MUSE: NORMAL MMHG
DIASTOLIC BLOOD PRESSURE - MUSE: NORMAL MMHG
EGFRCR SERPLBLD CKD-EPI 2021: 46 ML/MIN/1.73M2
GLUCOSE BLDC GLUCOMTR-MCNC: 120 MG/DL (ref 70–99)
GLUCOSE BLDC GLUCOMTR-MCNC: 129 MG/DL (ref 70–99)
GLUCOSE BLDC GLUCOMTR-MCNC: 141 MG/DL (ref 70–99)
GLUCOSE SERPL-MCNC: 134 MG/DL (ref 70–99)
INTERPRETATION ECG - MUSE: NORMAL
INTERPRETATION ECG - MUSE: NORMAL
LVEF ECHO: NORMAL
P AXIS - MUSE: 63 DEGREES
P AXIS - MUSE: 67 DEGREES
POTASSIUM SERPL-SCNC: 4.1 MMOL/L (ref 3.4–5.3)
PR INTERVAL - MUSE: 166 MS
PR INTERVAL - MUSE: 172 MS
QRS DURATION - MUSE: 110 MS
QRS DURATION - MUSE: 118 MS
QT - MUSE: 378 MS
QT - MUSE: 406 MS
QTC - MUSE: 454 MS
QTC - MUSE: 468 MS
R AXIS - MUSE: 28 DEGREES
R AXIS - MUSE: 62 DEGREES
SODIUM SERPL-SCNC: 139 MMOL/L (ref 135–145)
SYSTOLIC BLOOD PRESSURE - MUSE: NORMAL MMHG
SYSTOLIC BLOOD PRESSURE - MUSE: NORMAL MMHG
T AXIS - MUSE: 50 DEGREES
T AXIS - MUSE: 55 DEGREES
VENTRICULAR RATE- MUSE: 80 BPM
VENTRICULAR RATE- MUSE: 87 BPM

## 2024-04-30 PROCEDURE — 250N000013 HC RX MED GY IP 250 OP 250 PS 637: Performed by: INTERNAL MEDICINE

## 2024-04-30 PROCEDURE — 93325 DOPPLER ECHO COLOR FLOW MAPG: CPT

## 2024-04-30 PROCEDURE — 97110 THERAPEUTIC EXERCISES: CPT | Mod: GO

## 2024-04-30 PROCEDURE — 36415 COLL VENOUS BLD VENIPUNCTURE: CPT | Performed by: NURSE PRACTITIONER

## 2024-04-30 PROCEDURE — 93005 ELECTROCARDIOGRAM TRACING: CPT

## 2024-04-30 PROCEDURE — 93308 TTE F-UP OR LMTD: CPT | Mod: 26 | Performed by: INTERNAL MEDICINE

## 2024-04-30 PROCEDURE — 97535 SELF CARE MNGMENT TRAINING: CPT | Mod: GO

## 2024-04-30 PROCEDURE — 93325 DOPPLER ECHO COLOR FLOW MAPG: CPT | Mod: 26 | Performed by: INTERNAL MEDICINE

## 2024-04-30 PROCEDURE — 210N000001 HC R&B IMCU HEART CARE

## 2024-04-30 PROCEDURE — 250N000013 HC RX MED GY IP 250 OP 250 PS 637: Performed by: NURSE PRACTITIONER

## 2024-04-30 PROCEDURE — 97166 OT EVAL MOD COMPLEX 45 MIN: CPT | Mod: GO

## 2024-04-30 PROCEDURE — 93010 ELECTROCARDIOGRAM REPORT: CPT | Performed by: INTERNAL MEDICINE

## 2024-04-30 PROCEDURE — 250N000013 HC RX MED GY IP 250 OP 250 PS 637: Performed by: STUDENT IN AN ORGANIZED HEALTH CARE EDUCATION/TRAINING PROGRAM

## 2024-04-30 PROCEDURE — 80048 BASIC METABOLIC PNL TOTAL CA: CPT | Performed by: NURSE PRACTITIONER

## 2024-04-30 PROCEDURE — 93321 DOPPLER ECHO F-UP/LMTD STD: CPT | Mod: 26 | Performed by: INTERNAL MEDICINE

## 2024-04-30 PROCEDURE — 99232 SBSQ HOSP IP/OBS MODERATE 35: CPT | Performed by: INTERNAL MEDICINE

## 2024-04-30 RX ORDER — METOPROLOL TARTRATE 25 MG/1
25 TABLET, FILM COATED ORAL 2 TIMES DAILY
Status: DISCONTINUED | OUTPATIENT
Start: 2024-04-30 | End: 2024-05-03 | Stop reason: HOSPADM

## 2024-04-30 RX ORDER — METOPROLOL TARTRATE 25 MG/1
50 TABLET, FILM COATED ORAL 2 TIMES DAILY
Status: DISCONTINUED | OUTPATIENT
Start: 2024-04-30 | End: 2024-04-30

## 2024-04-30 RX ADMIN — MONTELUKAST 10 MG: 10 TABLET, FILM COATED ORAL at 20:46

## 2024-04-30 RX ADMIN — ATORVASTATIN CALCIUM 40 MG: 40 TABLET, FILM COATED ORAL at 20:46

## 2024-04-30 RX ADMIN — FLUTICASONE FUROATE AND VILANTEROL TRIFENATATE 1 PUFF: 100; 25 POWDER RESPIRATORY (INHALATION) at 08:52

## 2024-04-30 RX ADMIN — METOPROLOL TARTRATE 25 MG: 25 TABLET, FILM COATED ORAL at 08:52

## 2024-04-30 RX ADMIN — AMLODIPINE BESYLATE 5 MG: 5 TABLET ORAL at 20:46

## 2024-04-30 RX ADMIN — LOSARTAN POTASSIUM 50 MG: 50 TABLET, FILM COATED ORAL at 08:52

## 2024-04-30 RX ADMIN — ASPIRIN 81 MG: 81 TABLET, COATED ORAL at 08:51

## 2024-04-30 RX ADMIN — METOPROLOL TARTRATE 25 MG: 25 TABLET, FILM COATED ORAL at 20:47

## 2024-04-30 RX ADMIN — LEVOTHYROXINE SODIUM 50 MCG: 0.03 TABLET ORAL at 08:52

## 2024-04-30 RX ADMIN — CLOPIDOGREL BISULFATE 75 MG: 75 TABLET ORAL at 08:52

## 2024-04-30 ASSESSMENT — ACTIVITIES OF DAILY LIVING (ADL)
ADLS_ACUITY_SCORE: 29

## 2024-04-30 NOTE — PROGRESS NOTES
"[facility]    Medicine Progress Note - Hospitalist Service    Date of Admission:  4/27/2024    Assessment & Plan   88-year-old with hypertension, CKD, hyperlipidemia who presenting with chest pain and was diagnosed with non-STEMI.  Patient admitted for further management.    Non-ST elevation MI;  -- On 4/29, underwent coronary angiogram.  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 Campuzano, continue clinical monitoring, limited echo pending.  --Continue statin for    Leukocytosis, likely due to above;  -- No clinical signs and symptoms of infection.  Trend    Essential hypertension, uncontrolled;  -- PTA amlodipine, losartan.  Patient initiated on metoprolol due to above issue, titrate dose.  PTA clonidine discontinued on admission.  Monitor vitals and adjust medications accordingly.      History of Moderate persistent asthma; not on exacerbation  --Continue Breo, montelukast as needed nebs and inhaler  --Does not appear to be in exacerbation     GERD, PTA medication  CKD stage III, creatinine better than before  History of gout, PTA medication            Diet: Low Saturated Fat Na <2400 mg    DVT Prophylaxis: Pneumatic Compression Devices and Ambulate every shift  Russo Catheter: Not present  Lines: None     Cardiac Monitoring: ACTIVE order. Indication: Post- PCI/Angiogram (24 hours)  Code Status: Full Code      Clinically Significant Risk Factors                         # Overweight: Estimated body mass index is 29.94 kg/m  as calculated from the following:    Height as of this encounter: 1.702 m (5' 7\").    Weight as of this " encounter: 86.7 kg (191 lb 2.2 oz)., PRESENT ON ADMISSION     # Financial/Environmental Concerns: none  # Asthma: noted on problem list        Disposition Plan     Medically Ready for Discharge: Anticipated Tomorrow             Ricky Chavez MD  Hospitalist Service  [facility]  Securely message with [employer] (more info)  Text page via [employer] Paging/Directory   ______________________________________________________________________    Interval History   Patient is new to me.  Patient seen and examined.  Notes, labs, imaging report personally reviewed.  Patient reported that she ambulated with therapy in the hallway and also few steps and denied feeling short of breath, chest pain or dizziness during that activity.  Denied fever or chills.  Denied new concerns or complaints.  Discussed with nursing staff.  Discussed with cardiologist.    Physical Exam   Vital Signs: Temp: 97.9  F (36.6  C) Temp src: Oral BP: (!) 145/73 Pulse: 89   Resp: 20 SpO2: 95 % O2 Device: None (Room air) Oxygen Delivery: 2 LPM  Weight: 191 lbs 2.22 oz      General: Not in obvious distress.  HEENT: Normal cephalic, supple neck  Chest: Clear to auscultation bilateral anteriorly, no wheezing  Heart: S1S2 normal, regular  Abdomen: Soft. NT, ND. Bowel sounds- active.  Extremities: No legs swelling  Neuro: alert and awake, grossly non-focal        Medical Decision Making             Data     I have personally reviewed the following data over the past 24 hrs:    N/A  \   N/A   / N/A     139 103 19.5 /  141 (H)   4.1 20 (L) 1.15 (H) \       Imaging results reviewed over the past 24 hrs:   Recent Results (from the past 24 hour(s))   Cardiac Catheterization    Narrative    Images from the original result were not included.  Mary Corral is a 88 year old old female with HTN, HL, CKD who presented   to the hospital who is here for NSTEMI,    - 2-vessel CAD w/ acute lesion in mLcx now s/p DESx1 and small   dissection/contained perforation in  small OM; mod-severe disease in LAD;   normal L-sided filling pressures  - will re-check TTE in 2 hrs, and if unchanged once more tomorrow  - ASA 81mg daily indefinitely, clopidogrel 600mg once, followed by 75mg   daily for at least 12 months  - LAD can be addressed in a staged procedure 2-4 wks from now if c/w   patient's goals of care. Otherwise, medical management could also be a   consideration  - high dose statin  - continue aggressive risk factor modification          Findings:  LM:no obstruction  LAD:distal 60-70% narrowing  Lcx:mid-vessel acute 99% narrowing, UMESH 2 flow distally  RCA:dominant, mid-vessel 50% narrowing    LVEDP:5    Access:  R Radial artery    PCI:  LMT was engaged w/ a 6F EBU 3.0 Guide catheter and the lesion in Lcx was   wired w/ a Forte wire, lesion was ballooned w/ a 2.5x12mm Emerge at 6-10   petros. There was slow flow in distal vessel, so IC eptifibatide and   adenosine were given. We stented the original lesion w/ a 2.01b52ab   Synergy EES at 11 petros. There still was no good flow in distal small OM   even after aspiration w/ penumbra, so we suspected a dissection. We then   wired w/ another Forte and followed that w/ a Turnpike LP. Tip injection   confirmed luminal position, but when we tried to exchange for a Fielder FC   for a better purchase distally, we noted a contained perforation. Due to   hemodynamic stability, we withdrew the equipment and administered   protamine. STAT echo showed unchanged epicardial fat pad, repeat angio   showed no active extravasation and improved flow into the OM branch, w/   some focal hematoma, hence we chose to stop at this point. Final   angiography showed no dissection or perforation and a UMESH 3 flow.      Closure:   Vasc Band            Echocardiogram Limited    Narrative    [id number]  [id number]  [id number]  [id number]^[address]^[place] [address]     Name: GOSIA PEREZ  MRN: [id number]  :  1935  Study Date: 2024 02:00 PM  Age: 88 yrs  Gender: Female  Patient Location: [facility]  Reason For Study: Pericardial Disease  Ordering Physician: CODY RUTHERFORD  Referring Physician: CODY RUTHERFORD  Performed By:      BSA: 2.0 m2  Height: 67 in  Weight: 185 lb  HR: 76  ______________________________________________________________________________  Procedure  Limited Echo Adult.  ______________________________________________________________________________  Interpretation Summary     Limited views were obtained to evaluate for pericardial effusion     There is no pericardial effusion.  ______________________________________________________________________________  Pericardium  There is no pericardial effusion. Epicardial fat pat noted.  ______________________________________________________________________________  Report approved by: Emely Tavarez 2024 03:12 PM     ______________________________________________________________________________      Echocardiogram Limited   Result Value    LVEF  55-60%    Narrative    [id number]  [id number]  [id number]  [id number]^CYNTHIA^EDIL[place] [address]     Name: GOSIA PEREZ  MRN: [id number]  : 1935  Study Date: 2024 04:00 PM  Age: 88 yrs  Gender: Female  Patient Location: [facility]  Reason For Study: Pericardial Effusion  Ordering Physician: EDILMA MARIE  Referring Physician: SYSTEM, PROVIDER NOT IN  Performed By:      BSA: 2.0 m2  Height: 67 in  Weight: 185 lb  HR: 79  ______________________________________________________________________________  Procedure  Limited Echo Adult. The study was technically difficult. Limited views were  obtained. Compared to prior study, there is no significant change.  ______________________________________________________________________________  Interpretation Summary     The study was technically difficult.  Limited views were  obtained.  The left ventricle is normal in size.  Left ventricular function is normal.The ejection fraction is 55-60%.  Normal right ventricle size and systolic function.  There is no pericardial effusion.  Compared to prior study, there is no significant change.  ______________________________________________________________________________  Left Ventricle  The left ventricle is normal in size. Left ventricular function is normal.The  ejection fraction is 55-60%. Regional wall motion abnormalities cannot be  excluded due to limited visualization.     Right Ventricle  Normal right ventricle size and systolic function.     Atria  Normal left atrial size. Right atrium not well visualized.     Mitral Valve  The mitral valve is not well visualized.     Tricuspid Valve  The tricuspid valve is not well visualized.     Aortic Valve  The aortic valve is not well visualized.     Pulmonic Valve  The pulmonic valve is not well visualized.     Vessels  The aorta root is normal.     Pericardium  There is no pericardial effusion.     ______________________________________________________________________________  Report approved by: Emely Carmichael 04/29/2024 04:35 PM     ______________________________________________________________________________

## 2024-04-30 NOTE — CONSULTS
NUTRITION EDUCATION    REASON FOR ASSESSMENT:  Provider Order-  diet education     NUTRITION HISTORY:  Information obtained from patient.     Met with pt at bedside this AM. Pt reports no hx of diet education. Pt reports not using salt at baseline.     CURRENT DIET:  Low Saturated Fat, na <2400 mg     NUTRITION DIAGNOSIS:  Food- and nutrition-related knowledge deficit R/t NSTEMI AEB need for heart healthy diet education.     INTERVENTIONS:  Implementation:      *  Nutrition Education (Content):   A)  Provided handouts: Heart Healthy Nutrition Therapy, Heart Healthy Label Reading, Sodium free Flavoring tips    B)  Discussed handouts, sources of trans and saturated fats and strategies to avoid/limit, not adding/using salt, reading nutrition labels and ingredient lists.       *  Nutrition Education (Application):   A)  Discussed current eating habits and recommended alternative food choices      *  Anticipate good compliance      *  Diet Education - refer to Education Flowsheet    Goals:      *  Patient will verbalize understanding of diet      *  All of the above goals met during the education session    Follow Up/Monitoring:      *  Provided RD contact information for future questions      *  Recommended Out-Patient Nutrition Referral, if further diet instructions are needed

## 2024-04-30 NOTE — PLAN OF CARE
Problem: Adult Inpatient Plan of Care  Goal: Plan of Care Review  Description: The Plan of Care Review/Shift note should be completed every shift.  The Outcome Evaluation is a brief statement about your assessment that the patient is improving, declining, or no change.  This information will be displayed automatically on your shift  note.  Outcome: Progressing     Problem: Risk for Delirium  Goal: Improved Behavioral Control  Outcome: Progressing  Intervention: Prevent and Manage Agitation  Recent Flowsheet Documentation  Taken 4/30/2024 1249 by Cyndy Sandoval RN  Environment Familiarity/Consistency: daily routine followed  Taken 4/30/2024 0900 by Cyndy Sandoval RN  Environment Familiarity/Consistency: daily routine followed  Intervention: Minimize Safety Risk  Recent Flowsheet Documentation  Taken 4/30/2024 1249 by Cyndy Sandoval RN  Communication Enhancement Strategies: call light answered in person  Enhanced Safety Measures: assistive devices when indicated  Taken 4/30/2024 0900 by Cyndy Sandoval RN  Communication Enhancement Strategies: call light answered in person  Enhanced Safety Measures: assistive devices when indicated     Problem: Chest Pain  Goal: Resolution of Chest Pain Symptoms  Outcome: Progressing     Problem: Cardiac Catheterization (Diagnostic/Interventional)  Goal: Absence of Bleeding  Outcome: Progressing     Problem: Adult Inpatient Plan of Care  Goal: Absence of Hospital-Acquired Illness or Injury  Intervention: Identify and Manage Fall Risk  Recent Flowsheet Documentation  Taken 4/30/2024 1249 by Cyndy Sandoval RN  Safety Promotion/Fall Prevention: activity supervised  Taken 4/30/2024 0900 by Cyndy Sandoval RN  Safety Promotion/Fall Prevention: activity supervised     Problem: Risk for Delirium  Goal: Optimal Coping  Intervention: Optimize Psychosocial Adjustment to Delirium  Recent Flowsheet Documentation  Taken 4/30/2024 1249 by Cyndy Sandoval  RN  Supportive Measures: active listening utilized  Taken 4/30/2024 0900 by Cyndy Sandoval RN  Supportive Measures: active listening utilized  Goal: Improved Attention and Thought Clarity  Intervention: Maximize Cognitive Function  Recent Flowsheet Documentation  Taken 4/30/2024 1249 by Cyndy Sandoval RN  Sensory Stimulation Regulation: care clustered  Reorientation Measures: familiar social contact encouraged  Taken 4/30/2024 0900 by Cyndy Sandoval RN  Sensory Stimulation Regulation: care clustered  Reorientation Measures: familiar social contact encouraged     Problem: Cardiac Catheterization (Diagnostic/Interventional)  Goal: Anesthesia/Sedation Recovery  Intervention: Optimize Anesthesia Recovery  Recent Flowsheet Documentation  Taken 4/30/2024 1249 by Cyndy Sandoval RN  Safety Promotion/Fall Prevention: activity supervised  Administration (IS): instruction provided, follow-up  Reorientation Measures: familiar social contact encouraged  Taken 4/30/2024 0900 by Cyndy Sandoval RN  Safety Promotion/Fall Prevention: activity supervised  Administration (IS): instruction provided, follow-up  Reorientation Measures: familiar social contact encouraged  Goal: Absence of Vascular Access Complication  Intervention: Prevent and Manage Access Complications  Recent Flowsheet Documentation  Taken 4/30/2024 1249 by Cyndy Sandoval RN  Activity Management: activity adjusted per tolerance  Taken 4/30/2024 0900 by Cyndy Sandoval RN  Activity Management: activity adjusted per tolerance   Goal Outcome Evaluation:       Patient is alert and able to let her needs be known. Denies pain when asked. VSS and on room air. Shortness of breath noted with exertion. Up to the bathroom assist of one.Repeat ECHO ordered. Continue plan of care.

## 2024-04-30 NOTE — PROGRESS NOTES
04/30/24 0745   Appointment Info   Signing Clinician's Name / Credentials (OT) Lucero Cid LEX OTR/L CLT   Living Environment   People in Home alone   Current Living Arrangements house   Home Accessibility stairs to enter home   Number of Stairs, Main Entrance 3   Stair Railings, Main Entrance railings safe and in good condition   Transportation Anticipated family or friend will provide   Living Environment Comments I with ADLS,IADLS, drives, A with cleaning and lawn care. Laundry in basement   Self-Care   Regular Exercise Yes   Activity/Exercise Type   (golfs, stationary bike)   Equipment Currently Used at Home cane, straight  (uses cane PRN)   General Information   Onset of Illness/Injury or Date of Surgery 04/27/24   Referring Physician    Additional Occupational Profile Info/Pertinent History of Current Problem NSTEMI, post angio with Stent   Existing Precautions/Restrictions   (R wrist)   Cognitive Status Examination   Affect/Mental Status (Cognitive) WNL   Follows Commands WNL   Bed Mobility   Bed Mobility supine-sit;sit-supine   Supine-Sit [place] (Bed Mobility) independent   Sit-Supine [place] (Bed Mobility) independent   Transfers   Transfers sit-stand transfer   Sit-Stand Transfer   Sit-Stand [place] (Transfers) independent   Balance   Balance Assessment no deficits identified   Activities of Daily Living   BADL Assessment/Intervention   (did not assess during eval, anticipate I with ADLs)   Clinical Impression   Criteria for Skilled Therapeutic Interventions Met (OT) Yes, treatment indicated   OT Diagnosis post angio education   OT Problem List-Impairments impacting ADL problems related to;activity tolerance impaired;post-surgical precautions   Assessment of Occupational Performance 1-3 Performance Deficits   Planned Therapy Interventions (OT) home program guidelines;progressive activity/exercise   Clinical Decision Making Complexity (OT) problem focused assessment/low  complexity   Risk & Benefits of therapy have been explained care plan/treatment goals reviewed   OT Total Evaluation Time   OT Eval, Low Complexity Minutes (74344) 8   OT Goals   Therapy Frequency (OT) Daily   OT Predicted Duration/Target Date for Goal Attainment 05/07/24   OT Goals Cardiac Phase 1   OT: Understanding of cardiac education to maximize quality of life, condition management, and health outcomes Patient;Verbalize   OT: Perform aerobic activity with stable cardiovascular response 5 minutes;ambulation   OT: Functional/aerobic ambulation tolerance with stable cardiovascular response in order to return to home and community environment Supervision/SBA;200 feet   OT: Navigation of stairs simulating home set up with stable cardiovascular response in order to return to home and community environment Supervision/SBA;3 stairs   Interventions   Interventions Quick Adds Self-Care/Home Management;Cardiac Rehab;Therapeutic Procedures/Exercise   Self-Care/Home Management   Self-Care/Home Mgmt/ADL, Compensatory, Meal Prep Minutes (52926) 8   Treatment Detail/Skilled Intervention see CR education   Therapeutic Procedures/Exercise   Therapeutic Procedure: strength, endurance, ROM, flexibillity minutes (82551) 12   Treatment Detail/Skilled Intervention see Amb and Stairs   Ambulation   Workload 150ft, 150ft   Symptoms Dyspnea   Cardiovascular Response Normal   Exercise Details SBA/CGA with first walk- no device, 2nd walk with SEC. Patient has baseline asthma- dysnea noted however patient states its her baseline. Seated rest between each set   Stairs   Workload 3   Symptoms Dyspnea   Cardiovascular Response Normal   Exercise Details SBA/CGA. Instruction on Side step up/down with both hands on railing for safety. No change in breathing per patient report   Cardiac Education   Education Provided Stop light tool;Signs and symptoms;Resuming home activities;Precautions;Outpatient Cardiac Rehab;Home exercise program   OT  Discharge Planning   OT Plan nustep,amb   OT Discharge Recommendation (DC Rec) home with outpatient cardiac rehab   OT Rationale for DC Rec Patient provided post angio education and HEP. Recommend A with laundry and grocery shopping initially. SBA with ambulation.   OT Brief overview of current status SBA   Total Session Time   Timed Code Treatment Minutes 20   Total Session Time (sum of timed and untimed services) 28

## 2024-04-30 NOTE — PLAN OF CARE
Problem: Adult Inpatient Plan of Care  Goal: Plan of Care Review  Description: The Plan of Care Review/Shift note should be completed every shift.  The Outcome Evaluation is a brief statement about your assessment that the patient is improving, declining, or no change.  This information will be displayed automatically on your shift  note.  Outcome: Progressing     Problem: Chest Pain  Goal: Resolution of Chest Pain Symptoms  Outcome: Progressing     Problem: Cardiac Catheterization (Diagnostic/Interventional)  Goal: Stable Heart Rate and Rhythm  Outcome: Progressing   Goal Outcome Evaluation:    A & O x 4, VSS, on RA, NSR. Right radial site soft, no bleeding, no hematoma. CMS intact. Pt denied pain. Slept well through night.

## 2024-04-30 NOTE — PLAN OF CARE
"  Problem: Adult Inpatient Plan of Care  Goal: Plan of Care Review  Description: The Plan of Care Review/Shift note should be completed every shift.  The Outcome Evaluation is a brief statement about your assessment that the patient is improving, declining, or no change.  This information will be displayed automatically on your shift  note.  Outcome: Progressing  Goal: Patient-Specific Goal (Individualized)  Description: You can add care plan individualizations to a care plan. Examples of Individualization might be:  \"Parent requests to be called daily at 9am for status\", \"I have a hard time hearing out of my right ear\", or \"Do not touch me to wake me up as it startles  me\".  Outcome: Progressing  Goal: Readiness for Transition of Care  Outcome: Progressing     Problem: Risk for Delirium  Goal: Improved Behavioral Control  Outcome: Progressing  Intervention: Minimize Safety Risk  Recent Flowsheet Documentation  Taken 4/29/2024 1900 by Rani Rojas RN  Communication Enhancement Strategies: call light answered in person  Enhanced Safety Measures: assistive devices when indicated     Problem: Chest Pain  Goal: Resolution of Chest Pain Symptoms  Outcome: Progressing     Problem: Cardiac Catheterization (Diagnostic/Interventional)  Goal: Absence of Bleeding  Outcome: Progressing  Goal: Absence of Contrast-Induced Injury  Outcome: Progressing  Goal: Stable Heart Rate and Rhythm  Outcome: Progressing  Goal: Absence of Embolism Signs and Symptoms  Outcome: Progressing  Goal: Anesthesia/Sedation Recovery  Outcome: Progressing  Intervention: Optimize Anesthesia Recovery  Recent Flowsheet Documentation  Taken 4/29/2024 1900 by Rani Rojas RN  Safety Promotion/Fall Prevention: activity supervised  Reorientation Measures: clock in view  Goal: Optimal Pain Control and Function  Outcome: Progressing  Intervention: Prevent or Manage Pain  Recent Flowsheet Documentation  Taken 4/29/2024 2009 by Rani Rojas, RN  Pain Management " Interventions: medication (see MAR)  Goal: Absence of Vascular Access Complication  Outcome: Progressing  Intervention: Prevent and Manage Access Complications  Recent Flowsheet Documentation  Taken 4/29/2024 2000 by Rani Rojas, RN  Activity Management: ambulated to bathroom  Taken 4/29/2024 1900 by Rani Rojas, RN  Activity Management: bedrest  Head of Bed (HOB) Positioning: HOB at 20-30 degrees   Goal Outcome Evaluation:       Patient arrived back from angio. BP running 154-174/67-74. Medicated wit Hydralazine 10 mg. Bp mainly 150/69 after medication. Medicated with schedule Norvasc and Metoprolol. TR band was deflated without problems. CMS good-good radial pulse. Patient was incont with Purewick. Did walk to BR and voided-missed hat. Denies chest pain or pressure

## 2024-04-30 NOTE — PROGRESS NOTES
"Care Management Follow Up    Length of Stay (days): 3    Expected Discharge Date: 04/30/2024     Concerns to be Addressed: no discharge needs identified     Patient plan of care discussed at interdisciplinary rounds: Yes    Anticipated Discharge Disposition:       Anticipated Discharge Services:    Anticipated Discharge DME:      Patient/family educated on Medicare website which has current facility and service quality ratings:    Education Provided on the Discharge Plan:    Patient/Family in Agreement with the Plan:      Referrals Placed by CM/SW:    Private pay costs discussed: Not applicable    Additional Information:  OT recommends home with OP cardiac rehab, and assist with laundry and grocery shopping initially.     Social History:   \"Patient lives alone in a house. At baseline, patient reports independence with all I/ADLs, uses a cane as needed. Patient identifies positive support from friends and her nieces. Patient denies any in home services or concerns regarding discharge to home. Family transport.\"    CM following care progression to assist as needed with safe discharge planning.     Janna Hernandez RN      "

## 2024-05-01 ENCOUNTER — APPOINTMENT (OUTPATIENT)
Dept: CARDIOLOGY | Facility: HOSPITAL | Age: 89
DRG: 322 | End: 2024-05-01
Attending: INTERNAL MEDICINE
Payer: COMMERCIAL

## 2024-05-01 ENCOUNTER — APPOINTMENT (OUTPATIENT)
Dept: OCCUPATIONAL THERAPY | Facility: HOSPITAL | Age: 89
DRG: 322 | End: 2024-05-01
Attending: STUDENT IN AN ORGANIZED HEALTH CARE EDUCATION/TRAINING PROGRAM
Payer: COMMERCIAL

## 2024-05-01 ENCOUNTER — APPOINTMENT (OUTPATIENT)
Dept: CT IMAGING | Facility: HOSPITAL | Age: 89
DRG: 322 | End: 2024-05-01
Attending: INTERNAL MEDICINE
Payer: COMMERCIAL

## 2024-05-01 LAB
ANION GAP SERPL CALCULATED.3IONS-SCNC: 16 MMOL/L (ref 7–15)
BUN SERPL-MCNC: 27.6 MG/DL (ref 8–23)
CALCIUM SERPL-MCNC: 8.6 MG/DL (ref 8.8–10.2)
CHLORIDE SERPL-SCNC: 106 MMOL/L (ref 98–107)
CREAT SERPL-MCNC: 1.33 MG/DL (ref 0.51–0.95)
CREAT SERPL-MCNC: 1.35 MG/DL (ref 0.51–0.95)
DEPRECATED HCO3 PLAS-SCNC: 19 MMOL/L (ref 22–29)
EGFRCR SERPLBLD CKD-EPI 2021: 38 ML/MIN/1.73M2
EGFRCR SERPLBLD CKD-EPI 2021: 39 ML/MIN/1.73M2
GLUCOSE SERPL-MCNC: 112 MG/DL (ref 70–99)
HGB BLD-MCNC: 11.3 G/DL (ref 11.7–15.7)
MAGNESIUM SERPL-MCNC: 1.9 MG/DL (ref 1.7–2.3)
POTASSIUM SERPL-SCNC: 4 MMOL/L (ref 3.4–5.3)
SODIUM SERPL-SCNC: 141 MMOL/L (ref 135–145)
WBC # BLD AUTO: 14.4 10E3/UL (ref 4–11)

## 2024-05-01 PROCEDURE — 250N000013 HC RX MED GY IP 250 OP 250 PS 637: Performed by: STUDENT IN AN ORGANIZED HEALTH CARE EDUCATION/TRAINING PROGRAM

## 2024-05-01 PROCEDURE — 97110 THERAPEUTIC EXERCISES: CPT | Mod: GO

## 2024-05-01 PROCEDURE — 210N000001 HC R&B IMCU HEART CARE

## 2024-05-01 PROCEDURE — 99232 SBSQ HOSP IP/OBS MODERATE 35: CPT | Performed by: INTERNAL MEDICINE

## 2024-05-01 PROCEDURE — 250N000013 HC RX MED GY IP 250 OP 250 PS 637: Performed by: NURSE PRACTITIONER

## 2024-05-01 PROCEDURE — 85018 HEMOGLOBIN: CPT | Performed by: INTERNAL MEDICINE

## 2024-05-01 PROCEDURE — 250N000013 HC RX MED GY IP 250 OP 250 PS 637: Performed by: INTERNAL MEDICINE

## 2024-05-01 PROCEDURE — 93325 DOPPLER ECHO COLOR FLOW MAPG: CPT | Mod: 26 | Performed by: INTERNAL MEDICINE

## 2024-05-01 PROCEDURE — 71250 CT THORAX DX C-: CPT

## 2024-05-01 PROCEDURE — 36415 COLL VENOUS BLD VENIPUNCTURE: CPT | Performed by: INTERNAL MEDICINE

## 2024-05-01 PROCEDURE — 82565 ASSAY OF CREATININE: CPT | Performed by: INTERNAL MEDICINE

## 2024-05-01 PROCEDURE — 93325 DOPPLER ECHO COLOR FLOW MAPG: CPT

## 2024-05-01 PROCEDURE — 83735 ASSAY OF MAGNESIUM: CPT | Performed by: INTERNAL MEDICINE

## 2024-05-01 PROCEDURE — 93321 DOPPLER ECHO F-UP/LMTD STD: CPT | Mod: 26 | Performed by: INTERNAL MEDICINE

## 2024-05-01 PROCEDURE — 97535 SELF CARE MNGMENT TRAINING: CPT | Mod: GO

## 2024-05-01 PROCEDURE — 80048 BASIC METABOLIC PNL TOTAL CA: CPT | Performed by: INTERNAL MEDICINE

## 2024-05-01 PROCEDURE — 93308 TTE F-UP OR LMTD: CPT | Mod: 26 | Performed by: INTERNAL MEDICINE

## 2024-05-01 PROCEDURE — 85048 AUTOMATED LEUKOCYTE COUNT: CPT | Performed by: INTERNAL MEDICINE

## 2024-05-01 RX ADMIN — LEVOTHYROXINE SODIUM 50 MCG: 0.03 TABLET ORAL at 08:29

## 2024-05-01 RX ADMIN — METOPROLOL TARTRATE 25 MG: 25 TABLET, FILM COATED ORAL at 20:40

## 2024-05-01 RX ADMIN — ASPIRIN 81 MG: 81 TABLET, COATED ORAL at 08:24

## 2024-05-01 RX ADMIN — AMLODIPINE BESYLATE 5 MG: 5 TABLET ORAL at 20:40

## 2024-05-01 RX ADMIN — METOPROLOL TARTRATE 25 MG: 25 TABLET, FILM COATED ORAL at 08:22

## 2024-05-01 RX ADMIN — FLUTICASONE FUROATE AND VILANTEROL TRIFENATATE 1 PUFF: 100; 25 POWDER RESPIRATORY (INHALATION) at 08:31

## 2024-05-01 RX ADMIN — LOSARTAN POTASSIUM 50 MG: 50 TABLET, FILM COATED ORAL at 08:24

## 2024-05-01 RX ADMIN — ATORVASTATIN CALCIUM 40 MG: 40 TABLET, FILM COATED ORAL at 20:39

## 2024-05-01 RX ADMIN — CLOPIDOGREL BISULFATE 75 MG: 75 TABLET ORAL at 08:23

## 2024-05-01 RX ADMIN — MONTELUKAST 10 MG: 10 TABLET, FILM COATED ORAL at 20:40

## 2024-05-01 ASSESSMENT — ACTIVITIES OF DAILY LIVING (ADL)
ADLS_ACUITY_SCORE: 29
ADLS_ACUITY_SCORE: 25
ADLS_ACUITY_SCORE: 29
ADLS_ACUITY_SCORE: 25
ADLS_ACUITY_SCORE: 29
ADLS_ACUITY_SCORE: 29

## 2024-05-01 NOTE — PROGRESS NOTES
Care Management Follow Up    Length of Stay (days): 4    Expected Discharge Date: 05/01/2024     Concerns to be Addressed: discharge planning     Patient plan of care discussed at interdisciplinary rounds: Yes    Anticipated Discharge Disposition:  home with outpatient cardiac rehab     Anticipated Discharge Services:  outpatient cardiac rehab  Anticipated Discharge DME:  n/a    Patient/family educated on Medicare website which has current facility and service quality ratings: yes   Education Provided on the Discharge Plan: yes   Patient/Family in Agreement with the Plan:  yes    Referrals Placed by CM/SW:  n/a  Private pay costs discussed: Not applicable    Additional Information:  SW intern met with pt in room to discuss transportation to and from outpatient cardiac rehab. Pt states that she normally drives herself places and feel comfortable enough to transport herself. CM to follow for any further discharge planning needs.     RICHARD Mcd[facility]   5/1/2024

## 2024-05-01 NOTE — PROGRESS NOTES
Imp/plan:  S/p PCI with small perforation, echo last night with possible loculated effusion small, lower BP and boarderline HR. Echo prelim this AM no sig effusion, CT chest to screen for loculated sig effusion and observe overnight.  Possibly repeat CT in AM  CAD s/p PCI to Circ, reviewed anigogrpahy with 50-60% mid LAD narrowign, would suggest stress imaging if symptoms     Current Facility-Administered Medications   Medication Dose Route Frequency Provider Last Rate Last Admin    acetaminophen (TYLENOL) tablet 650 mg  650 mg Oral Q4H PRN Leslie Bailey CNP   650 mg at 04/29/24 2009    albuterol (PROVENTIL HFA/VENTOLIN HFA) inhaler  2 puff Inhalation Q4H PRN Lucas Gunn MD        albuterol (PROVENTIL) neb solution 2.5 mg  2.5 mg Nebulization Q4H PRN Lucas Gunn MD        allopurinol (ZYLOPRIM) tablet 100 mg  100 mg Oral BID PRN Gondal, Saad J, MD        amLODIPine (NORVASC) tablet 5 mg  5 mg Oral At Bedtime Ricky Chavez MD   5 mg at 04/30/24 2046    aspirin EC tablet 81 mg  81 mg Oral Daily Lucas Gunn MD   81 mg at 05/01/24 0824    atorvastatin (LIPITOR) tablet 40 mg  40 mg Oral QPM Gondal, Saad J, MD   40 mg at 04/30/24 2046    calcium carbonate (TUMS) chewable tablet 1,000 mg  1,000 mg Oral 4x Daily PRN Gondal, Saad J, MD        clopidogrel (PLAVIX) tablet 75 mg  75 mg Oral Daily Leslie Bailey CNP   75 mg at 05/01/24 0823    Continuing beta blocker from home medication list OR beta blocker order already placed during this visit   Does not apply DOES NOT GO TO Leslie Bobo CNP        Continuing statin from home medication list OR statin order already placed during this visit   Does not apply DOES NOT GO TO Leslie Bobo CNP        fluticasone-vilanterol (BREO ELLIPTA) 100-25 MCG/ACT inhaler 1 puff  1 puff Inhalation Daily Lucas Gunn MD   1 puff at 05/01/24 0831    HOLD: Metformin and metformin containing medications on day of procedure and 48 hours  after IV contrast given for patients with acute kidney injury or severe chronic kidney disease (stage IV or stage V; i.e., eGFR less than 30)   Does not apply HOLD Leslie Bailey CNP        hydrALAZINE (APRESOLINE) injection 10 mg  10 mg Intravenous Q4H PRN Leslie Bailey CNP   10 mg at 04/29/24 1923    levothyroxine (SYNTHROID/LEVOTHROID) tablet 50 mcg  50 mcg Oral Daily Gondal, Saad J, MD   50 mcg at 05/01/24 0829    lidocaine (LMX4) cream   Topical Q1H PRN Gondal, Saad J, MD        lidocaine 1 % 0.1-1 mL  0.1-1 mL Other Q1H PRN Gondal, Saad J, MD        losartan (COZAAR) tablet 50 mg  50 mg Oral Daily Ricky Chavez MD   50 mg at 05/01/24 0824    metoprolol (LOPRESSOR) injection 5 mg  5 mg Intravenous Q15 Min PRN Leslie Bailey CNP        metoprolol tartrate (LOPRESSOR) tablet 25 mg  25 mg Oral BID Ricky Chavez MD   25 mg at 05/01/24 0822    montelukast (SINGULAIR) tablet 10 mg  10 mg Oral At Bedtime Gondal, Saad J, MD   10 mg at 04/30/24 2046    nitroGLYcerin (NITROSTAT) sublingual tablet 0.4 mg  0.4 mg Sublingual Q5 Min PRN Leslie Bailey CNP        ondansetron (ZOFRAN ODT) ODT tab 4 mg  4 mg Oral Q6H PRN Leslie Bailey CNP        Or    ondansetron (ZOFRAN) injection 4 mg  4 mg Intravenous Q6H PRN Leslie Bailey CNP        oxyCODONE (ROXICODONE) tablet 5 mg  5 mg Oral Q4H PRN Leslie Bailey CNP        Or    oxyCODONE (ROXICODONE) tablet 10 mg  10 mg Oral Q4H PRN Leslie Bailey CNP        pantoprazole (PROTONIX) EC tablet 40 mg  40 mg Oral Daily PRN Gondal, Saad J, MD        Percutaneous Coronary Intervention orders placed (this is information for BPA alerting)   Does not apply DOES NOT GO TO Leslie Bobo CNP        reason aspirin not prescribed (intentional)   Other DOES NOT GO TO Leslie Bobo CNP        senna-docusate (SENOKOT-S/PERICOLACE) 8.6-50 MG per tablet 1 tablet  1 tablet Oral BID PRN Gondal, Saad  "MD EZEKIEL        Or    senna-docusate (SENOKOT-S/PERICOLACE) 8.6-50 MG per tablet 2 tablet  2 tablet Oral BID PRN Gondal, Saad J, MD        sodium chloride (PF) 0.9% PF flush 3 mL  3 mL Intracatheter Q8H Gondal, Saad J, MD   3 mL at 05/01/24 0424    sodium chloride (PF) 0.9% PF flush 3 mL  3 mL Intracatheter q1 min prn Gondal, Saad J, MD         Past Medical History:   Diagnosis Date    Allergic rhinitis     Chronic sinusitis     Conductive hearing loss     COPD (chronic obstructive pulmonary disease) (H)     CRI (chronic renal insufficiency)     mild    Gastroesophageal reflux disease     Hearing problem     Hypertension     Mediastinal mass     Nasal polyps     Pulmonary nodule     Seasonal allergies     Uncomplicated asthma         Review of Systems: 12 points negative other than above  vocal cord  /57 (BP Location: Left arm)   Pulse 75   Temp 98.5  F (36.9  C) (Oral)   Resp 20   Ht 1.702 m (5' 7\")   Wt 86 kg (189 lb 9.6 oz)   SpO2 92%   BMI 29.70 kg/m      /58   Pulse 99   Temp 97.7  F (36.5  C) (Oral)   Resp 20   Ht 1.702 m (5' 7\")   Wt 86 kg (189 lb 9.6 oz)   SpO2 92%   BMI 29.70 kg/m    JVP<7cm, carotids normal  Lungs clear  Cor RRR no c,r,m  Abs soft +BS, no mass  Ext no c,c,e    Lab Results   Component Value Date    HGB 11.3 (L) 05/01/2024     Lab Results   Component Value Date     04/28/2024     No results found for: \"CREATININE\"  No components found for: \"K\"          Tomas Campuzano MD  Interventional Cardiology   [facility]  [phone]    "

## 2024-05-01 NOTE — PLAN OF CARE
Goal Outcome Evaluation:  A/Ox4. VSS. Denies pain. Sats in 90s on RA. LS diminished/clear. Dyspneic with activity.Tele NSR. Follow up echo and CT done today. Pt ambulated halls assist x1 with walker/gait. Bed alarm on, call light in reach. Makes needs known.       Problem: Cardiac Catheterization (Diagnostic/Interventional)  Goal: Absence of Bleeding  Outcome: Progressing  Goal: Absence of Contrast-Induced Injury  Outcome: Progressing  Goal: Stable Heart Rate and Rhythm  Outcome: Progressing  Goal: Absence of Embolism Signs and Symptoms  Outcome: Progressing  Goal: Anesthesia/Sedation Recovery  Outcome: Progressing  Intervention: Optimize Anesthesia Recovery  Recent Flowsheet Documentation  Taken 5/1/2024 1500 by Shawna Sandra RN  Safety Promotion/Fall Prevention: activity supervised  Reorientation Measures:   calendar in view   clock in view  Taken 5/1/2024 0900 by Shawna Sandra RN  Safety Promotion/Fall Prevention: activity supervised  Reorientation Measures:   calendar in view   clock in view  Goal: Optimal Pain Control and Function  Outcome: Progressing  Goal: Absence of Vascular Access Complication  Outcome: Progressing  Intervention: Prevent and Manage Access Complications  Recent Flowsheet Documentation  Taken 5/1/2024 1500 by Shawna Sandra RN  Activity Management:   activity adjusted per tolerance   ambulated to bathroom   back to bed  Head of Bed (HOB) Positioning: HOB at 20-30 degrees  Taken 5/1/2024 1300 by Shawna Sandra RN  Activity Management: ambulated outside room  Taken 5/1/2024 0900 by Shawna Sandra RN  Activity Management:   activity adjusted per tolerance   ambulated to bathroom   back to bed  Head of Bed (HOB) Positioning: HOB at 20-30 degrees     Problem: Chest Pain  Goal: Resolution of Chest Pain Symptoms  Outcome: Progressing     Problem: Adult Inpatient Plan of Care  Goal: Plan of Care Review  Description: The Plan of Care Review/Shift note should be completed  "every shift.  The Outcome Evaluation is a brief statement about your assessment that the patient is improving, declining, or no change.  This information will be displayed automatically on your shift  note.  Outcome: Progressing  Goal: Patient-Specific Goal (Individualized)  Description: You can add care plan individualizations to a care plan. Examples of Individualization might be:  \"Parent requests to be called daily at 9am for status\", \"I have a hard time hearing out of my right ear\", or \"Do not touch me to wake me up as it startles  me\".  Outcome: Progressing  Goal: Absence of Hospital-Acquired Illness or Injury  Intervention: Identify and Manage Fall Risk  Recent Flowsheet Documentation  Taken 5/1/2024 1500 by Shawna Sandra RN  Safety Promotion/Fall Prevention: activity supervised  Taken 5/1/2024 0900 by Shawna Sandra RN  Safety Promotion/Fall Prevention: activity supervised  Intervention: Prevent Skin Injury  Recent Flowsheet Documentation  Taken 5/1/2024 1500 by Shawna Sandra RN  Body Position:   position changed independently   supine  Skin Protection: adhesive use limited  Taken 5/1/2024 0900 by Shawna Sandra RN  Body Position:   position changed independently   supine  Skin Protection: adhesive use limited  Intervention: Prevent Infection  Recent Flowsheet Documentation  Taken 5/1/2024 1500 by Shawna Sandra RN  Infection Prevention:   rest/sleep promoted   hand hygiene promoted   environmental surveillance performed   single patient room provided  Taken 5/1/2024 0900 by Shawna Sandra RN  Infection Prevention:   rest/sleep promoted   hand hygiene promoted   environmental surveillance performed   single patient room provided  Goal: Readiness for Transition of Care  Outcome: Progressing     "

## 2024-05-01 NOTE — PROGRESS NOTES
Sandra Corral, pt niece, requested copy of pt's cardiac cath report. Pt consented to have this information given to her niece. LOKI form given to pt and niece to be filled out. Completed form given to EVAN.

## 2024-05-01 NOTE — PLAN OF CARE
Goal Outcome Evaluation:      Plan of Care Reviewed With: patient    Overall Patient Progress: improvingOverall Patient Progress: improving           Problem: Adult Inpatient Plan of Care  Goal: Plan of Care Review  Description: The Plan of Care Review/Shift note should be completed every shift.  The Outcome Evaluation is a brief statement about your assessment that the patient is improving, declining, or no change.  This information will be displayed automatically on your shift  note.  Outcome: Progressing  Flowsheets (Taken 5/1/2024 0253)  Plan of Care Reviewed With: patient  Overall Patient Progress: improving     Problem: Adult Inpatient Plan of Care  Goal: Readiness for Transition of Care  Outcome: Progressing  Flowsheets (Taken 5/1/2024 0253)  Transportation Anticipated: family or friend will provide  Concerns to be Addressed: no discharge needs identified       Pt is alert and oriented x 4 and follows commands. Eager to be discharged today. VSS on RA throughout shift. Normal sinus rhythm on monitor. Pt has shortness of breath on exertion and chronic asthma for which she uses a PRN inhaler. Denied pain and chest pain all shift. R radial access site has no hematoma or bleeding, CMS intact. No barriers to discharge.     See flowsheets for full assessments.

## 2024-05-01 NOTE — PROGRESS NOTES
SPIRITUAL HEALTH SERVICES Progress Note    Saw pt Mary Corral and offered Zoroastrian sacrament of anointing for the healing of the sick.     Fr. Leopoldo Dunn

## 2024-05-01 NOTE — PROGRESS NOTES
"[facility]    Medicine Progress Note - Hospitalist Service    Date of Admission:  4/27/2024    Assessment & Plan   88-year-old with hypertension, CKD, hyperlipidemia who presenting with chest pain and was diagnosed with non-STEMI.  Patient admitted for further management.    Non-ST elevation MI;  -- On 4/29, underwent coronary angiogram.  Reported two-vessel CAD with acute lesion in mLcx s/p CANDICE x1 and a small dissection/contained perforation in a small OM, moderate to severe disease in LAD  --Cardiology recommended aspirin 81 mg daily indefinitely, Plavix 75 mg daily for at least 12 months, also reported LAD disease can be addressed in a staged procedure 2 to 4 weeks from now.  Follow-up with them as recommended  -- Repeat echo reported a small loculated pleural effusion, personally discussed with Dr. Campuzano, ordered repeat limited echo  --Continue statin.    Leukocytosis, likely due to above;  -- No clinical signs and symptoms of infection.  Trend    Essential hypertension, uncontrolled; improving  -- PTA amlodipine, losartan.    --Patient initiated on metoprolol due to above issue, titrate dose.    --PTA clonidine discontinued on admission.  Monitor vitals and adjust medications accordingly.    History of Moderate persistent asthma; not on exacerbation  --Continue Breo, montelukast, as needed nebs and inhaler    GERD, PTA medications  CKD stage III, creatinine fairly stable            Diet: Low Saturated Fat Na <2400 mg    DVT Prophylaxis: Pneumatic Compression Devices and Ambulate every shift  Russo Catheter: Not present  Lines: None     Cardiac Monitoring: ACTIVE order. Indication: Post- PCI/Angiogram (24 hours)  Code Status: Full Code      Clinically Significant Risk Factors                         # Overweight: Estimated body mass index is 29.7 kg/m  as calculated from the following:    Height as of this encounter: 1.702 m (5' 7\").    Weight as of this encounter: 86 kg (189 lb 9.6 " oz)., PRESENT ON ADMISSION     # Financial/Environmental Concerns: none  # Asthma: noted on problem list        Disposition Plan     Medically Ready for Discharge: Anticipated in 2-4 Days             Ricky Chavez MD  Hospitalist Service  [facility]  Securely message with [employer] (more info)  Text page via Walk-in Appointment Scheduler Paging/Directory   ______________________________________________________________________    Interval History   Patient seen and examined.  Notes, labs, imaging report personally reviewed.  No acute issues reported overnight.  Patient denied short of breath, chest pain, dizziness when ambulating with therapy.  Denied new concerns or complaints.  Discussed with nursing staff show  Discussed with cardiologist.  Addendum;  Discussed with patient's niece Ms. Flores at bedside.  She had some concern, questions regarding her coronary angiogram, stenting, postoperative complications and also requesting detail procedure report and pictures.  I tried to explain to her to best of my knowledge and also informed her that most of the things are beyond my scope of practice and will request cardiology to reach out to her again.  Personally discussed with Dr. Campuzano who had conversation with her earlier, requested him for re-visit.  Dr. Campuzano again discussed with patient's niece.  I discussed with Dr. Campuzano again after his visit second visit patient and family members.    Physical Exam   Vital Signs: Temp: 97.7  F (36.5  C) Temp src: Oral BP: 115/58 Pulse: 99   Resp: 20 SpO2: 92 % O2 Device: None (Room air)    Weight: 189 lbs 9.6 oz      General: Not in obvious distress.  HEENT: Normal cephalic, supple neck  Chest: Clear to auscultation bilateral anteriorly, no wheezing  Heart: S1S2 normal, regular  Abdomen: Soft. NT, ND. Bowel sounds- active.  Extremities: No legs swelling  Neuro: alert and awake, grossly non-focal        Medical Decision Making             Data     I have personally reviewed the  following data over the past 24 hrs:    N/A  \   11.3 (L)   / N/A     N/A N/A N/A /  120 (H)   N/A N/A 1.33 (H) \       Imaging results reviewed over the past 24 hrs:   Recent Results (from the past 24 hour(s))   Echocardiogram Limited   Result Value    LVEF  60-65%    Narrative    [id number]  [id number]  [id number]  [id number]^CYNTHIA^EDILMA^[place] [address]     Name: GOSIA PEREZ  MRN: [id number]  : 1935  Study Date: 2024 02:01 PM  Age: 88 yrs  Gender: Female  Patient Location: [facility]  Reason For Study: Pericardial Effusion  Ordering Physician: EDILMA MARIE  Referring Physician: SYSTEM, PROVIDER NOT IN  Performed By: SABAS     BSA: 2.0 m2  Height: 67 in  Weight: 191 lb  HR: 88  BP: 140/69 mmHg  ______________________________________________________________________________  ______________________________________________________________________________  Interpretation Summary     Pericardial fat pad present versus loculated pericardial effusion.  Left ventricular function is normal.The ejection fraction is 60-65%.  Normal right ventricle size and systolic function.  Limited views were obtained. Compared to prior study, there is no significant  change.  ______________________________________________________________________________  Left Ventricle  Left ventricular function is normal.The ejection fraction is 60-65%.     Right Ventricle  Normal right ventricle size and systolic function.     Mitral Valve  Mitral valve leaflets appear normal. There is no evidence of mitral stenosis  or clinically significant mitral regurgitation. There is trace mitral  regurgitation.     Aortic Valve  The aortic valve is trileaflet with aortic valve sclerosis. No hemodynamically  significant valvular aortic stenosis.     Vessels  IVC diameter <2.1 cm collapsing >50% with sniff suggests a normal RA pressure  of 3 mmHg.     Pericardium  Pericardial fat pad present versus  loculated pericardial effusion. There are  no echocardiographic indications of cardiac tamponade.  ______________________________________________________________________________  MMode/2D Measurements & Calculations  IVSd: 1.3 cm  LVIDd: 3.8 cm  LVIDs: 2.6 cm  LVPWd: 1.5 cm  FS: 32.1 %  LV mass(C)d: 196.3 grams  LV mass(C)dI: 99.0 grams/m2  RWT: 0.81     ______________________________________________________________________________  Report approved by: Emely Ferrer 04/30/2024 02:58 PM

## 2024-05-02 ENCOUNTER — APPOINTMENT (OUTPATIENT)
Dept: OCCUPATIONAL THERAPY | Facility: HOSPITAL | Age: 89
DRG: 322 | End: 2024-05-02
Attending: STUDENT IN AN ORGANIZED HEALTH CARE EDUCATION/TRAINING PROGRAM
Payer: COMMERCIAL

## 2024-05-02 ENCOUNTER — APPOINTMENT (OUTPATIENT)
Dept: CT IMAGING | Facility: HOSPITAL | Age: 89
DRG: 322 | End: 2024-05-02
Attending: INTERNAL MEDICINE
Payer: COMMERCIAL

## 2024-05-02 DIAGNOSIS — I31.39 PERICARDIAL EFFUSION: Primary | ICD-10-CM

## 2024-05-02 LAB
ANION GAP SERPL CALCULATED.3IONS-SCNC: 14 MMOL/L (ref 7–15)
BUN SERPL-MCNC: 34.2 MG/DL (ref 8–23)
CALCIUM SERPL-MCNC: 8.7 MG/DL (ref 8.8–10.2)
CHLORIDE SERPL-SCNC: 104 MMOL/L (ref 98–107)
CREAT SERPL-MCNC: 1.57 MG/DL (ref 0.51–0.95)
DEPRECATED HCO3 PLAS-SCNC: 21 MMOL/L (ref 22–29)
EGFRCR SERPLBLD CKD-EPI 2021: 32 ML/MIN/1.73M2
ERYTHROCYTE [DISTWIDTH] IN BLOOD BY AUTOMATED COUNT: 15.2 % (ref 10–15)
GLUCOSE SERPL-MCNC: 115 MG/DL (ref 70–99)
HCT VFR BLD AUTO: 34.8 % (ref 35–47)
HGB BLD-MCNC: 11 G/DL (ref 11.7–15.7)
MCH RBC QN AUTO: 30.5 PG (ref 26.5–33)
MCHC RBC AUTO-ENTMCNC: 31.6 G/DL (ref 31.5–36.5)
MCV RBC AUTO: 96 FL (ref 78–100)
PLATELET # BLD AUTO: 166 10E3/UL (ref 150–450)
POTASSIUM SERPL-SCNC: 3.8 MMOL/L (ref 3.4–5.3)
RBC # BLD AUTO: 3.61 10E6/UL (ref 3.8–5.2)
SODIUM SERPL-SCNC: 139 MMOL/L (ref 135–145)
WBC # BLD AUTO: 11.9 10E3/UL (ref 4–11)

## 2024-05-02 PROCEDURE — 71250 CT THORAX DX C-: CPT

## 2024-05-02 PROCEDURE — 85027 COMPLETE CBC AUTOMATED: CPT | Performed by: INTERNAL MEDICINE

## 2024-05-02 PROCEDURE — 250N000013 HC RX MED GY IP 250 OP 250 PS 637: Performed by: INTERNAL MEDICINE

## 2024-05-02 PROCEDURE — 97535 SELF CARE MNGMENT TRAINING: CPT | Mod: GO

## 2024-05-02 PROCEDURE — 258N000003 HC RX IP 258 OP 636: Performed by: INTERNAL MEDICINE

## 2024-05-02 PROCEDURE — 36415 COLL VENOUS BLD VENIPUNCTURE: CPT | Performed by: INTERNAL MEDICINE

## 2024-05-02 PROCEDURE — 250N000013 HC RX MED GY IP 250 OP 250 PS 637: Performed by: NURSE PRACTITIONER

## 2024-05-02 PROCEDURE — 80048 BASIC METABOLIC PNL TOTAL CA: CPT | Performed by: INTERNAL MEDICINE

## 2024-05-02 PROCEDURE — 99232 SBSQ HOSP IP/OBS MODERATE 35: CPT | Performed by: INTERNAL MEDICINE

## 2024-05-02 PROCEDURE — 250N000013 HC RX MED GY IP 250 OP 250 PS 637: Performed by: STUDENT IN AN ORGANIZED HEALTH CARE EDUCATION/TRAINING PROGRAM

## 2024-05-02 PROCEDURE — 99231 SBSQ HOSP IP/OBS SF/LOW 25: CPT | Performed by: INTERNAL MEDICINE

## 2024-05-02 PROCEDURE — 210N000001 HC R&B IMCU HEART CARE

## 2024-05-02 RX ORDER — SODIUM CHLORIDE 9 MG/ML
INJECTION, SOLUTION INTRAVENOUS CONTINUOUS
Status: DISCONTINUED | OUTPATIENT
Start: 2024-05-02 | End: 2024-05-03

## 2024-05-02 RX ADMIN — METOPROLOL TARTRATE 25 MG: 25 TABLET, FILM COATED ORAL at 20:02

## 2024-05-02 RX ADMIN — LEVOTHYROXINE SODIUM 50 MCG: 0.03 TABLET ORAL at 07:56

## 2024-05-02 RX ADMIN — ASPIRIN 81 MG: 81 TABLET, COATED ORAL at 07:55

## 2024-05-02 RX ADMIN — ATORVASTATIN CALCIUM 40 MG: 40 TABLET, FILM COATED ORAL at 20:02

## 2024-05-02 RX ADMIN — CLOPIDOGREL BISULFATE 75 MG: 75 TABLET ORAL at 07:55

## 2024-05-02 RX ADMIN — METOPROLOL TARTRATE 25 MG: 25 TABLET, FILM COATED ORAL at 07:57

## 2024-05-02 RX ADMIN — SODIUM CHLORIDE 750 ML: 9 INJECTION, SOLUTION INTRAVENOUS at 16:16

## 2024-05-02 RX ADMIN — FLUTICASONE FUROATE AND VILANTEROL TRIFENATATE 1 PUFF: 100; 25 POWDER RESPIRATORY (INHALATION) at 07:54

## 2024-05-02 RX ADMIN — MONTELUKAST 10 MG: 10 TABLET, FILM COATED ORAL at 20:02

## 2024-05-02 RX ADMIN — AMLODIPINE BESYLATE 5 MG: 5 TABLET ORAL at 20:01

## 2024-05-02 ASSESSMENT — ACTIVITIES OF DAILY LIVING (ADL)
ADLS_ACUITY_SCORE: 25
ADLS_ACUITY_SCORE: 26
ADLS_ACUITY_SCORE: 25
ADLS_ACUITY_SCORE: 26
ADLS_ACUITY_SCORE: 25

## 2024-05-02 NOTE — PROGRESS NOTES
Care Management Follow Up    Length of Stay (days): 5    Expected Discharge Date: 05/03/2024     Concerns to be Addressed: discharge planning  Patient plan of care discussed at interdisciplinary rounds: Yes    Anticipated Discharge Disposition:  home w/ outpatient cardiac rehab     Anticipated Discharge Services:  outpatient cardiac rehab  Anticipated Discharge DME:  per team    Patient/family educated on Medicare website which has current facility and service quality ratings:  yes  Education Provided on the Discharge Plan: yes   Patient/Family in Agreement with the Plan:  yes    Referrals Placed by CM/SW:    Private pay costs discussed: Not applicable    Additional Information:  SW and [employer] intern met with pt in room to discuss her plans for discharge and getting to and from Outpatient Cardiac Rehab. Pt states that she feels she is capable and feels comfortable enough to drive or can call an Uber. When pt was asked about feeling safe alone, she stated that she had been living alone since the 60's and stated understanding of her limitations which included not golfing for a month.   SW intern called and left a voicemail for pt meltami Sandra to update her on discharge plan.   CM to follow for further possible discharge needs.     RICHARD Mcdonald [facility]   5/2/2024

## 2024-05-02 NOTE — PROGRESS NOTES
"Imp/plan:  CAD s/p PCI to Circ complicated by very small perforation, no effusion on echo and CT with small effusion, will repeat today and if stable can be discharged home on asp 81mg, clopidogrel (was loaded with 600mg).  Residual moderate dz in LAD and RCA, cont with medical therapy.  Suggest repeat CT chest if symptoms dyspnea, syncope or dizzy spells to screen for effusion.    CV prevention - cont statin, goal LDL< 70    Addendum reviewed CT scan with radiologist and no difference in size from yesterday, noted not loculated.  If stay, plan echo limited David otherwise echo in 1 week. (I put in order for both inpt tomorrow and outpt at [facility] for one week)    Follow up with Dr. Mann and RAYMOND in Cardiology    Review of Systems: 12 points negative other than above    /58   Pulse 99   Temp 98.5  F (36.9  C) (Oral)   Resp 24   Ht 1.702 m (5' 7\")   Wt 84.7 kg (186 lb 12.8 oz)   SpO2 91%   BMI 29.26 kg/m    JVP<7cm, carotids normal  Lungs clear  Cor RRR no c,r,m  Abs soft +BS, no mass  Ext no c,c,e    Lab Results   Component Value Date    HGB 11.0 (L) 05/02/2024     Lab Results   Component Value Date     05/02/2024     No results found for: \"CREATININE\"  No components found for: \"K\"          Tomas Campuzano MD  Interventional Cardiology   [facility]  [phone]    "

## 2024-05-02 NOTE — PLAN OF CARE
Goal Outcome Evaluation:      Plan of Care Reviewed With: patient    Overall Patient Progress: improvingOverall Patient Progress: improving           Problem: Adult Inpatient Plan of Care  Goal: Plan of Care Review  Description: The Plan of Care Review/Shift note should be completed every shift.  The Outcome Evaluation is a brief statement about your assessment that the patient is improving, declining, or no change.  This information will be displayed automatically on your shift  note.  Outcome: Progressing  Flowsheets (Taken 5/2/2024 0307)  Plan of Care Reviewed With: patient  Overall Patient Progress: improving       Problem: Cardiac Catheterization (Diagnostic/Interventional)  Goal: Absence of Embolism Signs and Symptoms  Intervention: Prevent or Manage Embolism  Flowsheets  Taken 5/2/2024 0307  VTE Prevention/Management: SCDs (sequential compression devices) on  Taken 5/1/2024 2000  VTE Prevention/Management: SCDs (sequential compression devices) on         Pt alert and oriented x4. Eager to be discharged and verbalized disappointment about still being hospitalized. Denied pain throughout shift. R radial access site from angiogram free from hematoma and bleeding. Normal sinus rhythm on monitor. Shortness of breath on exertion noted. SBA with gait belt when ambulating to bathroom. Pt is able to make her needs known. SCDs applied yesterday evening. Applied NC early this AM d/t O2 saturation in upper 80s.     See flowsheets for full assessments.

## 2024-05-02 NOTE — PROGRESS NOTES
[facility]    Medicine Progress Note - Hospitalist Service    Date of Admission:  4/27/2024    Assessment & Plan   88-year-old with hypertension, CKD, hyperlipidemia who presenting with chest pain and was diagnosed with non-STEMI.  Patient admitted for further management.    Non-ST elevation MI;  -- On 4/29, underwent coronary angiogram.  Reported two-vessel CAD with acute lesion in mLcx s/p CANDICE x1 and a small dissection/contained perforation in a small OM, moderate to severe disease in LAD  --Cardiology recommended aspirin 81 mg daily indefinitely, Plavix 75 mg daily for at least 12 months, also reported LAD disease can be addressed in a staged procedure 2 to 4 weeks from now.  Follow-up with them as recommended  -- Seated echocardiogram, serial CT chest fairly stable per cardiologist.  Personally discussed with cardiologist.  --Continue statin.    Essential hypertension; fairly stable  -- PTA amlodipine, losartan (hold today).    --Patient initiated on metoprolol due to above issue, titrate dose.    --PTA clonidine discontinued on admission.    --Monitor vitals and adjust medications accordingly.    Leukocytosis, likely due to above;  -- No clinical signs and symptoms of infection.  Improving.    CT imaging reported concerning for aspiration;  -- CT reported mild right lower lobe reticulonodular and groundglass opacities with endobronchial debris.  Personally reviewed CT imaging with radiologist.   - Patient denied coughing or choking with oral intake but reported history of difficulty swallowing in the past.  Currently no cough or congestion, no feeling of fever or chills, WBC count improving so hold on antibiotics.  Discussed with patient and family members and they agreed.  -- Aspiration precaution and speech therapy consulted.    History of Moderate persistent asthma; not on exacerbation  --Continue Breo, montelukast, as needed nebs and inhaler    GERD, PTA medications  Probable CARLYLE on  "CKD stage III, gentle hydration, hold losartan today.  BMP in a.m.            Diet: Low Saturated Fat Na <2400 mg    DVT Prophylaxis: Pneumatic Compression Devices and Ambulate every shift  Russo Catheter: Not present  Lines: None     Cardiac Monitoring: ACTIVE order. Indication: Post- PCI/Angiogram (24 hours)  Code Status: Full Code      Clinically Significant Risk Factors                         # Overweight: Estimated body mass index is 29.26 kg/m  as calculated from the following:    Height as of this encounter: 1.702 m (5' 7\").    Weight as of this encounter: 84.7 kg (186 lb 12.8 oz).      # Financial/Environmental Concerns: none  # Asthma: noted on problem list        Disposition Plan     Medically Ready for Discharge: Anticipated Tomorrow             Ricky Chavez MD  Hospitalist Service  [facility]  Securely message with [employer] (more info)  Text page via [employer] Paging/Directory   ______________________________________________________________________    Interval History   Patient seen and examined.  Notes, labs, imaging report personally reviewed.  Patient denied coughing or choking with oral intake but reports history of difficulty swallowing.  Denied fever or chills.  Denied short of breath or chest pain.  Explained CT findings, labs results and needs close monitoring and keeping on hospital.  Patient was upset that she has to stay in the hospital.  Discussed with cardiologist.  Discussed with radiologist.  Discussed with nursing staff  Detailed plan of care discussed with patient and niece Ms. Flores, about imaging findings, lab findings, management, disposition plan.  She also reported patient not drinking much, started gentle IV fluid    Physical Exam   Vital Signs: Temp: 98.3  F (36.8  C) Temp src: Oral BP: 119/58 Pulse: 79   Resp: 24 SpO2: 95 % O2 Device: None (Room air) Oxygen Delivery: 1 LPM  Weight: 186 lbs 12.8 oz      General: Not in obvious distress.  HEENT: Normocephalic, " supple neck  Chest: Clear to auscultation bilateral anteriorly, no wheezing  Heart: S1S2 normal, regular  Abdomen: Soft. NT, ND. Bowel sounds- active.  Extremities: No legs swelling  Neuro: alert and awake, grossly non-focal        Medical Decision Making           Data     I have personally reviewed the following data over the past 24 hrs:    11.9 (H)  \   11.0 (L)   / 166     139 104 34.2 (H) /  115 (H)   3.8 21 (L) 1.57 (H) \       Imaging results reviewed over the past 24 hrs:   Recent Results (from the past 24 hour(s))   CT Chest w/o Contrast    Narrative    EXAM: CT CHEST W/O CONTRAST  LOCATION: [facility]  DATE: 5/1/2024    INDICATION: Posterior lateral pericardial effusion  COMPARISON: CTA chest 4/27/2024  TECHNIQUE: CT chest without IV contrast. Multiplanar reformats were obtained. Dose reduction techniques were used.  CONTRAST: None.    FINDINGS:   LUNGS AND PLEURA: Linear atelectasis or scarring bilaterally, similar to prior. No focal airspace disease. No pleural effusion or pneumothorax.    LOWER NECK: Status post left thyroidectomy. Right thyroid gland appears unremarkable.    MEDIASTINUM/AXILLAE: No lymphadenopathy. Trace anterior pericardial effusion, measuring up to 0.9 cm in thickness, new from 4/27/2024.    CORONARY ARTERY CALCIFICATION: Previous intervention (stents or CABG).    UPPER ABDOMEN: Unremarkable.    MUSCULOSKELETAL: Multilevel degenerative changes of the spine. Multilevel anterior flowing ossification, compatible with diffuse idiopathic skeletal hyperostosis.      Impression    IMPRESSION:   1.  Trace anterior pericardial effusion, new from 4/27/2024.    2.  Otherwise, no acute findings in the chest.     CT Chest w/o Contrast    Narrative    EXAM: CT CHEST W/O CONTRAST  LOCATION: [facility]  DATE: 5/2/2024    INDICATION: Pericardial effusion  COMPARISON: CT chest 5/1/2024, 4/27/2024 and 3/9/2017  TECHNIQUE: CT chest without IV contrast.  Multiplanar reformats were obtained. Dose reduction techniques were used.  CONTRAST: None.    FINDINGS:   LUNGS AND PLEURA: The central airways are clear. Mild bronchial wall thickening and new mild right lower lobe clustered reticular nodular and groundglass opacities with mild endobronchial debris. Mild parenchymal scarring. Stable right middle lobe   subsegmental atelectasis. No focal pneumonic consolidation or pleural effusion.    MEDIASTINUM/AXILLAE: Stable clustered right breast calcifications. No thoracic adenopathy. Calcified right hilar lymph node. Normal heart size. Slightly enlarging trace anterior pericardial effusion. Small hiatal hernia.    CORONARY ARTERY CALCIFICATION: Previous intervention (stents or CABG).    UPPER ABDOMEN: Gallbladder sludge versus tiny low-density stones.  Bilateral multifocal renal cortical scarring and multiple renal cysts. No follow-up is indicated.    MUSCULOSKELETAL: Spinal and bilateral glenohumeral joint degenerative changes. DISH.      Impression    IMPRESSION:   1.  Minimally enlarging trace anterior pericardial effusion.  2.  New mild right lower lobe reticulonodular and groundglass opacities with endobronchial debris. Aspiration is a primary differential consideration.

## 2024-05-02 NOTE — PLAN OF CARE
Goal Outcome Evaluation:  A/Ox4. VSS. Denies pain. Sats in 90s on RA. LS clear. Ambulated halls assist x1. SOB with exertion. IV fluids running at 50mL/hr. Bladder scanned for 731 at 2227; straight cathed for 550 at 2300. Plan is for SLP to see pt tomorrow, and for repeat echo. Bed alarm on, call light in reach. Makes needs known.       Problem: Cardiac Catheterization (Diagnostic/Interventional)  Goal: Absence of Bleeding  Outcome: Progressing  Goal: Absence of Contrast-Induced Injury  Outcome: Progressing  Goal: Stable Heart Rate and Rhythm  Outcome: Progressing  Goal: Absence of Embolism Signs and Symptoms  Outcome: Progressing  Goal: Anesthesia/Sedation Recovery  Outcome: Progressing  Intervention: Optimize Anesthesia Recovery  Recent Flowsheet Documentation  Taken 5/2/2024 1500 by Shawna Sandra RN  Safety Promotion/Fall Prevention: activity supervised  Reorientation Measures:   calendar in view   clock in view  Taken 5/2/2024 0800 by Shawna Sandra RN  Safety Promotion/Fall Prevention: activity supervised  Reorientation Measures:   calendar in view   clock in view  Goal: Optimal Pain Control and Function  Outcome: Progressing  Goal: Absence of Vascular Access Complication  Outcome: Progressing  Intervention: Prevent and Manage Access Complications  Recent Flowsheet Documentation  Taken 5/2/2024 1500 by Shawna Sandra RN  Activity Management: ambulated to bathroom  Taken 5/2/2024 1155 by Shawna Sandra RN  Activity Management: ambulated outside room  Taken 5/2/2024 0800 by Shawna Sandra RN  Activity Management: ambulated to bathroom     Problem: Chest Pain  Goal: Resolution of Chest Pain Symptoms  Outcome: Progressing     Problem: Adult Inpatient Plan of Care  Goal: Plan of Care Review  Description: The Plan of Care Review/Shift note should be completed every shift.  The Outcome Evaluation is a brief statement about your assessment that the patient is improving, declining, or no  "change.  This information will be displayed automatically on your shift  note.  Outcome: Progressing  Goal: Patient-Specific Goal (Individualized)  Description: You can add care plan individualizations to a care plan. Examples of Individualization might be:  \"Parent requests to be called daily at 9am for status\", \"I have a hard time hearing out of my right ear\", or \"Do not touch me to wake me up as it startles  me\".  Outcome: Progressing  Goal: Absence of Hospital-Acquired Illness or Injury  Intervention: Identify and Manage Fall Risk  Recent Flowsheet Documentation  Taken 5/2/2024 1500 by Shawna Sandra RN  Safety Promotion/Fall Prevention: activity supervised  Taken 5/2/2024 0800 by Shawna Sandra RN  Safety Promotion/Fall Prevention: activity supervised  Intervention: Prevent Skin Injury  Recent Flowsheet Documentation  Taken 5/2/2024 1500 by Shawna Sandra RN  Body Position: sitting up in bed  Taken 5/2/2024 0800 by Shawna Sandra RN  Body Position: sitting up in bed  Intervention: Prevent Infection  Recent Flowsheet Documentation  Taken 5/2/2024 1500 by Shawna Sandra RN  Infection Prevention:   rest/sleep promoted   hand hygiene promoted   environmental surveillance performed   single patient room provided  Taken 5/2/2024 0800 by Shawna Sandra RN  Infection Prevention:   rest/sleep promoted   hand hygiene promoted   environmental surveillance performed   single patient room provided  Goal: Readiness for Transition of Care  Outcome: Progressing     "

## 2024-05-03 ENCOUNTER — APPOINTMENT (OUTPATIENT)
Dept: OCCUPATIONAL THERAPY | Facility: HOSPITAL | Age: 89
DRG: 322 | End: 2024-05-03
Attending: STUDENT IN AN ORGANIZED HEALTH CARE EDUCATION/TRAINING PROGRAM
Payer: COMMERCIAL

## 2024-05-03 ENCOUNTER — APPOINTMENT (OUTPATIENT)
Dept: SPEECH THERAPY | Facility: HOSPITAL | Age: 89
DRG: 322 | End: 2024-05-03
Attending: INTERNAL MEDICINE
Payer: COMMERCIAL

## 2024-05-03 ENCOUNTER — APPOINTMENT (OUTPATIENT)
Dept: CARDIOLOGY | Facility: HOSPITAL | Age: 89
DRG: 322 | End: 2024-05-03
Attending: INTERNAL MEDICINE
Payer: COMMERCIAL

## 2024-05-03 ENCOUNTER — OFFICE VISIT (OUTPATIENT)
Dept: FAMILY MEDICINE | Facility: CLINIC | Age: 89
End: 2024-05-03
Payer: COMMERCIAL

## 2024-05-03 VITALS
WEIGHT: 186 LBS | TEMPERATURE: 97 F | RESPIRATION RATE: 28 BRPM | OXYGEN SATURATION: 96 % | DIASTOLIC BLOOD PRESSURE: 68 MMHG | SYSTOLIC BLOOD PRESSURE: 112 MMHG | HEART RATE: 110 BPM | BODY MASS INDEX: 29.13 KG/M2

## 2024-05-03 VITALS
HEART RATE: 89 BPM | DIASTOLIC BLOOD PRESSURE: 62 MMHG | WEIGHT: 184.3 LBS | RESPIRATION RATE: 16 BRPM | HEIGHT: 67 IN | OXYGEN SATURATION: 95 % | TEMPERATURE: 97.9 F | SYSTOLIC BLOOD PRESSURE: 134 MMHG | BODY MASS INDEX: 28.93 KG/M2

## 2024-05-03 DIAGNOSIS — I31.39 PERICARDIAL EFFUSION: Primary | ICD-10-CM

## 2024-05-03 DIAGNOSIS — R33.9 URINARY RETENTION: Primary | ICD-10-CM

## 2024-05-03 LAB
ANION GAP SERPL CALCULATED.3IONS-SCNC: 12 MMOL/L (ref 7–15)
BUN SERPL-MCNC: 41.9 MG/DL (ref 8–23)
CALCIUM SERPL-MCNC: 8.5 MG/DL (ref 8.8–10.2)
CHLORIDE SERPL-SCNC: 109 MMOL/L (ref 98–107)
CREAT SERPL-MCNC: 1.55 MG/DL (ref 0.51–0.95)
DEPRECATED HCO3 PLAS-SCNC: 21 MMOL/L (ref 22–29)
EGFRCR SERPLBLD CKD-EPI 2021: 32 ML/MIN/1.73M2
ERYTHROCYTE [DISTWIDTH] IN BLOOD BY AUTOMATED COUNT: 14.9 % (ref 10–15)
GLUCOSE BLDC GLUCOMTR-MCNC: 147 MG/DL (ref 70–99)
GLUCOSE SERPL-MCNC: 99 MG/DL (ref 70–99)
HCT VFR BLD AUTO: 33.6 % (ref 35–47)
HGB BLD-MCNC: 10.8 G/DL (ref 11.7–15.7)
MCH RBC QN AUTO: 31.2 PG (ref 26.5–33)
MCHC RBC AUTO-ENTMCNC: 32.1 G/DL (ref 31.5–36.5)
MCV RBC AUTO: 97 FL (ref 78–100)
PLATELET # BLD AUTO: 155 10E3/UL (ref 150–450)
POTASSIUM SERPL-SCNC: 4 MMOL/L (ref 3.4–5.3)
RBC # BLD AUTO: 3.46 10E6/UL (ref 3.8–5.2)
SODIUM SERPL-SCNC: 142 MMOL/L (ref 135–145)
WBC # BLD AUTO: 9.9 10E3/UL (ref 4–11)

## 2024-05-03 PROCEDURE — 99232 SBSQ HOSP IP/OBS MODERATE 35: CPT | Performed by: INTERNAL MEDICINE

## 2024-05-03 PROCEDURE — 93308 TTE F-UP OR LMTD: CPT | Mod: 26 | Performed by: INTERNAL MEDICINE

## 2024-05-03 PROCEDURE — 93321 DOPPLER ECHO F-UP/LMTD STD: CPT | Mod: 26 | Performed by: INTERNAL MEDICINE

## 2024-05-03 PROCEDURE — 93325 DOPPLER ECHO COLOR FLOW MAPG: CPT | Mod: 26 | Performed by: INTERNAL MEDICINE

## 2024-05-03 PROCEDURE — 36415 COLL VENOUS BLD VENIPUNCTURE: CPT | Performed by: INTERNAL MEDICINE

## 2024-05-03 PROCEDURE — 250N000013 HC RX MED GY IP 250 OP 250 PS 637: Performed by: INTERNAL MEDICINE

## 2024-05-03 PROCEDURE — 99204 OFFICE O/P NEW MOD 45 MIN: CPT | Performed by: FAMILY MEDICINE

## 2024-05-03 PROCEDURE — 80048 BASIC METABOLIC PNL TOTAL CA: CPT | Performed by: INTERNAL MEDICINE

## 2024-05-03 PROCEDURE — 99231 SBSQ HOSP IP/OBS SF/LOW 25: CPT | Performed by: INTERNAL MEDICINE

## 2024-05-03 PROCEDURE — 92610 EVALUATE SWALLOWING FUNCTION: CPT | Mod: GN

## 2024-05-03 PROCEDURE — 97535 SELF CARE MNGMENT TRAINING: CPT | Mod: GO

## 2024-05-03 PROCEDURE — 93325 DOPPLER ECHO COLOR FLOW MAPG: CPT

## 2024-05-03 PROCEDURE — 250N000013 HC RX MED GY IP 250 OP 250 PS 637: Performed by: NURSE PRACTITIONER

## 2024-05-03 PROCEDURE — 250N000013 HC RX MED GY IP 250 OP 250 PS 637: Performed by: STUDENT IN AN ORGANIZED HEALTH CARE EDUCATION/TRAINING PROGRAM

## 2024-05-03 PROCEDURE — 85027 COMPLETE CBC AUTOMATED: CPT | Performed by: INTERNAL MEDICINE

## 2024-05-03 RX ORDER — METOPROLOL TARTRATE 25 MG/1
25 TABLET, FILM COATED ORAL 2 TIMES DAILY
Qty: 120 TABLET | Refills: 0 | Status: SHIPPED | OUTPATIENT
Start: 2024-05-03 | End: 2024-08-09

## 2024-05-03 RX ADMIN — FLUTICASONE FUROATE AND VILANTEROL TRIFENATATE 1 PUFF: 100; 25 POWDER RESPIRATORY (INHALATION) at 08:11

## 2024-05-03 RX ADMIN — ASPIRIN 81 MG: 81 TABLET, COATED ORAL at 08:10

## 2024-05-03 RX ADMIN — CLOPIDOGREL BISULFATE 75 MG: 75 TABLET ORAL at 08:10

## 2024-05-03 RX ADMIN — METOPROLOL TARTRATE 25 MG: 25 TABLET, FILM COATED ORAL at 08:10

## 2024-05-03 RX ADMIN — LEVOTHYROXINE SODIUM 50 MCG: 0.03 TABLET ORAL at 08:10

## 2024-05-03 ASSESSMENT — ACTIVITIES OF DAILY LIVING (ADL)
ADLS_ACUITY_SCORE: 26

## 2024-05-03 NOTE — DISCHARGE SUMMARY
[facility] MEDICINE  DISCHARGE SUMMARY     Primary Care Physician: Jessica Fonseca  Admission Date: 4/27/2024   Discharge Provider: Ricky Chavez MD Discharge Date: 5/3/2024   Diet:   Active Diet and Nourishment Order   Procedures     Low Saturated Fat Na <2400 mg     Diet       Code Status: Full Code   Activity:         Condition at Discharge: Good     REASON FOR PRESENTATION(See Admission Note for Details)   Chest pain.  Please refer to H&P for details    PRINCIPAL & ACTIVE DISCHARGE DIAGNOSES     Principal Problem:    NSTEMI (non-ST elevated myocardial infarction) (H)  Active Problems:    Chest pain  Essential hypertension  CKD stage III  Leukocytosis  History of asthma  History of GERD    PENDING LABS     Unresulted Labs Ordered in the Past 30 Days of this Admission       No orders found from 3/28/2024 to 4/28/2024.              PROCEDURES ( this hospitalization only)      Procedure(s):  Left Heart Catheterization  Coronary Angiogram  Percutaneous Coronary Intervention  Percutaneous Coronary Intervention Aspiration Thrombectomy    RECOMMENDATIONS TO OUTPATIENT PROVIDER FOR F/U VISIT     Follow-up Appointments     Follow-up and recommended labs and tests       Follow up with primary care provider, Jessica Fonseca, within 5 to 7   days, to evaluate medication change, for hospital follow- up, and   medication refills. The following labs/tests are recommended: CBC, BMP,   magnesium.  Follow-up with cardiologist as recommended                DISPOSITION     Home    SUMMARY OF HOSPITAL COURSE:      88-year-old with hypertension, CKD, hyperlipidemia who presenting with chest pain and was diagnosed with non-STEMI.  Patient admitted for further management.     Non-ST elevation MI;  -- On 4/29, underwent coronary angiogram.  Reported two-vessel CAD with acute lesion in mLcx s/p CANDICE x1 and a small dissection/contained perforation in a small OM, moderate to severe disease in  LAD  --Cardiology recommended aspirin 81 mg daily indefinitely, Plavix 75 mg daily for at least 12 months, also reported LAD disease can be addressed in a staged procedure 2 to 4 weeks from now.  Follow-up with them as recommended.  -- Personally discussed with cardiologist, serial echocardiogram and serial CT chest fairly stable and they will arrange outpatient follow-up.  --Cardiologist changed PTA Zocor to atorvastatin.  --Aspirin, Plavix and atorvastatin prescription was sent by cardiologist     Essential hypertension;   -- PTA amlodipine, losartan  --Patient initiated on metoprolol due to above issue.  --PTA clonidine discontinued on admission as blood pressure fairly stable with other medications..    -- Monitor blood pressure as an outpatient through PCP    Leukocytosis, likely due to above;  -- No clinical signs and symptoms of infection and improved without intervention     CT imaging reported concerning for aspiration;  -- CT reported mild right lower lobe reticulonodular and groundglass opacities with endobronchial debris.  Personally reviewed CT imaging with radiologist.   - Patient denied coughing or choking with oral intake.  Currently no cough or congestion, no feeling of fever or chills, WBC count improving so hold on antibiotics.  Discussed with patient and family members and they agreed.  Advised patient to call PCP if patient develops cough or fever or shortness of breath.  -- Appreciated speech therapy input, reported no signs and symptoms of aspiration.    History of Moderate persistent asthma; not on exacerbation  --Continue Breo, montelukast, as needed nebs and inhaler     CKD stage III;  --Not much change with gentle hydration, patient reported that she is aware of her CKD stage III and PCP told her to drink fluid.  Reviewed kidney function since last several years, seems creatinine around 1.5.    --Check BMP when follow-up with PCP.    GERD, PTA medication    Discharge Medications with Med  changes:     Current Discharge Medication List        START taking these medications    Details   aspirin 81 MG EC tablet Take 1 tablet (81 mg) by mouth daily Start tomorrow.  Qty: 30 tablet, Refills: 3    Associated Diagnoses: Presence of drug-eluting stent in left circumflex coronary artery      atorvastatin (LIPITOR) 40 MG tablet Take 1 tablet (40 mg) by mouth daily  Qty: 90 tablet, Refills: 3    Associated Diagnoses: Presence of drug-eluting stent in left circumflex coronary artery      clopidogrel (PLAVIX) 75 MG tablet Take 1 tablet (75 mg) by mouth daily Dose to start tomorrow.  Qty: 90 tablet, Refills: 3    Associated Diagnoses: Presence of drug-eluting stent in left circumflex coronary artery      metoprolol tartrate (LOPRESSOR) 25 MG tablet Take 1 tablet (25 mg) by mouth 2 times daily Please call PCP for refill  Qty: 120 tablet, Refills: 0    Associated Diagnoses: NSTEMI (non-ST elevated myocardial infarction) (H)           CONTINUE these medications which have NOT CHANGED    Details   acetaminophen (TYLENOL) 325 MG tablet Take 2 tablets (650 mg) by mouth every 4 hours as needed for other (surgical pain)  Qty: 100 tablet, Refills: 0    Associated Diagnoses: Acute post-operative pain      acetaminophen-codeine (TYLENOL #3) 300-30 MG tablet Take 1 tablet by mouth every 6 hours as needed for severe pain (used when she has to walk long distances)      albuterol (2.5 MG/3ML) 0.083% nebulizer solution Take 1 vial by nebulization every 4 hours as needed for shortness of breath / dyspnea or wheezing      albuterol (PROAIR HFA/PROVENTIL HFA/VENTOLIN HFA) 108 (90 BASE) MCG/ACT Inhaler Inhale 2 puffs into the lungs every 4 hours as needed for shortness of breath      allopurinol (ZYLOPRIM) 100 MG tablet Take 100 mg by mouth 2 times daily as needed Gout flares      amLODIPine (NORVASC) 5 MG tablet Take 5 mg by mouth at bedtime      cetirizine (ZYRTEC) 10 MG tablet Take 10 mg by mouth daily as needed for allergies       ferrous sulfate 45 MG TBCR CR tablet Take 45 mg by mouth daily      fluticasone-salmeterol (ADVAIR) 250-50 MCG/DOSE diskus inhaler Inhale 1 puff into the lungs daily      levothyroxine (SYNTHROID/LEVOTHROID) 50 MCG tablet Take 50 mcg by mouth daily      losartan (COZAAR) 50 MG tablet Take 50 mg by mouth daily  Qty: 30 tablet      montelukast (SINGULAIR) 10 MG tablet Take 10 mg by mouth at bedtime      multivitamin w/minerals (THERA-VIT-M) tablet Take 1 tablet by mouth daily      pantoprazole (PROTONIX) 40 MG EC tablet Take 40 mg by mouth daily as needed for heartburn      polyethylene glycol (MIRALAX/GLYCOLAX) powder Take 17 g by mouth daily as needed for constipation      predniSONE (DELTASONE) 20 MG tablet Take 20 mg by mouth daily as needed (Self doses in winter for lung infections.)      valACYclovir (VALTREX) 1000 mg tablet Take 1,000 mg by mouth 2 times daily as needed (for one day)      Vitamin D3 (CHOLECALCIFEROL) 25 mcg (1000 units) tablet Take 1,000 Units by mouth daily           STOP taking these medications       cloNIDine (CATAPRES) 0.1 MG tablet Comments:   Reason for Stopping:         simvastatin (ZOCOR) 20 MG tablet Comments:   Reason for Stopping:                     Rationale for medication changes:      Please refer to hospital course        Consults       CARDIOLOGY IP CONSULT  PHARMACY IP CONSULT  CARE MANAGEMENT / SOCIAL WORK IP CONSULT  HOSPITALIST IP CONSULT  NUTRITION SERVICES ADULT IP CONSULT  CARDIAC REHAB IP CONSULT  PHARMACY IP CONSULT  SPEECH LANGUAGE PATH ADULT IP CONSULT  SMOKING CESSATION PROGRAM IP CONSULT    Immunizations given this encounter     Most Recent Immunizations   Administered Date(s) Administered     COVID-19 12+ (2023-24) (MODERNA) 10/02/2023     COVID-19 Bivalent 18+ (Moderna) 09/15/2022     COVID-19 Monovalent 18+ (Moderna) 04/06/2022           Anticoagulation Information          SIGNIFICANT IMAGING FINDINGS     Results for orders placed or performed during the  hospital encounter of 04/27/24   CT Chest w/o Contrast    Impression    IMPRESSION:   1.  Trace anterior pericardial effusion, new from 4/27/2024.    2.  Otherwise, no acute findings in the chest.     CT Chest w/o Contrast    Impression    IMPRESSION:   1.  Minimally enlarging trace anterior pericardial effusion.  2.  New mild right lower lobe reticulonodular and groundglass opacities with endobronchial debris. Aspiration is a primary differential consideration.     Echocardiogram Complete   Result Value Ref Range    LVEF  > 65%    Echocardiogram Limited   Result Value Ref Range    LVEF  55-60%    Echocardiogram Limited   Result Value Ref Range    LVEF  60-65%        SIGNIFICANT LABORATORY FINDINGS     Most Recent 3 CBC's:  Recent Labs   Lab Test 05/03/24  0507 05/02/24  0434 05/01/24  0506 04/28/24  0546   WBC 9.9 11.9* 14.4* 13.4*   HGB 10.8* 11.0* 11.3* 11.0*   MCV 97 96  --  96    166  --  156     Most Recent 3 BMP's:  Recent Labs   Lab Test 05/03/24  0801 05/03/24  0507 05/02/24  0434 05/01/24  0506   NA  --  142 139 141   POTASSIUM  --  4.0 3.8 4.0   CHLORIDE  --  109* 104 106   CO2  --  21* 21* 19*   BUN  --  41.9* 34.2* 27.6*   CR  --  1.55* 1.57* 1.35*  1.33*   ANIONGAP  --  12 14 16*   GREG  --  8.5* 8.7* 8.6*   * 99 115* 112*       Discharge Orders        Ambulatory CARDIAC REHAB REFERRAL      Ambulatory Cardiologist Referral      Follow-Up with Cardiology [facility] Referral      Reason for your hospital stay    Patient admitted for non-ST elevation MI     Follow-up and recommended labs and tests     Follow up with primary care provider, Jessica Fonseca, within 5 to 7 days, to evaluate medication change, for hospital follow- up, and medication refills. The following labs/tests are recommended: CBC, BMP, magnesium.  Follow-up with cardiologist as recommended     Activity    Your activity upon discharge: As recommended by cardiac rehab team     Diet    Follow this diet  upon discharge: Orders Placed This Encounter      Low Saturated Fat Na <2400 mg       Examination   Physical Exam   Temp:  [97.7  F (36.5  C)-98.4  F (36.9  C)] 97.9  F (36.6  C)  Pulse:  [78-91] 89  Resp:  [16-22] 16  BP: (116-148)/(60-66) 134/62  SpO2:  [93 %-95 %] 95 %  Wt Readings from Last 1 Encounters:   05/03/24 83.6 kg (184 lb 4.8 oz)       Patient seen and examined.  Notes, labs, imaging report personally reviewed.  Patient denied feeling short of breath or chest pain, cough or congestion, fever or chills.  Denied abdomen pain.  Discussed with nursing staffs.  Discussed with cardiologist at bedside.  Detailed plan of care after discharge discussed with patient and family member at bedside.      General: Not in obvious distress.  HEENT: Normocephalic, supple neck  Chest: Clear to auscultation bilateral anteriorly, no wheezing  Heart: S1S2 normal, regular  Abdomen: Soft. NT, ND. Bowel sounds- active.  Extremities: No legs swelling  Neuro: alert and awake, grossly non-focal        Please see EMR for more detailed significant labs, imaging, consultant notes etc.    IRicky MD, personally saw the patient today and spent greater than 30 minutes discharging this patient.    Ricky Chavez MD  [facility]    CC:Jessica Fonseca

## 2024-05-03 NOTE — PROGRESS NOTES
Care Management Discharge Note    Discharge Date: 05/03/2024       Discharge Disposition: Home    Discharge Services: None    Discharge DME: None    Discharge Transportation: family or friend will provide    Private pay costs discussed: Not applicable    Does the patient's insurance plan have a 3 day qualifying hospital stay waiver?  No    PAS Confirmation Code: N/A  Patient/family educated on Medicare website which has current facility and service quality ratings: no    Education Provided on the Discharge Plan: Yes  Persons Notified of Discharge Plans: patient; family  Patient/Family in Agreement with the Plan: yes    Handoff Referral Completed: Yes    Additional Information:  CM received a voicemail from Pt's niece Sandra who requested a return phone call from CM to discuss Pt's discharge plan as she is very displeased with Pt's care during the hospital stay. Sandra expressed her frustration with the poor communication between the Care Team and felt some testing/procedures unnecessary. Sandra stated she will not have any of her family go to [facility] again and will go to [facility] from now on.  offered Sandra the Patient Advocate contact to file a complaint. Sandra reported it wasn't needed and that she was in a sauna so she wouldn't be able to write it down. Sandra declined any further questions or contact from .    Pt will discharge home with family transport.     CM will sign off. Please contact CM if any additional needs arise prior to discharge.         JOSY Winn

## 2024-05-03 NOTE — PROGRESS NOTES
Patient refused flutter valve. She didn't think she needed it after explanation of therapy.     Mai Cruz, RT

## 2024-05-03 NOTE — PROGRESS NOTES
"Imp/plan:  CAD s/p PCI to Circ complicated by very small perforation, small effusion on echo today, no signs tamponade, await final report.   Residual moderate dz in LAD and RCA, cont with medical therapy.  Plan echo as outpt in one week but if symptoms dyspnea, syncope or dizzy spells can use either echo or CT chest to screen for effusion.   Anemia - Consider follow up CBC in 1-2 weeks with primary care given on clopiodgrel and asp  CV prevention - cont statin, goal LDL< 70    Follow up scheduled with RAYMOND and Dr Mann in Cardiology  Review of Systems: 12 points negative other than above    /62 (BP Location: Left arm)   Pulse 89   Temp 97.9  F (36.6  C) (Oral)   Resp 16   Ht 1.702 m (5' 7\")   Wt 83.6 kg (184 lb 4.8 oz)   SpO2 95%   BMI 28.87 kg/m        Lab Results   Component Value Date    HGB 10.8 (L) 05/03/2024     Lab Results   Component Value Date     05/03/2024     No results found for: \"CREATININE\"  No components found for: \"K\"          Tomas Campuzano MD  Interventional Cardiology   [facility]  [phone]    "

## 2024-05-03 NOTE — PROGRESS NOTES
----- Message -----  From: Tomas Campuzano MD  Sent: 5/3/2024  11:49 AM CDT  To: Terri Stevenson, RN    Going home today - please schedule limited echo in one week and follow up with R[facility] for Dr. Mann after echo  Thank you!!  ----- Message -----  From: Terri Stevenson RN  Sent: 5/3/2024   9:16 AM CDT  To: Tomas Campuzano MD    Per chart review no CT ordered. There is an order from you for a limited echo to be completed 5/9 ordered but not scheduled, as well as a limited echo scheduled today as an IP. The follow-up echo unable to schedule while IP. Await discharge.  -sea  ----- Message -----  From: Tomas Campuzano MD  Sent: 5/3/2024   9:08 AM CDT  To: Terri Stevenson, RN    Could you tell me if an outpatient CT is ordered by me?? If so, would cancel and please order limited echo in 1-2 weeks to screen for effusion.  Thank you!!  Tomas

## 2024-05-03 NOTE — SIGNIFICANT EVENT
Discharge home with family, discharge instructions discussed and given to patient, niece present with discussion, all questions answered and states understanding of follow-up and medication changes

## 2024-05-03 NOTE — PROGRESS NOTES
"Speech-Language Pathology: Clinical Swallow Evaluation     05/03/24 0900   Appointment Info   Signing Clinician's Name / Credentials (SLP) Viky Santiago MA, CCC-SLP   General Information   Onset of Illness/Injury or Date of Surgery 04/27/24   Referring Physician Ricky LÓPEZ MD   Pertinent History of Current Problem Per ordering provider \"Patient seen and examined.  Notes, labs, imaging report personally reviewed.  Patient denied coughing or choking with oral intake but reports history of difficulty swallowing.  Denied fever or chills.  Denied short of breath or chest pain.  Explained CT findings, labs results and needs close monitoring and keeping on hospital.  Patient was upset that she has to stay in the hospital.  Discussed with cardiologist.  Discussed with radiologist.  Discussed with nursing staff  Detailed plan of care discussed with patient and niece Ms. Flores, about imaging findings, lab findings, management, disposition plan.  She also reported patient not drinking much, started gentle IV fluid\".   General Observations Alert and cooperative   Type of Evaluation   Type of Evaluation Swallow Evaluation   Oral Motor   Oral Musculature generally intact   Mucosal Quality good   Dentition (Oral Motor)   Dentition (Oral Motor) adequate dentition   Facial Symmetry (Oral Motor)   Facial Symmetry (Oral Motor) WNL   Lip Function (Oral Motor)   Lip Range of Motion (Oral Motor) WNL   Lip Strength (Oral Motor) WNL   Tongue Function (Oral Motor)   Tongue Strength (Oral Motor) WNL   Tongue Coordination/Speed (Oral Motor) WNL   Tongue ROM (Oral Motor) WNL   Jaw Function (Oral Motor)   Jaw Function (Oral Motor) WNL   Cough/Swallow/Gag Reflex (Oral Motor)   Soft Palate/Velum (Oral Motor) WNL   Volitional Throat Clear/Cough (Oral Motor) WNL   Volitional Swallow (Oral Motor) WNL   Vocal Quality/Secretion Management (Oral Motor)   Vocal Quality (Oral Motor) WNL   Secretion Management (Oral Motor) WNL   General Swallowing " Observations   Past History of Dysphagia RN and pt report no s/s aspiration. Pt stated that she had esophageal dilitation about a year ago d/t sensation of solids feeling stuck mid chest. No issues since.   Respiratory Support room air   Current Diet/Method of Nutritional Intake (General Swallowing Observations, NIS) regular diet;thin liquids (level 0)   Swallowing Evaluation Clinical swallow evaluation   Clinical Swallow Evaluation   Clinical Swallow Evaluation Textures Trialed thin liquids;solid foods   Clinical Swallow Eval: Thin Liquid Texture Trial   Mode of Presentation, Thin Liquids cup;straw   Volume of Liquid or Food Presented 6oz   Oral Phase of Swallow WFL   Pharyngeal Phase of Swallow intact   Diagnostic Statement No overt s/s aspiration   Clinical Swallow Evaluation: Solid Food Texture Trial   Mode of Presentation self-fed   Volume Presented x1 cracker   Oral Phase WFL   Pharyngeal Phase intact   Diagnostic Statement No overt s/s aspiration   Esophageal Phase of Swallow   Patient reports or presents with symptoms of esophageal dysphagia Yes   Esophageal comments No symptoms currently. Esophageal dilitation 2/6/23.   Swallowing Recommendations   Diet Consistency Recommendations regular diet;thin liquids (level 0)   Recommended Feeding/Eating Techniques (Swallow Eval) maintain upright posture during/after eating for 30 minutes   Medication Administration Recommendations, Swallowing (SLP) Per patient preference   Instrumental Assessment Recommendations instrumental evaluation not recommended at this time   Comment, Swallowing Recommendations Patient appears to be at low risk for aspiration when eating/drinking. Could be at some risk for post prandial aspiration, given hx of esophageal dysphagia. However, pt reports no symptoms since last year.   Clinical Impression   Criteria for Skilled Therapeutic Interventions Met (SLP Eval) Evaluation only   Risks & Benefits of therapy have been explained  evaluation/treatment results reviewed;care plan/treatment goals reviewed;participants voiced agreement with care plan;participants included;patient   Clinical Impression Comments Patient participated in Clinical Swallow Evaluation. No s/s aspiration with any intake. Oral motor was WFL. Mastication was adequate. Hyolaryngeal movement was present. Recommend diet listed above. No anticipated SLP needs at this time. Please contact SLP with any questions or concerns.   SLP Total Evaluation Time   Eval: oral/pharyngeal swallow function, clinical swallow Minutes (65914) 20   SLP Discharge Planning   SLP Discharge Recommendation other (see comments)   SLP Rationale for DC Rec No anticipated SLP needs at this time.   SLP Brief overview of current status  Recommend regular textures and thin liquids. No anticipated SLP needs at this time. Please contact SLP with any questions or concerns.   Total Session Time   Total Session Time (sum of timed and untimed services) 20

## 2024-05-03 NOTE — PLAN OF CARE
Problem: Chest Pain  Goal: Resolution of Chest Pain Symptoms  Outcome: Met     Problem: Cardiac Catheterization (Diagnostic/Interventional)  Goal: Stable Heart Rate and Rhythm  Outcome: Met  Goal: Optimal Pain Control and Function  Outcome: Met     Problem: Adult Inpatient Plan of Care  Goal: Absence of Hospital-Acquired Illness or Injury  Intervention: Identify and Manage Fall Risk  Recent Flowsheet Documentation  Taken 5/3/2024 0030 by Mirlande Bean RN  Safety Promotion/Fall Prevention: activity supervised  Intervention: Prevent Infection  Recent Flowsheet Documentation  Taken 5/3/2024 0030 by Mirlande Bean RN  Infection Prevention:   rest/sleep promoted   hand hygiene promoted   environmental surveillance performed   single patient room provided   Goal Outcome Evaluation:       Pt. Alert and oriented. On telemetry NSR. Vital signs stable. Slept well over the night. Using call light for all needs. Denies pain. Speech language pathology consult today and repeat ECHO. Voiding in small amounts. Bladder scan<463 ml. St. Cath. 600 ml. Will continue to monitor.    Mirlande Bean RN

## 2024-05-04 NOTE — PROGRESS NOTES
OUTPATIENT VISIT NOTE                                                   Date of Visit: 5/3/2024     Chief Complaint   Patient presents with:  Urinary Problem: Feels like she has to urinate but unable to since 6AM this morning.             History of Present Illness   Mary Corral is a 88 year old female has been unable to urinate since 6 o'clock this morning.  Recently hospitalized for MI.    Was straight cathed last night and again this morning both  times 1000 ml after ultrasound showed urinary retention.  No pain with urination.  Has been drinking water all day  No abdominal discomfort       MEDICATIONS   Current Outpatient Medications   Medication Sig Dispense Refill    acetaminophen (TYLENOL) 325 MG tablet Take 2 tablets (650 mg) by mouth every 4 hours as needed for other (surgical pain) 100 tablet 0    acetaminophen-codeine (TYLENOL #3) 300-30 MG tablet Take 1 tablet by mouth every 6 hours as needed for severe pain (used when she has to walk long distances)      albuterol (2.5 MG/3ML) 0.083% nebulizer solution Take 1 vial by nebulization every 4 hours as needed for shortness of breath / dyspnea or wheezing      albuterol (PROAIR HFA/PROVENTIL HFA/VENTOLIN HFA) 108 (90 BASE) MCG/ACT Inhaler Inhale 2 puffs into the lungs every 4 hours as needed for shortness of breath      allopurinol (ZYLOPRIM) 100 MG tablet Take 100 mg by mouth 2 times daily as needed Gout flares      amLODIPine (NORVASC) 5 MG tablet Take 5 mg by mouth at bedtime      aspirin 81 MG EC tablet Take 1 tablet (81 mg) by mouth daily Start tomorrow. 30 tablet 3    atorvastatin (LIPITOR) 40 MG tablet Take 1 tablet (40 mg) by mouth daily 90 tablet 3    cetirizine (ZYRTEC) 10 MG tablet Take 10 mg by mouth daily as needed for allergies      clopidogrel (PLAVIX) 75 MG tablet Take 1 tablet (75 mg) by mouth daily Dose to start tomorrow. 90 tablet 3    ferrous sulfate 45 MG TBCR CR tablet Take 45 mg by mouth daily      fluticasone-salmeterol (ADVAIR) 250-50  MCG/DOSE diskus inhaler Inhale 1 puff into the lungs daily      levothyroxine (SYNTHROID/LEVOTHROID) 50 MCG tablet Take 50 mcg by mouth daily      losartan (COZAAR) 50 MG tablet Take 50 mg by mouth daily 30 tablet     metoprolol tartrate (LOPRESSOR) 25 MG tablet Take 1 tablet (25 mg) by mouth 2 times daily Please call PCP for refill 120 tablet 0    montelukast (SINGULAIR) 10 MG tablet Take 10 mg by mouth at bedtime      multivitamin w/minerals (THERA-VIT-M) tablet Take 1 tablet by mouth daily      pantoprazole (PROTONIX) 40 MG EC tablet Take 40 mg by mouth daily as needed for heartburn      polyethylene glycol (MIRALAX/GLYCOLAX) powder Take 17 g by mouth daily as needed for constipation      predniSONE (DELTASONE) 20 MG tablet Take 20 mg by mouth daily as needed (Self doses in winter for lung infections.)      valACYclovir (VALTREX) 1000 mg tablet Take 1,000 mg by mouth 2 times daily as needed (for one day)      Vitamin D3 (CHOLECALCIFEROL) 25 mcg (1000 units) tablet Take 1,000 Units by mouth daily       No current facility-administered medications for this visit.         SOCIAL HISTORY   Social History     Tobacco Use    Smoking status: Never    Smokeless tobacco: Never    Tobacco comments:     smoked twice a week for a couple years while in college, less than 1/2 pack    Substance Use Topics    Alcohol use: Yes     Alcohol/week: 0.0 standard drinks of alcohol     Comment: Alcoholic Drinks/day: occasional           Physical Exam   Vitals:    05/03/24 1947   BP: 112/68   BP Location: Left arm   Patient Position: Sitting   Cuff Size: Adult Large   Pulse: 110   Resp: 28   Temp: 97  F (36.1  C)   TempSrc: Oral   SpO2: 96%   Weight: 84.4 kg (186 lb)        GEN: NAD  ABD: soft nontender.  Bladder not well palpated         Assessment and Plan     Urinary retention  No urination for over 12 hours.  Likely needs mendez catheter.  We do not have the capability here.  Patient doesn't want to go to [facility] ER.  She prefers  to go to the [facility].    The [facility] was called and they stated they have the facilities to provide mendez catheterization if needed.  Patient was sent to the urgency room.                 Discussed signs / symptoms that warrant urgent / emergent medical attention.   Recheck if worsening or not improving.       Abdoul Domínguez MD          Pertinent History     The following portions of the patient's history were reviewed and updated as appropriate: allergies, current medications, past family history, past medical history, past social history, past surgical history and problem list.

## 2024-05-06 ENCOUNTER — HOSPITAL ENCOUNTER (OUTPATIENT)
Dept: CARDIAC REHAB | Facility: HOSPITAL | Age: 89
Discharge: HOME | End: 2024-05-06
Attending: NURSE PRACTITIONER
Payer: COMMERCIAL

## 2024-05-06 DIAGNOSIS — Z95.5 S/P CORONARY ARTERY STENT PLACEMENT: ICD-10-CM

## 2024-05-06 DIAGNOSIS — Z95.5 PRESENCE OF DRUG-ELUTING STENT IN LEFT CIRCUMFLEX CORONARY ARTERY: ICD-10-CM

## 2024-05-06 PROCEDURE — 93797 PHYS/QHP OP CAR RHAB WO ECG: CPT

## 2024-05-06 PROCEDURE — 93798 PHYS/QHP OP CAR RHAB W/ECG: CPT

## 2024-05-08 ENCOUNTER — HOSPITAL ENCOUNTER (OUTPATIENT)
Dept: CARDIOLOGY | Facility: HOSPITAL | Age: 89
Discharge: HOME | End: 2024-05-08
Attending: INTERNAL MEDICINE | Admitting: INTERNAL MEDICINE
Payer: COMMERCIAL

## 2024-05-08 ENCOUNTER — HOSPITAL ENCOUNTER (OUTPATIENT)
Dept: CARDIAC REHAB | Facility: HOSPITAL | Age: 89
Discharge: HOME | End: 2024-05-08
Attending: INTERNAL MEDICINE
Payer: COMMERCIAL

## 2024-05-08 DIAGNOSIS — I31.39 PERICARDIAL EFFUSION: ICD-10-CM

## 2024-05-08 LAB — LVEF ECHO: NORMAL

## 2024-05-08 PROCEDURE — 255N000002 HC RX 255 OP 636: Performed by: INTERNAL MEDICINE

## 2024-05-08 PROCEDURE — 93325 DOPPLER ECHO COLOR FLOW MAPG: CPT | Mod: 26 | Performed by: INTERNAL MEDICINE

## 2024-05-08 PROCEDURE — 999N000208 ECHOCARDIOGRAM LIMITED

## 2024-05-08 PROCEDURE — 93321 DOPPLER ECHO F-UP/LMTD STD: CPT | Mod: 26 | Performed by: INTERNAL MEDICINE

## 2024-05-08 PROCEDURE — 93308 TTE F-UP OR LMTD: CPT | Mod: 26 | Performed by: INTERNAL MEDICINE

## 2024-05-08 PROCEDURE — 93798 PHYS/QHP OP CAR RHAB W/ECG: CPT

## 2024-05-08 RX ADMIN — PERFLUTREN 2 ML: 6.52 INJECTION, SUSPENSION INTRAVENOUS at 11:07

## 2024-05-09 ENCOUNTER — LAB REQUISITION (OUTPATIENT)
Dept: LAB | Facility: CLINIC | Age: 89
End: 2024-05-09

## 2024-05-09 DIAGNOSIS — N18.32 CHRONIC KIDNEY DISEASE, STAGE 3B (H): ICD-10-CM

## 2024-05-09 DIAGNOSIS — R31.0 GROSS HEMATURIA: ICD-10-CM

## 2024-05-09 PROCEDURE — 82374 ASSAY BLOOD CARBON DIOXIDE: CPT | Performed by: FAMILY MEDICINE

## 2024-05-09 PROCEDURE — 87186 SC STD MICRODIL/AGAR DIL: CPT | Performed by: FAMILY MEDICINE

## 2024-05-09 PROCEDURE — 87086 URINE CULTURE/COLONY COUNT: CPT | Performed by: FAMILY MEDICINE

## 2024-05-10 LAB
ANION GAP SERPL CALCULATED.3IONS-SCNC: 15 MMOL/L (ref 7–15)
BACTERIA UR CULT: ABNORMAL
BUN SERPL-MCNC: 36.9 MG/DL (ref 8–23)
CALCIUM SERPL-MCNC: 9.5 MG/DL (ref 8.8–10.2)
CHLORIDE SERPL-SCNC: 106 MMOL/L (ref 98–107)
CREAT SERPL-MCNC: 1.55 MG/DL (ref 0.51–0.95)
DEPRECATED HCO3 PLAS-SCNC: 18 MMOL/L (ref 22–29)
EGFRCR SERPLBLD CKD-EPI 2021: 32 ML/MIN/1.73M2
GLUCOSE SERPL-MCNC: 127 MG/DL (ref 70–99)
POTASSIUM SERPL-SCNC: 5.3 MMOL/L (ref 3.4–5.3)
SODIUM SERPL-SCNC: 139 MMOL/L (ref 135–145)

## 2024-05-13 ENCOUNTER — HOSPITAL ENCOUNTER (OUTPATIENT)
Dept: CARDIAC REHAB | Facility: HOSPITAL | Age: 89
Discharge: HOME | End: 2024-05-13
Attending: INTERNAL MEDICINE
Payer: COMMERCIAL

## 2024-05-13 PROCEDURE — 93798 PHYS/QHP OP CAR RHAB W/ECG: CPT

## 2024-05-15 ENCOUNTER — HOSPITAL ENCOUNTER (OUTPATIENT)
Dept: CARDIAC REHAB | Facility: HOSPITAL | Age: 89
Discharge: HOME | End: 2024-05-15
Attending: INTERNAL MEDICINE
Payer: COMMERCIAL

## 2024-05-15 PROCEDURE — 93798 PHYS/QHP OP CAR RHAB W/ECG: CPT

## 2024-05-17 ENCOUNTER — HOSPITAL ENCOUNTER (OUTPATIENT)
Dept: CARDIAC REHAB | Facility: HOSPITAL | Age: 89
Discharge: HOME | End: 2024-05-17
Attending: INTERNAL MEDICINE
Payer: COMMERCIAL

## 2024-05-17 PROCEDURE — 93798 PHYS/QHP OP CAR RHAB W/ECG: CPT

## 2024-05-20 ENCOUNTER — HOSPITAL ENCOUNTER (OUTPATIENT)
Dept: CARDIAC REHAB | Facility: HOSPITAL | Age: 89
Discharge: HOME | End: 2024-05-20
Attending: INTERNAL MEDICINE
Payer: COMMERCIAL

## 2024-05-20 PROCEDURE — 93798 PHYS/QHP OP CAR RHAB W/ECG: CPT

## 2024-05-22 ENCOUNTER — HOSPITAL ENCOUNTER (OUTPATIENT)
Dept: CARDIAC REHAB | Facility: HOSPITAL | Age: 89
Discharge: HOME | End: 2024-05-22
Attending: INTERNAL MEDICINE
Payer: COMMERCIAL

## 2024-05-22 PROCEDURE — 93798 PHYS/QHP OP CAR RHAB W/ECG: CPT

## 2024-05-24 ENCOUNTER — HOSPITAL ENCOUNTER (OUTPATIENT)
Dept: CARDIAC REHAB | Facility: HOSPITAL | Age: 89
Discharge: HOME | End: 2024-05-24
Attending: INTERNAL MEDICINE
Payer: COMMERCIAL

## 2024-05-24 PROCEDURE — 93798 PHYS/QHP OP CAR RHAB W/ECG: CPT

## 2024-05-29 ENCOUNTER — HOSPITAL ENCOUNTER (OUTPATIENT)
Dept: CARDIAC REHAB | Facility: HOSPITAL | Age: 89
Discharge: HOME | End: 2024-05-29
Attending: INTERNAL MEDICINE
Payer: COMMERCIAL

## 2024-05-29 PROCEDURE — 93798 PHYS/QHP OP CAR RHAB W/ECG: CPT

## 2024-05-31 ENCOUNTER — HOSPITAL ENCOUNTER (OUTPATIENT)
Dept: CARDIAC REHAB | Facility: HOSPITAL | Age: 89
Discharge: HOME | End: 2024-05-31
Attending: INTERNAL MEDICINE
Payer: COMMERCIAL

## 2024-05-31 PROCEDURE — 93798 PHYS/QHP OP CAR RHAB W/ECG: CPT

## 2024-06-05 ENCOUNTER — HOSPITAL ENCOUNTER (OUTPATIENT)
Dept: CARDIAC REHAB | Facility: HOSPITAL | Age: 89
Discharge: HOME | End: 2024-06-05
Attending: INTERNAL MEDICINE
Payer: COMMERCIAL

## 2024-06-05 PROCEDURE — 93798 PHYS/QHP OP CAR RHAB W/ECG: CPT

## 2024-06-07 ENCOUNTER — HOSPITAL ENCOUNTER (OUTPATIENT)
Dept: CARDIAC REHAB | Facility: HOSPITAL | Age: 89
Discharge: HOME | End: 2024-06-07
Attending: INTERNAL MEDICINE
Payer: COMMERCIAL

## 2024-06-07 PROCEDURE — 93798 PHYS/QHP OP CAR RHAB W/ECG: CPT

## 2024-06-10 ENCOUNTER — OFFICE VISIT (OUTPATIENT)
Dept: CARDIOLOGY | Facility: CLINIC | Age: 89
End: 2024-06-10
Payer: COMMERCIAL

## 2024-06-10 VITALS
WEIGHT: 187 LBS | HEIGHT: 67 IN | BODY MASS INDEX: 29.35 KG/M2 | RESPIRATION RATE: 14 BRPM | DIASTOLIC BLOOD PRESSURE: 40 MMHG | SYSTOLIC BLOOD PRESSURE: 138 MMHG | HEART RATE: 80 BPM

## 2024-06-10 DIAGNOSIS — I31.39 PERICARDIAL EFFUSION: ICD-10-CM

## 2024-06-10 DIAGNOSIS — I10 BENIGN ESSENTIAL HYPERTENSION: ICD-10-CM

## 2024-06-10 DIAGNOSIS — Z95.5 S/P DRUG ELUTING CORONARY STENT PLACEMENT: Primary | ICD-10-CM

## 2024-06-10 DIAGNOSIS — I25.10 CORONARY ARTERY DISEASE INVOLVING NATIVE CORONARY ARTERY OF NATIVE HEART WITHOUT ANGINA PECTORIS: ICD-10-CM

## 2024-06-10 DIAGNOSIS — E78.5 DYSLIPIDEMIA, GOAL LDL BELOW 70: ICD-10-CM

## 2024-06-10 PROCEDURE — 99215 OFFICE O/P EST HI 40 MIN: CPT

## 2024-06-10 NOTE — PATIENT INSTRUCTIONS
It was a pleasure taking part in your care today:    - Schedule NM Lexiscan stress test. Await result for treatment recommendations.  - Schedule lab appointment for lipid panel and ALT in 1 month  - Hold morning metoprolol on day of stress test  - Continue current medications  - Continue aspirin and Plavix     - Can increase weight lifting and exercise as tolerated, can golf again.    Please call the [facility] clinic with any questions or concerns at [phone].     Etta Soliman PA-C

## 2024-06-10 NOTE — LETTER
"6/10/2024    Jessica Fonseca MD  [address]  [facility] [address]    RE: Mary Corral       Dear Colleague,     I had the pleasure of seeing Mary Corral in the [facility].          Assessment/Recommendations   Assessment:    1.  Coronary artery disease: S/p NSTEMI and PCI to 99% Lcx stenosis, remaining 60-70% LAD stenosis. Small dissection/contained perforation in small OM and echo monitoring showed no persistent pericardial effusion, LVEF 60 to 65%.  LAD would be amenable to staged PCI.  - On dual antiplatelet therapy with ASA 81 mg indefinitely and Clopidogrel (Plavix) 75 mg daily for 12 months.  Patient denies any chest pain.  Presented with feeling of \"difficulty swallowing\" to ED, no recurrence of this.  Has shortness of breath with asthma that she states has returned to baseline.  - Cardiac rehab has been scheduled  - Reviewed most recent BMP, Hgb, platelet- stable.    2.  Dyslipidemia with LDL goal <70/Obesity with a BMI of 29.29: Mary Corral is on high intensity statin therapy with atorvastatin 40 mg transition from simvastatin. Most recent LDL is 84.     3.  Hypertension: Her blood pressure is Controlled. Currently on amlodipine, metoprolol tartrate, losartan        Plan:  - In-depth shared decision-making discussion with patient regarding treatment of her remaining CAD.  Discussed staged stenting of LAD mod-severe stenosis recommendation given remaining shortness of breath although patient states this is her baseline with asthma.  Discussed risks of procedure including MI, stroke, bleeding, contrast nephropathy.  We also discussed conservative/medical management in which case I would recommend NM Lexiscan stress test to evaluate LAD territory.  Pending results can further discuss necessity of stenting.  Patient cites her advanced age, desire to avoid further procedures and decision to proceed with NM Lexiscan stress test at this time.  Would be open to coronary " angiogram if needed.  Unable to perform treadmill stress test given back pain, uses walker/cane.    - We discussed the importance of antiplatelet therapy and talking with her cardiologist prior to stopping these medications for any reason.  We discussed about utilization of as needed nitroglycerin.   - Encouraged to seek medical attention if recurrent chest pain or shortness of breath.    - Continue current hypertension regimen    - Continue hyperlipidemia regimen. Fasting lipid profile check in 4-8 weeks. Order placed    - Cardiac rehab as scheduled    - We discussed a diet low in saturated fat, weight loss, and exercise along with medication for better control of cholesterol.  Highly encouraged to participate in nutrition class in cardiac rehab.  - Risk factor modification and lifestyle management topics were discussed including managing comorbidities, weight loss, heart healthy diet, exercise, smoking cessation, stress reduction, alcohol use, and drug use.        Follow up with Dr. Mann 8/9/2024     History of Present Illness/Subjective    Ms. Mary Corral is a 88 year old female with a past medical history of CAD, HLD, HTN who is seen at [facility] for post coronary intervention follow up. S/p NSTEMI and PCI to 60-70% Lcx stenosis, remaining 60-70% LAD stenosis. Small dissection/contained perforation in small OM and echo monitoring showed no persistent effusion. Most recent ECHO/Stress test showed an EF of 60-65%.     Patient reports return to baseline function following coronary angiogram.  She has chronically had shortness of breath with asthma.  Is not worsening or changing since procedure.  Scented to ED with feeling that something was stuck in her throat and she can follow, no recurrence of this.  Has never had any chest pain to this process and continues to be without.  Denies effects of new medication.  Continues Plavix and Brilinta with some increased bruising but no  "significant blood in stool or nosebleeds.     She denies lightheadedness, shortness of breath, dyspnea on exertion, orthopnea, PND, palpitations, chest pain, and lower extremity edema.     Coronary Angiogram 4/27/2024 reviewed:  - 2-vessel CAD w/ acute lesion in mLcx now s/p DESx1 and small dissection/contained perforation in small OM; mod-severe disease in LAD; normal L-sided filling pressures  - will re-check TTE in 2 hrs, and if unchanged once more tomorrow  - ASA 81mg daily indefinitely, clopidogrel 600mg once, followed by 75mg daily for at least 12 months  - LAD can be addressed in a staged procedure 2-4 wks from now if c/w patient's goals of care. Otherwise, medical management could also be a consideration  - high dose statin  - continue aggressive risk factor modification     Findings:  LM:no obstruction  LAD:distal 60-70% narrowing  Lcx:mid-vessel acute 99% narrowing, UMESH 2 flow distally  RCA:dominant, mid-vessel 50% narrowing     LVEDP:5     Access:  R Radial artery    ECHO 5/8/2024 Reviewed:   Left ventricular size, wall motion and function are normal. The ejection  fraction is 60-65%.  Normal right ventricle size and systolic function.  No hemodynamically significant valvular abnormalities on 2D or color Doppler  images.  There is no pericardial effusion.     When compared to previous study on 5-3-2024, previous trace pericardial  effusion is resolved.         Physical Examination Review of Systems   /40 (BP Location: Right arm, Patient Position: Sitting, Cuff Size: Adult Regular)   Pulse 80   Resp 14   Ht 1.702 m (5' 7\")   Wt 84.8 kg (187 lb)   BMI 29.29 kg/m    Body mass index is 29.29 kg/m .  Wt Readings from Last 3 Encounters:   06/10/24 84.8 kg (187 lb)   05/03/24 84.4 kg (186 lb)   05/03/24 83.6 kg (184 lb 4.8 oz)     General Appearance:   no distress, normal body habitus   ENT/Mouth: membranes moist, no oral lesions or bleeding gums.      EYES:  no scleral icterus, normal conjunctivae "   Neck: no carotid bruits or thyromegaly   Chest/Lungs:   lungs are clear to auscultation, no rales or wheezing, equal chest wall expansion    Cardiovascular:   Regular. Normal first and second heart sounds with no murmurs, rubs, or gallops; the carotid, radial and posterior tibial pulses are intact, absent edema bilaterally        Extremities  Puncture Site: no cyanosis or clubbing  Right radial site is soft without bruising.  Radial pulses and Pedal pulses intact and symmetrical.  CMS intact.   Skin: no xanthelasma, warm.    Neurologic: no tremors     Psychiatric: alert and oriented x3, calm                                                        Negative unless noted in HPI     Medical History  Surgical History Family History Social History   Past Medical History:   Diagnosis Date    Allergic rhinitis     Chronic sinusitis     Conductive hearing loss     COPD (chronic obstructive pulmonary disease) (H)     CRI (chronic renal insufficiency)     mild    Gastroesophageal reflux disease     Hearing problem     Hypertension     Mediastinal mass     Nasal polyps     Pulmonary nodule     Seasonal allergies     Uncomplicated asthma     Past Surgical History:   Procedure Laterality Date    APPENDECTOMY      BIOPSY MASS NECK N/A 11/21/2016    Procedure: BIOPSY MASS NECK;  Surgeon: Lucia Quinteros MD;  Location: [facility] OR    CATARACT IOL, RT/LT Bilateral 2016    CV CORONARY ANGIOGRAM N/A 4/29/2024    Procedure: Coronary Angiogram;  Surgeon: Marino Lind MD;  Location: [facility] CV    CV LEFT HEART CATH N/A 4/29/2024    Procedure: Left Heart Catheterization;  Surgeon: Marino Lind MD;  Location: [facility] LAB CV    CV PCI N/A 4/29/2024    Procedure: Percutaneous Coronary Intervention;  Surgeon: Marino Lind MD;  Location: [facility] CV    CV PCI ASPIRATION THROMECTOMY N/A 4/29/2024    Procedure: Percutaneous Coronary Intervention Aspiration Thrombectomy;  Surgeon: Marino Lind MD;   Location: [facility] CATH LAB CV    ENDOSCOPIC ULTRASOUND UPPER GASTROINTESTINAL TRACT (GI) N/A 11/21/2016    Procedure: ENDOSCOPIC ULTRASOUND, ESOPHAGOSCOPY / UPPER GASTROINTESTINAL TRACT (GI);  Surgeon: Lucia Quinteros MD;  Location: [facility] OR    ESOPHAGOSCOPY, GASTROSCOPY, DUODENOSCOPY (EGD), DILATATION, COMBINED N/A 2/6/2023    Procedure: ESOPHAGOGASTRODUODENOSCOPY with esophageal dilitation;  Surgeon: Kodak Huerta MD, MD;  Location: [facility] GI    ROTATOR CUFF REPAIR RT/LT Right 30 years ago    THYROIDECTOMY N/A 3/14/2017    Procedure: THYROIDECTOMY;  Surgeon: Misbah Julien MD;  Location: [facility] OR    Family History   Problem Relation Age of Onset    Esophageal Cancer Mother     Colon Cancer Mother     Mental Illness Mother     Dementia Mother     Unknown/Adopted Mother     Asthma Mother     Diabetes Mother     Cerebrovascular Disease Mother     Thyroid Disease Mother     Congenital Anomalies Mother     Bleeding Disorder Mother     Migraines Mother     Muscular Disorder Mother     Parkinsonism Father     Mental Illness Father     Unknown/Adopted Father     Asthma Father     Cancer Father     Hypertension Father     Cerebrovascular Disease Father     Thyroid Disease Father     Congenital Anomalies Father     Bleeding Disorder Father     Migraines Father     Muscular Disorder Father     Mental Illness Sister     Dementia Sister     Unknown/Adopted Sister     Asthma Sister     Cerebrovascular Disease Sister     Thyroid Disease Sister     Congenital Anomalies Sister     Bleeding Disorder Sister     Migraines Sister     Muscular Disorder Sister     Cancer Mother         esophageal    Social History     Socioeconomic History    Marital status: Single     Spouse name: Not on file    Number of children: Not on file    Years of education: Not on file    Highest education level: Not on file   Occupational History    Not on file   Tobacco Use    Smoking status: Never    Smokeless tobacco: Never    Tobacco comments:      smoked twice a week for a couple years while in college, less than 1/2 pack    Substance and Sexual Activity    Alcohol use: Yes     Alcohol/week: 0.0 standard drinks of alcohol     Comment: Alcoholic Drinks/day: occasional    Drug use: No    Sexual activity: Never   Other Topics Concern    Not on file   Social History Narrative    Not on file     Social Determinants of Health     Financial Resource Strain: Not on file   Food Insecurity: Not on file   Transportation Needs: Not on file   Physical Activity: Not on file   Stress: Not on file   Social Connections: Not on file   Interpersonal Safety: Not on file   Housing Stability: Not on file          Medications  Allergies   Current Outpatient Medications   Medication Sig Dispense Refill    acetaminophen (TYLENOL) 325 MG tablet Take 2 tablets (650 mg) by mouth every 4 hours as needed for other (surgical pain) 100 tablet 0    acetaminophen-codeine (TYLENOL #3) 300-30 MG tablet Take 1 tablet by mouth every 6 hours as needed for severe pain (used when she has to walk long distances)      albuterol (2.5 MG/3ML) 0.083% nebulizer solution Take 1 vial by nebulization every 4 hours as needed for shortness of breath / dyspnea or wheezing      albuterol (PROAIR HFA/PROVENTIL HFA/VENTOLIN HFA) 108 (90 BASE) MCG/ACT Inhaler Inhale 2 puffs into the lungs every 4 hours as needed for shortness of breath      allopurinol (ZYLOPRIM) 100 MG tablet Take 100 mg by mouth 2 times daily as needed Gout flares      amLODIPine (NORVASC) 5 MG tablet Take 5 mg by mouth at bedtime      aspirin 81 MG EC tablet Take 1 tablet (81 mg) by mouth daily Start tomorrow. 30 tablet 3    atorvastatin (LIPITOR) 40 MG tablet Take 1 tablet (40 mg) by mouth daily 90 tablet 3    cetirizine (ZYRTEC) 10 MG tablet Take 10 mg by mouth daily as needed for allergies      clopidogrel (PLAVIX) 75 MG tablet Take 1 tablet (75 mg) by mouth daily Dose to start tomorrow. 90 tablet 3    ferrous sulfate 45 MG TBCR CR tablet  Take 45 mg by mouth daily      fluticasone-salmeterol (ADVAIR) 250-50 MCG/DOSE diskus inhaler Inhale 1 puff into the lungs daily      levothyroxine (SYNTHROID/LEVOTHROID) 50 MCG tablet Take 50 mcg by mouth daily      losartan (COZAAR) 50 MG tablet Take 50 mg by mouth daily 30 tablet     metoprolol tartrate (LOPRESSOR) 25 MG tablet Take 1 tablet (25 mg) by mouth 2 times daily Please call PCP for refill 120 tablet 0    montelukast (SINGULAIR) 10 MG tablet Take 10 mg by mouth at bedtime      multivitamin w/minerals (THERA-VIT-M) tablet Take 1 tablet by mouth daily      pantoprazole (PROTONIX) 40 MG EC tablet Take 40 mg by mouth daily as needed for heartburn      polyethylene glycol (MIRALAX/GLYCOLAX) powder Take 17 g by mouth daily as needed for constipation      predniSONE (DELTASONE) 20 MG tablet Take 20 mg by mouth daily as needed (Self doses in winter for lung infections.)      valACYclovir (VALTREX) 1000 mg tablet Take 1,000 mg by mouth 2 times daily as needed (for one day)      Vitamin D3 (CHOLECALCIFEROL) 25 mcg (1000 units) tablet Take 1,000 Units by mouth daily      Allergies   Allergen Reactions    Amoxicillin Hives    Keflex [Cephalexin] Hives    Lisinopril Shortness Of Breath    Penicillins Hives         Lab Results    Chemistry/lipid CBC Cardiac Enzymes/BNP/TSH/INR   Lab Results   Component Value Date    CHOL 151 04/27/2024    HDL 57 04/27/2024    TRIG 51 04/27/2024    BUN 36.9 (H) 05/09/2024     05/09/2024    CO2 18 (L) 05/09/2024    Lab Results   Component Value Date    WBC 9.9 05/03/2024    HGB 10.8 (L) 05/03/2024    HCT 33.6 (L) 05/03/2024    MCV 97 05/03/2024     05/03/2024    Lab Results   Component Value Date    TROPONINI 0.02 02/18/2018    TSH 0.81 04/27/2024    INR 1.00 02/17/2018            This note has been dictated using voice recognition software. Any grammatical, typographical, or context distortions are unintentional and inherent to the software    Etta Soliman,  AMBER      Thank you for allowing me to participate in the care of your patient.      Sincerely,     Etta Vogt PA-C     [facility]  cc:   Etta Soliman PA-C  [address]  [place] [address]

## 2024-06-10 NOTE — PROGRESS NOTES
"        Assessment/Recommendations   Assessment:    1.  Coronary artery disease: S/p NSTEMI and PCI to 99% Lcx stenosis, remaining 60-70% LAD stenosis. Small dissection/contained perforation in small OM and echo monitoring showed no persistent pericardial effusion, LVEF 60 to 65%.  LAD would be amenable to staged PCI.  - On dual antiplatelet therapy with ASA 81 mg indefinitely and Clopidogrel (Plavix) 75 mg daily for 12 months.  Patient denies any chest pain.  Presented with feeling of \"difficulty swallowing\" to ED, no recurrence of this.  Has shortness of breath with asthma that she states has returned to baseline.  - Cardiac rehab has been scheduled  - Reviewed most recent BMP, Hgb, platelet- stable.    2.  Dyslipidemia with LDL goal <70/Obesity with a BMI of 29.29: Mary Corral is on high intensity statin therapy with atorvastatin 40 mg transition from simvastatin. Most recent LDL is 84.     3.  Hypertension: Her blood pressure is Controlled. Currently on amlodipine, metoprolol tartrate, losartan        Plan:  - In-depth shared decision-making discussion with patient regarding treatment of her remaining CAD.  Discussed staged stenting of LAD mod-severe stenosis recommendation given remaining shortness of breath although patient states this is her baseline with asthma.  Discussed risks of procedure including MI, stroke, bleeding, contrast nephropathy.  We also discussed conservative/medical management in which case I would recommend NM Lexiscan stress test to evaluate LAD territory.  Pending results can further discuss necessity of stenting.  Patient cites her advanced age, desire to avoid further procedures and decision to proceed with NM Lexiscan stress test at this time.  Would be open to coronary angiogram if needed.  Unable to perform treadmill stress test given back pain, uses walker/cane.    - We discussed the importance of antiplatelet therapy and talking with her cardiologist prior to stopping these " medications for any reason.  We discussed about utilization of as needed nitroglycerin.   - Encouraged to seek medical attention if recurrent chest pain or shortness of breath.    - Continue current hypertension regimen    - Continue hyperlipidemia regimen. Fasting lipid profile check in 4-8 weeks. Order placed    - Cardiac rehab as scheduled    - We discussed a diet low in saturated fat, weight loss, and exercise along with medication for better control of cholesterol.  Highly encouraged to participate in nutrition class in cardiac rehab.  - Risk factor modification and lifestyle management topics were discussed including managing comorbidities, weight loss, heart healthy diet, exercise, smoking cessation, stress reduction, alcohol use, and drug use.        Follow up with Dr. Mann 8/9/2024     History of Present Illness/Subjective    Ms. Mary Corral is a 88 year old female with a past medical history of CAD, HLD, HTN who is seen at [facility] for post coronary intervention follow up. S/p NSTEMI and PCI to 60-70% Lcx stenosis, remaining 60-70% LAD stenosis. Small dissection/contained perforation in small OM and echo monitoring showed no persistent effusion. Most recent ECHO/Stress test showed an EF of 60-65%.     Patient reports return to baseline function following coronary angiogram.  She has chronically had shortness of breath with asthma.  Is not worsening or changing since procedure.  Scented to ED with feeling that something was stuck in her throat and she can follow, no recurrence of this.  Has never had any chest pain to this process and continues to be without.  Denies effects of new medication.  Continues Plavix and Brilinta with some increased bruising but no significant blood in stool or nosebleeds.     She denies lightheadedness, shortness of breath, dyspnea on exertion, orthopnea, PND, palpitations, chest pain, and lower extremity edema.     Coronary Angiogram  "4/27/2024 reviewed:  - 2-vessel CAD w/ acute lesion in mLcx now s/p DESx1 and small dissection/contained perforation in small OM; mod-severe disease in LAD; normal L-sided filling pressures  - will re-check TTE in 2 hrs, and if unchanged once more tomorrow  - ASA 81mg daily indefinitely, clopidogrel 600mg once, followed by 75mg daily for at least 12 months  - LAD can be addressed in a staged procedure 2-4 wks from now if c/w patient's goals of care. Otherwise, medical management could also be a consideration  - high dose statin  - continue aggressive risk factor modification     Findings:  LM:no obstruction  LAD:distal 60-70% narrowing  Lcx:mid-vessel acute 99% narrowing, UMESH 2 flow distally  RCA:dominant, mid-vessel 50% narrowing     LVEDP:5     Access:  R Radial artery    ECHO 5/8/2024 Reviewed:   Left ventricular size, wall motion and function are normal. The ejection  fraction is 60-65%.  Normal right ventricle size and systolic function.  No hemodynamically significant valvular abnormalities on 2D or color Doppler  images.  There is no pericardial effusion.     When compared to previous study on 5-3-2024, previous trace pericardial  effusion is resolved.         Physical Examination Review of Systems   /40 (BP Location: Right arm, Patient Position: Sitting, Cuff Size: Adult Regular)   Pulse 80   Resp 14   Ht 1.702 m (5' 7\")   Wt 84.8 kg (187 lb)   BMI 29.29 kg/m    Body mass index is 29.29 kg/m .  Wt Readings from Last 3 Encounters:   06/10/24 84.8 kg (187 lb)   05/03/24 84.4 kg (186 lb)   05/03/24 83.6 kg (184 lb 4.8 oz)     General Appearance:   no distress, normal body habitus   ENT/Mouth: membranes moist, no oral lesions or bleeding gums.      EYES:  no scleral icterus, normal conjunctivae   Neck: no carotid bruits or thyromegaly   Chest/Lungs:   lungs are clear to auscultation, no rales or wheezing, equal chest wall expansion    Cardiovascular:   Regular. Normal first and second heart sounds " with no murmurs, rubs, or gallops; the carotid, radial and posterior tibial pulses are intact, absent edema bilaterally        Extremities  Puncture Site: no cyanosis or clubbing  Right radial site is soft without bruising.  Radial pulses and Pedal pulses intact and symmetrical.  CMS intact.   Skin: no xanthelasma, warm.    Neurologic: no tremors     Psychiatric: alert and oriented x3, calm                                                        Negative unless noted in HPI     Medical History  Surgical History Family History Social History   Past Medical History:   Diagnosis Date     Allergic rhinitis      Chronic sinusitis      Conductive hearing loss      COPD (chronic obstructive pulmonary disease) (H)      CRI (chronic renal insufficiency)     mild     Gastroesophageal reflux disease      Hearing problem      Hypertension      Mediastinal mass      Nasal polyps      Pulmonary nodule      Seasonal allergies      Uncomplicated asthma     Past Surgical History:   Procedure Laterality Date     APPENDECTOMY       BIOPSY MASS NECK N/A 11/21/2016    Procedure: BIOPSY MASS NECK;  Surgeon: Lucia Quinteros MD;  Location: [facility] OR     CATARACT IOL, RT/LT Bilateral 2016     CV CORONARY ANGIOGRAM N/A 4/29/2024    Procedure: Coronary Angiogram;  Surgeon: Marino Lind MD;  Location: [facility]     CV LEFT HEART CATH N/A 4/29/2024    Procedure: Left Heart Catheterization;  Surgeon: Marino Lind MD;  Location: [facility] CV     CV PCI N/A 4/29/2024    Procedure: Percutaneous Coronary Intervention;  Surgeon: Marino Lind MD;  Location: [facility] CV     CV PCI ASPIRATION THROMECTOMY N/A 4/29/2024    Procedure: Percutaneous Coronary Intervention Aspiration Thrombectomy;  Surgeon: Marino Lind MD;  Location: [facility]     ENDOSCOPIC ULTRASOUND UPPER GASTROINTESTINAL TRACT (GI) N/A 11/21/2016    Procedure: ENDOSCOPIC ULTRASOUND, ESOPHAGOSCOPY / UPPER GASTROINTESTINAL TRACT  (GI);  Surgeon: Lucia Quinteros MD;  Location: [facility] OR     ESOPHAGOSCOPY, GASTROSCOPY, DUODENOSCOPY (EGD), DILATATION, COMBINED N/A 2/6/2023    Procedure: ESOPHAGOGASTRODUODENOSCOPY with esophageal dilitation;  Surgeon: Kodak Huerta MD, MD;  Location: [facility] GI     ROTATOR CUFF REPAIR RT/LT Right 30 years ago     THYROIDECTOMY N/A 3/14/2017    Procedure: THYROIDECTOMY;  Surgeon: Misbah Julien MD;  Location: [facility] OR    Family History   Problem Relation Age of Onset     Esophageal Cancer Mother      Colon Cancer Mother      Mental Illness Mother      Dementia Mother      Unknown/Adopted Mother      Asthma Mother      Diabetes Mother      Cerebrovascular Disease Mother      Thyroid Disease Mother      Congenital Anomalies Mother      Bleeding Disorder Mother      Migraines Mother      Muscular Disorder Mother      Parkinsonism Father      Mental Illness Father      Unknown/Adopted Father      Asthma Father      Cancer Father      Hypertension Father      Cerebrovascular Disease Father      Thyroid Disease Father      Congenital Anomalies Father      Bleeding Disorder Father      Migraines Father      Muscular Disorder Father      Mental Illness Sister      Dementia Sister      Unknown/Adopted Sister      Asthma Sister      Cerebrovascular Disease Sister      Thyroid Disease Sister      Congenital Anomalies Sister      Bleeding Disorder Sister      Migraines Sister      Muscular Disorder Sister      Cancer Mother         esophageal    Social History     Socioeconomic History     Marital status: Single     Spouse name: Not on file     Number of children: Not on file     Years of education: Not on file     Highest education level: Not on file   Occupational History     Not on file   Tobacco Use     Smoking status: Never     Smokeless tobacco: Never     Tobacco comments:     smoked twice a week for a couple years while in college, less than 1/2 pack    Substance and Sexual Activity     Alcohol use: Yes      Alcohol/week: 0.0 standard drinks of alcohol     Comment: Alcoholic Drinks/day: occasional     Drug use: No     Sexual activity: Never   Other Topics Concern     Not on file   Social History Narrative     Not on file     Social Determinants of Health     Financial Resource Strain: Not on file   Food Insecurity: Not on file   Transportation Needs: Not on file   Physical Activity: Not on file   Stress: Not on file   Social Connections: Not on file   Interpersonal Safety: Not on file   Housing Stability: Not on file          Medications  Allergies   Current Outpatient Medications   Medication Sig Dispense Refill     acetaminophen (TYLENOL) 325 MG tablet Take 2 tablets (650 mg) by mouth every 4 hours as needed for other (surgical pain) 100 tablet 0     acetaminophen-codeine (TYLENOL #3) 300-30 MG tablet Take 1 tablet by mouth every 6 hours as needed for severe pain (used when she has to walk long distances)       albuterol (2.5 MG/3ML) 0.083% nebulizer solution Take 1 vial by nebulization every 4 hours as needed for shortness of breath / dyspnea or wheezing       albuterol (PROAIR HFA/PROVENTIL HFA/VENTOLIN HFA) 108 (90 BASE) MCG/ACT Inhaler Inhale 2 puffs into the lungs every 4 hours as needed for shortness of breath       allopurinol (ZYLOPRIM) 100 MG tablet Take 100 mg by mouth 2 times daily as needed Gout flares       amLODIPine (NORVASC) 5 MG tablet Take 5 mg by mouth at bedtime       aspirin 81 MG EC tablet Take 1 tablet (81 mg) by mouth daily Start tomorrow. 30 tablet 3     atorvastatin (LIPITOR) 40 MG tablet Take 1 tablet (40 mg) by mouth daily 90 tablet 3     cetirizine (ZYRTEC) 10 MG tablet Take 10 mg by mouth daily as needed for allergies       clopidogrel (PLAVIX) 75 MG tablet Take 1 tablet (75 mg) by mouth daily Dose to start tomorrow. 90 tablet 3     ferrous sulfate 45 MG TBCR CR tablet Take 45 mg by mouth daily       fluticasone-salmeterol (ADVAIR) 250-50 MCG/DOSE diskus inhaler Inhale 1 puff into the  lungs daily       levothyroxine (SYNTHROID/LEVOTHROID) 50 MCG tablet Take 50 mcg by mouth daily       losartan (COZAAR) 50 MG tablet Take 50 mg by mouth daily 30 tablet      metoprolol tartrate (LOPRESSOR) 25 MG tablet Take 1 tablet (25 mg) by mouth 2 times daily Please call PCP for refill 120 tablet 0     montelukast (SINGULAIR) 10 MG tablet Take 10 mg by mouth at bedtime       multivitamin w/minerals (THERA-VIT-M) tablet Take 1 tablet by mouth daily       pantoprazole (PROTONIX) 40 MG EC tablet Take 40 mg by mouth daily as needed for heartburn       polyethylene glycol (MIRALAX/GLYCOLAX) powder Take 17 g by mouth daily as needed for constipation       predniSONE (DELTASONE) 20 MG tablet Take 20 mg by mouth daily as needed (Self doses in winter for lung infections.)       valACYclovir (VALTREX) 1000 mg tablet Take 1,000 mg by mouth 2 times daily as needed (for one day)       Vitamin D3 (CHOLECALCIFEROL) 25 mcg (1000 units) tablet Take 1,000 Units by mouth daily      Allergies   Allergen Reactions     Amoxicillin Hives     Keflex [Cephalexin] Hives     Lisinopril Shortness Of Breath     Penicillins Hives         Lab Results    Chemistry/lipid CBC Cardiac Enzymes/BNP/TSH/INR   Lab Results   Component Value Date    CHOL 151 04/27/2024    HDL 57 04/27/2024    TRIG 51 04/27/2024    BUN 36.9 (H) 05/09/2024     05/09/2024    CO2 18 (L) 05/09/2024    Lab Results   Component Value Date    WBC 9.9 05/03/2024    HGB 10.8 (L) 05/03/2024    HCT 33.6 (L) 05/03/2024    MCV 97 05/03/2024     05/03/2024    Lab Results   Component Value Date    TROPONINI 0.02 02/18/2018    TSH 0.81 04/27/2024    INR 1.00 02/17/2018            This note has been dictated using voice recognition software. Any grammatical, typographical, or context distortions are unintentional and inherent to the software    Etta Soliman PA-C

## 2024-06-12 ENCOUNTER — HOSPITAL ENCOUNTER (OUTPATIENT)
Dept: CARDIOLOGY | Facility: HOSPITAL | Age: 89
Discharge: HOME | End: 2024-06-12
Payer: COMMERCIAL

## 2024-06-12 ENCOUNTER — HOSPITAL ENCOUNTER (OUTPATIENT)
Dept: NUCLEAR MEDICINE | Facility: HOSPITAL | Age: 89
Discharge: HOME | End: 2024-06-12
Payer: COMMERCIAL

## 2024-06-12 DIAGNOSIS — Z95.5 S/P DRUG ELUTING CORONARY STENT PLACEMENT: ICD-10-CM

## 2024-06-12 DIAGNOSIS — I25.10 CORONARY ARTERY DISEASE INVOLVING NATIVE CORONARY ARTERY OF NATIVE HEART WITHOUT ANGINA PECTORIS: ICD-10-CM

## 2024-06-12 LAB
CV STRESS CURRENT BP HE: NORMAL
CV STRESS CURRENT HR HE: 101
CV STRESS CURRENT HR HE: 102
CV STRESS CURRENT HR HE: 103
CV STRESS CURRENT HR HE: 103
CV STRESS CURRENT HR HE: 105
CV STRESS CURRENT HR HE: 106
CV STRESS CURRENT HR HE: 107
CV STRESS CURRENT HR HE: 107
CV STRESS CURRENT HR HE: 89
CV STRESS CURRENT HR HE: 90
CV STRESS CURRENT HR HE: 91
CV STRESS CURRENT HR HE: 94
CV STRESS CURRENT HR HE: 98
CV STRESS CURRENT HR HE: 99
CV STRESS DEVIATION TIME HE: NORMAL
CV STRESS ECHO PERCENT HR HE: NORMAL
CV STRESS EXERCISE STAGE HE: NORMAL
CV STRESS FINAL RESTING BP HE: NORMAL
CV STRESS FINAL RESTING HR HE: 98
CV STRESS MAX HR HE: 107
CV STRESS MAX TREADMILL GRADE HE: 0
CV STRESS MAX TREADMILL SPEED HE: 0
CV STRESS PEAK DIA BP HE: NORMAL
CV STRESS PEAK SYS BP HE: NORMAL
CV STRESS PHASE HE: NORMAL
CV STRESS PROTOCOL HE: NORMAL
CV STRESS RESTING PT POSITION HE: NORMAL
CV STRESS ST DEVIATION AMOUNT HE: NORMAL
CV STRESS ST DEVIATION ELEVATION HE: NORMAL
CV STRESS ST EVELATION AMOUNT HE: NORMAL
CV STRESS TEST TYPE HE: NORMAL
CV STRESS TOTAL STAGE TIME MIN 1 HE: NORMAL
RATE PRESSURE PRODUCT: NORMAL
STRESS ECHO BASELINE DIASTOLIC HE: 75
STRESS ECHO BASELINE HR: 82
STRESS ECHO BASELINE SYSTOLIC BP: 163
STRESS ECHO CALCULATED PERCENT HR: 80 %
STRESS ECHO LAST STRESS DIASTOLIC BP: 71
STRESS ECHO LAST STRESS HR: 107
STRESS ECHO LAST STRESS SYSTOLIC BP: 164
STRESS ECHO TARGET HR: 134

## 2024-06-12 PROCEDURE — 93017 CV STRESS TEST TRACING ONLY: CPT

## 2024-06-12 PROCEDURE — 343N000001 HC RX 343

## 2024-06-12 PROCEDURE — A9500 TC99M SESTAMIBI: HCPCS

## 2024-06-12 PROCEDURE — 93018 CV STRESS TEST I&R ONLY: CPT | Performed by: GENERAL ACUTE CARE HOSPITAL

## 2024-06-12 PROCEDURE — 93016 CV STRESS TEST SUPVJ ONLY: CPT | Performed by: INTERNAL MEDICINE

## 2024-06-12 PROCEDURE — 78452 HT MUSCLE IMAGE SPECT MULT: CPT | Mod: 26 | Performed by: GENERAL ACUTE CARE HOSPITAL

## 2024-06-12 PROCEDURE — 250N000011 HC RX IP 250 OP 636: Mod: JZ

## 2024-06-12 PROCEDURE — 78452 HT MUSCLE IMAGE SPECT MULT: CPT

## 2024-06-12 RX ORDER — ALBUTEROL SULFATE 0.83 MG/ML
2.5 SOLUTION RESPIRATORY (INHALATION)
Status: DISCONTINUED | OUTPATIENT
Start: 2024-06-12 | End: 2024-06-12 | Stop reason: HOSPADM

## 2024-06-12 RX ORDER — AMINOPHYLLINE 25 MG/ML
50 INJECTION, SOLUTION INTRAVENOUS
Status: DISCONTINUED | OUTPATIENT
Start: 2024-06-12 | End: 2024-06-12 | Stop reason: HOSPADM

## 2024-06-12 RX ORDER — REGADENOSON 0.08 MG/ML
0.4 INJECTION, SOLUTION INTRAVENOUS ONCE
Status: COMPLETED | OUTPATIENT
Start: 2024-06-12 | End: 2024-06-12

## 2024-06-12 RX ORDER — CAFFEINE 200 MG
200 TABLET ORAL
Status: DISCONTINUED | OUTPATIENT
Start: 2024-06-12 | End: 2024-06-12 | Stop reason: HOSPADM

## 2024-06-12 RX ORDER — CAFFEINE CITRATE 20 MG/ML
60 SOLUTION INTRAVENOUS
Status: DISCONTINUED | OUTPATIENT
Start: 2024-06-12 | End: 2024-06-12 | Stop reason: HOSPADM

## 2024-06-12 RX ADMIN — Medication 8.8 MILLICURIE: at 08:17

## 2024-06-12 RX ADMIN — REGADENOSON 0.4 MG: 0.08 INJECTION, SOLUTION INTRAVENOUS at 10:10

## 2024-06-12 RX ADMIN — Medication 31.7 MILLICURIE: at 10:10

## 2024-06-14 ENCOUNTER — HOSPITAL ENCOUNTER (OUTPATIENT)
Dept: CARDIAC REHAB | Facility: HOSPITAL | Age: 89
Discharge: HOME | End: 2024-06-14
Attending: INTERNAL MEDICINE
Payer: COMMERCIAL

## 2024-06-14 PROCEDURE — 93798 PHYS/QHP OP CAR RHAB W/ECG: CPT

## 2024-06-17 ENCOUNTER — HOSPITAL ENCOUNTER (OUTPATIENT)
Dept: CARDIAC REHAB | Facility: HOSPITAL | Age: 89
Discharge: HOME | End: 2024-06-17
Attending: INTERNAL MEDICINE
Payer: COMMERCIAL

## 2024-06-17 PROCEDURE — 93798 PHYS/QHP OP CAR RHAB W/ECG: CPT

## 2024-06-19 ENCOUNTER — HOSPITAL ENCOUNTER (OUTPATIENT)
Dept: CARDIAC REHAB | Facility: HOSPITAL | Age: 89
Discharge: HOME | End: 2024-06-19
Attending: INTERNAL MEDICINE
Payer: COMMERCIAL

## 2024-06-19 PROCEDURE — 93798 PHYS/QHP OP CAR RHAB W/ECG: CPT

## 2024-06-21 ENCOUNTER — HOSPITAL ENCOUNTER (OUTPATIENT)
Dept: CARDIAC REHAB | Facility: HOSPITAL | Age: 89
Discharge: HOME | End: 2024-06-21
Attending: INTERNAL MEDICINE
Payer: COMMERCIAL

## 2024-06-21 PROCEDURE — 93798 PHYS/QHP OP CAR RHAB W/ECG: CPT

## 2024-06-24 ENCOUNTER — HOSPITAL ENCOUNTER (OUTPATIENT)
Dept: CARDIAC REHAB | Facility: HOSPITAL | Age: 89
Discharge: HOME | End: 2024-06-24
Attending: INTERNAL MEDICINE
Payer: COMMERCIAL

## 2024-06-24 PROCEDURE — 93798 PHYS/QHP OP CAR RHAB W/ECG: CPT

## 2024-06-26 ENCOUNTER — HOSPITAL ENCOUNTER (OUTPATIENT)
Dept: CARDIAC REHAB | Facility: HOSPITAL | Age: 89
Discharge: HOME | End: 2024-06-26
Attending: INTERNAL MEDICINE
Payer: COMMERCIAL

## 2024-06-26 PROCEDURE — 93798 PHYS/QHP OP CAR RHAB W/ECG: CPT

## 2024-06-28 ENCOUNTER — HOSPITAL ENCOUNTER (OUTPATIENT)
Dept: CARDIAC REHAB | Facility: HOSPITAL | Age: 89
Discharge: HOME | End: 2024-06-28
Attending: INTERNAL MEDICINE
Payer: COMMERCIAL

## 2024-06-28 PROCEDURE — 93798 PHYS/QHP OP CAR RHAB W/ECG: CPT

## 2024-07-01 ENCOUNTER — HOSPITAL ENCOUNTER (OUTPATIENT)
Dept: CARDIAC REHAB | Facility: HOSPITAL | Age: 89
Discharge: HOME | End: 2024-07-01
Attending: INTERNAL MEDICINE
Payer: COMMERCIAL

## 2024-07-01 PROCEDURE — 93798 PHYS/QHP OP CAR RHAB W/ECG: CPT

## 2024-07-03 ENCOUNTER — HOSPITAL ENCOUNTER (OUTPATIENT)
Dept: CARDIAC REHAB | Facility: HOSPITAL | Age: 89
Discharge: HOME | End: 2024-07-03
Attending: INTERNAL MEDICINE
Payer: COMMERCIAL

## 2024-07-03 PROCEDURE — 93798 PHYS/QHP OP CAR RHAB W/ECG: CPT

## 2024-07-08 ENCOUNTER — HOSPITAL ENCOUNTER (OUTPATIENT)
Dept: CARDIAC REHAB | Facility: HOSPITAL | Age: 89
Discharge: HOME | End: 2024-07-08
Attending: INTERNAL MEDICINE
Payer: COMMERCIAL

## 2024-07-08 PROCEDURE — 93798 PHYS/QHP OP CAR RHAB W/ECG: CPT

## 2024-07-10 ENCOUNTER — LAB (OUTPATIENT)
Dept: CARDIOLOGY | Facility: CLINIC | Age: 89
End: 2024-07-10
Payer: COMMERCIAL

## 2024-07-10 DIAGNOSIS — Z95.5 S/P DRUG ELUTING CORONARY STENT PLACEMENT: ICD-10-CM

## 2024-07-10 LAB
ALT SERPL W P-5'-P-CCNC: 16 U/L (ref 0–50)
CHOLEST SERPL-MCNC: 146 MG/DL
FASTING STATUS PATIENT QL REPORTED: YES
HDLC SERPL-MCNC: 54 MG/DL
LDLC SERPL CALC-MCNC: 72 MG/DL
NONHDLC SERPL-MCNC: 92 MG/DL
TRIGL SERPL-MCNC: 100 MG/DL

## 2024-07-10 PROCEDURE — 84460 ALANINE AMINO (ALT) (SGPT): CPT

## 2024-07-10 PROCEDURE — 36415 COLL VENOUS BLD VENIPUNCTURE: CPT

## 2024-07-10 PROCEDURE — 80061 LIPID PANEL: CPT

## 2024-07-12 ENCOUNTER — HOSPITAL ENCOUNTER (OUTPATIENT)
Dept: CARDIAC REHAB | Facility: HOSPITAL | Age: 89
Discharge: HOME | End: 2024-07-12
Attending: INTERNAL MEDICINE
Payer: COMMERCIAL

## 2024-07-12 PROCEDURE — 93798 PHYS/QHP OP CAR RHAB W/ECG: CPT

## 2024-07-15 ENCOUNTER — HOSPITAL ENCOUNTER (OUTPATIENT)
Dept: CARDIAC REHAB | Facility: HOSPITAL | Age: 89
Discharge: HOME | End: 2024-07-15
Attending: INTERNAL MEDICINE
Payer: COMMERCIAL

## 2024-07-15 PROCEDURE — 93798 PHYS/QHP OP CAR RHAB W/ECG: CPT

## 2024-07-17 ENCOUNTER — HOSPITAL ENCOUNTER (OUTPATIENT)
Dept: CARDIAC REHAB | Facility: HOSPITAL | Age: 89
Discharge: HOME | End: 2024-07-17
Attending: INTERNAL MEDICINE
Payer: COMMERCIAL

## 2024-07-17 PROCEDURE — 93798 PHYS/QHP OP CAR RHAB W/ECG: CPT

## 2024-07-19 ENCOUNTER — HOSPITAL ENCOUNTER (OUTPATIENT)
Dept: CARDIAC REHAB | Facility: HOSPITAL | Age: 89
Discharge: HOME | End: 2024-07-19
Attending: INTERNAL MEDICINE
Payer: COMMERCIAL

## 2024-07-19 PROCEDURE — 93798 PHYS/QHP OP CAR RHAB W/ECG: CPT

## 2024-07-22 ENCOUNTER — HOSPITAL ENCOUNTER (OUTPATIENT)
Dept: CARDIAC REHAB | Facility: HOSPITAL | Age: 89
Discharge: HOME | End: 2024-07-22
Attending: INTERNAL MEDICINE
Payer: COMMERCIAL

## 2024-07-22 PROCEDURE — 93798 PHYS/QHP OP CAR RHAB W/ECG: CPT

## 2024-07-23 ENCOUNTER — TRANSFERRED RECORDS (OUTPATIENT)
Dept: HEALTH INFORMATION MANAGEMENT | Facility: CLINIC | Age: 89
End: 2024-07-23
Payer: COMMERCIAL

## 2024-07-23 ENCOUNTER — LAB REQUISITION (OUTPATIENT)
Dept: LAB | Facility: CLINIC | Age: 89
End: 2024-07-23

## 2024-07-23 DIAGNOSIS — N18.32 CHRONIC KIDNEY DISEASE, STAGE 3B (H): ICD-10-CM

## 2024-07-23 PROCEDURE — 83970 ASSAY OF PARATHORMONE: CPT | Performed by: FAMILY MEDICINE

## 2024-07-23 PROCEDURE — 82040 ASSAY OF SERUM ALBUMIN: CPT | Performed by: FAMILY MEDICINE

## 2024-07-23 PROCEDURE — 84100 ASSAY OF PHOSPHORUS: CPT | Performed by: FAMILY MEDICINE

## 2024-07-24 ENCOUNTER — HOSPITAL ENCOUNTER (OUTPATIENT)
Dept: CARDIAC REHAB | Facility: HOSPITAL | Age: 89
Discharge: HOME | End: 2024-07-24
Attending: INTERNAL MEDICINE
Payer: COMMERCIAL

## 2024-07-24 LAB
ALBUMIN SERPL BCG-MCNC: 3.8 G/DL (ref 3.5–5.2)
ALP SERPL-CCNC: 86 U/L (ref 40–150)
ALT SERPL W P-5'-P-CCNC: 15 U/L (ref 0–50)
ANION GAP SERPL CALCULATED.3IONS-SCNC: 12 MMOL/L (ref 7–15)
AST SERPL W P-5'-P-CCNC: 19 U/L (ref 0–45)
BILIRUB SERPL-MCNC: 0.4 MG/DL
BUN SERPL-MCNC: 29.9 MG/DL (ref 8–23)
CALCIUM SERPL-MCNC: 8.7 MG/DL (ref 8.8–10.4)
CALCIUM SERPL-MCNC: 8.9 MG/DL (ref 8.8–10.4)
CHLORIDE SERPL-SCNC: 108 MMOL/L (ref 98–107)
CREAT SERPL-MCNC: 1.59 MG/DL (ref 0.51–0.95)
CREAT SERPL-MCNC: 1.62 MG/DL (ref 0.51–0.95)
EGFRCR SERPLBLD CKD-EPI 2021: 31 ML/MIN/1.73M2
EGFRCR SERPLBLD CKD-EPI 2021: 31 ML/MIN/1.73M2
GLUCOSE SERPL-MCNC: 116 MG/DL (ref 70–99)
HCO3 SERPL-SCNC: 23 MMOL/L (ref 22–29)
PHOSPHATE SERPL-MCNC: 4 MG/DL (ref 2.5–4.5)
POTASSIUM SERPL-SCNC: 4.6 MMOL/L (ref 3.4–5.3)
PROT SERPL-MCNC: 6.7 G/DL (ref 6.4–8.3)
PTH-INTACT SERPL-MCNC: 42 PG/ML (ref 15–65)
SODIUM SERPL-SCNC: 143 MMOL/L (ref 135–145)

## 2024-07-24 PROCEDURE — 93798 PHYS/QHP OP CAR RHAB W/ECG: CPT

## 2024-07-26 ENCOUNTER — HOSPITAL ENCOUNTER (OUTPATIENT)
Dept: CARDIAC REHAB | Facility: HOSPITAL | Age: 89
Discharge: HOME | End: 2024-07-26
Attending: INTERNAL MEDICINE
Payer: COMMERCIAL

## 2024-07-26 PROCEDURE — 93798 PHYS/QHP OP CAR RHAB W/ECG: CPT

## 2024-07-29 ENCOUNTER — HOSPITAL ENCOUNTER (OUTPATIENT)
Dept: CARDIAC REHAB | Facility: HOSPITAL | Age: 89
Discharge: HOME | End: 2024-07-29
Attending: INTERNAL MEDICINE
Payer: COMMERCIAL

## 2024-07-29 PROCEDURE — 93798 PHYS/QHP OP CAR RHAB W/ECG: CPT

## 2024-08-02 ENCOUNTER — HOSPITAL ENCOUNTER (OUTPATIENT)
Dept: CARDIAC REHAB | Facility: HOSPITAL | Age: 89
Discharge: HOME | End: 2024-08-02
Attending: INTERNAL MEDICINE
Payer: COMMERCIAL

## 2024-08-02 PROCEDURE — 93798 PHYS/QHP OP CAR RHAB W/ECG: CPT

## 2024-08-05 ENCOUNTER — HOSPITAL ENCOUNTER (OUTPATIENT)
Dept: CARDIAC REHAB | Facility: HOSPITAL | Age: 89
Discharge: HOME | End: 2024-08-05
Attending: INTERNAL MEDICINE
Payer: COMMERCIAL

## 2024-08-05 PROCEDURE — 93798 PHYS/QHP OP CAR RHAB W/ECG: CPT

## 2024-08-07 ENCOUNTER — HOSPITAL ENCOUNTER (OUTPATIENT)
Dept: CARDIAC REHAB | Facility: HOSPITAL | Age: 89
Discharge: HOME | End: 2024-08-07
Attending: INTERNAL MEDICINE
Payer: COMMERCIAL

## 2024-08-07 PROCEDURE — 93798 PHYS/QHP OP CAR RHAB W/ECG: CPT

## 2024-08-07 PROCEDURE — 93797 PHYS/QHP OP CAR RHAB WO ECG: CPT

## 2024-08-09 ENCOUNTER — OFFICE VISIT (OUTPATIENT)
Dept: CARDIOLOGY | Facility: CLINIC | Age: 89
End: 2024-08-09
Payer: COMMERCIAL

## 2024-08-09 VITALS
DIASTOLIC BLOOD PRESSURE: 62 MMHG | RESPIRATION RATE: 16 BRPM | SYSTOLIC BLOOD PRESSURE: 118 MMHG | BODY MASS INDEX: 31.34 KG/M2 | HEART RATE: 72 BPM | HEIGHT: 66 IN | WEIGHT: 195 LBS

## 2024-08-09 DIAGNOSIS — I10 PRIMARY HYPERTENSION: ICD-10-CM

## 2024-08-09 DIAGNOSIS — E78.5 DYSLIPIDEMIA, GOAL LDL BELOW 70: ICD-10-CM

## 2024-08-09 DIAGNOSIS — I25.10 CORONARY ARTERY DISEASE INVOLVING NATIVE CORONARY ARTERY OF NATIVE HEART WITHOUT ANGINA PECTORIS: ICD-10-CM

## 2024-08-09 DIAGNOSIS — I21.4 NSTEMI (NON-ST ELEVATED MYOCARDIAL INFARCTION) (H): Primary | ICD-10-CM

## 2024-08-09 DIAGNOSIS — Z95.5 PRESENCE OF DRUG-ELUTING STENT IN LEFT CIRCUMFLEX CORONARY ARTERY: ICD-10-CM

## 2024-08-09 PROCEDURE — G2211 COMPLEX E/M VISIT ADD ON: HCPCS | Performed by: STUDENT IN AN ORGANIZED HEALTH CARE EDUCATION/TRAINING PROGRAM

## 2024-08-09 PROCEDURE — 99214 OFFICE O/P EST MOD 30 MIN: CPT | Performed by: STUDENT IN AN ORGANIZED HEALTH CARE EDUCATION/TRAINING PROGRAM

## 2024-08-09 RX ORDER — LOSARTAN POTASSIUM 25 MG/1
25 TABLET ORAL 2 TIMES DAILY
Qty: 180 TABLET | Refills: 3 | Status: SHIPPED | OUTPATIENT
Start: 2024-08-09

## 2024-08-09 RX ORDER — METOPROLOL TARTRATE 25 MG/1
25 TABLET, FILM COATED ORAL 2 TIMES DAILY
Qty: 180 TABLET | Refills: 3 | Status: SHIPPED | OUTPATIENT
Start: 2024-08-09 | End: 2024-09-16

## 2024-08-09 NOTE — LETTER
8/9/2024    Jessica Fonseca MD  [address]  [facility] [address]    RE: Mary Corral       Dear Colleague,     I had the pleasure of seeing Mary Corral in the [facility].    [facility]   [address]  [place] [address]   [email or web address]   OFFICE: [phone]     CARDIOLOGY CLINIC NOTE     Thank you, Dr. Fonseca, Jessica Hawk, for asking the [facility] team to see Ms. Mary Corral to evaluate No chief complaint on file.         History of Present Illness   Ms. Mray Corral is a 89 year old female with a significant past history of CAD s/p NSTEMI Lcx 4/2024,  HTN, CKD, HLD, who presents for follow up.  The patient notes she has been doing well. She has dyspnea related to asthma but no other issues.  No exertional symptoms.  She was reticent to undergo staged PCI so she did a stress test which did not show any LAD territory ischemia.  Staged PCI was deferred.   She likes to walk and indoor bike. She has been staying active.           Medications  Allergies   Current Outpatient Medications   Medication Sig Dispense Refill     acetaminophen (TYLENOL) 325 MG tablet Take 2 tablets (650 mg) by mouth every 4 hours as needed for other (surgical pain) 100 tablet 0     acetaminophen-codeine (TYLENOL #3) 300-30 MG tablet Take 1 tablet by mouth every 6 hours as needed for severe pain (used when she has to walk long distances)       albuterol (2.5 MG/3ML) 0.083% nebulizer solution Take 1 vial by nebulization every 4 hours as needed for shortness of breath / dyspnea or wheezing       albuterol (PROAIR HFA/PROVENTIL HFA/VENTOLIN HFA) 108 (90 BASE) MCG/ACT Inhaler Inhale 2 puffs into the lungs every 4 hours as needed for shortness of breath       allopurinol (ZYLOPRIM) 100 MG tablet Take 100 mg by mouth 2 times daily as needed Gout flares       amLODIPine (NORVASC) 5 MG tablet Take 5 mg by mouth at bedtime       aspirin 81 MG  EC tablet Take 1 tablet (81 mg) by mouth daily Start tomorrow. 30 tablet 3     atorvastatin (LIPITOR) 40 MG tablet Take 1 tablet (40 mg) by mouth daily 90 tablet 3     cetirizine (ZYRTEC) 10 MG tablet Take 10 mg by mouth daily as needed for allergies       clopidogrel (PLAVIX) 75 MG tablet Take 1 tablet (75 mg) by mouth daily Dose to start tomorrow. 90 tablet 3     ferrous sulfate 45 MG TBCR CR tablet Take 45 mg by mouth daily       fluticasone-salmeterol (ADVAIR) 250-50 MCG/DOSE diskus inhaler Inhale 1 puff into the lungs daily       levothyroxine (SYNTHROID/LEVOTHROID) 50 MCG tablet Take 50 mcg by mouth daily       losartan (COZAAR) 50 MG tablet Take 50 mg by mouth daily 30 tablet      metoprolol tartrate (LOPRESSOR) 25 MG tablet Take 1 tablet (25 mg) by mouth 2 times daily Please call PCP for refill 120 tablet 0     montelukast (SINGULAIR) 10 MG tablet Take 10 mg by mouth at bedtime       multivitamin w/minerals (THERA-VIT-M) tablet Take 1 tablet by mouth daily       pantoprazole (PROTONIX) 40 MG EC tablet Take 40 mg by mouth daily as needed for heartburn       polyethylene glycol (MIRALAX/GLYCOLAX) powder Take 17 g by mouth daily as needed for constipation       predniSONE (DELTASONE) 20 MG tablet Take 20 mg by mouth daily as needed (Self doses in winter for lung infections.)       valACYclovir (VALTREX) 1000 mg tablet Take 1,000 mg by mouth 2 times daily as needed (for one day)       Vitamin D3 (CHOLECALCIFEROL) 25 mcg (1000 units) tablet Take 1,000 Units by mouth daily        Allergies   Allergen Reactions     Amoxicillin Hives     Keflex [Cephalexin] Hives     Lisinopril Shortness Of Breath     Penicillins Hives        Physical Examination Review of Systems   Vitals: There were no vitals taken for this visit.  BMI= There is no height or weight on file to calculate BMI.  Wt Readings from Last 3 Encounters:   06/10/24 84.8 kg (187 lb)   05/03/24 84.4 kg (186 lb)   05/03/24 83.6 kg (184 lb 4.8 oz)       General:  pleasant female. No acute distress.   Neck: No JVD  Lungs: clear to auscultation  COR:  regular rate and rhythm, No murmurs, rubs, or gallops  Extrem: No edema        Please refer above for cardiac ROS details.       Past History     Family History:   Family History   Problem Relation Age of Onset     Esophageal Cancer Mother      Colon Cancer Mother      Mental Illness Mother      Dementia Mother      Unknown/Adopted Mother      Asthma Mother      Diabetes Mother      Cerebrovascular Disease Mother      Thyroid Disease Mother      Congenital Anomalies Mother      Bleeding Disorder Mother      Migraines Mother      Muscular Disorder Mother      Parkinsonism Father      Mental Illness Father      Unknown/Adopted Father      Asthma Father      Cancer Father      Hypertension Father      Cerebrovascular Disease Father      Thyroid Disease Father      Congenital Anomalies Father      Bleeding Disorder Father      Migraines Father      Muscular Disorder Father      Mental Illness Sister      Dementia Sister      Unknown/Adopted Sister      Asthma Sister      Cerebrovascular Disease Sister      Thyroid Disease Sister      Congenital Anomalies Sister      Bleeding Disorder Sister      Migraines Sister      Muscular Disorder Sister      Cancer Mother         esophageal        Social History:   Social History     Socioeconomic History     Marital status: Single     Spouse name: Not on file     Number of children: Not on file     Years of education: Not on file     Highest education level: Not on file   Occupational History     Not on file   Tobacco Use     Smoking status: Never     Smokeless tobacco: Never     Tobacco comments:     smoked twice a week for a couple years while in college, less than 1/2 pack    Substance and Sexual Activity     Alcohol use: Yes     Alcohol/week: 0.0 standard drinks of alcohol     Comment: Alcoholic Drinks/day: occasional     Drug use: No     Sexual activity: Never   Other Topics Concern      Not on file   Social History Narrative     Not on file     Social Determinants of Health     Financial Resource Strain: Not on file   Food Insecurity: Not on file   Transportation Needs: Not on file   Physical Activity: Not on file   Stress: Not on file   Social Connections: Not on file   Interpersonal Safety: Not on file   Housing Stability: Not on file            Lab Results    Chemistry/lipid CBC Cardiac Enzymes/BNP/TSH/INR   Lab Results   Component Value Date    CHOL 146 07/10/2024    HDL 54 07/10/2024    TRIG 100 07/10/2024    BUN 29.9 (H) 07/23/2024     07/23/2024    CO2 23 07/23/2024    Lab Results   Component Value Date    WBC 9.9 05/03/2024    HGB 10.8 (L) 05/03/2024    HCT 33.6 (L) 05/03/2024    MCV 97 05/03/2024     05/03/2024    Lab Results   Component Value Date    TROPONINI 0.02 02/18/2018    TSH 0.81 04/27/2024    INR 1.00 02/17/2018          Cardiac Problems and Cardiac Diagnostics     Most Recent Cardiac testing:  ECG Personal interpretation  4/30/2024  NSR  RBBB    ECHO 5/8/2024  Interpretation Summary     Left ventricular size, wall motion and function are normal. The ejection  fraction is 60-65%.  Normal right ventricle size and systolic function.  No hemodynamically significant valvular abnormalities on 2D or color Doppler  images.  There is no pericardial effusion.     When compared to previous study on 5-3-2024, previous trace pericardial  effusion is resolved.  Stress test 6/12/2024:      The regadenoson nuclear stress test is negative for inducible myocardial ischemia or infarction.     The left ventricular ejection fraction at stress is greater than 70%.     The patient is at a low risk of future cardiac ischemic events.     There is no prior study for comparison.     Cardiac CT:    Cardiac cath 4/29/2024:  Findings:  LM:no obstruction  LAD:distal 60-70% narrowing  Lcx:mid-vessel acute 99% narrowing, UMESH 2 flow distally  RCA:dominant, mid-vessel 50% narrowing        Assessment/Recommendations   Assessment:    Ms. Mary Corral is a 89 year old female with a significant past history of CAD s/p NSTEMI Lcx 4/2024,  HTN, CKD, HLD, who presents for follow up.      CAD s/p NSTEMI Lcx 4/2024   - Cont DAPT with indefinite ASA 81mg, plavix x 12 months   - Moderate residual LAD disease, neg for ischemia by NM MPI.  Not having any symptoms.  Can defer PCI for now   = Continue lifestyle modification   -0 LDL 72.  Continue atorvastatin 40mg   - Cont BB and ARB   - Completed CR    HTN   - Well controlled   - Continue current management, change losartan to 25mg twice a day to target higher AM BP.    RTC 6 months    The longitudinal plan of care for CAD was addressed during this visit. Due to the added complexity in care, I will continue to support Mary Corral  in the subsequent management of this condition(s) and with the ongoing continuity of care of this condition(s).             Ata Mann DO [facility]  Non-invasive Cardiologist  [facility]         Thank you for allowing me to participate in the care of your patient.      Sincerely,     Ata Mann DO     [facility]  cc:   Leslie Bailey, CNP  [address] [place] [address]

## 2024-08-09 NOTE — PROGRESS NOTES
[facility] HEART CARE   [address]  [place] [address]   [email or web address]   OFFICE: [phone]     CARDIOLOGY CLINIC NOTE     Thank you, Jessica Acosta, for asking the [facility] Heart Care team to see Ms. Mary Corral to evaluate No chief complaint on file.         History of Present Illness   Ms. Mary Corral is a 89 year old female with a significant past history of CAD s/p NSTEMI Lcx 4/2024,  HTN, CKD, HLD, who presents for follow up.  The patient notes she has been doing well. She has dyspnea related to asthma but no other issues.  No exertional symptoms.  She was reticent to undergo staged PCI so she did a stress test which did not show any LAD territory ischemia.  Staged PCI was deferred.   She likes to walk and indoor bike. She has been staying active.           Medications  Allergies   Current Outpatient Medications   Medication Sig Dispense Refill    acetaminophen (TYLENOL) 325 MG tablet Take 2 tablets (650 mg) by mouth every 4 hours as needed for other (surgical pain) 100 tablet 0    acetaminophen-codeine (TYLENOL #3) 300-30 MG tablet Take 1 tablet by mouth every 6 hours as needed for severe pain (used when she has to walk long distances)      albuterol (2.5 MG/3ML) 0.083% nebulizer solution Take 1 vial by nebulization every 4 hours as needed for shortness of breath / dyspnea or wheezing      albuterol (PROAIR HFA/PROVENTIL HFA/VENTOLIN HFA) 108 (90 BASE) MCG/ACT Inhaler Inhale 2 puffs into the lungs every 4 hours as needed for shortness of breath      allopurinol (ZYLOPRIM) 100 MG tablet Take 100 mg by mouth 2 times daily as needed Gout flares      amLODIPine (NORVASC) 5 MG tablet Take 5 mg by mouth at bedtime      aspirin 81 MG EC tablet Take 1 tablet (81 mg) by mouth daily Start tomorrow. 30 tablet 3    atorvastatin (LIPITOR) 40 MG tablet Take 1 tablet (40 mg) by mouth daily 90 tablet 3    cetirizine (ZYRTEC) 10 MG tablet Take 10 mg by  mouth daily as needed for allergies      clopidogrel (PLAVIX) 75 MG tablet Take 1 tablet (75 mg) by mouth daily Dose to start tomorrow. 90 tablet 3    ferrous sulfate 45 MG TBCR CR tablet Take 45 mg by mouth daily      fluticasone-salmeterol (ADVAIR) 250-50 MCG/DOSE diskus inhaler Inhale 1 puff into the lungs daily      levothyroxine (SYNTHROID/LEVOTHROID) 50 MCG tablet Take 50 mcg by mouth daily      losartan (COZAAR) 50 MG tablet Take 50 mg by mouth daily 30 tablet     metoprolol tartrate (LOPRESSOR) 25 MG tablet Take 1 tablet (25 mg) by mouth 2 times daily Please call PCP for refill 120 tablet 0    montelukast (SINGULAIR) 10 MG tablet Take 10 mg by mouth at bedtime      multivitamin w/minerals (THERA-VIT-M) tablet Take 1 tablet by mouth daily      pantoprazole (PROTONIX) 40 MG EC tablet Take 40 mg by mouth daily as needed for heartburn      polyethylene glycol (MIRALAX/GLYCOLAX) powder Take 17 g by mouth daily as needed for constipation      predniSONE (DELTASONE) 20 MG tablet Take 20 mg by mouth daily as needed (Self doses in winter for lung infections.)      valACYclovir (VALTREX) 1000 mg tablet Take 1,000 mg by mouth 2 times daily as needed (for one day)      Vitamin D3 (CHOLECALCIFEROL) 25 mcg (1000 units) tablet Take 1,000 Units by mouth daily        Allergies   Allergen Reactions    Amoxicillin Hives    Keflex [Cephalexin] Hives    Lisinopril Shortness Of Breath    Penicillins Hives        Physical Examination Review of Systems   Vitals: There were no vitals taken for this visit.  BMI= There is no height or weight on file to calculate BMI.  Wt Readings from Last 3 Encounters:   06/10/24 84.8 kg (187 lb)   05/03/24 84.4 kg (186 lb)   05/03/24 83.6 kg (184 lb 4.8 oz)       General: pleasant female. No acute distress.   Neck: No JVD  Lungs: clear to auscultation  COR:  regular rate and rhythm, No murmurs, rubs, or gallops  Extrem: No edema        Please refer above for cardiac ROS details.       Past History      Family History:   Family History   Problem Relation Age of Onset    Esophageal Cancer Mother     Colon Cancer Mother     Mental Illness Mother     Dementia Mother     Unknown/Adopted Mother     Asthma Mother     Diabetes Mother     Cerebrovascular Disease Mother     Thyroid Disease Mother     Congenital Anomalies Mother     Bleeding Disorder Mother     Migraines Mother     Muscular Disorder Mother     Parkinsonism Father     Mental Illness Father     Unknown/Adopted Father     Asthma Father     Cancer Father     Hypertension Father     Cerebrovascular Disease Father     Thyroid Disease Father     Congenital Anomalies Father     Bleeding Disorder Father     Migraines Father     Muscular Disorder Father     Mental Illness Sister     Dementia Sister     Unknown/Adopted Sister     Asthma Sister     Cerebrovascular Disease Sister     Thyroid Disease Sister     Congenital Anomalies Sister     Bleeding Disorder Sister     Migraines Sister     Muscular Disorder Sister     Cancer Mother         esophageal        Social History:   Social History     Socioeconomic History    Marital status: Single     Spouse name: Not on file    Number of children: Not on file    Years of education: Not on file    Highest education level: Not on file   Occupational History    Not on file   Tobacco Use    Smoking status: Never    Smokeless tobacco: Never    Tobacco comments:     smoked twice a week for a couple years while in college, less than 1/2 pack    Substance and Sexual Activity    Alcohol use: Yes     Alcohol/week: 0.0 standard drinks of alcohol     Comment: Alcoholic Drinks/day: occasional    Drug use: No    Sexual activity: Never   Other Topics Concern    Not on file   Social History Narrative    Not on file     Social Determinants of Health     Financial Resource Strain: Not on file   Food Insecurity: Not on file   Transportation Needs: Not on file   Physical Activity: Not on file   Stress: Not on file   Social Connections: Not  on file   Interpersonal Safety: Not on file   Housing Stability: Not on file            Lab Results    Chemistry/lipid CBC Cardiac Enzymes/BNP/TSH/INR   Lab Results   Component Value Date    CHOL 146 07/10/2024    HDL 54 07/10/2024    TRIG 100 07/10/2024    BUN 29.9 (H) 07/23/2024     07/23/2024    CO2 23 07/23/2024    Lab Results   Component Value Date    WBC 9.9 05/03/2024    HGB 10.8 (L) 05/03/2024    HCT 33.6 (L) 05/03/2024    MCV 97 05/03/2024     05/03/2024    Lab Results   Component Value Date    TROPONINI 0.02 02/18/2018    TSH 0.81 04/27/2024    INR 1.00 02/17/2018          Cardiac Problems and Cardiac Diagnostics     Most Recent Cardiac testing:  ECG Personal interpretation  4/30/2024  NSR  RBBB    ECHO 5/8/2024  Interpretation Summary     Left ventricular size, wall motion and function are normal. The ejection  fraction is 60-65%.  Normal right ventricle size and systolic function.  No hemodynamically significant valvular abnormalities on 2D or color Doppler  images.  There is no pericardial effusion.     When compared to previous study on 5-3-2024, previous trace pericardial  effusion is resolved.  Stress test 6/12/2024:     The regadenoson nuclear stress test is negative for inducible myocardial ischemia or infarction.    The left ventricular ejection fraction at stress is greater than 70%.    The patient is at a low risk of future cardiac ischemic events.    There is no prior study for comparison.     Cardiac CT:    Cardiac cath 4/29/2024:  Findings:  LM:no obstruction  LAD:distal 60-70% narrowing  Lcx:mid-vessel acute 99% narrowing, UMESH 2 flow distally  RCA:dominant, mid-vessel 50% narrowing       Assessment/Recommendations   Assessment:    Ms. Mary Corral is a 89 year old female with a significant past history of CAD s/p NSTEMI Lcx 4/2024,  HTN, CKD, HLD, who presents for follow up.      CAD s/p NSTEMI Lcx 4/2024   - Cont DAPT with indefinite ASA 81mg, plavix x 12 months   - Moderate  residual LAD disease, neg for ischemia by NM MPI.  Not having any symptoms.  Can defer PCI for now   = Continue lifestyle modification   -0 LDL 72.  Continue atorvastatin 40mg   - Cont BB and ARB   - Completed CR    HTN   - Well controlled   - Continue current management, change losartan to 25mg twice a day to target higher AM BP.    RTC 6 months    The longitudinal plan of care for CAD was addressed during this visit. Due to the added complexity in care, I will continue to support Mary Corral  in the subsequent management of this condition(s) and with the ongoing continuity of care of this condition(s).             Ata Mann DO [facility]  Non-invasive Cardiologist  [facility]

## 2024-08-09 NOTE — PATIENT INSTRUCTIONS
It was a pleasure meeting you today    In summary:    We will have you change the losartan to 25mg twice a day.      Please call my nurse Lam Bravo at [phone] if you have any questions or issues.    We will schedule a follow up visit in  6 months      Ata Mann DO [facility]  Non-invasive Cardiologist  [facility]

## 2024-08-14 ENCOUNTER — TELEPHONE (OUTPATIENT)
Dept: CARDIOLOGY | Facility: CLINIC | Age: 89
End: 2024-08-14
Payer: COMMERCIAL

## 2024-08-14 NOTE — TELEPHONE ENCOUNTER
Dr. Mann, please review. Too premature to see any appreciable change or would adjust medications further? Thank you, Emeli Robbins   ________________________________________________________    Incoming call from patient. Saw TS on Friday. Changed her Losartan to 25 BID. Reports this has made no change in her blood pressure readings at all.  140's-150's/70 reported at home. Would like to know what to do? MERVATRN

## 2024-08-14 NOTE — TELEPHONE ENCOUNTER
PC to patient and discussion of provider response. Will continue to monitor for 2 weeks, and then report her at home BP readings. No further quesitons at this time. MERVAT,Rn

## 2024-08-14 NOTE — TELEPHONE ENCOUNTER
===View-only below this line===  ----- Message -----  From: Ata Mann DO  Sent: 8/14/2024  11:41 AM CDT  To: Lam Bravo RN    May take 2 weeks for this change to take full effect.  Send BP log then

## 2024-09-10 ENCOUNTER — LAB REQUISITION (OUTPATIENT)
Dept: LAB | Facility: CLINIC | Age: 89
End: 2024-09-10

## 2024-09-10 DIAGNOSIS — I12.9 HYPERTENSIVE CHRONIC KIDNEY DISEASE WITH STAGE 1 THROUGH STAGE 4 CHRONIC KIDNEY DISEASE, OR UNSPECIFIED CHRONIC KIDNEY DISEASE: ICD-10-CM

## 2024-09-10 DIAGNOSIS — N18.32 CHRONIC KIDNEY DISEASE, STAGE 3B (H): ICD-10-CM

## 2024-09-10 PROCEDURE — 82040 ASSAY OF SERUM ALBUMIN: CPT | Performed by: FAMILY MEDICINE

## 2024-09-11 LAB
ALBUMIN SERPL BCG-MCNC: 3.9 G/DL (ref 3.5–5.2)
ALP SERPL-CCNC: 89 U/L (ref 40–150)
ALT SERPL W P-5'-P-CCNC: 14 U/L (ref 0–50)
ANION GAP SERPL CALCULATED.3IONS-SCNC: 14 MMOL/L (ref 7–15)
AST SERPL W P-5'-P-CCNC: 17 U/L (ref 0–45)
BILIRUB SERPL-MCNC: 0.4 MG/DL
BUN SERPL-MCNC: 35.5 MG/DL (ref 8–23)
CALCIUM SERPL-MCNC: 9.1 MG/DL (ref 8.8–10.4)
CHLORIDE SERPL-SCNC: 109 MMOL/L (ref 98–107)
CREAT SERPL-MCNC: 1.67 MG/DL (ref 0.51–0.95)
EGFRCR SERPLBLD CKD-EPI 2021: 29 ML/MIN/1.73M2
GLUCOSE SERPL-MCNC: 112 MG/DL (ref 70–99)
HCO3 SERPL-SCNC: 21 MMOL/L (ref 22–29)
POTASSIUM SERPL-SCNC: 4.7 MMOL/L (ref 3.4–5.3)
PROT SERPL-MCNC: 6.9 G/DL (ref 6.4–8.3)
SODIUM SERPL-SCNC: 144 MMOL/L (ref 135–145)

## 2024-09-12 ENCOUNTER — TELEPHONE (OUTPATIENT)
Dept: CARDIOLOGY | Facility: CLINIC | Age: 89
End: 2024-09-12
Payer: COMMERCIAL

## 2024-09-12 DIAGNOSIS — I10 BENIGN ESSENTIAL HYPERTENSION: Primary | ICD-10-CM

## 2024-09-12 DIAGNOSIS — I10 PRIMARY HYPERTENSION: ICD-10-CM

## 2024-09-12 NOTE — TELEPHONE ENCOUNTER
----- Message from Maria Dolores CHRISTENSEN sent at 9/10/2024  2:58 PM CDT -----  Regarding: BP readings for patient of Dr Mackenzie Chauhan,    Patient walked in with BP readings recorded on calendar pages and I asked that I send them to Cassie, which I have done via e-mail.    I will bring the originals to [place] to ask her if she can scan them into the chart too.    Thank you,  Muriel

## 2024-09-12 NOTE — TELEPHONE ENCOUNTER
Dr. Mann, please review the patient reported BP log. Continue Losartan 25 mg BID or increase? Thank you CMM,RN

## 2024-09-13 NOTE — TELEPHONE ENCOUNTER
===View-only below this line===  ----- Message -----  From: Ata Mann DO  Sent: 9/12/2024   1:08 PM CDT  To: Lam Bravo RN    Let's have her change the metoprolol to carvedilol 6.25mg BID.  Another BP log in 3 weeks.  Thanks    PC to patient, and LVM to return call. CMM,RN

## 2024-09-16 RX ORDER — CARVEDILOL 6.25 MG/1
6.25 TABLET ORAL 2 TIMES DAILY WITH MEALS
Qty: 60 TABLET | Refills: 3 | Status: SHIPPED | OUTPATIENT
Start: 2024-09-16

## 2024-09-16 NOTE — TELEPHONE ENCOUNTER
PC to patient and review of recommendations. Metoprolol removed from medication list. New Rx sent to verified pharmacy location. Discussion of BP monitoring, 3 weeks after med change to send in new readings. Call sooner if questions or concerns. Discussion to take with early morning and evening meals to have good separation and with food. Pt verbalized understanding. MERVAT,Rn

## 2024-09-18 NOTE — TELEPHONE ENCOUNTER
PC to patient and review of provider response. Will monitor. Encouraged to call if further questions/concerns. MERVAT,Rn

## 2024-09-18 NOTE — TELEPHONE ENCOUNTER
"Dr. Mann, please review. Any concerns of continuing Carvedilol with asthma history? SBP this am, after transition 130. Please advise. Emeli CROWELL  _____________________________________________________    Incoming call from patient, she is a severe asthmatic.   Physically induced on inhalers. One of the side effects from the carvedilol, \"if you have asthma do not take\". She wants to have provider be aware and review. Will update and return call once obtained. Pt reports can LVM on home line if no answer. CMM,Rn     "

## 2024-09-18 NOTE — TELEPHONE ENCOUNTER
===View-only below this line===  ----- Message -----  From: Ata Mann DO  Sent: 9/18/2024  10:15 AM CDT  To: Lam Bravo RN    Less asthma complication risk with carvedilol vs. Metoprolol.  If she tolerated metoprolol then she should be ok.

## 2024-10-15 ENCOUNTER — MEDICAL CORRESPONDENCE (OUTPATIENT)
Dept: HEALTH INFORMATION MANAGEMENT | Facility: CLINIC | Age: 89
End: 2024-10-15
Payer: COMMERCIAL

## 2024-10-22 ENCOUNTER — TELEPHONE (OUTPATIENT)
Dept: CARDIOLOGY | Facility: CLINIC | Age: 89
End: 2024-10-22
Payer: COMMERCIAL

## 2024-10-22 NOTE — TELEPHONE ENCOUNTER
Incoming receipt of BP logs on two pages of calendar. Sent to [facility] to have scanned into chart. Once available will have TS review. PC to patient to make aware received.MERVAT,RN

## 2024-10-30 NOTE — TELEPHONE ENCOUNTER
Dr. Mann please review the incoming calendar/BP log from patient. Non-urgent. Any further recommendations? MERVAT,RN

## 2024-11-05 NOTE — TELEPHONE ENCOUNTER
===View-only below this line===  ----- Message -----  From: Ata Mann DO  Sent: 11/5/2024  11:24 AM CST  To: Lam Bravo RN    BP looks good - no additional changes recommended.

## 2024-11-05 NOTE — TELEPHONE ENCOUNTER
Return incoming call from patient, and review of provider response. Will continue on current meds. Direct transfer to schedulers to arrange TS in February, 6 month follow-up. MERVATRn

## 2024-12-10 ENCOUNTER — LAB REQUISITION (OUTPATIENT)
Dept: LAB | Facility: CLINIC | Age: 89
End: 2024-12-10

## 2024-12-10 ENCOUNTER — TRANSFERRED RECORDS (OUTPATIENT)
Dept: HEALTH INFORMATION MANAGEMENT | Facility: CLINIC | Age: 89
End: 2024-12-10

## 2024-12-10 DIAGNOSIS — N18.32 CHRONIC KIDNEY DISEASE, STAGE 3B (H): ICD-10-CM

## 2024-12-10 PROCEDURE — 82310 ASSAY OF CALCIUM: CPT | Performed by: FAMILY MEDICINE

## 2024-12-10 PROCEDURE — 82565 ASSAY OF CREATININE: CPT | Performed by: FAMILY MEDICINE

## 2024-12-10 PROCEDURE — 82040 ASSAY OF SERUM ALBUMIN: CPT | Performed by: FAMILY MEDICINE

## 2024-12-10 PROCEDURE — 80053 COMPREHEN METABOLIC PANEL: CPT | Performed by: FAMILY MEDICINE

## 2024-12-11 LAB
ALBUMIN SERPL BCG-MCNC: 4 G/DL (ref 3.5–5.2)
ALP SERPL-CCNC: 99 U/L (ref 40–150)
ALT SERPL W P-5'-P-CCNC: 17 U/L (ref 0–50)
ANION GAP SERPL CALCULATED.3IONS-SCNC: 13 MMOL/L (ref 7–15)
AST SERPL W P-5'-P-CCNC: 17 U/L (ref 0–45)
BILIRUB SERPL-MCNC: 0.4 MG/DL
BUN SERPL-MCNC: 30.7 MG/DL (ref 8–23)
CALCIUM SERPL-MCNC: 9.3 MG/DL (ref 8.8–10.4)
CHLORIDE SERPL-SCNC: 109 MMOL/L (ref 98–107)
CREAT SERPL-MCNC: 1.34 MG/DL (ref 0.51–0.95)
EGFRCR SERPLBLD CKD-EPI 2021: 38 ML/MIN/1.73M2
GLUCOSE SERPL-MCNC: 110 MG/DL (ref 70–99)
HCO3 SERPL-SCNC: 19 MMOL/L (ref 22–29)
POTASSIUM SERPL-SCNC: 4.6 MMOL/L (ref 3.4–5.3)
PROT SERPL-MCNC: 6.6 G/DL (ref 6.4–8.3)
SODIUM SERPL-SCNC: 141 MMOL/L (ref 135–145)

## 2025-01-02 ENCOUNTER — TELEPHONE (OUTPATIENT)
Dept: CARDIOLOGY | Facility: CLINIC | Age: 89
End: 2025-01-02
Payer: COMMERCIAL

## 2025-01-02 NOTE — TELEPHONE ENCOUNTER
Spoke with pt regarding cold medications. Pt reports that she would like to take coricidin and robitussin. Discussed that should be okay and that she should avoid medications that have decongestants such as phenylephrine and pseudoephedrine. Pt verbalized understanding and has no other needs. aj

## 2025-01-02 NOTE — TELEPHONE ENCOUNTER
[facility] Call Center    Phone Message    May a detailed message be left on voicemail: yes    Reason for Call: Patient called requesting to speak with a member of her care team. Would like to know if it is safe for her to take cold and flu medications. Please call back to further discuss.    Thank you!  [facility]

## 2025-01-09 ENCOUNTER — TELEPHONE (OUTPATIENT)
Dept: CARDIOLOGY | Facility: CLINIC | Age: 89
End: 2025-01-09
Payer: COMMERCIAL

## 2025-01-09 DIAGNOSIS — I10 PRIMARY HYPERTENSION: ICD-10-CM

## 2025-01-09 DIAGNOSIS — I10 BENIGN ESSENTIAL HYPERTENSION: ICD-10-CM

## 2025-01-09 RX ORDER — CARVEDILOL 6.25 MG/1
6.25 TABLET ORAL 2 TIMES DAILY WITH MEALS
Qty: 180 TABLET | Refills: 2 | Status: SHIPPED | OUTPATIENT
Start: 2025-01-09

## 2025-01-09 NOTE — TELEPHONE ENCOUNTER
[facility] Call Center    Phone Message    May a detailed message be left on voicemail: yes     Reason for Call: Medication Refill Request    Has the patient contacted the pharmacy for the refill? Yes   Name of medication being requested: carvedilol (COREG) 6.25 MG tablet   Provider who prescribed the medication: Mackenzie  Pharmacy: [id number]][address]    Date medication is needed: Pt stats pharmacy has not receive medication request. Patient is now out.       Action Taken: Other: cardiology     Travel Screening: Not Applicable    Thank you!  [facility]       Date of Service:

## 2025-01-09 NOTE — TELEPHONE ENCOUNTER
Refilled Rx and sent to [facility]. Noted no refill request received electronically or via fax to this clinic. -[facility] [place]      Outpatient Medication Detail     Disp Refills Start End TED   carvedilol (COREG) 6.25 MG tablet 180 tablet 2 1/9/2025 -- No   Sig - Route: Take 1 tablet (6.25 mg) by mouth 2 times daily (with meals). - Oral   Sent to pharmacy as: Carvedilol 6.25 MG Oral Tablet (COREG)   Class: E-Prescribe   Order: [id number]   E-Prescribing Status: Receipt confirmed by pharmacy (1/9/2025 10:19 AM CST)

## 2025-01-12 ENCOUNTER — HEALTH MAINTENANCE LETTER (OUTPATIENT)
Age: 89
End: 2025-01-12

## 2025-02-05 ENCOUNTER — APPOINTMENT (OUTPATIENT)
Dept: CT IMAGING | Facility: HOSPITAL | Age: 89
DRG: 378 | End: 2025-02-05
Attending: STUDENT IN AN ORGANIZED HEALTH CARE EDUCATION/TRAINING PROGRAM
Payer: COMMERCIAL

## 2025-02-05 ENCOUNTER — HOSPITAL ENCOUNTER (INPATIENT)
Facility: HOSPITAL | Age: 89
End: 2025-02-05
Attending: EMERGENCY MEDICINE | Admitting: INTERNAL MEDICINE
Payer: COMMERCIAL

## 2025-02-05 DIAGNOSIS — R53.1 GENERALIZED WEAKNESS: ICD-10-CM

## 2025-02-05 DIAGNOSIS — K92.1 GASTROINTESTINAL HEMORRHAGE WITH MELENA: Primary | ICD-10-CM

## 2025-02-05 DIAGNOSIS — R19.5 LOOSE STOOLS: ICD-10-CM

## 2025-02-05 LAB
ABO + RH BLD: NORMAL
ALBUMIN SERPL BCG-MCNC: 3.4 G/DL (ref 3.5–5.2)
ALP SERPL-CCNC: 65 U/L (ref 40–150)
ALT SERPL W P-5'-P-CCNC: 16 U/L (ref 0–50)
ANION GAP SERPL CALCULATED.3IONS-SCNC: 13 MMOL/L (ref 7–15)
ANION GAP SERPL CALCULATED.3IONS-SCNC: 14 MMOL/L (ref 7–15)
APTT PPP: 28 SECONDS (ref 22–38)
AST SERPL W P-5'-P-CCNC: 12 U/L (ref 0–45)
BASOPHILS # BLD AUTO: 0 10E3/UL (ref 0–0.2)
BASOPHILS # BLD AUTO: 0 10E3/UL (ref 0–0.2)
BASOPHILS NFR BLD AUTO: 0 %
BASOPHILS NFR BLD AUTO: 0 %
BILIRUB DIRECT SERPL-MCNC: <0.2 MG/DL (ref 0–0.3)
BILIRUB SERPL-MCNC: 0.4 MG/DL
BLD GP AB SCN SERPL QL: NEGATIVE
BLD PROD TYP BPU: NORMAL
BLD PROD TYP BPU: NORMAL
BLOOD COMPONENT TYPE: NORMAL
BLOOD COMPONENT TYPE: NORMAL
BUN SERPL-MCNC: 90.2 MG/DL (ref 8–23)
BUN SERPL-MCNC: 91.9 MG/DL (ref 8–23)
CALCIUM SERPL-MCNC: 7.7 MG/DL (ref 8.8–10.4)
CALCIUM SERPL-MCNC: 8.8 MG/DL (ref 8.8–10.4)
CHLORIDE SERPL-SCNC: 109 MMOL/L (ref 98–107)
CHLORIDE SERPL-SCNC: 115 MMOL/L (ref 98–107)
CODING SYSTEM: NORMAL
CODING SYSTEM: NORMAL
CREAT SERPL-MCNC: 1.54 MG/DL (ref 0.51–0.95)
CREAT SERPL-MCNC: 1.63 MG/DL (ref 0.51–0.95)
CROSSMATCH: NORMAL
CROSSMATCH: NORMAL
EGFRCR SERPLBLD CKD-EPI 2021: 30 ML/MIN/1.73M2
EGFRCR SERPLBLD CKD-EPI 2021: 32 ML/MIN/1.73M2
EOSINOPHIL # BLD AUTO: 0 10E3/UL (ref 0–0.7)
EOSINOPHIL # BLD AUTO: 0.2 10E3/UL (ref 0–0.7)
EOSINOPHIL NFR BLD AUTO: 0 %
EOSINOPHIL NFR BLD AUTO: 1 %
ERYTHROCYTE [DISTWIDTH] IN BLOOD BY AUTOMATED COUNT: 15 % (ref 10–15)
ERYTHROCYTE [DISTWIDTH] IN BLOOD BY AUTOMATED COUNT: 15 % (ref 10–15)
FLUAV RNA SPEC QL NAA+PROBE: NEGATIVE
FLUBV RNA RESP QL NAA+PROBE: NEGATIVE
GLUCOSE BLDC GLUCOMTR-MCNC: 196 MG/DL (ref 70–99)
GLUCOSE SERPL-MCNC: 158 MG/DL (ref 70–99)
GLUCOSE SERPL-MCNC: 207 MG/DL (ref 70–99)
HCO3 SERPL-SCNC: 13 MMOL/L (ref 22–29)
HCO3 SERPL-SCNC: 19 MMOL/L (ref 22–29)
HCT VFR BLD AUTO: 17.8 % (ref 35–47)
HCT VFR BLD AUTO: 25 % (ref 35–47)
HGB BLD-MCNC: 5.5 G/DL (ref 11.7–15.7)
HGB BLD-MCNC: 7.8 G/DL (ref 11.7–15.7)
IMM GRANULOCYTES # BLD: 0.1 10E3/UL
IMM GRANULOCYTES # BLD: 0.1 10E3/UL
IMM GRANULOCYTES NFR BLD: 1 %
IMM GRANULOCYTES NFR BLD: 1 %
INR PPP: 1.2 (ref 0.85–1.15)
ISSUE DATE AND TIME: NORMAL
ISSUE DATE AND TIME: NORMAL
LACTATE SERPL-SCNC: 1.8 MMOL/L (ref 0.7–2)
LACTATE SERPL-SCNC: 2.2 MMOL/L (ref 0.7–2)
LACTATE SERPL-SCNC: 2.8 MMOL/L (ref 0.7–2)
LIPASE SERPL-CCNC: 41 U/L (ref 13–60)
LYMPHOCYTES # BLD AUTO: 1.5 10E3/UL (ref 0.8–5.3)
LYMPHOCYTES # BLD AUTO: 2.1 10E3/UL (ref 0.8–5.3)
LYMPHOCYTES NFR BLD AUTO: 11 %
LYMPHOCYTES NFR BLD AUTO: 13 %
MAGNESIUM SERPL-MCNC: 1.8 MG/DL (ref 1.7–2.3)
MCH RBC QN AUTO: 30.1 PG (ref 26.5–33)
MCH RBC QN AUTO: 30.2 PG (ref 26.5–33)
MCHC RBC AUTO-ENTMCNC: 30.9 G/DL (ref 31.5–36.5)
MCHC RBC AUTO-ENTMCNC: 31.2 G/DL (ref 31.5–36.5)
MCV RBC AUTO: 97 FL (ref 78–100)
MCV RBC AUTO: 98 FL (ref 78–100)
MONOCYTES # BLD AUTO: 0.1 10E3/UL (ref 0–1.3)
MONOCYTES # BLD AUTO: 1 10E3/UL (ref 0–1.3)
MONOCYTES NFR BLD AUTO: 1 %
MONOCYTES NFR BLD AUTO: 6 %
NEUTROPHILS # BLD AUTO: 12.5 10E3/UL (ref 1.6–8.3)
NEUTROPHILS # BLD AUTO: 12.9 10E3/UL (ref 1.6–8.3)
NEUTROPHILS NFR BLD AUTO: 79 %
NEUTROPHILS NFR BLD AUTO: 88 %
NRBC # BLD AUTO: 0 10E3/UL
NRBC # BLD AUTO: 0 10E3/UL
NRBC BLD AUTO-RTO: 0 /100
NRBC BLD AUTO-RTO: 0 /100
NT-PROBNP SERPL-MCNC: 798 PG/ML (ref 0–1800)
PLATELET # BLD AUTO: 155 10E3/UL (ref 150–450)
PLATELET # BLD AUTO: 209 10E3/UL (ref 150–450)
POTASSIUM SERPL-SCNC: 5 MMOL/L (ref 3.4–5.3)
POTASSIUM SERPL-SCNC: 5.2 MMOL/L (ref 3.4–5.3)
PROT SERPL-MCNC: 5.4 G/DL (ref 6.4–8.3)
RBC # BLD AUTO: 1.82 10E6/UL (ref 3.8–5.2)
RBC # BLD AUTO: 2.59 10E6/UL (ref 3.8–5.2)
RSV RNA SPEC NAA+PROBE: NEGATIVE
SARS-COV-2 RNA RESP QL NAA+PROBE: NEGATIVE
SODIUM SERPL-SCNC: 141 MMOL/L (ref 135–145)
SODIUM SERPL-SCNC: 142 MMOL/L (ref 135–145)
SPECIMEN EXP DATE BLD: NORMAL
TROPONIN T SERPL HS-MCNC: 19 NG/L
TROPONIN T SERPL HS-MCNC: 25 NG/L
TROPONIN T SERPL HS-MCNC: 25 NG/L
TSH SERPL DL<=0.005 MIU/L-ACNC: 0.8 UIU/ML (ref 0.3–4.2)
UNIT ABO/RH: NORMAL
UNIT ABO/RH: NORMAL
UNIT NUMBER: NORMAL
UNIT NUMBER: NORMAL
UNIT STATUS: NORMAL
UNIT STATUS: NORMAL
UNIT TYPE ISBT: 7300
UNIT TYPE ISBT: 7300
WBC # BLD AUTO: 14.2 10E3/UL (ref 4–11)
WBC # BLD AUTO: 16.3 10E3/UL (ref 4–11)

## 2025-02-05 PROCEDURE — 83880 ASSAY OF NATRIURETIC PEPTIDE: CPT | Performed by: STUDENT IN AN ORGANIZED HEALTH CARE EDUCATION/TRAINING PROGRAM

## 2025-02-05 PROCEDURE — 71275 CT ANGIOGRAPHY CHEST: CPT

## 2025-02-05 PROCEDURE — 94640 AIRWAY INHALATION TREATMENT: CPT

## 2025-02-05 PROCEDURE — 85730 THROMBOPLASTIN TIME PARTIAL: CPT | Performed by: STUDENT IN AN ORGANIZED HEALTH CARE EDUCATION/TRAINING PROGRAM

## 2025-02-05 PROCEDURE — 93005 ELECTROCARDIOGRAM TRACING: CPT

## 2025-02-05 PROCEDURE — 250N000011 HC RX IP 250 OP 636: Performed by: EMERGENCY MEDICINE

## 2025-02-05 PROCEDURE — 87637 SARSCOV2&INF A&B&RSV AMP PRB: CPT | Performed by: STUDENT IN AN ORGANIZED HEALTH CARE EDUCATION/TRAINING PROGRAM

## 2025-02-05 PROCEDURE — 96365 THER/PROPH/DIAG IV INF INIT: CPT | Mod: 59

## 2025-02-05 PROCEDURE — 250N000011 HC RX IP 250 OP 636: Mod: JZ | Performed by: STUDENT IN AN ORGANIZED HEALTH CARE EDUCATION/TRAINING PROGRAM

## 2025-02-05 PROCEDURE — 84443 ASSAY THYROID STIM HORMONE: CPT | Performed by: STUDENT IN AN ORGANIZED HEALTH CARE EDUCATION/TRAINING PROGRAM

## 2025-02-05 PROCEDURE — 96375 TX/PRO/DX INJ NEW DRUG ADDON: CPT

## 2025-02-05 PROCEDURE — 86901 BLOOD TYPING SEROLOGIC RH(D): CPT | Performed by: STUDENT IN AN ORGANIZED HEALTH CARE EDUCATION/TRAINING PROGRAM

## 2025-02-05 PROCEDURE — 83605 ASSAY OF LACTIC ACID: CPT | Performed by: STUDENT IN AN ORGANIZED HEALTH CARE EDUCATION/TRAINING PROGRAM

## 2025-02-05 PROCEDURE — 96361 HYDRATE IV INFUSION ADD-ON: CPT

## 2025-02-05 PROCEDURE — 258N000003 HC RX IP 258 OP 636

## 2025-02-05 PROCEDURE — 85610 PROTHROMBIN TIME: CPT | Performed by: STUDENT IN AN ORGANIZED HEALTH CARE EDUCATION/TRAINING PROGRAM

## 2025-02-05 PROCEDURE — 82248 BILIRUBIN DIRECT: CPT | Performed by: STUDENT IN AN ORGANIZED HEALTH CARE EDUCATION/TRAINING PROGRAM

## 2025-02-05 PROCEDURE — 36415 COLL VENOUS BLD VENIPUNCTURE: CPT

## 2025-02-05 PROCEDURE — 74174 CTA ABD&PLVS W/CONTRAST: CPT

## 2025-02-05 PROCEDURE — 86923 COMPATIBILITY TEST ELECTRIC: CPT | Performed by: INTERNAL MEDICINE

## 2025-02-05 PROCEDURE — 86923 COMPATIBILITY TEST ELECTRIC: CPT

## 2025-02-05 PROCEDURE — 87040 BLOOD CULTURE FOR BACTERIA: CPT | Performed by: STUDENT IN AN ORGANIZED HEALTH CARE EDUCATION/TRAINING PROGRAM

## 2025-02-05 PROCEDURE — 258N000003 HC RX IP 258 OP 636: Performed by: EMERGENCY MEDICINE

## 2025-02-05 PROCEDURE — 258N000003 HC RX IP 258 OP 636: Performed by: STUDENT IN AN ORGANIZED HEALTH CARE EDUCATION/TRAINING PROGRAM

## 2025-02-05 PROCEDURE — 250N000009 HC RX 250: Performed by: HOSPITALIST

## 2025-02-05 PROCEDURE — 82565 ASSAY OF CREATININE: CPT | Performed by: STUDENT IN AN ORGANIZED HEALTH CARE EDUCATION/TRAINING PROGRAM

## 2025-02-05 PROCEDURE — 96374 THER/PROPH/DIAG INJ IV PUSH: CPT | Mod: 59

## 2025-02-05 PROCEDURE — 93005 ELECTROCARDIOGRAM TRACING: CPT | Performed by: STUDENT IN AN ORGANIZED HEALTH CARE EDUCATION/TRAINING PROGRAM

## 2025-02-05 PROCEDURE — P9016 RBC LEUKOCYTES REDUCED: HCPCS

## 2025-02-05 PROCEDURE — 999N000157 HC STATISTIC RCP TIME EA 10 MIN

## 2025-02-05 PROCEDURE — 84484 ASSAY OF TROPONIN QUANT: CPT | Performed by: STUDENT IN AN ORGANIZED HEALTH CARE EDUCATION/TRAINING PROGRAM

## 2025-02-05 PROCEDURE — 250N000009 HC RX 250: Performed by: STUDENT IN AN ORGANIZED HEALTH CARE EDUCATION/TRAINING PROGRAM

## 2025-02-05 PROCEDURE — 83690 ASSAY OF LIPASE: CPT | Performed by: STUDENT IN AN ORGANIZED HEALTH CARE EDUCATION/TRAINING PROGRAM

## 2025-02-05 PROCEDURE — 85025 COMPLETE CBC W/AUTO DIFF WBC: CPT

## 2025-02-05 PROCEDURE — 250N000013 HC RX MED GY IP 250 OP 250 PS 637: Performed by: HOSPITALIST

## 2025-02-05 PROCEDURE — 99222 1ST HOSP IP/OBS MODERATE 55: CPT | Performed by: HOSPITALIST

## 2025-02-05 PROCEDURE — 36415 COLL VENOUS BLD VENIPUNCTURE: CPT | Performed by: STUDENT IN AN ORGANIZED HEALTH CARE EDUCATION/TRAINING PROGRAM

## 2025-02-05 PROCEDURE — 250N000011 HC RX IP 250 OP 636: Performed by: STUDENT IN AN ORGANIZED HEALTH CARE EDUCATION/TRAINING PROGRAM

## 2025-02-05 PROCEDURE — 120N000001 HC R&B MED SURG/OB

## 2025-02-05 PROCEDURE — 83605 ASSAY OF LACTIC ACID: CPT

## 2025-02-05 PROCEDURE — 99285 EMERGENCY DEPT VISIT HI MDM: CPT | Mod: 25

## 2025-02-05 PROCEDURE — 82310 ASSAY OF CALCIUM: CPT

## 2025-02-05 PROCEDURE — 93010 ELECTROCARDIOGRAM REPORT: CPT | Performed by: INTERNAL MEDICINE

## 2025-02-05 PROCEDURE — 83735 ASSAY OF MAGNESIUM: CPT | Performed by: STUDENT IN AN ORGANIZED HEALTH CARE EDUCATION/TRAINING PROGRAM

## 2025-02-05 PROCEDURE — 86923 COMPATIBILITY TEST ELECTRIC: CPT | Performed by: HOSPITALIST

## 2025-02-05 PROCEDURE — 85025 COMPLETE CBC W/AUTO DIFF WBC: CPT | Performed by: STUDENT IN AN ORGANIZED HEALTH CARE EDUCATION/TRAINING PROGRAM

## 2025-02-05 PROCEDURE — 84484 ASSAY OF TROPONIN QUANT: CPT

## 2025-02-05 RX ORDER — IPRATROPIUM BROMIDE AND ALBUTEROL SULFATE 2.5; .5 MG/3ML; MG/3ML
3 SOLUTION RESPIRATORY (INHALATION) ONCE
Status: COMPLETED | OUTPATIENT
Start: 2025-02-05 | End: 2025-02-05

## 2025-02-05 RX ORDER — LIDOCAINE 40 MG/G
CREAM TOPICAL
Status: DISCONTINUED | OUTPATIENT
Start: 2025-02-05 | End: 2025-02-13 | Stop reason: HOSPADM

## 2025-02-05 RX ORDER — ATORVASTATIN CALCIUM 40 MG/1
40 TABLET, FILM COATED ORAL DAILY
Status: DISCONTINUED | OUTPATIENT
Start: 2025-02-06 | End: 2025-02-13 | Stop reason: HOSPADM

## 2025-02-05 RX ORDER — ALBUTEROL SULFATE 0.83 MG/ML
2.5 SOLUTION RESPIRATORY (INHALATION) EVERY 4 HOURS PRN
Status: DISCONTINUED | OUTPATIENT
Start: 2025-02-05 | End: 2025-02-13 | Stop reason: HOSPADM

## 2025-02-05 RX ORDER — CALCIUM CARBONATE 500 MG/1
1000 TABLET, CHEWABLE ORAL 4 TIMES DAILY PRN
Status: DISCONTINUED | OUTPATIENT
Start: 2025-02-05 | End: 2025-02-13 | Stop reason: HOSPADM

## 2025-02-05 RX ORDER — ALBUTEROL SULFATE 90 UG/1
2 INHALANT RESPIRATORY (INHALATION) EVERY 4 HOURS PRN
Status: DISCONTINUED | OUTPATIENT
Start: 2025-02-05 | End: 2025-02-13 | Stop reason: HOSPADM

## 2025-02-05 RX ORDER — ACETAMINOPHEN 325 MG/1
650 TABLET ORAL EVERY 4 HOURS PRN
Status: DISCONTINUED | OUTPATIENT
Start: 2025-02-05 | End: 2025-02-13 | Stop reason: HOSPADM

## 2025-02-05 RX ORDER — MEROPENEM 1 G/1
1 INJECTION, POWDER, FOR SOLUTION INTRAVENOUS ONCE
Status: DISCONTINUED | OUTPATIENT
Start: 2025-02-05 | End: 2025-02-05

## 2025-02-05 RX ORDER — LEVOTHYROXINE SODIUM 25 UG/1
50 TABLET ORAL
Status: DISCONTINUED | OUTPATIENT
Start: 2025-02-06 | End: 2025-02-13 | Stop reason: HOSPADM

## 2025-02-05 RX ORDER — FLUTICASONE FUROATE AND VILANTEROL 100; 25 UG/1; UG/1
1 POWDER RESPIRATORY (INHALATION) DAILY
Status: DISCONTINUED | OUTPATIENT
Start: 2025-02-06 | End: 2025-02-13 | Stop reason: HOSPADM

## 2025-02-05 RX ORDER — POLYETHYLENE GLYCOL 3350 17 G/17G
17 POWDER, FOR SOLUTION ORAL DAILY PRN
Status: DISCONTINUED | OUTPATIENT
Start: 2025-02-05 | End: 2025-02-10

## 2025-02-05 RX ORDER — METHYLPREDNISOLONE SODIUM SUCCINATE 125 MG/2ML
125 INJECTION INTRAMUSCULAR; INTRAVENOUS ONCE
Status: COMPLETED | OUTPATIENT
Start: 2025-02-05 | End: 2025-02-05

## 2025-02-05 RX ORDER — AMOXICILLIN 250 MG
2 CAPSULE ORAL 2 TIMES DAILY PRN
Status: DISCONTINUED | OUTPATIENT
Start: 2025-02-05 | End: 2025-02-13 | Stop reason: HOSPADM

## 2025-02-05 RX ORDER — CARVEDILOL 3.12 MG/1
6.25 TABLET ORAL 2 TIMES DAILY WITH MEALS
Status: DISCONTINUED | OUTPATIENT
Start: 2025-02-05 | End: 2025-02-13 | Stop reason: HOSPADM

## 2025-02-05 RX ORDER — AMOXICILLIN 250 MG
1 CAPSULE ORAL 2 TIMES DAILY PRN
Status: DISCONTINUED | OUTPATIENT
Start: 2025-02-05 | End: 2025-02-13 | Stop reason: HOSPADM

## 2025-02-05 RX ORDER — MONTELUKAST SODIUM 10 MG/1
10 TABLET ORAL AT BEDTIME
Status: DISCONTINUED | OUTPATIENT
Start: 2025-02-05 | End: 2025-02-13 | Stop reason: HOSPADM

## 2025-02-05 RX ORDER — IOPAMIDOL 755 MG/ML
75 INJECTION, SOLUTION INTRAVASCULAR ONCE
Status: COMPLETED | OUTPATIENT
Start: 2025-02-05 | End: 2025-02-05

## 2025-02-05 RX ADMIN — MEROPENEM 1 G: 1 INJECTION, POWDER, FOR SOLUTION INTRAVENOUS at 13:55

## 2025-02-05 RX ADMIN — SODIUM CHLORIDE 1750 MG: 9 INJECTION, SOLUTION INTRAVENOUS at 14:02

## 2025-02-05 RX ADMIN — SODIUM CHLORIDE 1779 ML: 9 INJECTION, SOLUTION INTRAVENOUS at 10:52

## 2025-02-05 RX ADMIN — MONTELUKAST 10 MG: 10 TABLET, FILM COATED ORAL at 22:04

## 2025-02-05 RX ADMIN — ALBUTEROL SULFATE 2 PUFF: 90 AEROSOL, METERED RESPIRATORY (INHALATION) at 22:22

## 2025-02-05 RX ADMIN — CARVEDILOL 6.25 MG: 6.25 TABLET, FILM COATED ORAL at 18:26

## 2025-02-05 RX ADMIN — METHYLPREDNISOLONE SODIUM SUCCINATE 125 MG: 125 INJECTION, POWDER, FOR SOLUTION INTRAMUSCULAR; INTRAVENOUS at 14:01

## 2025-02-05 RX ADMIN — PANTOPRAZOLE SODIUM 40 MG: 40 INJECTION, POWDER, FOR SOLUTION INTRAVENOUS at 11:12

## 2025-02-05 RX ADMIN — PANTOPRAZOLE SODIUM 40 MG: 40 INJECTION, POWDER, FOR SOLUTION INTRAVENOUS at 22:04

## 2025-02-05 RX ADMIN — IPRATROPIUM BROMIDE AND ALBUTEROL SULFATE 3 ML: .5; 3 SOLUTION RESPIRATORY (INHALATION) at 10:09

## 2025-02-05 RX ADMIN — SODIUM CHLORIDE 500 ML: 9 INJECTION, SOLUTION INTRAVENOUS at 20:36

## 2025-02-05 RX ADMIN — IOPAMIDOL 75 ML: 755 INJECTION, SOLUTION INTRAVENOUS at 11:49

## 2025-02-05 ASSESSMENT — ACTIVITIES OF DAILY LIVING (ADL)
ADLS_ACUITY_SCORE: 57
ADLS_ACUITY_SCORE: 59
ADLS_ACUITY_SCORE: 59
ADLS_ACUITY_SCORE: 57
ADLS_ACUITY_SCORE: 59

## 2025-02-05 ASSESSMENT — COLUMBIA-SUICIDE SEVERITY RATING SCALE - C-SSRS
1. IN THE PAST MONTH, HAVE YOU WISHED YOU WERE DEAD OR WISHED YOU COULD GO TO SLEEP AND NOT WAKE UP?: NO
2. HAVE YOU ACTUALLY HAD ANY THOUGHTS OF KILLING YOURSELF IN THE PAST MONTH?: NO
6. HAVE YOU EVER DONE ANYTHING, STARTED TO DO ANYTHING, OR PREPARED TO DO ANYTHING TO END YOUR LIFE?: NO

## 2025-02-05 NOTE — PHARMACY-VANCOMYCIN DOSING SERVICE
Pharmacy Vancomycin Initial Note  Date of Service 2025  Patient's  1935  89 year old, female    Indication: Sepsis    Current estimated CrCl = Estimated Creatinine Clearance: 28.8 mL/min (A) (based on SCr of 1.54 mg/dL (H)).    Creatinine for last 3 days  2025: 10:12 AM Creatinine 1.54 mg/dL    Recent Vancomycin Level(s) for last 3 days  No results found for requested labs within last 3 days.      Vancomycin IV Administrations (past 72 hours)        No vancomycin orders with administrations in past 72 hours.                    Nephrotoxins and other renal medications (From now, onward)      Start     Dose/Rate Route Frequency Ordered Stop    25 1330  vancomycin (VANCOCIN) 1,750 mg in 0.9% NaCl 517.5 mL intermittent infusion         1,750 mg  over 2 Hours Intravenous ONCE 25 1307              Contrast Orders - past 72 hours (72h ago, onward)      Start     Dose/Rate Route Frequency Stop    25 1200  iopamidol (ISOVUE-370) solution 75 mL         75 mL Intravenous ONCE 25 1149                  Plan:  Start vancomycin  1750 mg IV once.   Please consult pharmacy again if you would like to continue inpatient     MADELEINE PARKS [facility]

## 2025-02-05 NOTE — ED TRIAGE NOTES
Patient arrives by ambulance from home with increased generalized weakness. Is pale appearing, reports black diarrhea last night. Was placed on 2L NC en route as sats dropped to 88%. On arrival trialed on RA and sats stable in mid 90s. Uses a cane for ambulation at home and denies any recent falls. Reports feeling fatigued and short of breath when ambulating     Triage Assessment (Adult)       Row Name 02/05/25 0954          Triage Assessment    Airway WDL WDL        Respiratory WDL    Respiratory WDL WDL        Skin Circulation/Temperature WDL    Skin Circulation/Temperature WDL WDL        Cardiac WDL    Cardiac WDL WDL        Peripheral/Neurovascular WDL    Peripheral Neurovascular WDL WDL        Cognitive/Neuro/Behavioral WDL    Cognitive/Neuro/Behavioral WDL WDL

## 2025-02-05 NOTE — ED NOTES
Bed: [facility]  Expected date:   Expected time:   Means of arrival: Ambulance  Comments:  [facility]: trouble walking and weak x 2 days, new O2 need

## 2025-02-05 NOTE — PHARMACY-ADMISSION MEDICATION HISTORY
Pharmacist Admission Medication History    Admission medication history is complete. The information provided in this note is only as accurate as the sources available at the time of the update.    Information Source(s): Patient and CareEverywhere/SureScripts via in-person    Pertinent Information: Patient not sure about atorvastatin but has consistently filled last being in 1/2025, many taken prn     Changes made to PTA medication list:  Added: None  Deleted: None  Changed: None    Allergies reviewed with patient and updates made in EHR: yes    Medication History Completed By: MADELEINE PARKS [facility] 2/5/2025 11:21 AM    PTA Med List   Medication Sig Last Dose/Taking    acetaminophen (TYLENOL) 325 MG tablet Take 2 tablets (650 mg) by mouth every 4 hours as needed for other (surgical pain) Taking As Needed    acetaminophen-codeine (TYLENOL #3) 300-30 MG tablet Take 1 tablet by mouth every 6 hours as needed for severe pain (used when she has to walk long distances) Taking As Needed    albuterol (2.5 MG/3ML) 0.083% nebulizer solution Take 1 vial by nebulization every 4 hours as needed for shortness of breath / dyspnea or wheezing Taking As Needed    albuterol (PROAIR HFA/PROVENTIL HFA/VENTOLIN HFA) 108 (90 BASE) MCG/ACT Inhaler Inhale 2 puffs into the lungs every 4 hours as needed for shortness of breath Taking As Needed    allopurinol (ZYLOPRIM) 100 MG tablet Take 100 mg by mouth 2 times daily as needed Gout flares Taking As Needed    amLODIPine (NORVASC) 5 MG tablet Take 5 mg by mouth at bedtime 2/4/2025 Evening    aspirin 81 MG EC tablet Take 1 tablet (81 mg) by mouth daily Start tomorrow. 2/4/2025 Morning    atorvastatin (LIPITOR) 40 MG tablet Take 1 tablet (40 mg) by mouth daily 2/4/2025 Morning    carvedilol (COREG) 6.25 MG tablet Take 1 tablet (6.25 mg) by mouth 2 times daily (with meals). 2/4/2025 Evening    cetirizine (ZYRTEC) 10 MG tablet Take 10 mg by mouth daily as needed for allergies Taking As Needed     clopidogrel (PLAVIX) 75 MG tablet Take 1 tablet (75 mg) by mouth daily Dose to start tomorrow. 2/4/2025 Morning    ferrous sulfate 45 MG TBCR CR tablet Take 45 mg by mouth daily 2/4/2025 Morning    fluticasone-salmeterol (ADVAIR) 250-50 MCG/DOSE diskus inhaler Inhale 1 puff into the lungs daily 2/4/2025 Morning    levothyroxine (SYNTHROID/LEVOTHROID) 50 MCG tablet Take 50 mcg by mouth daily 2/5/2025 Morning    losartan (COZAAR) 25 MG tablet Take 1 tablet (25 mg) by mouth 2 times daily 2/4/2025 Evening    montelukast (SINGULAIR) 10 MG tablet Take 10 mg by mouth at bedtime 2/4/2025 Evening    multivitamin w/minerals (THERA-VIT-M) tablet Take 1 tablet by mouth daily 2/4/2025 Morning    polyethylene glycol (MIRALAX/GLYCOLAX) powder Take 17 g by mouth daily as needed for constipation Taking As Needed    predniSONE (DELTASONE) 20 MG tablet Take 20 mg by mouth daily as needed (Self doses in winter for lung infections.) Taking As Needed    valACYclovir (VALTREX) 1000 mg tablet Take 1,000 mg by mouth 2 times daily as needed (for one day) Taking As Needed    Vitamin D3 (CHOLECALCIFEROL) 25 mcg (1000 units) tablet Take 1,000 Units by mouth daily 2/4/2025 Morning

## 2025-02-05 NOTE — ED NOTES
[facility] ED Handoff Report    ED Chief Complaint: SOB with ambulation, generalized weakness    ED Diagnosis:  (R53.1) Generalized weakness  Plan: Case Request: ESOPHAGOGASTRODUODENOSCOPY       (R19.5) Loose stools  Plan: Case Request: ESOPHAGOGASTRODUODENOSCOPY          PMH:    Past Medical History:   Diagnosis Date    Allergic rhinitis     Chronic sinusitis     Conductive hearing loss     COPD (chronic obstructive pulmonary disease) (H)     CRI (chronic renal insufficiency)     mild    Gastroesophageal reflux disease     Hearing problem     Hypertension     Mediastinal mass     Nasal polyps     Pulmonary nodule     Seasonal allergies     Uncomplicated asthma         Code Status:  No CPR- Do NOT Intubate     Falls Risk: Yes Band: Applied    Current Living Situation/Residence: lives alone     Elimination Status: Continent: Yes     Activity Level: Independent with cane    Patients Preferred Language:  English     Needed: No    Vital Signs:  /80   Pulse 91   Temp 98  F (36.7  C) (Oral)   Resp 27   SpO2 96%      Pain Score: 0/10    Is the Patient Confused:  No    Last Food or Drink: 02/05/25 at 1500    Focused Assessment:  Patient here with increased weakness for the past couple of days. When she first arrived was pale appearing, reported SOB with ambulation. Reports black stools since last night. Plan is for EGD tomorrow. Has not had a BM since arrival. Responded well to duonebs. Has been on room air.    Tests Performed: Done: Labs and Imaging    Treatments Provided:  see MAR    Family Dynamics/Concerns: No    Family Updated On Visitor Policy: Yes    Plan of Care Communicated to Family: Yes    Who Was Updated about Plan of Care: niece    Medications sent with patient: inhaler, sent via tube station    Covid: symptomatic, negative    Dia Campbell RN 2/5/2025 4:26 PM

## 2025-02-05 NOTE — CONSULTS
Gastroenterology Consultation    Patient: Mary Corral   1935  Date of Consult:  2/5/2025  Admission Date/Time: 2/5/2025  9:45 AM  Primary Care Provider:  Jessica Fonseca    Requesting Physician: Gilbert Teran DO    CHIEF COMPLAINT:   Melena/Diarrhea    REASON FOR THE CONSULT:  Melena/Diarrhea    HPI:   Very pleasant 89-year-old female who presented to the ER today via ambulance for progressively worsening generalized weakness over the last few days to the point that she was unable to get out of bed today.  Last night she also noted multiple episodes of diarrhea, which were black in color.  She is currently on aspirin and Plavix for cardiac stent placed May 2024.  She denies any nausea, vomiting, diarrheal illness prior to last night, abdominal pain or other symptoms.  She has had an upper endoscopy before for dysphagia (February 2023 when she had dilation) following which that resolved, had a colonoscopy about 7 years ago per her report.      On arrival to the ER, she was found to have leukocytosis up to 16.3, hemoglobin of 7.8, elevated BUN to 90.2, elevated creatinine to 1.54, normal INR.  Given history of asthma patient also underwent a CT chest which revealed mild scattered bibasilar predominant airway debris, but no major abnormalities.  CTA down for concern for bleeding was negative for active bleeding but did show mild origin stenosis, bilateral renal arteries with severe left origin stenosis and right mild origin stenosis.  Extensive sigmoid diverticulosis was seen but no evidence of bleeding was noted. Denies NSAID use.    REVIEW OF SYSTEMS:  Negative except as in HPI    PAST MEDICAL HISTORY:  Past Medical History:   Diagnosis Date    Allergic rhinitis     Chronic sinusitis     Conductive hearing loss     COPD (chronic obstructive pulmonary disease) (H)     CRI (chronic renal insufficiency)     mild    Gastroesophageal reflux disease     Hearing problem     Hypertension     Mediastinal mass      Nasal polyps     Pulmonary nodule     Seasonal allergies     Uncomplicated asthma        PAST SURGICAL HISTORY:  Past Surgical History:   Procedure Laterality Date    APPENDECTOMY      BIOPSY MASS NECK N/A 11/21/2016    Procedure: BIOPSY MASS NECK;  Surgeon: Lucia Quinteros MD;  Location: [facility] OR    CATARACT IOL, RT/LT Bilateral 2016    CV CORONARY ANGIOGRAM N/A 4/29/2024    Procedure: Coronary Angiogram;  Surgeon: Marino Lind MD;  Location: [facility] CV    CV LEFT HEART CATH N/A 4/29/2024    Procedure: Left Heart Catheterization;  Surgeon: Marino Lind MD;  Location: [facility] CV    CV PCI N/A 4/29/2024    Procedure: Percutaneous Coronary Intervention;  Surgeon: Marino Lind MD;  Location: [facility] CV    CV PCI ASPIRATION THROMECTOMY N/A 4/29/2024    Procedure: Percutaneous Coronary Intervention Aspiration Thrombectomy;  Surgeon: Marino Lind MD;  Location: [facility] CV    ENDOSCOPIC ULTRASOUND UPPER GASTROINTESTINAL TRACT (GI) N/A 11/21/2016    Procedure: ENDOSCOPIC ULTRASOUND, ESOPHAGOSCOPY / UPPER GASTROINTESTINAL TRACT (GI);  Surgeon: Lucia Quinteros MD;  Location: [facility] OR    ESOPHAGOSCOPY, GASTROSCOPY, DUODENOSCOPY (EGD), DILATATION, COMBINED N/A 2/6/2023    Procedure: ESOPHAGOGASTRODUODENOSCOPY with esophageal dilitation;  Surgeon: Kodak Huerta MD, MD;  Location: [facility] GI    ROTATOR CUFF REPAIR RT/LT Right 30 years ago    THYROIDECTOMY N/A 3/14/2017    Procedure: THYROIDECTOMY;  Surgeon: Misbah Julien MD;  Location: [facility] OR       MEDICATIONS:  Current Outpatient Medications   Medication Instructions    acetaminophen (TYLENOL) 650 mg, Oral, EVERY 4 HOURS PRN    acetaminophen-codeine (TYLENOL #3) 300-30 MG tablet 1 tablet, EVERY 6 HOURS PRN    albuterol (2.5 MG/3ML) 0.083% nebulizer solution 1 vial, EVERY 4 HOURS PRN    albuterol (PROAIR HFA/PROVENTIL HFA/VENTOLIN HFA) 108 (90 BASE) MCG/ACT Inhaler 2 puffs, EVERY 4 HOURS PRN    allopurinol  (ZYLOPRIM) 100 mg, 2 TIMES DAILY PRN    amLODIPine (NORVASC) 5 mg, AT BEDTIME    aspirin 81 mg, Oral, DAILY, Start tomorrow.    atorvastatin (LIPITOR) 40 mg, Oral, DAILY    carvedilol (COREG) 6.25 mg, Oral, 2 TIMES DAILY WITH MEALS    cetirizine (ZYRTEC) 10 mg, DAILY PRN    clopidogrel (PLAVIX) 75 mg, Oral, DAILY, Dose to start tomorrow.    ferrous sulfate 45 mg, DAILY    fluticasone-salmeterol (ADVAIR) 250-50 MCG/DOSE diskus inhaler 1 puff, DAILY    levothyroxine (SYNTHROID/LEVOTHROID) 50 mcg, DAILY    losartan (COZAAR) 25 mg, Oral, 2 TIMES DAILY    montelukast (SINGULAIR) 10 mg, AT BEDTIME    multivitamin w/minerals (THERA-VIT-M) tablet 1 tablet, DAILY    polyethylene glycol (MIRALAX) 17 g, DAILY PRN    predniSONE (DELTASONE) 20 mg, DAILY PRN    valACYclovir (VALTREX) 1,000 mg, 2 TIMES DAILY PRN    Vitamin D3 (CHOLECALCIFEROL) 1,000 Units, DAILY       ALLERGIES:  Amoxicillin, Keflex [cephalexin], Lisinopril, and Penicillins    FAMILY HISTORY:  Family History   Problem Relation Age of Onset    Esophageal Cancer Mother     Colon Cancer Mother     Mental Illness Mother     Dementia Mother     Unknown/Adopted Mother     Asthma Mother     Diabetes Mother     Cerebrovascular Disease Mother     Thyroid Disease Mother     Congenital Anomalies Mother     Bleeding Disorder Mother     Migraines Mother     Muscular Disorder Mother     Parkinsonism Father     Mental Illness Father     Unknown/Adopted Father     Asthma Father     Cancer Father     Hypertension Father     Cerebrovascular Disease Father     Thyroid Disease Father     Congenital Anomalies Father     Bleeding Disorder Father     Migraines Father     Muscular Disorder Father     Mental Illness Sister     Dementia Sister     Unknown/Adopted Sister     Asthma Sister     Cerebrovascular Disease Sister     Thyroid Disease Sister     Congenital Anomalies Sister     Bleeding Disorder Sister     Migraines Sister     Muscular Disorder Sister     Cancer Mother          esophageal       SOCIAL HISTORY:  Social History     Tobacco Use    Smoking status: Never    Smokeless tobacco: Never    Tobacco comments:     smoked twice a week for a couple years while in college, less than 1/2 pack    Substance Use Topics    Alcohol use: Yes     Alcohol/week: 0.0 standard drinks of alcohol     Comment: Alcoholic Drinks/day: occasional       PHYSICAL EXAM:  /60   Pulse 90   Temp 98  F (36.7  C) (Oral)   Resp 27   SpO2 97%   There is no height or weight on file to calculate BMI.  Constitutional: healthy, alert, and no distress   Abdomen: Abdomen soft, non-tender. BS normal. No masses, organomegaly    LABS:  CBC:  Recent Labs   Lab Test 02/05/25  1012   WBC 16.3*   RBC 2.59*   HGB 7.8*   HCT 25.0*   MCV 97   MCH 30.1   MCHC 31.2*   RDW 15.0           BMP:  Recent Labs   Lab 02/05/25  1012      POTASSIUM 5.0   CHLORIDE 109*   CO2 19*   *   CR 1.54*   BUN 90.2*       Liver/Pancreas:  Recent Labs   Lab 02/05/25  1012   AST 12   ALT 16   ALKPHOS 65   BILITOTAL 0.4   LIPASE 41     Recent Labs   Lab Test 02/05/25  1012   INR 1.20*       IMAGING:    CTA Abdomen Pelvis with Contrast    Result Date: 2/5/2025  EXAM: CTA ABDOMEN PELVIS WITH CONTRAST LOCATION: [facility] DATE: 2/5/2025 INDICATION: Loose stools past few days, dark black loose stool today. Weakness and SOB. Evaluate for GI bleed and diverticulitis COMPARISON: CT chest 5/2/2024 TECHNIQUE: CT angiogram abdomen pelvis during arterial phase of injection of IV contrast. 2D and 3D MIP reconstructions were performed by the CT technologist. Dose reduction techniques were used. CONTRAST: 75ml isovue 370 FINDINGS: ANGIOGRAM ABDOMEN/PELVIS: No active GI bleed. The celiac, SMA and PANFILO are patent with mild origin stenoses. Single bilateral renal arteries with severe left origin stenosis and mild right origin stenosis. Mild aortoiliac atherosclerosis with no aneurysm nor stenosis. LOWER CHEST: Bibasilar  subsegmental atelectasis versus scar. Small sliding-type hiatal hernia. HEPATOBILIARY: Normal. PANCREAS: Normal. SPLEEN: Normal. ADRENAL GLANDS: Normal. KIDNEYS/BLADDER: Simple bilateral cortical and parapelvic cysts do not require imaging follow-up. No urinary tract calculus or hydronephrosis. The bladder is negative. BOWEL: Extensive sigmoid diverticulosis. No obstruction nor inflammatory change. No formed stool in the colon. Normal appendix. No peritoneal fluid. LYMPH NODES: No lymphadenopathy. PELVIC ORGANS: Normal uterus and ovaries. MUSCULOSKELETAL: Degenerative changes are present within the spine and hips. No suspicious osseous lesions.     IMPRESSION: 1.  No evidence of active GI bleed nor acute inflammatory bowel process. 2.  Extensive colonic diverticulosis. 3.  No formed stool within the colon.    CT Chest Pulmonary Embolism w Contrast    Result Date: 2/5/2025  EXAM: CT CHEST PULMONARY EMBOLISM W CONTRAST LOCATION: [facility] DATE: 2/5/2025 INDICATION: Generalized weakness. Intermittent hypoxia. Shortness of breath. History of asthma. COMPARISON: 5/2/2024. Others. TECHNIQUE: CT chest pulmonary angiogram during arterial phase injection of IV contrast. Multiplanar reformats and MIP reconstructions were performed. Dose reduction techniques were used. CONTRAST: 75ml isovue 370 FINDINGS: ANGIOGRAM CHEST: No pulmonary embolism. Nonaneurysmal aorta without dissection. LUNGS AND PLEURA: Prior right lower lobe inflammatory nodular opacities and groundglass have resolved. Mild bandlike basilar atelectasis or scarring. Mild scattered airway debris, pronounced in the right lower lobe. No pleural effusion or pneumothorax. MEDIASTINUM/AXILLAE: No adenopathy. Nonaneurysmal aorta. No pericardial effusion. Small hiatus. CORONARY ARTERY CALCIFICATION: Mild. UPPER ABDOMEN: Moderate to severe narrowing of the left renal artery origin with focal coarse atherosclerosis at this location.  MUSCULOSKELETAL: Degenerative changes spine.     IMPRESSION: 1.  No pulmonary embolism. 2.  No acute findings to explain symptoms. 3.  Mild scattered right basilar predominant airway debris; aspiration not excluded.       ASSESSMENT:   #Diarrhea with melena  #Acute blood loss anemia  -BUN/Cr elevated  -Description of stool consistent with UGIB based on color but no obvious risk factors. Differentials include possible PUD,gastric or duodenal AVM, upper GI mass.  -Colonic source possible as well.  -No evidence of liver disease per imaging/labs    #Leucocytosis-could be related to infection driving diarrhea-will await enteric pathogen panel testing and rest of workup per primary team for sources of infection.    PLAN:  -Clears today  -Complete ongoing workup for infection as ongoing  -EGD tomorrow. Regular diet OK for today. NPO after midnight.  -Transfusion per primary team  -IV PPI bid    45 minutes of total time was spent providing patient care, including patient evaluation, reviewing documentation/test results, and .          Taj Francis MD  Thank you for the opportunity to participate in the care of this patient.   Please feel free to call with any questions or concerns.  [phone].       CC: [facility] Theresa Marie

## 2025-02-05 NOTE — ED PROVIDER NOTES
Emergency Department Encounter   NAME: Mary Corral ; AGE: 89 year old female ; YOB: 1935 ; MRN: [id number] ; PCP: Jessica Fonseca   ED PROVIDER: Nurys Camarena PA-C    Evaluation Date & Time:   2/5/2025  9:45 AM    CHIEF COMPLAINT:  Rectal Bleeding and Fatigue        Impression and Plan   FINAL IMPRESSION:    ICD-10-CM    1. Generalized weakness  R53.1       2. Loose stools  R19.5           ED Course and Medical Decision Making   is an 89 year old female with PMH of asthma, NSTEMI s/p PCI on Plavix, GERD, and HTN presenting to the emergency department via EMS for evaluation of generalized weakness for the past few days. She states she has had loose stools for the past few days, denies any abdominal pain, nausea, or vomiting.  She endorses a very watery stool that was dark in color this morning. She states today she has such severe generalized weakness of her body that she was unable to get up out of bed and ambulate around her house.  Patient also endorses needing her albuterol inhaler more frequently than normal.  She states at baseline she uses her rescue inhaler twice per week if she is going for a walk or being more active.  The past few days she has needed her inhaler 2-3 times per day.  This does help her feelings of shortness of breath.  She denies any chest pain or tightness.  She denies any cough, congestion, or pharyngitis. She does not smoke. No fevers or chills.  No urinary symptoms.  Per EMS, patient was satting at 95% on room air when they picked her up from home. En route her SpO2 dropped to 88% on room air so they placed her on 2 L nasal cannula.    Vitals reviewed, upon arrival trialed room air and her SpO2 remained at 95-97%. On exam she is resting comfortably, non toxic appearing. Differential diagnosis/emergent conditions considered and evaluated for includes but not limited to COVID, influenza, viral gastroenteritis, GI bleed, UTI, hypoglycemia, dehydration, electrolyte  abnormality, diverticulitis, heart failure exacerbation, asthma exacerbation, bowel obstruction.  Patient is alert and responding to questions appropriately.  She does not appear to be in any distress.  Her lung sounds are present throughout and clear to auscultation.  She does not appear fluid overloaded on exam.  Her abdomen is soft and nondistended, no areas of tenderness.    She has a leukocytosis of 16.3. Lactic acid slightly elevated at 2.2 concerning for sepsis. Blood cultures were obtained and 30 ml/kg fluids ordered per sepsis protocol.  Her hemoglobin is 7.8 today. Most recent hemoglobin is from 9 months ago where this was 10.8. CTA abdomen pending to rule out GI bleed given she is endorsing black liquid stool yesterday as well as occult stool testing. Patient is on plavix due to PCI hx. Creatinine today is 1.54, patient does have CKD at baseline. Most recent creatinine is 1.34 about one month ago although previously her Cr has been 1.5-1.6. UA still pending.  BNP within normal range. She does not appear significantly fluid overloaded on exam. Her EKG shows sinus rhythm with RBBB, this appears fairly consistent with previous EKG.  No evidence of ischemia. First troponin 25, repeat 25 as well.  I have low suspicion overall for cardiac etiology for her symptoms.  COVID and influenza swabs negative.      Lactic acid improved from 2.2 to 1.8 following administration of fluids. She is sitting at rest here she denies any shortness of breath.  She has remained at 95% + on room air since arrival from EMS. She was given 2 DuoNebs here, states no significant change.  She was also given a dose of IV methylprednisolone for possible asthma exacerbation.    CTA does not show any evidence of active GI bleeding.  She does have extensive colonic diverticulosis, no evidence of diverticulitis, appendicitis, or other acute finding.  Unfortunately, I have been unable to obtain a occult stool/do a digital rectal exam as patient  has been in the hallway of the emergency department since arrival.  CT PE study does not find any evidence of pulmonary embolism or obvious pneumonia. At this time UA is still pending but I otherwise do not have a source of infection at this point so I have covered her with vancomycin and meropenem until we are able to fully rule out infection. Plan to admission for continued fluids, trending hemoglobin, and continued management.  Patient is too weak to ambulate out of bed and unable to go home.  I spoke with hospitalist and patient was accepted for admission.      Supplemental history:  Obtained supplemental history:Supplemental history obtained?: Documented in chart and Family Member/Significant Other  Reviewed external records: External records reviewed?: No  Patient information was obtained from: patient  Use of Intrepreter: N/A     Complicating Factors:  Care impacted by chronic illness:Hypertension  Care significantly affected by social determinants of health:N/A    Work Up:  Did you consider but not order tests?: Work up considered but not performed and documented in chart, if applicable  Did you interpret images independently?: Independent interpretation of ECG and images noted in documentation, when applicable.    External Consults:  Consultation discussion with other provider: Dr. Sanz  Admit.          ED COURSE:  952 I met and introduced myself to the patient. I gathered initial history and performed my physical exam. We discussed plan for initial workup.   1033 I have staffed the patient with Dr. aSnz, ED MD, who has evaluated the patient and agrees with all aspects of today's care.   1409 I spoke with Dr. Teran and patient was accepted for admission.    At the conclusion of the encounter I discussed the results of all the tests and the disposition. The questions were answered. The patient or family acknowledged understanding and was agreeable with the care plan.          MEDICATIONS GIVEN IN THE  EMERGENCY DEPARTMENT:  Medications   ipratropium - albuterol 0.5 mg/2.5 mg/3 mL (DUONEB) neb solution 3 mL (has no administration in time range)   meropenem (MERREM) 1 g vial to attach to  mL bag (has no administration in time range)   vancomycin (VANCOCIN) 1,750 mg in 0.9% NaCl 517.5 mL intermittent infusion (has no administration in time range)   ipratropium - albuterol 0.5 mg/2.5 mg/3 mL (DUONEB) neb solution 3 mL (3 mLs Nebulization $Given 2/5/25 1009)   sodium chloride 0.9% BOLUS 1,779 mL (0 mLs Intravenous Stopped 2/5/25 1317)   pantoprazole (PROTONIX) IV push injection 40 mg (40 mg Intravenous $Given 2/5/25 1112)   iopamidol (ISOVUE-370) solution 75 mL (75 mLs Intravenous $Given 2/5/25 1149)         NEW PRESCRIPTIONS STARTED AT TODAY'S ED VISIT:  New Prescriptions    No medications on file         HPI      is an 89 year old female with PMH of asthma, NSTEMI s/p PCI on Plavix, GERD, and HTN presenting to the emergency department via EMS for evaluation of generalized weakness for the past few days. She states she has had loose stools for the past few days, denies any abdominal pain, nausea, or vomiting.  She endorses a very watery stool that was dark in color this morning. She states today she has such severe generalized weakness of her body that she was unable to get up out of bed and ambulate around her house.  Patient also endorses needing her albuterol inhaler more frequently than normal.  She states at baseline she uses her rescue inhaler twice per week if she is going for a walk or being more active.  The past few days she has needed her inhaler 2-3 times per day.  This does help her feelings of shortness of breath.  She denies any chest pain or tightness.  She denies any cough, congestion, or pharyngitis. She does not smoke. No fevers or chills.  No urinary symptoms.  Per EMS, patient was satting at 95% on room air when they picked her up from home. En route her SpO2 dropped to 88% on room air  so they placed her on 2 L nasal cannula.      Physical Exam     First Vitals:  Patient Vitals for the past 24 hrs:   BP Temp Temp src Pulse Resp SpO2   02/05/25 1230 112/63 -- -- 95 -- 94 %   02/05/25 1100 95/53 -- -- 92 27 96 %   02/05/25 1030 95/50 -- -- 87 25 98 %   02/05/25 1002 -- -- -- -- 18 --   02/05/25 1000 109/76 -- -- 89 28 98 %   02/05/25 0954 (!) 88/49 98  F (36.7  C) Oral 89 24 95 %         PHYSICAL EXAM    General Appearance:  Alert, cooperative, no distress, appears stated age  HENT: Normocephalic without obvious deformity, atraumatic. Mucous membranes moist   Eyes: Conjunctiva clear, Lids normal. No discharge.   Respiratory: No distress. Lungs clear to ausculation bilaterally. No wheezes, rhonchi or stridor  Cardiovascular: Regular rate and rhythm, no murmur. Normal cap refill. No peripheral edema  GI: Abdomen soft, nontender  : No CVA tenderness  Musculoskeletal: Moving all extremities. No gross deformities  Integument: Warm, dry, no rashes or lesions  Neurologic: Alert and orientated x3. No focal deficits. GCS 15. TA. Cranial nerves III through XII intact.  No facial asymmetry.   Intact straight leg raise.  5 out of 5 strength with , flexion extension at elbow joints, flexion at shoulder and hip joint against resistance.  Dorsiflexion and plantarflexion are intact.  Answering questions appropriately with normal speech.  No aphasia or dysarthria.  Following commands.  Intact visual fields.  Psych: Normal mood and affect        Results     LAB:  All pertinent labs reviewed and interpreted  Labs Ordered and Resulted from Time of ED Arrival to Time of ED Departure   TROPONIN T, HIGH SENSITIVITY - Abnormal       Result Value    Troponin T, High Sensitivity 25 (*)    BASIC METABOLIC PANEL - Abnormal    Sodium 141      Potassium 5.0      Chloride 109 (*)     Carbon Dioxide (CO2) 19 (*)     Anion Gap 13      Urea Nitrogen 90.2 (*)     Creatinine 1.54 (*)     GFR Estimate 32 (*)     Calcium 8.8       Glucose 158 (*)    HEPATIC FUNCTION PANEL - Abnormal    Protein Total 5.4 (*)     Albumin 3.4 (*)     Bilirubin Total 0.4      Alkaline Phosphatase 65      AST 12      ALT 16      Bilirubin Direct <0.20     LACTIC ACID WHOLE BLOOD WITH 1X REPEAT IN 2 HR WHEN >2 - Abnormal    Lactic Acid, Initial 2.2 (*)    INR - Abnormal    INR 1.20 (*)    CBC WITH PLATELETS AND DIFFERENTIAL - Abnormal    WBC Count 16.3 (*)     RBC Count 2.59 (*)     Hemoglobin 7.8 (*)     Hematocrit 25.0 (*)     MCV 97      MCH 30.1      MCHC 31.2 (*)     RDW 15.0      Platelet Count 209      % Neutrophils 79      % Lymphocytes 13      % Monocytes 6      % Eosinophils 1      % Basophils 0      % Immature Granulocytes 1      NRBCs per 100 WBC 0      Absolute Neutrophils 12.9 (*)     Absolute Lymphocytes 2.1      Absolute Monocytes 1.0      Absolute Eosinophils 0.2      Absolute Basophils 0.0      Absolute Immature Granulocytes 0.1      Absolute NRBCs 0.0     TROPONIN T, HIGH SENSITIVITY - Abnormal    Troponin T, High Sensitivity 25 (*)    LIPASE - Normal    Lipase 41     NT PROBNP INPATIENT - Normal    N terminal Pro BNP Inpatient 798     MAGNESIUM - Normal    Magnesium 1.8     TSH WITH FREE T4 REFLEX - Normal    TSH 0.80     INFLUENZA A/B, RSV AND SARS-COV2 PCR - Normal    Influenza A PCR Negative      Influenza B PCR Negative      RSV PCR Negative      SARS CoV2 PCR Negative     PARTIAL THROMBOPLASTIN TIME - Normal    aPTT 28     LACTIC ACID WHOLE BLOOD - Normal    Lactic Acid 1.8     TROPONIN T, HIGH SENSITIVITY   ROUTINE UA WITH MICROSCOPIC REFLEX TO CULTURE   OCCULT BLOOD STOOL   TYPE AND SCREEN, ADULT    ABO/RH(D) B POS      Antibody Screen Negative      SPECIMEN EXPIRATION DATE [id number]     BLOOD CULTURE   BLOOD CULTURE   ABO/RH TYPE AND SCREEN       RADIOLOGY:  CTA Abdomen Pelvis with Contrast   Final Result   IMPRESSION:   1.  No evidence of active GI bleed nor acute inflammatory bowel process.   2.  Extensive colonic  diverticulosis.   3.  No formed stool within the colon.      CT Chest Pulmonary Embolism w Contrast   Final Result   IMPRESSION:      1.  No pulmonary embolism.      2.  No acute findings to explain symptoms.      3.  Mild scattered right basilar predominant airway debris; aspiration not excluded.               ECG:  Performed at: 1003    Impression: sinus rhythm with PACs    Rate: 89  Rhythm: sinus  Axis: normal  IL Interval: 166  QRS Interval: 120  QTc Interval: 464  ST Changes: none  Comparison: no significant change    EKG results reviewed and interpreted by Dr. Yvon ED MD.       PROCEDURES:  N/A      Nurys Camarena PA-C   Emergency Medicine   [facility] EMERGENCY DEPARTMENT       Nurys Camarena PA-C  02/05/25 1930

## 2025-02-05 NOTE — ED PROVIDER NOTES
Emergency Department Midlevel Supervisory Note     I had a face to face encounter with this patient seen by the Advanced Practice Provider (RAYMOND). I personally made/approved the management plan and take responsibility for the patient management. I personally saw patient and performed a substantive portion of the visit including all aspects of the medical decision making.     ED Course:  10:17 AM Nurys Camarena PA-C staffed patient with me. I agree with their assessment and plan of management, and I will see the patient. I met with the patient to introduce myself, gather additional history, perform my initial exam, and discuss the plan.     Brief HPI:     Mary Corral is a 89 year old female who presents for evaluation of generalized fatigue.    I, Alexandre Cruz, am serving as a scribe to document services personally performed by Yisel Sanz MD, based on my observations and the provider's statements to me.   I, Yisel Sanz MD, attest that Alexandre Cruz was acting in a scribe capacity, has observed my performance of the services and has documented them in accordance with my direction.    Brief Physical Exam: /76   Pulse 89   Temp 98  F (36.7  C) (Oral)   Resp 18   SpO2 98%   Constitutional:  Alert, in no acute distress  EYES: Conjunctivae clear  HENT:  Atraumatic  Respiratory:  Respirations even, unlabored, in no acute respiratory distress  Cardiovascular:  Regular rate and rhythm, good peripheral perfusion  GI: Soft, non-distended, non-tender  Musculoskeletal:  Moves all 4 extremities equally, grossly symmetrical strength  Integument: Warm & dry. No appreciable rash, erythema. Pale skin tone  Neurologic:  Alert & oriented, speech clear and fluent, no focal deficits noted  Psych: Normal mood and affect       MDM:  Plan to admit for SIRS without known etiology and to trend H&H    EKG: I reviewed and independently interpreted the patient's EKG, with comments made as listed below. Please see scanned EKG for full  report.     Performed: [facility] 1003  Rate: 89 BPM  Impression: Sinus rhythm, Right bundle branch block  Comparison: When compared with ECG of 30-Apr-2024 0611, Similar results.    Procedures:  I was present for the key portions of procedures documented in RAYMOND/midlevel note, see midlevel note for further details.    Yisel Sanz MD  [facility] EMERGENCY DEPARTMENT  [address] [place] [id number]  [phone]       Yisel Sanz MD  02/05/25 4740

## 2025-02-05 NOTE — H&P
[facility]    History and Physical - Hospitalist Service       Date of Admission:  2/5/2025    Assessment & Plan    Patient is an 89-year-old female with history of CAD s/p stent on Plavix and aspirin who presents with generalized weakness and melena and found to have anemia.    #Acute blood loss anemia  #Melena  #Suspect upper GI bleeding  #Mild coagulopathy, INR 1.2  #Mildly elevated lactic acid, resolved w/ IVF  Hgb 7.8, was 10.8 in May  CTA a/p w/ contrast: No e/o active GI bleed or acute inflammatory bowel process.  Extensive colonic diverticulosis.  No formed stool in the colon.  Hold ASA, Plavix  IV PPI  Consult GI  NPO at midnight in case of EGD in AM  Transfuse if hgb <7    #Diarrhea  Check enteric PCR    #CAD s/p stent 5/2024  #Hypertension  Hold dual-antiplatelet  PTA coreg w/ holding parameters  Hold PTA ARB, amlodipine    #COPD  No exacerbation, No hypoxia  CTA chest: No PE, no acute findings.  Mild scattered right basilar predominant airway debris  s/p IV solumedrol, duonebs via EMS    #Initially suspected sepsis, ruled out  s/p Merrem, Vanc, Discontinue  Covid/flu/RSV neg  Check UA            Diet: Regular Diet Adult  NPO per Anesthesia Guidelines for Procedure/Surgery Except for: Meds    DVT Prophylaxis: Pneumatic Compression Devices  Russo Catheter: Not present  Lines: None     Cardiac Monitoring: None  Code Status: No CPR- Do NOT Intubate      Clinically Significant Risk Factors Present on Admission          # Hyperchloremia: Highest Cl = 109 mmol/L in last 2 days, will monitor as appropriate          # Hypoalbuminemia: Lowest albumin = 3.4 g/dL at 2/5/2025 10:12 AM, will monitor as appropriate    # Coagulation Defect: INR = 1.20 (Ref range: 0.85 - 1.15) and/or PTT = 28 Seconds (Ref range: 22 - 38 Seconds), will monitor for bleeding  # Drug Induced Platelet Defect: home medication list includes an antiplatelet medication   # Hypertension: Home medication list includes  antihypertensive(s)      # Anemia: based on hgb <11           # Financial/Environmental Concerns:    # Asthma: noted on problem list        Disposition Plan     Medically Ready for Discharge: Anticipated in 2-4 Days           Gilbert Teran DO  Hospitalist Service  [facility]  Securely message with [employer] (more info)  Text page via [employer] Paging/Directory     ______________________________________________________________________    Chief Complaint   Generalized weakness, melena    History is obtained from the patient, electronic health record, emergency department physician, and patient's daughter    History of Present Illness   Patient is a very pleasant 89-year-old female who presents to the ED via ambulance with progressively worsening generalized weakness over the last few days the point where she had difficulty getting out of bed today and could not ambulate.  Patient reports multiple episodes of black diarrhea last night.  Does report she took some Pepto-Bismol.  She is on aspirin and Plavix for a heart stent that was placed in May 2024.  Denies abdominal pain, nausea, vomiting, hematemesis, syncope, chest pain, shortness of breath.      Past Medical History    Past Medical History:   Diagnosis Date    Allergic rhinitis     Chronic sinusitis     Conductive hearing loss     COPD (chronic obstructive pulmonary disease) (H)     CRI (chronic renal insufficiency)     mild    Gastroesophageal reflux disease     Hearing problem     Hypertension     Mediastinal mass     Nasal polyps     Pulmonary nodule     Seasonal allergies     Uncomplicated asthma        Past Surgical History   Past Surgical History:   Procedure Laterality Date    APPENDECTOMY      BIOPSY MASS NECK N/A 11/21/2016    Procedure: BIOPSY MASS NECK;  Surgeon: Lucia Quinteros MD;  Location: [facility] OR    CATARACT IOL, RT/LT Bilateral 2016    CV CORONARY ANGIOGRAM N/A 4/29/2024    Procedure: Coronary Angiogram;  Surgeon: Lelo  MD Marino;  Location: [facility] LAB CV    CV LEFT HEART CATH N/A 4/29/2024    Procedure: Left Heart Catheterization;  Surgeon: Marino Lind MD;  Location: [facility] CATH LAB CV    CV PCI N/A 4/29/2024    Procedure: Percutaneous Coronary Intervention;  Surgeon: Marino Lind MD;  Location: [facility] CV    CV PCI ASPIRATION THROMECTOMY N/A 4/29/2024    Procedure: Percutaneous Coronary Intervention Aspiration Thrombectomy;  Surgeon: Marino Lind MD;  Location: [facility]    ENDOSCOPIC ULTRASOUND UPPER GASTROINTESTINAL TRACT (GI) N/A 11/21/2016    Procedure: ENDOSCOPIC ULTRASOUND, ESOPHAGOSCOPY / UPPER GASTROINTESTINAL TRACT (GI);  Surgeon: Lucia Quinteros MD;  Location: [facility] OR    ESOPHAGOSCOPY, GASTROSCOPY, DUODENOSCOPY (EGD), DILATATION, COMBINED N/A 2/6/2023    Procedure: ESOPHAGOGASTRODUODENOSCOPY with esophageal dilitation;  Surgeon: Kodak Huerta MD, MD;  Location[facility]    ROTATOR CUFF REPAIR RT/LT Right 30 years ago    THYROIDECTOMY N/A 3/14/2017    Procedure: THYROIDECTOMY;  Surgeon: Misbah Julien MD;  Location: [facility] OR       Prior to Admission Medications   Prior to Admission Medications   Prescriptions Last Dose Informant Patient Reported? Taking?   Vitamin D3 (CHOLECALCIFEROL) 25 mcg (1000 units) tablet 2/4/2025 Morning  Yes Yes   Sig: Take 1,000 Units by mouth daily   acetaminophen (TYLENOL) 325 MG tablet   No Yes   Sig: Take 2 tablets (650 mg) by mouth every 4 hours as needed for other (surgical pain)   acetaminophen-codeine (TYLENOL #3) 300-30 MG tablet   Yes Yes   Sig: Take 1 tablet by mouth every 6 hours as needed for severe pain (used when she has to walk long distances)   albuterol (2.5 MG/3ML) 0.083% nebulizer solution   Yes Yes   Sig: Take 1 vial by nebulization every 4 hours as needed for shortness of breath / dyspnea or wheezing   albuterol (PROAIR HFA/PROVENTIL HFA/VENTOLIN HFA) 108 (90 BASE) MCG/ACT Inhaler   Yes Yes   Sig: Inhale 2 puffs into the  lungs every 4 hours as needed for shortness of breath   allopurinol (ZYLOPRIM) 100 MG tablet   Yes Yes   Sig: Take 100 mg by mouth 2 times daily as needed Gout flares   amLODIPine (NORVASC) 5 MG tablet 2/4/2025 Evening  Yes Yes   Sig: Take 5 mg by mouth at bedtime   aspirin 81 MG EC tablet 2/4/2025 Morning  No Yes   Sig: Take 1 tablet (81 mg) by mouth daily Start tomorrow.   atorvastatin (LIPITOR) 40 MG tablet 2/4/2025 Morning  No Yes   Sig: Take 1 tablet (40 mg) by mouth daily   carvedilol (COREG) 6.25 MG tablet 2/4/2025 Evening  No Yes   Sig: Take 1 tablet (6.25 mg) by mouth 2 times daily (with meals).   cetirizine (ZYRTEC) 10 MG tablet   Yes Yes   Sig: Take 10 mg by mouth daily as needed for allergies   clopidogrel (PLAVIX) 75 MG tablet 2/4/2025 Morning  No Yes   Sig: Take 1 tablet (75 mg) by mouth daily Dose to start tomorrow.   ferrous sulfate 45 MG TBCR CR tablet 2/4/2025 Morning  Yes Yes   Sig: Take 45 mg by mouth daily   fluticasone-salmeterol (ADVAIR) 250-50 MCG/DOSE diskus inhaler 2/4/2025 Morning  Yes Yes   Sig: Inhale 1 puff into the lungs daily   levothyroxine (SYNTHROID/LEVOTHROID) 50 MCG tablet 2/5/2025 Morning  Yes Yes   Sig: Take 50 mcg by mouth daily   losartan (COZAAR) 25 MG tablet 2/4/2025 Evening  No Yes   Sig: Take 1 tablet (25 mg) by mouth 2 times daily   montelukast (SINGULAIR) 10 MG tablet 2/4/2025 Evening  Yes Yes   Sig: Take 10 mg by mouth at bedtime   multivitamin w/minerals (THERA-VIT-M) tablet 2/4/2025 Morning  Yes Yes   Sig: Take 1 tablet by mouth daily   polyethylene glycol (MIRALAX/GLYCOLAX) powder   Yes Yes   Sig: Take 17 g by mouth daily as needed for constipation   predniSONE (DELTASONE) 20 MG tablet   Yes Yes   Sig: Take 20 mg by mouth daily as needed (Self doses in winter for lung infections.)   valACYclovir (VALTREX) 1000 mg tablet   Yes Yes   Sig: Take 1,000 mg by mouth 2 times daily as needed (for one day)      Facility-Administered Medications: None           Physical Exam    Vital Signs: Temp: 98  F (36.7  C) Temp src: Oral BP: 129/60 Pulse: 90   Resp: 27 SpO2: 97 % O2 Device: None (Room air)    Weight: 0 lbs 0 oz    General Appearance:  No acute distress  Respiratory: Clear to auscultation bilaterally  Cardiovascular: Regular rate and rhythm  GI: Normal bowel sounds, abdomen is soft, nondistended with no tenderness or rebound  Extremities: No peripheral edema or cyanosis  Skin: Generalized pallor  Neuro: Alert and oriented x 3, normal speech    Medical Decision Making             Data

## 2025-02-06 ENCOUNTER — APPOINTMENT (OUTPATIENT)
Dept: CARDIOLOGY | Facility: HOSPITAL | Age: 89
DRG: 378 | End: 2025-02-06
Attending: INTERNAL MEDICINE
Payer: COMMERCIAL

## 2025-02-06 ENCOUNTER — ANESTHESIA EVENT (OUTPATIENT)
Dept: SURGERY | Facility: HOSPITAL | Age: 89
End: 2025-02-06
Payer: COMMERCIAL

## 2025-02-06 ENCOUNTER — ANESTHESIA (OUTPATIENT)
Dept: SURGERY | Facility: HOSPITAL | Age: 89
End: 2025-02-06
Payer: COMMERCIAL

## 2025-02-06 LAB
ADV 40+41 DNA STL QL NAA+NON-PROBE: NEGATIVE
ALBUMIN UR-MCNC: NEGATIVE MG/DL
ANION GAP SERPL CALCULATED.3IONS-SCNC: 13 MMOL/L (ref 7–15)
APPEARANCE UR: CLEAR
ASTRO TYP 1-8 RNA STL QL NAA+NON-PROBE: NEGATIVE
ATRIAL RATE - MUSE: 84 BPM
ATRIAL RATE - MUSE: 89 BPM
BACTERIA BLD CULT: NORMAL
BACTERIA BLD CULT: NORMAL
BASE EXCESS BLDV CALC-SCNC: -12 MMOL/L (ref -3–3)
BASOPHILS # BLD AUTO: 0 10E3/UL (ref 0–0.2)
BASOPHILS NFR BLD AUTO: 0 %
BILIRUB UR QL STRIP: NEGATIVE
BLD PROD TYP BPU: NORMAL
BLOOD COMPONENT TYPE: NORMAL
BUN SERPL-MCNC: 103 MG/DL (ref 8–23)
C CAYETANENSIS DNA STL QL NAA+NON-PROBE: NEGATIVE
CA-I BLD-MCNC: 4 MG/DL (ref 4.4–5.2)
CALCIUM SERPL-MCNC: 7.8 MG/DL (ref 8.8–10.4)
CAMPYLOBACTER DNA SPEC NAA+PROBE: NEGATIVE
CHLORIDE SERPL-SCNC: 114 MMOL/L (ref 98–107)
CODING SYSTEM: NORMAL
COLOR UR AUTO: ABNORMAL
CREAT SERPL-MCNC: 1.9 MG/DL (ref 0.51–0.95)
CROSSMATCH: NORMAL
CRYPTOSP DNA STL QL NAA+NON-PROBE: NEGATIVE
DIASTOLIC BLOOD PRESSURE - MUSE: 76 MMHG
DIASTOLIC BLOOD PRESSURE - MUSE: NORMAL MMHG
E COLI O157 DNA STL QL NAA+NON-PROBE: NORMAL
E HISTOLYT DNA STL QL NAA+NON-PROBE: NEGATIVE
EAEC ASTA GENE ISLT QL NAA+PROBE: NEGATIVE
EC STX1+STX2 GENES STL QL NAA+NON-PROBE: NEGATIVE
EGFRCR SERPLBLD CKD-EPI 2021: 25 ML/MIN/1.73M2
EOSINOPHIL # BLD AUTO: 0 10E3/UL (ref 0–0.7)
EOSINOPHIL NFR BLD AUTO: 0 %
EPEC EAE GENE STL QL NAA+NON-PROBE: NEGATIVE
ERYTHROCYTE [DISTWIDTH] IN BLOOD BY AUTOMATED COUNT: 15.3 % (ref 10–15)
ETEC LTA+ST1A+ST1B TOX ST NAA+NON-PROBE: NEGATIVE
FIBRINOGEN PPP-MCNC: 268 MG/DL (ref 170–510)
G LAMBLIA DNA STL QL NAA+NON-PROBE: NEGATIVE
GLUCOSE BLDC GLUCOMTR-MCNC: 176 MG/DL (ref 70–99)
GLUCOSE SERPL-MCNC: 212 MG/DL (ref 70–99)
GLUCOSE UR STRIP-MCNC: NEGATIVE MG/DL
HCO3 BLDV-SCNC: 15 MMOL/L (ref 21–28)
HCO3 SERPL-SCNC: 13 MMOL/L (ref 22–29)
HCT VFR BLD AUTO: 25.9 % (ref 35–47)
HEMOCCULT STL QL: POSITIVE
HGB BLD-MCNC: 6.5 G/DL (ref 11.7–15.7)
HGB BLD-MCNC: 7.6 G/DL (ref 11.7–15.7)
HGB BLD-MCNC: 8.4 G/DL (ref 11.7–15.7)
HGB BLD-MCNC: 9 G/DL (ref 11.7–15.7)
HGB UR QL STRIP: NEGATIVE
HOLD SPECIMEN: NORMAL
IMM GRANULOCYTES # BLD: 0.3 10E3/UL
IMM GRANULOCYTES NFR BLD: 1 %
INR PPP: 1.45 (ref 0.85–1.15)
INTERPRETATION ECG - MUSE: NORMAL
INTERPRETATION ECG - MUSE: NORMAL
ISSUE DATE AND TIME: NORMAL
KETONES UR STRIP-MCNC: NEGATIVE MG/DL
LACTATE SERPL-SCNC: 2.3 MMOL/L (ref 0.7–2)
LACTATE SERPL-SCNC: 2.3 MMOL/L (ref 0.7–2)
LEUKOCYTE ESTERASE UR QL STRIP: ABNORMAL
LVEF ECHO: NORMAL
LYMPHOCYTES # BLD AUTO: 3.3 10E3/UL (ref 0.8–5.3)
LYMPHOCYTES NFR BLD AUTO: 15 %
MCH RBC QN AUTO: 29.8 PG (ref 26.5–33)
MCHC RBC AUTO-ENTMCNC: 32.4 G/DL (ref 31.5–36.5)
MCV RBC AUTO: 92 FL (ref 78–100)
MONOCYTES # BLD AUTO: 0.4 10E3/UL (ref 0–1.3)
MONOCYTES NFR BLD AUTO: 2 %
NEUTROPHILS # BLD AUTO: 18.1 10E3/UL (ref 1.6–8.3)
NEUTROPHILS NFR BLD AUTO: 82 %
NITRATE UR QL: NEGATIVE
NOROVIRUS GI+II RNA STL QL NAA+NON-PROBE: NEGATIVE
NRBC # BLD AUTO: 0 10E3/UL
NRBC BLD AUTO-RTO: 0 /100
O2/TOTAL GAS SETTING VFR VENT: 28 %
OXYHGB MFR BLDV: 53 % (ref 70–75)
P AXIS - MUSE: 71 DEGREES
P AXIS - MUSE: 83 DEGREES
P SHIGELLOIDES DNA STL QL NAA+NON-PROBE: NEGATIVE
PCO2 BLDV: 35 MM HG (ref 40–50)
PH BLDV: 7.23 [PH] (ref 7.32–7.43)
PH UR STRIP: 5 [PH] (ref 5–7)
PLATELET # BLD AUTO: 152 10E3/UL (ref 150–450)
PO2 BLDV: 34 MM HG (ref 25–47)
POTASSIUM SERPL-SCNC: 5.4 MMOL/L (ref 3.4–5.3)
POTASSIUM SERPL-SCNC: 5.5 MMOL/L (ref 3.4–5.3)
POTASSIUM SERPL-SCNC: 5.5 MMOL/L (ref 3.4–5.3)
PR INTERVAL - MUSE: 166 MS
PR INTERVAL - MUSE: 172 MS
QRS DURATION - MUSE: 116 MS
QRS DURATION - MUSE: 120 MS
QT - MUSE: 382 MS
QT - MUSE: 386 MS
QTC - MUSE: 456 MS
QTC - MUSE: 464 MS
R AXIS - MUSE: 62 DEGREES
R AXIS - MUSE: 68 DEGREES
RBC # BLD AUTO: 2.82 10E6/UL (ref 3.8–5.2)
RBC URINE: 1 /HPF
RVA RNA STL QL NAA+NON-PROBE: NEGATIVE
SALMONELLA SP RPOD STL QL NAA+PROBE: NEGATIVE
SAO2 % BLDV: 53.5 % (ref 70–75)
SAPO I+II+IV+V RNA STL QL NAA+NON-PROBE: NEGATIVE
SHIGELLA SP+EIEC IPAH ST NAA+NON-PROBE: NEGATIVE
SODIUM SERPL-SCNC: 140 MMOL/L (ref 135–145)
SP GR UR STRIP: 1.01 (ref 1–1.03)
SQUAMOUS EPITHELIAL: <1 /HPF
SYSTOLIC BLOOD PRESSURE - MUSE: 109 MMHG
SYSTOLIC BLOOD PRESSURE - MUSE: NORMAL MMHG
T AXIS - MUSE: 59 DEGREES
T AXIS - MUSE: 66 DEGREES
TROPONIN T SERPL HS-MCNC: 19 NG/L
UNIT ABO/RH: NORMAL
UNIT NUMBER: NORMAL
UNIT STATUS: NORMAL
UNIT TYPE ISBT: 7300
UPPER GI ENDOSCOPY: NORMAL
UROBILINOGEN UR STRIP-MCNC: <2 MG/DL
V CHOLERAE DNA SPEC QL NAA+PROBE: NEGATIVE
VENTRICULAR RATE- MUSE: 84 BPM
VENTRICULAR RATE- MUSE: 89 BPM
VIBRIO DNA SPEC NAA+PROBE: NEGATIVE
WBC # BLD AUTO: 22 10E3/UL (ref 4–11)
WBC URINE: 7 /HPF
Y ENTEROCOL DNA STL QL NAA+PROBE: NEGATIVE

## 2025-02-06 PROCEDURE — 85025 COMPLETE CBC W/AUTO DIFF WBC: CPT | Performed by: HOSPITALIST

## 2025-02-06 PROCEDURE — 94640 AIRWAY INHALATION TREATMENT: CPT

## 2025-02-06 PROCEDURE — 82436 ASSAY OF URINE CHLORIDE: CPT | Performed by: INTERNAL MEDICINE

## 2025-02-06 PROCEDURE — 82272 OCCULT BLD FECES 1-3 TESTS: CPT | Performed by: STUDENT IN AN ORGANIZED HEALTH CARE EDUCATION/TRAINING PROGRAM

## 2025-02-06 PROCEDURE — 84484 ASSAY OF TROPONIN QUANT: CPT

## 2025-02-06 PROCEDURE — 85384 FIBRINOGEN ACTIVITY: CPT | Performed by: INTERNAL MEDICINE

## 2025-02-06 PROCEDURE — C8929 TTE W OR WO FOL WCON,DOPPLER: HCPCS

## 2025-02-06 PROCEDURE — 250N000013 HC RX MED GY IP 250 OP 250 PS 637: Performed by: INTERNAL MEDICINE

## 2025-02-06 PROCEDURE — 85610 PROTHROMBIN TIME: CPT | Performed by: INTERNAL MEDICINE

## 2025-02-06 PROCEDURE — 272N000001 HC OR GENERAL SUPPLY STERILE: Performed by: INTERNAL MEDICINE

## 2025-02-06 PROCEDURE — 83605 ASSAY OF LACTIC ACID: CPT

## 2025-02-06 PROCEDURE — 250N000011 HC RX IP 250 OP 636: Performed by: NURSE ANESTHETIST, CERTIFIED REGISTERED

## 2025-02-06 PROCEDURE — 85018 HEMOGLOBIN: CPT | Performed by: INTERNAL MEDICINE

## 2025-02-06 PROCEDURE — 36415 COLL VENOUS BLD VENIPUNCTURE: CPT

## 2025-02-06 PROCEDURE — 82805 BLOOD GASES W/O2 SATURATION: CPT

## 2025-02-06 PROCEDURE — P9016 RBC LEUKOCYTES REDUCED: HCPCS

## 2025-02-06 PROCEDURE — 255N000002 HC RX 255 OP 636: Performed by: INTERNAL MEDICINE

## 2025-02-06 PROCEDURE — 258N000003 HC RX IP 258 OP 636: Performed by: INTERNAL MEDICINE

## 2025-02-06 PROCEDURE — 84300 ASSAY OF URINE SODIUM: CPT | Performed by: INTERNAL MEDICINE

## 2025-02-06 PROCEDURE — 84132 ASSAY OF SERUM POTASSIUM: CPT | Performed by: INTERNAL MEDICINE

## 2025-02-06 PROCEDURE — 250N000009 HC RX 250: Performed by: HOSPITALIST

## 2025-02-06 PROCEDURE — 200N000001 HC R&B ICU

## 2025-02-06 PROCEDURE — 85018 HEMOGLOBIN: CPT

## 2025-02-06 PROCEDURE — 250N000011 HC RX IP 250 OP 636: Performed by: ANESTHESIOLOGY

## 2025-02-06 PROCEDURE — 258N000003 HC RX IP 258 OP 636: Performed by: NURSE ANESTHETIST, CERTIFIED REGISTERED

## 2025-02-06 PROCEDURE — 84133 ASSAY OF URINE POTASSIUM: CPT | Performed by: INTERNAL MEDICINE

## 2025-02-06 PROCEDURE — 36415 COLL VENOUS BLD VENIPUNCTURE: CPT | Performed by: HOSPITALIST

## 2025-02-06 PROCEDURE — 258N000003 HC RX IP 258 OP 636

## 2025-02-06 PROCEDURE — 81001 URINALYSIS AUTO W/SCOPE: CPT | Performed by: STUDENT IN AN ORGANIZED HEALTH CARE EDUCATION/TRAINING PROGRAM

## 2025-02-06 PROCEDURE — 0W3P8ZZ CONTROL BLEEDING IN GASTROINTESTINAL TRACT, VIA NATURAL OR ARTIFICIAL OPENING ENDOSCOPIC: ICD-10-PCS | Performed by: INTERNAL MEDICINE

## 2025-02-06 PROCEDURE — 250N000009 HC RX 250: Performed by: INTERNAL MEDICINE

## 2025-02-06 PROCEDURE — 999N000141 HC STATISTIC PRE-PROCEDURE NURSING ASSESSMENT: Performed by: INTERNAL MEDICINE

## 2025-02-06 PROCEDURE — 250N000011 HC RX IP 250 OP 636: Performed by: INTERNAL MEDICINE

## 2025-02-06 PROCEDURE — 36415 COLL VENOUS BLD VENIPUNCTURE: CPT | Performed by: INTERNAL MEDICINE

## 2025-02-06 PROCEDURE — 250N000011 HC RX IP 250 OP 636

## 2025-02-06 PROCEDURE — 370N000017 HC ANESTHESIA TECHNICAL FEE, PER MIN: Performed by: INTERNAL MEDICINE

## 2025-02-06 PROCEDURE — 999N000157 HC STATISTIC RCP TIME EA 10 MIN

## 2025-02-06 PROCEDURE — 87507 IADNA-DNA/RNA PROBE TQ 12-25: CPT | Performed by: HOSPITALIST

## 2025-02-06 PROCEDURE — P9016 RBC LEUKOCYTES REDUCED: HCPCS | Performed by: INTERNAL MEDICINE

## 2025-02-06 PROCEDURE — 93306 TTE W/DOPPLER COMPLETE: CPT | Mod: 26 | Performed by: STUDENT IN AN ORGANIZED HEALTH CARE EDUCATION/TRAINING PROGRAM

## 2025-02-06 PROCEDURE — 250N000009 HC RX 250: Performed by: NURSE ANESTHETIST, CERTIFIED REGISTERED

## 2025-02-06 PROCEDURE — 82330 ASSAY OF CALCIUM: CPT | Performed by: INTERNAL MEDICINE

## 2025-02-06 PROCEDURE — 360N000075 HC SURGERY LEVEL 2, PER MIN: Performed by: INTERNAL MEDICINE

## 2025-02-06 PROCEDURE — 80048 BASIC METABOLIC PNL TOTAL CA: CPT | Performed by: HOSPITALIST

## 2025-02-06 PROCEDURE — 99233 SBSQ HOSP IP/OBS HIGH 50: CPT | Performed by: INTERNAL MEDICINE

## 2025-02-06 RX ORDER — HYDROMORPHONE HCL IN WATER/PF 6 MG/30 ML
0.2 PATIENT CONTROLLED ANALGESIA SYRINGE INTRAVENOUS EVERY 5 MIN PRN
Status: DISCONTINUED | OUTPATIENT
Start: 2025-02-06 | End: 2025-02-06

## 2025-02-06 RX ORDER — LIDOCAINE HYDROCHLORIDE 10 MG/ML
INJECTION, SOLUTION INFILTRATION; PERINEURAL PRN
Status: DISCONTINUED | OUTPATIENT
Start: 2025-02-06 | End: 2025-02-06

## 2025-02-06 RX ORDER — IPRATROPIUM BROMIDE AND ALBUTEROL SULFATE 2.5; .5 MG/3ML; MG/3ML
3 SOLUTION RESPIRATORY (INHALATION) EVERY 4 HOURS PRN
Status: DISCONTINUED | OUTPATIENT
Start: 2025-02-06 | End: 2025-02-13 | Stop reason: HOSPADM

## 2025-02-06 RX ORDER — OXYCODONE HYDROCHLORIDE 5 MG/1
5 TABLET ORAL
Status: DISCONTINUED | OUTPATIENT
Start: 2025-02-06 | End: 2025-02-06

## 2025-02-06 RX ORDER — FUROSEMIDE 10 MG/ML
10 INJECTION INTRAMUSCULAR; INTRAVENOUS ONCE
Status: COMPLETED | OUTPATIENT
Start: 2025-02-06 | End: 2025-02-06

## 2025-02-06 RX ORDER — DEXAMETHASONE SODIUM PHOSPHATE 4 MG/ML
4 INJECTION, SOLUTION INTRA-ARTICULAR; INTRALESIONAL; INTRAMUSCULAR; INTRAVENOUS; SOFT TISSUE
Status: DISCONTINUED | OUTPATIENT
Start: 2025-02-06 | End: 2025-02-06

## 2025-02-06 RX ORDER — PROPOFOL 10 MG/ML
INJECTION, EMULSION INTRAVENOUS PRN
Status: DISCONTINUED | OUTPATIENT
Start: 2025-02-06 | End: 2025-02-06

## 2025-02-06 RX ORDER — NALOXONE HYDROCHLORIDE 0.4 MG/ML
0.2 INJECTION, SOLUTION INTRAMUSCULAR; INTRAVENOUS; SUBCUTANEOUS
Status: DISCONTINUED | OUTPATIENT
Start: 2025-02-06 | End: 2025-02-13 | Stop reason: HOSPADM

## 2025-02-06 RX ORDER — FENTANYL CITRATE 50 UG/ML
50 INJECTION, SOLUTION INTRAMUSCULAR; INTRAVENOUS EVERY 5 MIN PRN
Status: DISCONTINUED | OUTPATIENT
Start: 2025-02-06 | End: 2025-02-06

## 2025-02-06 RX ORDER — METRONIDAZOLE 500 MG/100ML
500 INJECTION, SOLUTION INTRAVENOUS EVERY 12 HOURS
Status: DISCONTINUED | OUTPATIENT
Start: 2025-02-06 | End: 2025-02-06

## 2025-02-06 RX ORDER — NICOTINE POLACRILEX 4 MG
15-30 LOZENGE BUCCAL
Status: DISCONTINUED | OUTPATIENT
Start: 2025-02-06 | End: 2025-02-13 | Stop reason: HOSPADM

## 2025-02-06 RX ORDER — SODIUM CHLORIDE, SODIUM LACTATE, POTASSIUM CHLORIDE, CALCIUM CHLORIDE 600; 310; 30; 20 MG/100ML; MG/100ML; MG/100ML; MG/100ML
INJECTION, SOLUTION INTRAVENOUS CONTINUOUS
Status: DISCONTINUED | OUTPATIENT
Start: 2025-02-06 | End: 2025-02-06

## 2025-02-06 RX ORDER — DEXTROSE MONOHYDRATE 25 G/50ML
25-50 INJECTION, SOLUTION INTRAVENOUS
Status: DISCONTINUED | OUTPATIENT
Start: 2025-02-06 | End: 2025-02-13 | Stop reason: HOSPADM

## 2025-02-06 RX ORDER — NALOXONE HYDROCHLORIDE 0.4 MG/ML
0.4 INJECTION, SOLUTION INTRAMUSCULAR; INTRAVENOUS; SUBCUTANEOUS
Status: DISCONTINUED | OUTPATIENT
Start: 2025-02-06 | End: 2025-02-13 | Stop reason: HOSPADM

## 2025-02-06 RX ORDER — ONDANSETRON 2 MG/ML
4 INJECTION INTRAMUSCULAR; INTRAVENOUS EVERY 8 HOURS PRN
Status: DISCONTINUED | OUTPATIENT
Start: 2025-02-06 | End: 2025-02-13 | Stop reason: HOSPADM

## 2025-02-06 RX ORDER — ACETAMINOPHEN AND CODEINE PHOSPHATE 300; 30 MG/1; MG/1
1 TABLET ORAL EVERY 4 HOURS PRN
Status: DISCONTINUED | OUTPATIENT
Start: 2025-02-06 | End: 2025-02-13 | Stop reason: HOSPADM

## 2025-02-06 RX ORDER — NALOXONE HYDROCHLORIDE 0.4 MG/ML
0.1 INJECTION, SOLUTION INTRAMUSCULAR; INTRAVENOUS; SUBCUTANEOUS
Status: DISCONTINUED | OUTPATIENT
Start: 2025-02-06 | End: 2025-02-06

## 2025-02-06 RX ORDER — PROPOFOL 10 MG/ML
INJECTION, EMULSION INTRAVENOUS CONTINUOUS PRN
Status: DISCONTINUED | OUTPATIENT
Start: 2025-02-06 | End: 2025-02-06

## 2025-02-06 RX ORDER — SODIUM CHLORIDE, SODIUM LACTATE, POTASSIUM CHLORIDE, CALCIUM CHLORIDE 600; 310; 30; 20 MG/100ML; MG/100ML; MG/100ML; MG/100ML
INJECTION, SOLUTION INTRAVENOUS CONTINUOUS
Status: DISCONTINUED | OUTPATIENT
Start: 2025-02-06 | End: 2025-02-06 | Stop reason: HOSPADM

## 2025-02-06 RX ORDER — OXYCODONE HYDROCHLORIDE 5 MG/1
10 TABLET ORAL
Status: DISCONTINUED | OUTPATIENT
Start: 2025-02-06 | End: 2025-02-06

## 2025-02-06 RX ORDER — SODIUM CHLORIDE 9 MG/ML
INJECTION, SOLUTION INTRAVENOUS CONTINUOUS
Status: DISCONTINUED | OUTPATIENT
Start: 2025-02-06 | End: 2025-02-07

## 2025-02-06 RX ORDER — FENTANYL CITRATE 50 UG/ML
25 INJECTION, SOLUTION INTRAMUSCULAR; INTRAVENOUS EVERY 5 MIN PRN
Status: DISCONTINUED | OUTPATIENT
Start: 2025-02-06 | End: 2025-02-06

## 2025-02-06 RX ORDER — ONDANSETRON 4 MG/1
4 TABLET, ORALLY DISINTEGRATING ORAL EVERY 30 MIN PRN
Status: DISCONTINUED | OUTPATIENT
Start: 2025-02-06 | End: 2025-02-06

## 2025-02-06 RX ORDER — LIDOCAINE 40 MG/G
CREAM TOPICAL
Status: DISCONTINUED | OUTPATIENT
Start: 2025-02-06 | End: 2025-02-06 | Stop reason: HOSPADM

## 2025-02-06 RX ORDER — SODIUM CHLORIDE 9 MG/ML
INJECTION, SOLUTION INTRAVENOUS CONTINUOUS PRN
Status: DISCONTINUED | OUTPATIENT
Start: 2025-02-06 | End: 2025-02-06

## 2025-02-06 RX ORDER — VASOPRESSIN IN 0.9 % NACL 2 UNIT/2ML
SYRINGE (ML) INTRAVENOUS PRN
Status: DISCONTINUED | OUTPATIENT
Start: 2025-02-06 | End: 2025-02-06

## 2025-02-06 RX ORDER — HYDROMORPHONE HCL IN WATER/PF 6 MG/30 ML
0.4 PATIENT CONTROLLED ANALGESIA SYRINGE INTRAVENOUS EVERY 5 MIN PRN
Status: DISCONTINUED | OUTPATIENT
Start: 2025-02-06 | End: 2025-02-06

## 2025-02-06 RX ORDER — ONDANSETRON 2 MG/ML
4 INJECTION INTRAMUSCULAR; INTRAVENOUS EVERY 30 MIN PRN
Status: DISCONTINUED | OUTPATIENT
Start: 2025-02-06 | End: 2025-02-06

## 2025-02-06 RX ORDER — CIPROFLOXACIN 2 MG/ML
400 INJECTION, SOLUTION INTRAVENOUS EVERY 12 HOURS
Status: DISCONTINUED | OUTPATIENT
Start: 2025-02-06 | End: 2025-02-06

## 2025-02-06 RX ORDER — ONDANSETRON 2 MG/ML
4 INJECTION INTRAMUSCULAR; INTRAVENOUS ONCE
Status: COMPLETED | OUTPATIENT
Start: 2025-02-06 | End: 2025-02-06

## 2025-02-06 RX ORDER — AMLODIPINE BESYLATE 5 MG/1
5 TABLET ORAL AT BEDTIME
Status: DISCONTINUED | OUTPATIENT
Start: 2025-02-06 | End: 2025-02-13 | Stop reason: HOSPADM

## 2025-02-06 RX ADMIN — SODIUM CHLORIDE: 9 INJECTION, SOLUTION INTRAVENOUS at 10:00

## 2025-02-06 RX ADMIN — IPRATROPIUM BROMIDE AND ALBUTEROL SULFATE 3 ML: .5; 3 SOLUTION RESPIRATORY (INHALATION) at 11:10

## 2025-02-06 RX ADMIN — SODIUM CHLORIDE: 9 INJECTION, SOLUTION INTRAVENOUS at 07:18

## 2025-02-06 RX ADMIN — ALBUTEROL SULFATE 2.5 MG: 2.5 SOLUTION RESPIRATORY (INHALATION) at 05:02

## 2025-02-06 RX ADMIN — Medication 1 UNITS: at 09:42

## 2025-02-06 RX ADMIN — PHENYLEPHRINE HYDROCHLORIDE 200 MCG: 10 INJECTION INTRAVENOUS at 09:35

## 2025-02-06 RX ADMIN — LIDOCAINE HYDROCHLORIDE 3 ML: 10 INJECTION, SOLUTION INFILTRATION; PERINEURAL at 09:19

## 2025-02-06 RX ADMIN — PROPOFOL 30 MG: 10 INJECTION, EMULSION INTRAVENOUS at 09:19

## 2025-02-06 RX ADMIN — IPRATROPIUM BROMIDE AND ALBUTEROL SULFATE 3 ML: .5; 3 SOLUTION RESPIRATORY (INHALATION) at 17:18

## 2025-02-06 RX ADMIN — FUROSEMIDE 10 MG: 10 INJECTION, SOLUTION INTRAMUSCULAR; INTRAVENOUS at 15:01

## 2025-02-06 RX ADMIN — ACETAMINOPHEN AND CODEINE PHOSPHATE 1 TABLET: 300; 30 TABLET ORAL at 17:57

## 2025-02-06 RX ADMIN — ONDANSETRON 4 MG: 2 INJECTION, SOLUTION INTRAMUSCULAR; INTRAVENOUS at 08:55

## 2025-02-06 RX ADMIN — FUROSEMIDE 10 MG: 10 INJECTION, SOLUTION INTRAMUSCULAR; INTRAVENOUS at 17:57

## 2025-02-06 RX ADMIN — SODIUM CHLORIDE 500 ML: 9 INJECTION, SOLUTION INTRAVENOUS at 04:55

## 2025-02-06 RX ADMIN — Medication 1 UNITS: at 10:02

## 2025-02-06 RX ADMIN — PHENYLEPHRINE HYDROCHLORIDE 200 MCG: 10 INJECTION INTRAVENOUS at 10:07

## 2025-02-06 RX ADMIN — ALBUTEROL SULFATE 2 PUFF: 90 AEROSOL, METERED RESPIRATORY (INHALATION) at 08:57

## 2025-02-06 RX ADMIN — Medication 1 UNITS: at 10:07

## 2025-02-06 RX ADMIN — Medication 1 UNITS: at 09:55

## 2025-02-06 RX ADMIN — PROPOFOL 125 MCG/KG/MIN: 10 INJECTION, EMULSION INTRAVENOUS at 09:19

## 2025-02-06 RX ADMIN — ACETAMINOPHEN AND CODEINE PHOSPHATE 1 TABLET: 300; 30 TABLET ORAL at 12:34

## 2025-02-06 RX ADMIN — SODIUM CHLORIDE: 9 INJECTION, SOLUTION INTRAVENOUS at 11:07

## 2025-02-06 RX ADMIN — SODIUM CHLORIDE: 9 INJECTION, SOLUTION INTRAVENOUS at 09:15

## 2025-02-06 RX ADMIN — SODIUM CHLORIDE 500 ML: 9 INJECTION, SOLUTION INTRAVENOUS at 05:45

## 2025-02-06 RX ADMIN — PHENYLEPHRINE HYDROCHLORIDE 200 MCG: 10 INJECTION INTRAVENOUS at 09:23

## 2025-02-06 RX ADMIN — ONDANSETRON 4 MG: 2 INJECTION INTRAMUSCULAR; INTRAVENOUS at 04:56

## 2025-02-06 RX ADMIN — PANTOPRAZOLE SODIUM 40 MG: 40 INJECTION, POWDER, FOR SOLUTION INTRAVENOUS at 22:03

## 2025-02-06 RX ADMIN — PHENYLEPHRINE HYDROCHLORIDE 200 MCG: 10 INJECTION INTRAVENOUS at 09:28

## 2025-02-06 RX ADMIN — PHENYLEPHRINE HYDROCHLORIDE 300 MCG: 10 INJECTION INTRAVENOUS at 09:41

## 2025-02-06 RX ADMIN — PERFLUTREN 3 ML: 6.52 INJECTION, SUSPENSION INTRAVENOUS at 14:06

## 2025-02-06 RX ADMIN — SODIUM BICARBONATE 50 MEQ: 84 INJECTION INTRAVENOUS at 06:40

## 2025-02-06 ASSESSMENT — ACTIVITIES OF DAILY LIVING (ADL)
ADLS_ACUITY_SCORE: 49
ADLS_ACUITY_SCORE: 65
ADLS_ACUITY_SCORE: 65
ADLS_ACUITY_SCORE: 47
ADLS_ACUITY_SCORE: 49
ADLS_ACUITY_SCORE: 64
ADLS_ACUITY_SCORE: 65
ADLS_ACUITY_SCORE: 47
ADLS_ACUITY_SCORE: 70
ADLS_ACUITY_SCORE: 65
ADLS_ACUITY_SCORE: 59
ADLS_ACUITY_SCORE: 64
ADLS_ACUITY_SCORE: 65
ADLS_ACUITY_SCORE: 49
ADLS_ACUITY_SCORE: 64
ADLS_ACUITY_SCORE: 59

## 2025-02-06 ASSESSMENT — COPD QUESTIONNAIRES: COPD: 1

## 2025-02-06 NOTE — PROGRESS NOTES
[facility]    PROGRESS NOTE - Hospitalist Service    Assessment and Plan  89 years old female with a past medical history of asthma, NSTEMI s/p PCI on Plavix, GERD, and HTN presenting to the emergency department for evaluation of generalized weakness for the past few days.  Found to have GI bleeding and admitted with acute blood loss anemia.    Acute blood loss  -Secondary to GI bleeding  -Patient presented with hemoglobin of 7.8  -Hemoglobin dropped to 5.5 after few hours with hypotension  -Rapid response was called and patient was transferred to the ICU  - S/P blood transfusion  -Hemoglobin was up to 9.0 and again dropped to 6.5  -CTA abdomen is negative for active bleeding  -Continue to monitor hemoglobin every 6 hour  -Continue to hold home aspirin and Plavix    Acute GI bleed  -GI consult, appreciate input  - S/P EGD which showed clotted blood in the gastric fundus and gastric body  -No active bleeding  -Continue IV PPI twice daily  -Keep patient up after midnight, may need to repeat EGD again tomorrow as per GI.  -Continue to monitor globin as above    Hypotension   - Secondary to acute blood loss anemia  -Patient transferred to ICU after rapid response on 2/6/2025  -Improving blood pressure with fluid and blood resuscitation  -Check echo  -Hold hypertension medications  -Continue monitoring patient in the ICU overnight    History of asthma  -No signs or symptoms of acute exacerbation  -Received IV steroid in the ER  -Continue nebs  -Hold on further steroid for now    Leukocytosis  -Probably stress reaction  -No sign or symptom of infection  -Unremarkable UA and CT chest for infection  -Continue to monitor CBC    History of coronary disease  -Patient denies chest pain  -Hold home aspirin and Plavix due to GI bleeding  -Hold hypertension medication because of hypotension    Chronic back pain  -Resume Tylenol # 3    50 MINUTES SPENT BY ME on the date of service doing chart review,  history, exam, documentation & further activities per the note    Principal Problem:    Gastrointestinal hemorrhage with melena  Active Problems:    Generalized weakness    Loose stools      VTE prophylaxis:  Pneumatic Compression Devices  DIET: Orders Placed This Encounter      Clear Liquid Diet      Disposition/Barriers to discharge: Monitor hemoglobin, blood transfusion, IV Protonix  Medically Ready for Discharge: Anticipated in 2-4 Days   Code Status: No CPR- Do NOT Intubate    Subjective:   is feeling better today, has some shortness of breath but denies chest pain.  Some back pain.  Family at bedside.    PHYSICAL EXAM  Vitals:    02/06/25 0716   Weight: 86.6 kg (191 lb)     B/P:118/56 T:97.7 P:92 R:27     Intake/Output Summary (Last 24 hours) at 2/6/2025 1300  Last data filed at 2/6/2025 1200  Gross per 24 hour   Intake 2818 ml   Output 750 ml   Net 2068 ml      Body mass index is 30.83 kg/m .    Constitutional: awake, alert, cooperative, no apparent distress, and appears stated age  Respiratory: Scattered rhonchi with occasional expiratory wheeze.  No rales.    Cardiovascular: Normal apical impulse, regular rate and rhythm, normal S1 and S2, no S3 or S4, and no murmur noted  GI: No scars, normal bowel sounds, soft, non-distended, non-tender, no masses palpated, no hepatosplenomegally  Skin: no bruising or bleeding and normal skin color, texture, turgor  Musculoskeletal: There is no redness, warmth, or swelling of the joints.  Full range of motion noted.  no lower extremity pitting edema present  Neurologic: Awake, alert, oriented to name, place and time.  Cranial nerves II-XII are grossly intact.  Motor is 5 out of 5 bilaterally.   Sensory is intact.    Neuropsychiatric: Appropriate with examiner      PERTINENT LABS/IMAGING:    I have personally reviewed the following data over the past 24 hrs:    22.0 (H)  \   6.5 (LL)   / 152     140 114 (H) 103.0 (H) /  176 (H)   5.4 (H) 13 (L) 1.90 (H) \     Trop: 19  (H) BNP: N/A     Procal: N/A CRP: N/A Lactic Acid: 2.3 (H)       INR:  1.45 (H) PTT:  N/A   D-dimer:  N/A Fibrinogen:  268       Imaging results reviewed over the past 24 hrs:   No results found for this or any previous visit (from the past 24 hours).    Discussed with patient, family, GI, nursing staff and discharge planner    Kirby Ayala MD  [facility] Medicine Service  [phone]

## 2025-02-06 NOTE — SIGNIFICANT EVENT
Significant Event Note    Time of event: 5:00 AM February 6, 2025    Description of event:    See significant event note from last evening.  Patient has been fluid resuscitated and received 2Units of PRBCs  Her Hgb did respond well and was 9.0 at 2:00AM    She had a fairly good size black colored stool this early AM per nursing  She continues to appear very pale  She now feels dizzy and nauseous and has some abdominal pain as well    Afebrile  She is hypotensive despite fluid resuscitation    She is still alert and oriented    I think she may have an active Upper GI bleed  I did consider if transfusion reaction could be contributing.  But she is afebrile, and does not have chills    Hgb 8.4 from 9 without any fluids  White count 22.0 from 14    Plan:    Empiric antibiotics started  Rechecking Hgb  NS bolus ordered  Discussed with GI who agreed EGD should be done urgently and will do this morning      Discussed with: bedside nurse, GI    6:24 AM  Discussed with intensivist due to hypotension MAP<60 despite fluid resuscitation  Patient will transfer to ICU    Sonny Bell MD

## 2025-02-06 NOTE — H&P
Pre-procedure Note    Reason for procedure: Melena    History and Physical Reviewed: Reviewed, no changes.    Pre-sedation assessment:    General: alert, appears stated age, and cooperative  Airway: normal  Heart: regular rate and rhythm  Lungs: clear to auscultation bilaterally    Sedation Plan based on assessment: MAC    Mallampati score: Class III (visualization of the soft palate and base of uvula)    ASA Classification: ASA 3 - Patient with moderate systemic disease with functional limitations    Impression: Patient deemed adequate candidate for MAC sedation    Risks, benefits and alternatives were discussed with the patient and informed consent was obtained.    Plan: esophagogastroduodenoscopy    Thank you for the opportunity to participate in the care of this patient. Please feel free to call with any questions or concerns.     Phone number [phone].     Taj Francis MD 2/6/2025 9:07 AM

## 2025-02-06 NOTE — PLAN OF CARE
Problem: Adult Inpatient Plan of Care  Goal: Optimal Comfort and Wellbeing  Outcome: Progressing   Goal Outcome Evaluation:       Pt is alert and oriented, able to make needs known. Pt received one unit of blood this shift for a total of 2 units. Hgb 9.0 after blood. BP  89/44.  Pt was feeling dizzy and nauseated after getting up to the bathroom. Pt was A-1 with a walker. Notified resident, 500ml bolus started and zofran given. BP then dropped to 76/44 and 69/44 rapid response paged. Pt received another 500ml bolus. Pt continues to be pale. Pt had one formed black stool which was positive for occult blood. Pt NPO for EGD today. Russo placed and pt will be transfering to ICU.

## 2025-02-06 NOTE — ANESTHESIA PREPROCEDURE EVALUATION
Anesthesia Pre-Procedure Evaluation    Patient: Mary Corral   MRN: [id number] : 1935        Procedure : Procedure(s):  ESOPHAGOGASTRODUODENOSCOPY          Past Medical History:   Diagnosis Date    Allergic rhinitis     Chronic sinusitis     Conductive hearing loss     COPD (chronic obstructive pulmonary disease) (H)     CRI (chronic renal insufficiency)     mild    Gastroesophageal reflux disease     Hearing problem     Hypertension     Mediastinal mass     Nasal polyps     Pulmonary nodule     Seasonal allergies     Uncomplicated asthma       Past Surgical History:   Procedure Laterality Date    APPENDECTOMY      BIOPSY MASS NECK N/A 2016    Procedure: BIOPSY MASS NECK;  Surgeon: Lucia Quinteros MD;  Location: [facility] OR    CATARACT IOL, RT/LT Bilateral     CV CORONARY ANGIOGRAM N/A 2024    Procedure: Coronary Angiogram;  Surgeon: Marino Lind MD;  Location: [facility]    CV LEFT HEART CATH N/A 2024    Procedure: Left Heart Catheterization;  Surgeon: Marino Lind MD;  Location: [facility] CV    CV PCI N/A 2024    Procedure: Percutaneous Coronary Intervention;  Surgeon: Marino Lind MD;  Location: [facility]    CV PCI ASPIRATION THROMECTOMY N/A 2024    Procedure: Percutaneous Coronary Intervention Aspiration Thrombectomy;  Surgeon: Marino Lind MD;  Location: [facility]    ENDOSCOPIC ULTRASOUND UPPER GASTROINTESTINAL TRACT (GI) N/A 2016    Procedure: ENDOSCOPIC ULTRASOUND, ESOPHAGOSCOPY / UPPER GASTROINTESTINAL TRACT (GI);  Surgeon: Lucia Quinteros MD;  Location: [facility] OR    ESOPHAGOSCOPY, GASTROSCOPY, DUODENOSCOPY (EGD), DILATATION, COMBINED N/A 2023    Procedure: ESOPHAGOGASTRODUODENOSCOPY with esophageal dilitation;  Surgeon: Kodak Huerta MD, MD;  Location: [facility] GI    ROTATOR CUFF REPAIR RT/LT Right 30 years ago    THYROIDECTOMY N/A 3/14/2017    Procedure: THYROIDECTOMY;  Surgeon: Misbah Julien MD;   Location: [facility] OR      Allergies   Allergen Reactions    Amoxicillin Hives    Keflex [Cephalexin] Hives    Lisinopril Shortness Of Breath    Penicillins Hives      Social History     Tobacco Use    Smoking status: Never    Smokeless tobacco: Never    Tobacco comments:     smoked twice a week for a couple years while in college, less than 1/2 pack    Substance Use Topics    Alcohol use: Yes     Alcohol/week: 0.0 standard drinks of alcohol     Comment: Alcoholic Drinks/day: occasional      Wt Readings from Last 1 Encounters:   02/06/25 86.6 kg (191 lb)        Anesthesia Evaluation            ROS/MED HX  ENT/Pulmonary:     (+)                      asthma    COPD,              Neurologic:       Cardiovascular:     (+)  hypertension- -   -  - -                                      METS/Exercise Tolerance:     Hematologic:       Musculoskeletal:       GI/Hepatic:     (+) GERD,                   Renal/Genitourinary:     (+) renal disease,             Endo:     (+)          thyroid problem,            Psychiatric/Substance Use:       Infectious Disease:       Malignancy:       Other:            Physical Exam    Airway  airway exam normal      Mallampati: I   TM distance: > 3 FB   Neck ROM: full   Mouth opening: > 3 cm    Respiratory Devices and Support         Dental       (+) Completely normal teeth      Cardiovascular   cardiovascular exam normal          Pulmonary                   OUTSIDE LABS:  CBC:   Lab Results   Component Value Date    WBC 22.0 (H) 02/06/2025    WBC 14.2 (H) 02/05/2025    HGB 8.4 (L) 02/06/2025    HGB 9.0 (L) 02/06/2025    HCT 25.9 (L) 02/06/2025    HCT 17.8 (L) 02/05/2025     02/06/2025     02/05/2025     BMP:   Lab Results   Component Value Date     02/06/2025     02/05/2025    POTASSIUM 5.5 (H) 02/06/2025    POTASSIUM 5.5 (H) 02/06/2025    CHLORIDE 114 (H) 02/06/2025    CHLORIDE 115 (H) 02/05/2025    CO2 13 (L) 02/06/2025    CO2 13 (L) 02/05/2025    .0 (H)  02/06/2025    BUN 91.9 (H) 02/05/2025    CR 1.90 (H) 02/06/2025    CR 1.63 (H) 02/05/2025     (H) 02/06/2025     (H) 02/05/2025     COAGS:   Lab Results   Component Value Date    PTT 28 02/05/2025    INR 1.45 (H) 02/06/2025    FIBR 268 02/06/2025     POC:   Lab Results   Component Value Date     (H) 03/15/2017     HEPATIC:   Lab Results   Component Value Date    ALBUMIN 3.4 (L) 02/05/2025    PROTTOTAL 5.4 (L) 02/05/2025    ALT 16 02/05/2025    AST 12 02/05/2025    ALKPHOS 65 02/05/2025    BILITOTAL 0.4 02/05/2025     OTHER:   Lab Results   Component Value Date    PH 7.33 (L) 03/14/2017    LACT 2.3 (H) 02/06/2025    A1C 6.1 (H) 04/27/2024    GREG 7.8 (L) 02/06/2025    PHOS 4.0 07/23/2024    MAG 1.8 02/05/2025    LIPASE 41 02/05/2025    TSH 0.80 02/05/2025    T4 1.75 (H) 03/13/2017       Anesthesia Plan    ASA Status:  4, emergent       Anesthesia Type: MAC.              Consents    Anesthesia Plan(s) and associated risks, benefits, and realistic alternatives discussed. Questions answered and patient/representative(s) expressed understanding.     - Discussed: Risks, Benefits and Alternatives for BOTH SEDATION and the PROCEDURE were discussed     - Discussed with:  Patient            Postoperative Care       PONV prophylaxis: Ondansetron (or other 5HT-3)     Comments:    Other Comments: Patient with severe anemia. Hypotension. Received 2 units of blood. Shortness of breath.            Tatyana Sweeney MD    I have reviewed the pertinent notes and labs in the chart from the past 30 days and (re)examined the patient.  Any updates or changes from those notes are reflected in this note.    Clinically Significant Risk Factors Present on Admission        # Hyperkalemia: Highest K = 5.5 mmol/L in last 2 days, will monitor as appropriate   # Hyperchloremia: Highest Cl = 115 mmol/L in last 2 days, will monitor as appropriate      # Hypocalcemia: Lowest Ca = 7.7 mg/dL in last 2 days, will monitor and replace  "as appropriate     # Hypoalbuminemia: Lowest albumin = 3.4 g/dL at 2/5/2025 10:12 AM, will monitor as appropriate  # Coagulation Defect: INR = 1.45 (Ref range: 0.85 - 1.15) and/or PTT = 28 Seconds (Ref range: 22 - 38 Seconds), will monitor for bleeding  # Drug Induced Platelet Defect: home medication list includes an antiplatelet medication   # Hypertension: Home medication list includes antihypertensive(s)      # Anemia: based on hgb <11       # Obesity: Estimated body mass index is 30.83 kg/m  as calculated from the following:    Height as of this encounter: 1.676 m (5' 6\").    Weight as of this encounter: 86.6 kg (191 lb).       # Financial/Environmental Concerns:    # Asthma: noted on problem list          "

## 2025-02-06 NOTE — SIGNIFICANT EVENT
Significant Event Note    Time of event: 8:45 PM February 5, 2025    Description of event:  Rapid response called due to blood pressure dropping to 70's systolic with patient feeling dizzy and clammy. In brief, patient with history of CAD on Plavix and ASA admitted for acute blood loss anemia secondary to suspected upper GI bleed. Patient evaluated at bedside, blood pressure into low 90's systolic upon arrival to room with normal heart rate and oxygen saturation. Blood pressures had been better earlier in the day. Patient in trendelenburg positioning. Reports feeling somewhat warm and sweaty but without chest pain, shortness of breath, abdominal pain, or nausea. On exam, she appeared somewhat pale but was alert and interactive. Heart regular, lungs clear. Peripheral pulses intact. No abdominal tenderness with normal bowel sounds. Skin clammy to the touch. No extremity edema. She has two peripheral IV's and IV fluids were initiated. Per chart review, CT abdomen/pelvis earlier in the day without evidence of active arterial bleed. GI with plans for EGD tomorrow.     /49 (BP Location: Left arm)   Pulse 93   Temp 98.2  F (36.8  C) (Oral)   Resp 28   SpO2 96%     After some fluid resuscitation, patient BP improved to 110 systolic and she reports feeling better.     Plan:  - IV fluid bolus 500 ml to start, likely do a full liter.   - CBC, BMP, troponin, lactic, EKG  - Transfuse as needed pending hemoglobin  - Low threshold to contact GI if concern for active bleeding and hemodynamic instability  - Will follow up based on results. Page house with change in status or ongoing concerns    Discussed with: bedside nurse and Dr. Ray Iverson MD, PGY-1    9:49 PM  Hgb 5.5, lactic acid 2.8, patient exam unchanged, still pale, otherwise alert and interactive, VSS  Consented for transfusion  Ordered 2 units PRBCs  updated GI who agrees with plan for transfusion, and will update them again if signs of ongoing  profuse bleeding  Will recheck both lactic acid and Hgb after transfusion

## 2025-02-06 NOTE — SIGNIFICANT EVENT
RT responded to RRT. Patient sating 96% on room air, R22. Patient not in respiratory distress. RT excused from room.    Traci Cruz, RT

## 2025-02-06 NOTE — ANESTHESIA CARE TRANSFER NOTE
Patient: Mary Corral    Procedure: Procedure(s):  ESOPHAGOGASTRODUODENOSCOPY WITH CAUTERY       Diagnosis: Generalized weakness [R53.1]  Loose stools [R19.5]  Diagnosis Additional Information: No value filed.    Anesthesia Type:   MAC     Note:    Oropharynx: oropharynx clear of all foreign objects and spontaneously breathing  Level of Consciousness: drowsy  Oxygen Supplementation: face mask  Level of Supplemental Oxygen (L/min / FiO2): 8  Independent Airway: airway patency satisfactory and stable  Dentition: dentition unchanged  Vital Signs Stable: post-procedure vital signs reviewed and stable  Report to RN Given: handoff report given  Patient transferred to: ICU    ICU Handoff: Call for PAUSE to initiate/utilize ICU HANDOFF, Identified Patient, Identified Responsible Provider, Reviewed the Pertinent Medical History, Discussed Surgical Course, Reviewed Intra-OP Anesthesia Management and Issues during Anesthesia, Set Expectations for Post Procedure Period and Allowed Opportunity for Questions and Acknowledgement of Understanding  Vitals:  Vitals Value Taken Time   BP     Temp     Pulse 93 02/06/25 1040   Resp 35 02/06/25 1040   SpO2 97 % 02/06/25 1040   Vitals shown include unfiled device data.    Electronically Signed By: DENISE Shea CRNA  February 6, 2025  10:42 AM

## 2025-02-06 NOTE — PLAN OF CARE
Dual Skin Assessment Note:    Patient admitted from ED from to P2.     Comprehensive skin inspection completed by myself and Sanna Vee RN.     Abnormal skin assessment findings: left hand bruise, abdominal bruise, small bruises on bilateral shins, bruises to right hip, scaling skin beneath breasts     LDA Initiated for skin breakdown/non-blanchable redness: Not applicable    Provider notified: Not applicable    If yes, WOC Consult order obtained: Not applicable    Svitlana Ring RN 6:36 PM

## 2025-02-06 NOTE — ANESTHESIA POSTPROCEDURE EVALUATION
Patient: Mary Corral    Procedure: Procedure(s):  ESOPHAGOGASTRODUODENOSCOPY WITH CAUTERY       Anesthesia Type:  MAC    Note:  Disposition: Inpatient   Postop Pain Control: Uneventful   PONV:    Neuro/Psych: Uneventful            Sign Out: Acceptable/Baseline neuro status   Airway/Respiratory: Uneventful            Sign Out: Acceptable/Baseline resp. status   CV/Hemodynamics: Uneventful            Sign Out: Acceptable CV status; No obvious hypovolemia; No obvious fluid overload   Other NRE: NONE   DID A NON-ROUTINE EVENT OCCUR? No           Last vitals:  Vitals:    02/06/25 0840 02/06/25 0850 02/06/25 0900   BP:  117/55 102/50   Pulse: 94 90 91   Resp: 23 22 30   Temp: (!) 35.8  C (96.4  F) 36.1  C (97  F) 36.1  C (97  F)   SpO2: 99% 100% 100%       Electronically Signed By: Tatyana Sweeney MD  February 6, 2025  10:49 AM

## 2025-02-06 NOTE — PROVIDER NOTIFICATION
Page out to [facility] per Dr Bell request. Dr Damico is the on-call. Dr Bell [phone]

## 2025-02-06 NOTE — PLAN OF CARE
Goal Outcome Evaluation:      Plan of Care Reviewed With: patient    Overall Patient Progress: declining    Outcome Evaluation: Patient Hgb down to 5.5, requiring blood transfusion      Problem: Gastrointestinal Bleeding  Goal: Hemostasis  Outcome: Not Progressing     Problem: Adult Inpatient Plan of Care  Goal: Optimal Comfort and Wellbeing  Outcome: Progressing          NURSING PROGRESS NOTE       Shift Summary: Patient arrived to unit from ED this shift, initially VSS on RA, A+Ox4 w/ forgetfulness at times, denying pain.       Pertinent Shift Updates:  Patient called writer into room around 2015, reported dizziness and feeling diaphoretic, manual BP obtained and reading 76/42, patient also endorsing new onset pain 5/10 in RUQ of abdomen, rapid response activated at this time. Patient placed in trendelenburg and received 500 ml NS bolus, BP improved and pain resolved. Hgb ordered during rapid resulted at 5.5, order to transfuse 2 units placed, 1st unit started by writer and patient tolerating well. No stool since arrival to unit, unable to obtain sample for ordered testing. No urine sample obtained this shift either.         Plan:  Continue w/ blood transfusion, NPO at midnight for planned EGD tomorrow. Recheck Hgb after transfusion.       Svitlana Ring, RN 2/5/2025   [phone]     For detailed vital signs and assessments, please see documentation flowsheets. For detailed medication administrations, please see MAR.

## 2025-02-07 VITALS
SYSTOLIC BLOOD PRESSURE: 120 MMHG | HEART RATE: 91 BPM | HEIGHT: 66 IN | TEMPERATURE: 98.2 F | BODY MASS INDEX: 30.7 KG/M2 | RESPIRATION RATE: 19 BRPM | DIASTOLIC BLOOD PRESSURE: 58 MMHG | WEIGHT: 191 LBS | OXYGEN SATURATION: 98 %

## 2025-02-07 LAB
ALBUMIN SERPL BCG-MCNC: 2.7 G/DL (ref 3.5–5.2)
ANION GAP SERPL CALCULATED.3IONS-SCNC: 11 MMOL/L (ref 7–15)
ANION GAP SERPL CALCULATED.3IONS-SCNC: 13 MMOL/L (ref 7–15)
BLD PROD TYP BPU: NORMAL
BLOOD COMPONENT TYPE: NORMAL
BUN SERPL-MCNC: 102.2 MG/DL (ref 8–23)
BUN SERPL-MCNC: 120.9 MG/DL (ref 8–23)
CALCIUM SERPL-MCNC: 7.4 MG/DL (ref 8.8–10.4)
CALCIUM SERPL-MCNC: 7.6 MG/DL (ref 8.8–10.4)
CHLORIDE SERPL-SCNC: 111 MMOL/L (ref 98–107)
CHLORIDE SERPL-SCNC: 112 MMOL/L (ref 98–107)
CHLORIDE UR-SCNC: 24 MMOL/L
CODING SYSTEM: NORMAL
CREAT SERPL-MCNC: 2.34 MG/DL (ref 0.51–0.95)
CREAT SERPL-MCNC: 2.59 MG/DL (ref 0.51–0.95)
CROSSMATCH: NORMAL
EGFRCR SERPLBLD CKD-EPI 2021: 17 ML/MIN/1.73M2
EGFRCR SERPLBLD CKD-EPI 2021: 20 ML/MIN/1.73M2
ERYTHROCYTE [DISTWIDTH] IN BLOOD BY AUTOMATED COUNT: 17.2 % (ref 10–15)
GLUCOSE SERPL-MCNC: 126 MG/DL (ref 70–99)
GLUCOSE SERPL-MCNC: 135 MG/DL (ref 70–99)
HCO3 SERPL-SCNC: 13 MMOL/L (ref 22–29)
HCO3 SERPL-SCNC: 17 MMOL/L (ref 22–29)
HCT VFR BLD AUTO: 24.3 % (ref 35–47)
HGB BLD-MCNC: 6.8 G/DL (ref 11.7–15.7)
HGB BLD-MCNC: 7.7 G/DL (ref 11.7–15.7)
HGB BLD-MCNC: 8.2 G/DL (ref 11.7–15.7)
HGB BLD-MCNC: NORMAL G/DL
ISSUE DATE AND TIME: NORMAL
MAGNESIUM SERPL-MCNC: 1.7 MG/DL (ref 1.7–2.3)
MCH RBC QN AUTO: 29.3 PG (ref 26.5–33)
MCHC RBC AUTO-ENTMCNC: 33.7 G/DL (ref 31.5–36.5)
MCV RBC AUTO: 87 FL (ref 78–100)
PHOSPHATE SERPL-MCNC: 4.7 MG/DL (ref 2.5–4.5)
PLATELET # BLD AUTO: 80 10E3/UL (ref 150–450)
POTASSIUM SERPL-SCNC: 4.4 MMOL/L (ref 3.4–5.3)
POTASSIUM SERPL-SCNC: 5.4 MMOL/L (ref 3.4–5.3)
POTASSIUM UR-SCNC: 61.3 MMOL/L
RBC # BLD AUTO: 2.8 10E6/UL (ref 3.8–5.2)
SODIUM SERPL-SCNC: 137 MMOL/L (ref 135–145)
SODIUM SERPL-SCNC: 140 MMOL/L (ref 135–145)
SODIUM UR-SCNC: <20 MMOL/L
UNIT ABO/RH: NORMAL
UNIT NUMBER: NORMAL
UNIT STATUS: NORMAL
UNIT TYPE ISBT: 7300
UPPER GI ENDOSCOPY: NORMAL
WBC # BLD AUTO: 17.6 10E3/UL (ref 4–11)

## 2025-02-07 PROCEDURE — 99222 1ST HOSP IP/OBS MODERATE 55: CPT | Performed by: INTERNAL MEDICINE

## 2025-02-07 PROCEDURE — P9016 RBC LEUKOCYTES REDUCED: HCPCS | Performed by: HOSPITALIST

## 2025-02-07 PROCEDURE — 258N000003 HC RX IP 258 OP 636: Performed by: INTERNAL MEDICINE

## 2025-02-07 PROCEDURE — 80069 RENAL FUNCTION PANEL: CPT | Performed by: INTERNAL MEDICINE

## 2025-02-07 PROCEDURE — 94640 AIRWAY INHALATION TREATMENT: CPT

## 2025-02-07 PROCEDURE — 85018 HEMOGLOBIN: CPT | Performed by: HOSPITALIST

## 2025-02-07 PROCEDURE — 82565 ASSAY OF CREATININE: CPT | Performed by: INTERNAL MEDICINE

## 2025-02-07 PROCEDURE — 250N000009 HC RX 250: Performed by: HOSPITALIST

## 2025-02-07 PROCEDURE — 85041 AUTOMATED RBC COUNT: CPT | Performed by: INTERNAL MEDICINE

## 2025-02-07 PROCEDURE — 250N000009 HC RX 250: Performed by: INTERNAL MEDICINE

## 2025-02-07 PROCEDURE — 36415 COLL VENOUS BLD VENIPUNCTURE: CPT | Performed by: INTERNAL MEDICINE

## 2025-02-07 PROCEDURE — 250N000011 HC RX IP 250 OP 636: Performed by: INTERNAL MEDICINE

## 2025-02-07 PROCEDURE — 360N000075 HC SURGERY LEVEL 2, PER MIN: Performed by: INTERNAL MEDICINE

## 2025-02-07 PROCEDURE — 83735 ASSAY OF MAGNESIUM: CPT | Performed by: INTERNAL MEDICINE

## 2025-02-07 PROCEDURE — 272N000001 HC OR GENERAL SUPPLY STERILE: Performed by: INTERNAL MEDICINE

## 2025-02-07 PROCEDURE — 36415 COLL VENOUS BLD VENIPUNCTURE: CPT | Performed by: HOSPITALIST

## 2025-02-07 PROCEDURE — 999N000141 HC STATISTIC PRE-PROCEDURE NURSING ASSESSMENT: Performed by: INTERNAL MEDICINE

## 2025-02-07 PROCEDURE — 36416 COLLJ CAPILLARY BLOOD SPEC: CPT | Performed by: HOSPITALIST

## 2025-02-07 PROCEDURE — 370N000017 HC ANESTHESIA TECHNICAL FEE, PER MIN: Performed by: INTERNAL MEDICINE

## 2025-02-07 PROCEDURE — 250N000013 HC RX MED GY IP 250 OP 250 PS 637: Performed by: INTERNAL MEDICINE

## 2025-02-07 PROCEDURE — 999N000157 HC STATISTIC RCP TIME EA 10 MIN

## 2025-02-07 PROCEDURE — 200N000001 HC R&B ICU

## 2025-02-07 PROCEDURE — 0W3P8ZZ CONTROL BLEEDING IN GASTROINTESTINAL TRACT, VIA NATURAL OR ARTIFICIAL OPENING ENDOSCOPIC: ICD-10-PCS | Performed by: INTERNAL MEDICINE

## 2025-02-07 PROCEDURE — 99233 SBSQ HOSP IP/OBS HIGH 50: CPT | Performed by: INTERNAL MEDICINE

## 2025-02-07 RX ORDER — OXYCODONE HYDROCHLORIDE 5 MG/1
10 TABLET ORAL
Status: DISCONTINUED | OUTPATIENT
Start: 2025-02-07 | End: 2025-02-07 | Stop reason: HOSPADM

## 2025-02-07 RX ORDER — EPINEPHRINE 0.1 MG/ML
INJECTION INTRAVENOUS PRN
Status: DISCONTINUED | OUTPATIENT
Start: 2025-02-07 | End: 2025-02-07 | Stop reason: HOSPADM

## 2025-02-07 RX ORDER — ONDANSETRON 2 MG/ML
4 INJECTION INTRAMUSCULAR; INTRAVENOUS EVERY 30 MIN PRN
Status: DISCONTINUED | OUTPATIENT
Start: 2025-02-07 | End: 2025-02-07 | Stop reason: HOSPADM

## 2025-02-07 RX ORDER — LIDOCAINE 40 MG/G
CREAM TOPICAL
Status: DISCONTINUED | OUTPATIENT
Start: 2025-02-07 | End: 2025-02-07 | Stop reason: HOSPADM

## 2025-02-07 RX ORDER — OXYCODONE HYDROCHLORIDE 5 MG/1
5 TABLET ORAL
Status: DISCONTINUED | OUTPATIENT
Start: 2025-02-07 | End: 2025-02-07 | Stop reason: HOSPADM

## 2025-02-07 RX ORDER — FUROSEMIDE 10 MG/ML
40 INJECTION INTRAMUSCULAR; INTRAVENOUS EVERY 12 HOURS
Status: DISCONTINUED | OUTPATIENT
Start: 2025-02-07 | End: 2025-02-08

## 2025-02-07 RX ORDER — DEXAMETHASONE SODIUM PHOSPHATE 4 MG/ML
4 INJECTION, SOLUTION INTRA-ARTICULAR; INTRALESIONAL; INTRAMUSCULAR; INTRAVENOUS; SOFT TISSUE
Status: DISCONTINUED | OUTPATIENT
Start: 2025-02-07 | End: 2025-02-07 | Stop reason: HOSPADM

## 2025-02-07 RX ORDER — SODIUM CHLORIDE, SODIUM LACTATE, POTASSIUM CHLORIDE, CALCIUM CHLORIDE 600; 310; 30; 20 MG/100ML; MG/100ML; MG/100ML; MG/100ML
INJECTION, SOLUTION INTRAVENOUS CONTINUOUS
Status: DISCONTINUED | OUTPATIENT
Start: 2025-02-07 | End: 2025-02-07 | Stop reason: HOSPADM

## 2025-02-07 RX ORDER — ONDANSETRON 4 MG/1
4 TABLET, ORALLY DISINTEGRATING ORAL EVERY 30 MIN PRN
Status: DISCONTINUED | OUTPATIENT
Start: 2025-02-07 | End: 2025-02-07 | Stop reason: HOSPADM

## 2025-02-07 RX ORDER — NALOXONE HYDROCHLORIDE 1 MG/ML
0.1 INJECTION INTRAMUSCULAR; INTRAVENOUS; SUBCUTANEOUS
Status: DISCONTINUED | OUTPATIENT
Start: 2025-02-07 | End: 2025-02-07 | Stop reason: HOSPADM

## 2025-02-07 RX ADMIN — FUROSEMIDE 40 MG: 10 INJECTION, SOLUTION INTRAMUSCULAR; INTRAVENOUS at 23:05

## 2025-02-07 RX ADMIN — FUROSEMIDE 40 MG: 10 INJECTION, SOLUTION INTRAMUSCULAR; INTRAVENOUS at 14:14

## 2025-02-07 RX ADMIN — SODIUM CHLORIDE: 9 INJECTION, SOLUTION INTRAVENOUS at 01:23

## 2025-02-07 RX ADMIN — SODIUM ZIRCONIUM CYCLOSILICATE 10 G: 10 POWDER, FOR SUSPENSION ORAL at 16:01

## 2025-02-07 RX ADMIN — SODIUM BICARBONATE: 84 INJECTION, SOLUTION INTRAVENOUS at 12:52

## 2025-02-07 RX ADMIN — ACETAMINOPHEN AND CODEINE PHOSPHATE 1 TABLET: 300; 30 TABLET ORAL at 08:51

## 2025-02-07 RX ADMIN — IPRATROPIUM BROMIDE AND ALBUTEROL SULFATE 3 ML: .5; 3 SOLUTION RESPIRATORY (INHALATION) at 10:07

## 2025-02-07 RX ADMIN — PANTOPRAZOLE SODIUM 40 MG: 40 INJECTION, POWDER, FOR SOLUTION INTRAVENOUS at 22:26

## 2025-02-07 RX ADMIN — PANTOPRAZOLE SODIUM 40 MG: 40 INJECTION, POWDER, FOR SOLUTION INTRAVENOUS at 10:53

## 2025-02-07 ASSESSMENT — ACTIVITIES OF DAILY LIVING (ADL)
ADLS_ACUITY_SCORE: 49
ADLS_ACUITY_SCORE: 47
ADLS_ACUITY_SCORE: 49
ADLS_ACUITY_SCORE: 47
ADLS_ACUITY_SCORE: 49
ADLS_ACUITY_SCORE: 47
ADLS_ACUITY_SCORE: 49

## 2025-02-07 NOTE — PLAN OF CARE
Problem: Adult Inpatient Plan of Care  Goal: Plan of Care Review  Description: The Plan of Care Review/Shift note should be completed every shift.  The Outcome Evaluation is a brief statement about your assessment that the patient is improving, declining, or no change.  This information will be displayed automatically on your shift  note.  Outcome: Progressing  Flowsheets (Taken 2/6/2025 1931)  Outcome Evaluation: EGD completed. Two black stools today. 1 unit of blood given for Hgb of 6.5. Recheck Hgb 7.5. Lasix given post transfusion, minimal urine output. Russo in place. MD notified. BP and HR stable. Pt c/o lower back pain due to spinal stenosis. Tylenol #3 given x2 for back pain. On clear liquids, tolerating well.  Plan of Care Reviewed With: patient  Overall Patient Progress: improving   Goal Outcome Evaluation:      Plan of Care Reviewed With: patient    Overall Patient Progress: improvingOverall Patient Progress: improving    Outcome Evaluation: EGD completed. Two black stools today. 1 unit of blood given for Hgb of 6.5. Recheck Hgb 7.5. Lasix given post transfusion, minimal urine output. Russo in place. MD notified. BP and HR stable. Pt c/o lower back pain due to spinal stenosis. Tylenol #3 given x2 for back pain. On clear liquids, tolerating well.

## 2025-02-07 NOTE — PLAN OF CARE
Goal Outcome Evaluation:      Plan of Care Reviewed With: patient    Overall Patient Progress: no changeOverall Patient Progress: no change    Outcome Evaluation: Hgb every 6 hours reordered by Dr. Goodwin. Midnight result was 6.8. Transfused one unit of PRBC. Worsening Kidney function. Consulting Nephrology this morning. Kept NPO after midnight per handoff report for possible repeat EGD this morning.    [facility] - ICU    RN Progress Note:            Pertinent Assessments:      Please refer to flowsheet rows for full assessment                Key Events - This Shift:            RN Managed Protocols Ordered:  No  Protocols:No  PRN'S:  Protocols Status: N/A                Barriers to Discharge / Downgrade:     May downgrade           Problem: Gastrointestinal Bleeding  Goal: Optimal Coping with Acute Illness  Outcome: Progressing     Problem: Risk for Delirium  Goal: Optimal Coping  Outcome: Progressing

## 2025-02-07 NOTE — H&P
Pre-procedure Note    Reason for procedure: Melena    History and Physical Reviewed: Reviewed, no changes.    Pre-sedation assessment:    General: alert, appears stated age, and cooperative  Airway: normal  Heart: regular rate and rhythm  Lungs: clear to auscultation bilaterally    Sedation Plan based on assessment: MAC    Mallampati score:    ASA Classification: ASA 3 - Patient with moderate systemic disease with functional limitations    Impression: Patient deemed adequate candidate for MAC sedation    Risks, benefits and alternatives were discussed with the patient and informed consent was obtained.    Plan: esophagogastroduodenoscopy    Thank you for the opportunity to participate in the care of this patient. Please feel free to call with any questions or concerns.     Phone number [phone].     Taj Francis MD 2/7/2025 1:06 PM

## 2025-02-07 NOTE — PROGRESS NOTES
[facility]    PROGRESS NOTE - Hospitalist Service    Assessment and Plan  89 years old female with a past medical history of asthma, NSTEMI s/p PCI on Plavix, GERD, and HTN presenting to the emergency department for evaluation of generalized weakness for the past few days.  Found to have GI bleeding and admitted with acute blood loss anemia.     Acute blood loss anemia  - Secondary to GI bleeding  - Patient presented with hemoglobin of 7.8  - Hemoglobin dropped to 5.5 after few hours with hypotension  - Rapid response was called and patient was transferred to the ICU  - S/P blood transfusion  - Hemoglobin was up to 9.0 and again dropped to 6.5  - CTA abdomen is negative for active bleeding  - Continue to monitor hemoglobin every 6 hour  - Continue to hold home aspirin and Plavix     Acute GI bleed  -GI consult, appreciate input  - S/P EGD which showed clotted blood in the gastric fundus and gastric body  - No active bleeding  -Continue IV PPI twice daily  -Plan to do another EGD today as per GI.  -Continue to monitor hemoglobin as above     Hypotension   - Secondary to acute blood loss anemia  - Patient transferred to ICU after rapid response on 2/6/2025  - Improving blood pressure with fluid and blood resuscitation  - Echo shows EF of 65 to 70% with grade 1 diastolic dysfunction  - Continue to hold hypertension medications  - Continue monitoring patient in the ICU overnight    Acute on chronic renal failure  -Baseline chronic kidney disease stage III  -Probably secondary to hypotension with hypovolemia secondary GI bleed in addition to contrast  -Nephrology consult, appreciate input  -Change IV fluid to D5W with bicarb as per nephrology  -Continue to monitor renal function     History of asthma  - No signs or symptoms of acute exacerbation  - Received IV steroid in the ER  - Continue nebs  - Hold on further steroid for now     Leukocytosis  - Probably stress reaction  - No sign or symptom  of infection  - Unremarkable UA and CT chest for infection  - Continue to monitor CBC     History of coronary disease  -Patient denies chest pain  -Hold home aspirin and Plavix due to GI bleeding  -Hold hypertension medication because of hypotension     Chronic back pain  -Resume Tylenol # 3    Weakness and deconditioning  -PT/OT evaluation when medically stable.    50 MINUTES SPENT BY ME on the date of service doing chart review, history, exam, documentation & further activities per the note    Principal Problem:    Gastrointestinal hemorrhage with melena  Active Problems:    Generalized weakness    Loose stools      VTE prophylaxis:  Pneumatic Compression Devices  DIET: Orders Placed This Encounter      NPO per Anesthesia Guidelines for Procedure/Surgery Except for: Meds      Disposition/Barriers to discharge: Monitor renal function, repeat EGD and monitor hemoglobin.  Medically Ready for Discharge: Anticipated in 2-4 Days   Code Status: No CPR- Do NOT Intubate    Subjective:   is feeling about the same today, no active bleeding overnight, denies any chest pain, improving back pain after getting Tylenol#3    PHYSICAL EXAM  Vitals:    02/06/25 0716 02/07/25 0500   Weight: 86.6 kg (191 lb) 90.7 kg (200 lb)     B/P:137/59 T:98.2 P:82 R:17     Intake/Output Summary (Last 24 hours) at 2/7/2025 1328  Last data filed at 2/7/2025 1000  Gross per 24 hour   Intake 2302.5 ml   Output 905 ml   Net 1397.5 ml      Body mass index is 32.28 kg/m .    Constitutional: awake, alert, cooperative, no apparent distress, and appears stated age  Respiratory: No increased work of breathing, good air exchange, clear to auscultation bilaterally, no crackles or wheezing  Cardiovascular: Normal apical impulse, regular rate and rhythm, normal S1 and S2, no S3 or S4, and no murmur noted  GI: No scars, normal bowel sounds, soft, non-distended, non-tender, no masses palpated, no hepatosplenomegally  Skin: no bruising or bleeding and normal  skin color, texture, turgor  Musculoskeletal: There is no redness, warmth, or swelling of the joints.  Full range of motion noted.  no lower extremity pitting edema present  Neurologic: Awake, alert, oriented to name, place and time.  Cranial nerves II-XII are grossly intact.  Motor is 5 out of 5 bilaterally.   Sensory is intact.    Neuropsychiatric: Appropriate with examiner      PERTINENT LABS/IMAGING:    I have personally reviewed the following data over the past 24 hrs:    17.6 (H)  \   7.7 (L)   / 80 (L)     137 111 (H) 120.9 (H) /  135 (H)   5.4 (H) 13 (L) 2.59 (H) \       Imaging results reviewed over the past 24 hrs:   Recent Results (from the past 24 hours)   Echocardiogram Complete   Result Value    LVEF  65-70%    Narrative    [id number]  [id number]  [id number]  [id number]^PHYLLIS^CECELIA^[place] [address]     Name: GOSIA PEREZ  MRN: [id number]  : 1935  Study Date: 2025 01:40 PM  Age: 89 yrs  Gender: Female  Patient Location: [facility]  Reason For Study: Dyspnea  Ordering Physician: CECELIA FERGUSON  Performed By: ACE     BSA: 2.0 m2  Height: 66 in  Weight: 191 lb  HR: 95  BP: 98/51 mmHg  ______________________________________________________________________________  Procedure  Echocardiogram with two-dimensional, color and spectral Doppler. Definity (NDC  #[id number]) given intravenously. Adequate quality two-dimensional was  performed and interpreted.  ______________________________________________________________________________  Interpretation Summary     The left ventricle is normal in size. There is normal left ventricular wall  thickness.  Hyperdynamic left ventricular function The visual ejection fraction is 65-70%.  No regional wall motion abnormalities noted.  Grade I or early diastolic dysfunction.     The right ventricle is normal in size and function.  Normal left atrial size. Right atrial size is normal.  IVC diameter <2.1 cm collapsing >50% with  sniff suggests a normal RA pressure  of 3 mmHg.  When compared to previous study from 5/8/2024 the LV systolic function is more  hyperdynamic.  ______________________________________________________________________________  Left Ventricle  The left ventricle is normal in size. There is normal left ventricular wall  thickness. Hyperdynamic left ventricular function. The visual ejection  fraction is 65-70%. Grade I or early diastolic dysfunction. No regional wall  motion abnormalities noted.     Right Ventricle  The right ventricle is normal in size and function.     Atria  Normal left atrial size. Right atrial size is normal.     Mitral Valve  Mitral valve leaflets appear normal. There is trace mitral regurgitation.  There is no mitral valve stenosis.     Tricuspid Valve  Tricuspid valve leaflets appear normal. There is trace tricuspid  regurgitation. Right ventricular systolic pressure could not be approximated  due to inadequate tricuspid regurgitation.     Aortic Valve  The aortic valve is trileaflet. No aortic regurgitation is present. No aortic  stenosis is present.     Pulmonic Valve  The pulmonic valve is not well visualized. There is trace pulmonic valvular  regurgitation.     Vessels  The aorta root is normal. IVC diameter <2.1 cm collapsing >50% with sniff  suggests a normal RA pressure of 3 mmHg.     Pericardium  There is no pericardial effusion.     Rhythm  Sinus rhythm was noted.  ______________________________________________________________________________  MMode/2D Measurements & Calculations     IVSd: 0.90 cm  LVIDd: 4.0 cm  LVIDs: 2.9 cm  LVPWd: 1.0 cm  FS: 27.4 %  LV mass(C)d: 119.7 grams  LV mass(C)dI: 61.0 grams/m2  Ao root diam: 2.7 cm  LVOT diam: 2.0 cm  LVOT area: 3.1 cm2  Ao root diam index Ht(cm/m): 1.6  Ao root diam index BSA (cm/m2): 1.4  EF Biplane: 73.6 %  LA Volume (BP): 28.3 ml     LA Volume Index (BP): 14.4 ml/m2  LA Volume Indexed (AL/bp): 15.7 ml/m2  RV Base: 2.3 cm  RWT:  0.52  TAPSE: 1.8 cm     Time Measurements  MM HR: 76.0 BPM     Doppler Measurements & Calculations  MV E max gennaro: 81.0 cm/sec  MV A max gennaro: 137.0 cm/sec  MV E/A: 0.59  MV dec slope: 431.0 cm/sec2  MV dec time: 0.19 sec  Ao V2 max: 192.0 cm/sec  Ao max PG: 15.0 mmHg  Ao V2 mean: 140.0 cm/sec  Ao mean P.0 mmHg  Ao V2 VTI: 33.3 cm  KEZIA(I,D): 2.0 cm2  KEZIA(V,D): 2.1 cm2  LV V1 max P.7 mmHg  LV V1 max: 129.0 cm/sec  LV V1 VTI: 21.1 cm  SV(LVOT): 66.3 ml  SI(LVOT): 33.8 ml/m2  AV Gennaro Ratio (DI): 0.67  KEZIA Index (cm2/m2): 1.0  E/E': 11.3  E/E' av.5  Lateral E/e': 11.3  Medial E/e': 11.6  Peak E' Gennaro: 7.2 cm/sec  RV S Gennaro: 15.9 cm/sec     ______________________________________________________________________________  Report approved by: Ata Mann MD on 2025 03:12 PM             Discussed with patient, family, nephrology, GI, nursing staff and discharge planner    Kirby Ayala MD  [facility] Medicine Service  [phone]

## 2025-02-07 NOTE — CONSULTS
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  Since making more urine and will monitor conservatively.  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  Unclear etiology but multiple possibility from hemodynamic, recurrent CARLYLE.  Will need further workup as an outpatient.  Avoid nephrotoxic medications  Renally dosing medication  Renal diet  Strict Is and Os    Hyperkalemia: Potassium has been 5.5-5.4 for last few checks.  Suspect from GI bleed with CARLYLE causing failure to excrete potassium.  Will start Lokelma 10 g 3 times daily for 2 days and monitor potassium level.  Shift with insulin and glucose for potassium above 5.6.  Will give furosemide 40 mg twice daily and hold for blood pressure less than 110.  Initiate bicarb infusion for intracellular shifting.    Acid-base: Bicarb 13 and chloride 111.  No anion gap.  Hyperchloremic Bolick acidosis is likely secondary to NS.  Follow-up urine electrolytes for urine anion gap.  Start bicarb at 75 ml/hr.    BP/volume: Blood pressure acceptable.  Volume is slightly hypervolemic.  Furosemide as above and monitor the response.    BMD: Calcium 7.4.  Albumin 3.42/5 pancreatic calcium 7.9.  Will check phosphorus.  Vitamin D and PTH level checked as an outpatient.    Anemia: Hemoglobin 8.2.  Likely from GI bleed.  Received 4 units of PRBC and received approximately 1 g of iron from that.  Will need to give REED as an outpatient if hemoglobin still less than 10.    HPI: 89 years old female with past medical history of hypertension, asthma, CKD 3/4 managing by primary as an outpatient never seen nephrologist in the past admitted for GI bleed.  Nephrology consulted for management of CARLYLE with underlying CKD.  Patient stated her brother has kidney disease but never been on dialysis and no  other family member has kidney issue.  She smoked briefly when she was young.  Denies any history of NSAID exposure.  No history of kidney stone, difficulty urination, or hematuria.  CT on admission did not show any significant anatomical condition in both kidneys.  He feels okay upon encounter.  No chest pain, shortness of breath.  She has Russo catheter and making more urine today compared to yesterday.  She has CT contrast exposure on 2/5.    REVIEW OF SYSTEMS: a 12 point review of systems was reviewed and negative other than noted in the HPI above.      Past Medical History:   Diagnosis Date    Allergic rhinitis     Chronic sinusitis     Conductive hearing loss     COPD (chronic obstructive pulmonary disease) (H)     CRI (chronic renal insufficiency)     mild    Gastroesophageal reflux disease     Hearing problem     Hypertension     Mediastinal mass     Nasal polyps     Pulmonary nodule     Seasonal allergies     Uncomplicated asthma        Current Facility-Administered Medications   Medication Dose Route Frequency Provider Last Rate Last Admin    acetaminophen (TYLENOL) tablet 650 mg  650 mg Oral Q4H PRN Gilbert Teran DO        acetaminophen-codeine (TYLENOL #3) 300-30 MG per tablet 1 tablet  1 tablet Oral Q4H PRN Kirby Ayala MD   1 tablet at 02/07/25 0851    albuterol (PROVENTIL HFA/VENTOLIN HFA) inhaler  2 puff Inhalation Q4H PRN Gilbert Teran DO   2 puff at 02/06/25 0857    albuterol (PROVENTIL) neb solution 2.5 mg  2.5 mg Nebulization Q4H PRN Gilbert Teran DO   2.5 mg at 02/06/25 0502    [Held by provider] amLODIPine (NORVASC) tablet 5 mg  5 mg Oral At Bedtime Kirby Ayala MD        [Held by provider] atorvastatin (LIPITOR) tablet 40 mg  40 mg Oral Daily Gilbert Teran DO        calcium carbonate (TUMS) chewable tablet 1,000 mg  1,000 mg Oral 4x Daily PRN Gilbert Teran DO        [Held by provider] carvedilol (COREG) tablet 6.25 mg  6.25 mg Oral BID w/meals Gilbert Teran DO   6.25 mg at  02/05/25 1826    glucose gel 15-30 g  15-30 g Oral Q15 Min PRN Lorelei Lloyd MD        Or    dextrose 50 % injection 25-50 mL  25-50 mL Intravenous Q15 Min PRN Lorelei Lloyd MD        Or    glucagon injection 1 mg  1 mg Subcutaneous Q15 Min PRN Lorelei Lloyd MD        fluticasone-vilanterol (BREO ELLIPTA) 100-25 MCG/ACT inhaler 1 puff  1 puff Inhalation Daily Gilbert Teran DO        ipratropium - albuterol 0.5 mg/2.5 mg/3 mL (DUONEB) neb solution 3 mL  3 mL Nebulization Q4H PRN Kirby Ayala MD   3 mL at 02/07/25 1007    [Held by provider] levothyroxine (SYNTHROID/LEVOTHROID) tablet 50 mcg  50 mcg Oral QAM AC Gilbert Teran DO        lidocaine (LMX4) cream   Topical Q1H PRN Gilbert Teran DO        lidocaine 1 % 0.1-1 mL  0.1-1 mL Other Q1H PRN Gilbert Teran DO        [Held by provider] montelukast (SINGULAIR) tablet 10 mg  10 mg Oral At Bedtime Gilbert Teran DO   10 mg at 02/05/25 2204    naloxone (NARCAN) injection 0.2 mg  0.2 mg Intravenous Q2 Min PRN Kirby Ayala MD        Or    naloxone (NARCAN) injection 0.4 mg  0.4 mg Intravenous Q2 Min PRN Kirby Ayala MD        Or    naloxone (NARCAN) injection 0.2 mg  0.2 mg Intramuscular Q2 Min PRN Kirby Ayala MD        Or    naloxone (NARCAN) injection 0.4 mg  0.4 mg Intramuscular Q2 Min PRN Kirby Ayala MD        ondansetron (ZOFRAN) injection 4 mg  4 mg Intravenous Q8H PRN Sonny Bell MD   4 mg at 02/06/25 0456    pantoprazole (PROTONIX) IV push injection 40 mg  40 mg Intravenous Q12H Gilbert Teran DO   40 mg at 02/07/25 1053    polyethylene glycol (MIRALAX) powder 17 g  17 g Oral Daily PRN Gilbert Teran DO        senna-docusate (SENOKOT-S/PERICOLACE) 8.6-50 MG per tablet 1 tablet  1 tablet Oral BID PRN Gilbert Teran DO        Or    senna-docusate (SENOKOT-S/PERICOLACE) 8.6-50 MG per tablet 2 tablet  2 tablet Oral BID PRN Gilbert Teran DO        sodium chloride (PF) 0.9% PF flush 3 mL  3 mL Intracatheter  Q8H Gilbert Teran DO   3 mL at 02/07/25 0527    sodium chloride (PF) 0.9% PF flush 3 mL  3 mL Intracatheter q1 min prn Gilbert Teran DO        sodium chloride 0.9 % infusion   Intravenous Continuous JamieKirby MD 75 mL/hr at 02/07/25 0430 Rate Change at 02/07/25 0430       No current outpatient medications on file.      ALLERGIES/SENSITIVITIES:  Allergies   Allergen Reactions    Amoxicillin Hives    Keflex [Cephalexin] Hives    Lisinopril Shortness Of Breath    Penicillins Hives     Social History     Tobacco Use    Smoking status: Never    Smokeless tobacco: Never    Tobacco comments:     smoked twice a week for a couple years while in college, less than 1/2 pack    Substance Use Topics    Alcohol use: Yes     Alcohol/week: 0.0 standard drinks of alcohol     Comment: Alcoholic Drinks/day: occasional    Drug use: No     I have reviewed this patient's family history and updated it with pertinent information if needed.  Family History   Problem Relation Age of Onset    Esophageal Cancer Mother     Colon Cancer Mother     Mental Illness Mother     Dementia Mother     Unknown/Adopted Mother     Asthma Mother     Diabetes Mother     Cerebrovascular Disease Mother     Thyroid Disease Mother     Congenital Anomalies Mother     Bleeding Disorder Mother     Migraines Mother     Muscular Disorder Mother     Parkinsonism Father     Mental Illness Father     Unknown/Adopted Father     Asthma Father     Cancer Father     Hypertension Father     Cerebrovascular Disease Father     Thyroid Disease Father     Congenital Anomalies Father     Bleeding Disorder Father     Migraines Father     Muscular Disorder Father     Mental Illness Sister     Dementia Sister     Unknown/Adopted Sister     Asthma Sister     Cerebrovascular Disease Sister     Thyroid Disease Sister     Congenital Anomalies Sister     Bleeding Disorder Sister     Migraines Sister     Muscular Disorder Sister     Cancer Mother         esophageal          PHYSICAL EXAM:  Physical Exam   Temp: 98.2  F (36.8  C) Temp src: Oral BP: 128/60 Pulse: 79   Resp: 14 SpO2: 100 % O2 Device: Nasal cannula Oxygen Delivery: 2 LPM  Vitals:    02/06/25 0716 02/07/25 0500   Weight: 86.6 kg (191 lb) 90.7 kg (200 lb)     Vital Signs with Ranges  Temp:  [97.3  F (36.3  C)-98.2  F (36.8  C)] 98.2  F (36.8  C)  Pulse:  [75-98] 79  Resp:  [14-42] 14  BP: ()/(46-68) 128/60  SpO2:  [94 %-100 %] 100 %  I/O last 3 completed shifts:  In: 3782.5 [P.O.:960; I.V.:2124.5]  Out: 955 [Urine:855; Stool:100]    @TMAXR(24)@    Patient Vitals for the past 72 hrs:   Weight   02/07/25 0500 90.7 kg (200 lb)   02/06/25 0716 86.6 kg (191 lb)       General - A & O x 3, NAD  HEENT - PERRLA, no scleral icterus  Neck - no carotid bruits, no JVD  Respiratory - Lungs CTA bilat without wheeze, rhonchi, rales  Cardiovascular - AP RRR without murmur  Abdomen - soft, BS present, no bruits, no fluid wave  Extremities - well perfused, trace edema in LE and 2 (+) on UE.   Integumentary - intact, good turgor, no rash/lesions  Neurologic - grossly intact  Psych:  Judgement intact, affect WNL  : Russo's catheter (+)    Laboratory:     Recent Labs   Lab 02/07/25  0651 02/07/25  0058 02/06/25  1843 02/06/25  1138 02/06/25  0504 02/06/25  0232 02/05/25  2054 02/05/25  1012   WBC 17.6*  --   --   --  22.0*  --  14.2* 16.3*   RBC 2.80*  --   --   --  2.82*  --  1.82* 2.59*   HGB 8.2* 6.8* 7.6* 6.5* 8.4* 9.0* 5.5* 7.8*   HCT 24.3*  --   --   --  25.9*  --  17.8* 25.0*   PLT 80*  --   --   --  152  --  155 209       Basic Metabolic Panel:  Recent Labs   Lab 02/07/25  0602 02/06/25  1203 02/06/25  1138 02/06/25  0705 02/06/25  0504 02/05/25 2054 02/05/25 2026 02/05/25  1012     --   --   --  140 142  --  141   POTASSIUM 5.4*  --  5.4* 5.5* 5.5* 5.2  --  5.0   CHLORIDE 111*  --   --   --  114* 115*  --  109*   CO2 13*  --   --   --  13* 13*  --  19*   .9*  --   --   --  103.0* 91.9*  --  90.2*   CR 2.59*   --   --   --  1.90* 1.63*  --  1.54*   * 176*  --   --  212* 207* 196* 158*   GREG 7.4*  --   --   --  7.8* 7.7*  --  8.8       INR  Recent Labs   Lab 02/06/25  0731 02/05/25  1012   INR 1.45* 1.20*       Recent Labs   Lab Test 02/07/25  0602 02/06/25  1138 02/06/25  0705 02/06/25  0504   POTASSIUM 5.4* 5.4*   < > 5.5*   CHLORIDE 111*  --   --  114*   .9*  --   --  103.0*    < > = values in this interval not displayed.      Recent Labs   Lab Test 02/06/25  0132 02/05/25  1012 12/10/24  1424 02/18/18  0641 02/18/18  0033   ALBUMIN  --  3.4* 4.0   < >  --    BILITOTAL  --  0.4 0.4   < >  --    ALT  --  16 17   < >  --    AST  --  12 17   < >  --    PROTEIN Negative  --   --   --  Negative    < > = values in this interval not displayed.       Personally reviewed today's laboratory studies      Thank you for involving us in the care of this patient. We will continue to follow along with you.      Anna Mack MD  [facility]  [phone]

## 2025-02-07 NOTE — PLAN OF CARE
Goal Outcome Evaluation:         [facility] - ICU    RN Progress Note:            Pertinent Assessments:      Please refer to flowsheet rows for full assessment     BP, temp WNL  Lung sounds clear/diminished after neb treatment. Earlier documentaton indicated wheezing.          Key Events - This Shift:     Hgb taken at 1800 was 7.6, up from 6.5. Pt remains on Q6 hr hgb check.             Barriers to Discharge / Downgrade:     Notes indicate that an EGD may be repeated tomorrow.

## 2025-02-08 LAB
ALBUMIN SERPL BCG-MCNC: 3 G/DL (ref 3.5–5.2)
ANION GAP SERPL CALCULATED.3IONS-SCNC: 9 MMOL/L (ref 7–15)
BUN SERPL-MCNC: 91.9 MG/DL (ref 8–23)
CALCIUM SERPL-MCNC: 7.8 MG/DL (ref 8.8–10.4)
CHLORIDE SERPL-SCNC: 108 MMOL/L (ref 98–107)
CREAT SERPL-MCNC: 2.06 MG/DL (ref 0.51–0.95)
EGFRCR SERPLBLD CKD-EPI 2021: 23 ML/MIN/1.73M2
ERYTHROCYTE [DISTWIDTH] IN BLOOD BY AUTOMATED COUNT: 18.3 % (ref 10–15)
GLUCOSE SERPL-MCNC: 117 MG/DL (ref 70–99)
HCO3 SERPL-SCNC: 26 MMOL/L (ref 22–29)
HCT VFR BLD AUTO: 22.1 % (ref 35–47)
HGB BLD-MCNC: 7.7 G/DL (ref 11.7–15.7)
MAGNESIUM SERPL-MCNC: 1.6 MG/DL (ref 1.7–2.3)
MCH RBC QN AUTO: 29.2 PG (ref 26.5–33)
MCHC RBC AUTO-ENTMCNC: 34.8 G/DL (ref 31.5–36.5)
MCV RBC AUTO: 84 FL (ref 78–100)
PHOSPHATE SERPL-MCNC: 4.4 MG/DL (ref 2.5–4.5)
PLATELET # BLD AUTO: 93 10E3/UL (ref 150–450)
POTASSIUM SERPL-SCNC: 3.7 MMOL/L (ref 3.4–5.3)
RBC # BLD AUTO: 2.64 10E6/UL (ref 3.8–5.2)
SODIUM SERPL-SCNC: 143 MMOL/L (ref 135–145)
WBC # BLD AUTO: 12.6 10E3/UL (ref 4–11)

## 2025-02-08 PROCEDURE — 250N000011 HC RX IP 250 OP 636: Performed by: INTERNAL MEDICINE

## 2025-02-08 PROCEDURE — 94640 AIRWAY INHALATION TREATMENT: CPT

## 2025-02-08 PROCEDURE — 250N000011 HC RX IP 250 OP 636: Performed by: HOSPITALIST

## 2025-02-08 PROCEDURE — 250N000009 HC RX 250: Performed by: INTERNAL MEDICINE

## 2025-02-08 PROCEDURE — 83735 ASSAY OF MAGNESIUM: CPT | Performed by: INTERNAL MEDICINE

## 2025-02-08 PROCEDURE — 250N000013 HC RX MED GY IP 250 OP 250 PS 637: Performed by: INTERNAL MEDICINE

## 2025-02-08 PROCEDURE — 258N000003 HC RX IP 258 OP 636: Performed by: INTERNAL MEDICINE

## 2025-02-08 PROCEDURE — 94640 AIRWAY INHALATION TREATMENT: CPT | Mod: 76

## 2025-02-08 PROCEDURE — 99233 SBSQ HOSP IP/OBS HIGH 50: CPT | Performed by: INTERNAL MEDICINE

## 2025-02-08 PROCEDURE — 85027 COMPLETE CBC AUTOMATED: CPT | Performed by: INTERNAL MEDICINE

## 2025-02-08 PROCEDURE — 999N000157 HC STATISTIC RCP TIME EA 10 MIN

## 2025-02-08 PROCEDURE — 999N000248 HC STATISTIC IV INSERT WITH US BY RN

## 2025-02-08 PROCEDURE — 82565 ASSAY OF CREATININE: CPT | Performed by: INTERNAL MEDICINE

## 2025-02-08 PROCEDURE — 99232 SBSQ HOSP IP/OBS MODERATE 35: CPT | Performed by: INTERNAL MEDICINE

## 2025-02-08 PROCEDURE — 36415 COLL VENOUS BLD VENIPUNCTURE: CPT | Performed by: INTERNAL MEDICINE

## 2025-02-08 PROCEDURE — 250N000013 HC RX MED GY IP 250 OP 250 PS 637: Performed by: HOSPITALIST

## 2025-02-08 PROCEDURE — 250N000009 HC RX 250: Performed by: HOSPITALIST

## 2025-02-08 PROCEDURE — 120N000004 HC R&B MS OVERFLOW

## 2025-02-08 RX ORDER — MAGNESIUM SULFATE HEPTAHYDRATE 40 MG/ML
2 INJECTION, SOLUTION INTRAVENOUS ONCE
Status: COMPLETED | OUTPATIENT
Start: 2025-02-08 | End: 2025-02-08

## 2025-02-08 RX ORDER — HYDRALAZINE HYDROCHLORIDE 20 MG/ML
10 INJECTION INTRAMUSCULAR; INTRAVENOUS EVERY 4 HOURS PRN
Status: DISCONTINUED | OUTPATIENT
Start: 2025-02-08 | End: 2025-02-13 | Stop reason: HOSPADM

## 2025-02-08 RX ADMIN — IPRATROPIUM BROMIDE AND ALBUTEROL SULFATE 3 ML: .5; 3 SOLUTION RESPIRATORY (INHALATION) at 11:47

## 2025-02-08 RX ADMIN — PANTOPRAZOLE SODIUM 40 MG: 40 INJECTION, POWDER, FOR SOLUTION INTRAVENOUS at 11:00

## 2025-02-08 RX ADMIN — ALBUTEROL SULFATE 2 PUFF: 90 AEROSOL, METERED RESPIRATORY (INHALATION) at 08:04

## 2025-02-08 RX ADMIN — CARVEDILOL 6.25 MG: 6.25 TABLET, FILM COATED ORAL at 11:00

## 2025-02-08 RX ADMIN — FLUTICASONE FUROATE AND VILANTEROL TRIFENATATE 1 PUFF: 100; 25 POWDER RESPIRATORY (INHALATION) at 11:58

## 2025-02-08 RX ADMIN — ACETAMINOPHEN 650 MG: 325 TABLET ORAL at 22:00

## 2025-02-08 RX ADMIN — MAGNESIUM SULFATE HEPTAHYDRATE 2 G: 40 INJECTION, SOLUTION INTRAVENOUS at 06:13

## 2025-02-08 RX ADMIN — FUROSEMIDE 40 MG: 10 INJECTION, SOLUTION INTRAMUSCULAR; INTRAVENOUS at 11:00

## 2025-02-08 RX ADMIN — IRON SUCROSE 200 MG: 20 INJECTION, SOLUTION INTRAVENOUS at 14:36

## 2025-02-08 RX ADMIN — MONTELUKAST 10 MG: 10 TABLET, FILM COATED ORAL at 22:00

## 2025-02-08 RX ADMIN — SODIUM BICARBONATE: 84 INJECTION, SOLUTION INTRAVENOUS at 01:49

## 2025-02-08 RX ADMIN — ACETAMINOPHEN AND CODEINE PHOSPHATE 1 TABLET: 300; 30 TABLET ORAL at 16:08

## 2025-02-08 RX ADMIN — PANTOPRAZOLE SODIUM 40 MG: 40 INJECTION, POWDER, FOR SOLUTION INTRAVENOUS at 22:00

## 2025-02-08 ASSESSMENT — ACTIVITIES OF DAILY LIVING (ADL)
ADLS_ACUITY_SCORE: 47
DEPENDENT_IADLS:: INDEPENDENT
ADLS_ACUITY_SCORE: 47
ADLS_ACUITY_SCORE: 44
ADLS_ACUITY_SCORE: 47

## 2025-02-08 NOTE — PROGRESS NOTES
[facility] Progress Note    Subjective:  Status post repeat upper endoscopy yesterday given continued drop in hemoglobin the night prior to that  Ulcerated lesion with remnant of a blood vessel was treated again with bipolar gold probe.  Remainder of stomach was also able to be examined as large amounts of clot material had cleared.  No other lesions responsible for bleeding noted.  Hemoglobin has remained stable with no requirement for transfusion overnight  Melena has resolved-no BM since yesterday per patient    Objective:  Vital signs in last 24 hours  Temp:  [97.6  F (36.4  C)-98.4  F (36.9  C)] 97.6  F (36.4  C)  Pulse:  [] 85  Resp:  [13-28] 23  BP: (118-187)/(56-85) 124/85  SpO2:  [91 %-97 %] 92 % O2 Device: Nasal cannula      Gen: Alert  Gastrointestinal: Soft, NTTP, BS+, no rebound or guarding      LABORATORY    ELECTROLYTE PANEL   Recent Labs   Lab 25  0510 25  1805 25  0602    140 137   POTASSIUM 3.7 4.4 5.4*   CHLORIDE 108* 112* 111*   CO2 26 17* 13*   * 126* 135*   CR 2.06* 2.34* 2.59*   BUN 91.9* 102.2* 120.9*      HEMATOLOGY PANEL   Recent Labs   Lab 25  0510 25  1247 25  0651 25  1138 25  0731 25  0504 25  2054 25  1012   HGB 7.7* 7.7* 8.2*   < >  --  8.4*   < > 7.8*   MCV 84  --  87  --   --  92   < > 97   WBC 12.6*  --  17.6*  --   --  22.0*   < > 16.3*   PLT 93*  --  80*  --   --  152   < > 209   INR  --   --   --   --  1.45*  --   --  1.20*    < > = values in this interval not displayed.      LIVER AND PANCREAS PANEL   Recent Labs   Lab 25  1012   AST 12   ALT 16   ALKPHOS 65   BILITOTAL 0.4   LIPASE 41     IMAGING STUDIES    Echocardiogram Complete    Result Date: 2025  [id number]^PHYLLIS^[place] [address]  Name: GOSIA PEREZ MRN: [id number] : 1935 Study Date: 2025 01:40 PM Age: 89 yrs Gender: Female  Patient Location: [facility] Reason For Study: Dyspnea Ordering Physician: CECELIA FERGUSON Performed By: ACE  BSA: 2.0 m2 Height: 66 in Weight: 191 lb HR: 95 BP: 98/51 mmHg ______________________________________________________________________________ Procedure Echocardiogram with two-dimensional, color and spectral Doppler. Definity (NDC #29482-657) given intravenously. Adequate quality two-dimensional was performed and interpreted. ______________________________________________________________________________ Interpretation Summary  The left ventricle is normal in size. There is normal left ventricular wall thickness. Hyperdynamic left ventricular function The visual ejection fraction is 65-70%. No regional wall motion abnormalities noted. Grade I or early diastolic dysfunction.  The right ventricle is normal in size and function. Normal left atrial size. Right atrial size is normal. IVC diameter <2.1 cm collapsing >50% with sniff suggests a normal RA pressure of 3 mmHg. When compared to previous study from 5/8/2024 the LV systolic function is more hyperdynamic. ______________________________________________________________________________ Left Ventricle The left ventricle is normal in size. There is normal left ventricular wall thickness. Hyperdynamic left ventricular function. The visual ejection fraction is 65-70%. Grade I or early diastolic dysfunction. No regional wall motion abnormalities noted.  Right Ventricle The right ventricle is normal in size and function.  Atria Normal left atrial size. Right atrial size is normal.  Mitral Valve Mitral valve leaflets appear normal. There is trace mitral regurgitation. There is no mitral valve stenosis.  Tricuspid Valve Tricuspid valve leaflets appear normal. There is trace tricuspid regurgitation. Right ventricular systolic pressure could not be approximated due to inadequate tricuspid regurgitation.  Aortic Valve The aortic valve is trileaflet. No aortic regurgitation is  present. No aortic stenosis is present.  Pulmonic Valve The pulmonic valve is not well visualized. There is trace pulmonic valvular regurgitation.  Vessels The aorta root is normal. IVC diameter <2.1 cm collapsing >50% with sniff suggests a normal RA pressure of 3 mmHg.  Pericardium There is no pericardial effusion.  Rhythm Sinus rhythm was noted. ______________________________________________________________________________ MMode/2D Measurements & Calculations  IVSd: 0.90 cm LVIDd: 4.0 cm LVIDs: 2.9 cm LVPWd: 1.0 cm FS: 27.4 % LV mass(C)d: 119.7 grams LV mass(C)dI: 61.0 grams/m2 Ao root diam: 2.7 cm LVOT diam: 2.0 cm LVOT area: 3.1 cm2 Ao root diam index Ht(cm/m): 1.6 Ao root diam index BSA (cm/m2): 1.4 EF Biplane: 73.6 % LA Volume (BP): 28.3 ml  LA Volume Index (BP): 14.4 ml/m2 LA Volume Indexed (AL/bp): 15.7 ml/m2 RV Base: 2.3 cm RWT: 0.52 TAPSE: 1.8 cm  Time Measurements MM HR: 76.0 BPM  Doppler Measurements & Calculations MV E max gennaro: 81.0 cm/sec MV A max gennaro: 137.0 cm/sec MV E/A: 0.59 MV dec slope: 431.0 cm/sec2 MV dec time: 0.19 sec Ao V2 max: 192.0 cm/sec Ao max PG: 15.0 mmHg Ao V2 mean: 140.0 cm/sec Ao mean P.0 mmHg Ao V2 VTI: 33.3 cm KEZIA(I,D): 2.0 cm2 KEZIA(V,D): 2.1 cm2 LV V1 max P.7 mmHg LV V1 max: 129.0 cm/sec LV V1 VTI: 21.1 cm SV(LVOT): 66.3 ml SI(LVOT): 33.8 ml/m2 AV Gennaro Ratio (DI): 0.67 KEZIA Index (cm2/m2): 1.0 E/E': 11.3 E/E' av.5 Lateral E/e': 11.3 Medial E/e': 11.6 Peak E' Gennaro: 7.2 cm/sec RV S Gennaro: 15.9 cm/sec  ______________________________________________________________________________ Report approved by: Ata Mann MD on 2025 03:12 PM       CTA Abdomen Pelvis with Contrast    Result Date: 2025  EXAM: CTA ABDOMEN PELVIS WITH CONTRAST LOCATION: [facility] DATE: 2025 INDICATION: Loose stools past few days, dark black loose stool today. Weakness and SOB. Evaluate for GI bleed and diverticulitis COMPARISON: CT chest 2024 TECHNIQUE: CT  angiogram abdomen pelvis during arterial phase of injection of IV contrast. 2D and 3D MIP reconstructions were performed by the CT technologist. Dose reduction techniques were used. CONTRAST: 75ml isovue 370 FINDINGS: ANGIOGRAM ABDOMEN/PELVIS: No active GI bleed. The celiac, SMA and PANFILO are patent with mild origin stenoses. Single bilateral renal arteries with severe left origin stenosis and mild right origin stenosis. Mild aortoiliac atherosclerosis with no aneurysm nor stenosis. LOWER CHEST: Bibasilar subsegmental atelectasis versus scar. Small sliding-type hiatal hernia. HEPATOBILIARY: Normal. PANCREAS: Normal. SPLEEN: Normal. ADRENAL GLANDS: Normal. KIDNEYS/BLADDER: Simple bilateral cortical and parapelvic cysts do not require imaging follow-up. No urinary tract calculus or hydronephrosis. The bladder is negative. BOWEL: Extensive sigmoid diverticulosis. No obstruction nor inflammatory change. No formed stool in the colon. Normal appendix. No peritoneal fluid. LYMPH NODES: No lymphadenopathy. PELVIC ORGANS: Normal uterus and ovaries. MUSCULOSKELETAL: Degenerative changes are present within the spine and hips. No suspicious osseous lesions.     IMPRESSION: 1.  No evidence of active GI bleed nor acute inflammatory bowel process. 2.  Extensive colonic diverticulosis. 3.  No formed stool within the colon.    CT Chest Pulmonary Embolism w Contrast    Result Date: 2/5/2025  EXAM: CT CHEST PULMONARY EMBOLISM W CONTRAST LOCATION: [facility] DATE: 2/5/2025 INDICATION: Generalized weakness. Intermittent hypoxia. Shortness of breath. History of asthma. COMPARISON: 5/2/2024. Others. TECHNIQUE: CT chest pulmonary angiogram during arterial phase injection of IV contrast. Multiplanar reformats and MIP reconstructions were performed. Dose reduction techniques were used. CONTRAST: 75ml isovue 370 FINDINGS: ANGIOGRAM CHEST: No pulmonary embolism. Nonaneurysmal aorta without dissection. LUNGS AND PLEURA:  Prior right lower lobe inflammatory nodular opacities and groundglass have resolved. Mild bandlike basilar atelectasis or scarring. Mild scattered airway debris, pronounced in the right lower lobe. No pleural effusion or pneumothorax. MEDIASTINUM/AXILLAE: No adenopathy. Nonaneurysmal aorta. No pericardial effusion. Small hiatus. CORONARY ARTERY CALCIFICATION: Mild. UPPER ABDOMEN: Moderate to severe narrowing of the left renal artery origin with focal coarse atherosclerosis at this location. MUSCULOSKELETAL: Degenerative changes spine.     IMPRESSION: 1.  No pulmonary embolism. 2.  No acute findings to explain symptoms. 3.  Mild scattered right basilar predominant airway debris; aspiration not excluded.       I have reviewed the current diagnostic and laboratory tests.                Assessment:  #Melena, secondary to gastric ulcer w/ vessel s/p EGD x 2  Resolved following repeat EGD yesterday.    Plan:  -Continue to trend hemoglobin as you are doing  -No further role for endoscopic therapy at this time.  - IV PPI twice daily while admitted, can consider IV iron x 1 as well if desired by primary team.  - Switch to oral PPI, for example pantoprazole or omeprazole 40 mg twice daily at discharge(please provide adequate supply for 3 months)  - Patient to continue to avoid NSAIDs if taking.  - Repeat upper endoscopy in 3 months in the hospital-I will request with [facility]    We will sign off at this time. Please call us back for questions or concerns    21 minutes of total time was spent providing patient care including patient evaluation, reviewing documentation/test results, and .                                                  Taj Francis MD  Thank you for the opportunity to participate in the care of this patient.   Please feel free to call me with any questions or concerns.  Phone number [phone].      Patient Active Problem List   Diagnosis    Uncomplicated asthma    Seasonal allergies    Pulmonary  nodule    Nasal polyps    Mediastinal mass    Hearing problem    Gastroesophageal reflux disease    CRI (chronic renal insufficiency)    COPD (chronic obstructive pulmonary disease) (H)    Conductive hearing loss    Chronic sinusitis    Substernal goiter    S/P thyroidectomy    ACP (advance care planning)    Chest pain    NSTEMI (non-ST elevated myocardial infarction) (H)    Generalized weakness    Loose stools    Gastrointestinal hemorrhage with melena

## 2025-02-08 NOTE — PROGRESS NOTES
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  Since making more urine and will monitor conservatively.  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  Unclear etiology but multiple possibility from hemodynamic, recurrent CARLYLE.  Will need further workup as an outpatient.  Kidney function improving.   Avoid nephrotoxic medications  Renally dosing medication  Renal diet  Strict Is and Os     Hyperkalemia: Potassium was 5.5-5.4 for last few checks.  Suspect from GI bleed with CARLYLE causing failure to excrete potassium.  Started Lokelma 10 g 3 times daily and discontinued after one dose.   Shift with insulin and glucose for potassium above 5.6.  Continue furosemide 40 mg twice daily and hold for blood pressure less than 110 for now for hypervolemia.   Initiate bicarb infusion for intracellular shifting and discontinued today due to bicarb normalized. .     Acid-base: Bicarb 13 and chloride 111.  No anion gap.  Hyperchloremic Bolick acidosis is likely secondary to NS.  Findings c/w RTA likley from CARLYLE urine electrolytes for urine anion gap.  Discontinue bicarb at 75 ml/hr due to bicarb normalized.      BP/volume: Blood pressure acceptable.  Volume is slightly hypervolemic.  Furosemide as above and monitor the response.  Would like to keep furosemide until she is off oxygen.      BMD: Calcium 7.8.  Albumin 3.0 and corrected calcium 8.6.  Phosphorus 4.4 today.  Vitamin D and PTH level checked as an outpatient.     Anemia: Hemoglobin 8.2.  Likely from GI bleed.  Received 4 units of PRBC and received approximately 1 g of iron from that.  Will need to give REED as an outpatient if hemoglobin still less than 10.    SUBJECTIVE: Patient seen and examined at bedside.  Feeling better.  Kidney function improving and hyperkalemia resolved.  Discussed with RN and no issue  reported.    OBJECTIVE:  Physical Exam   Temp: 97.6  F (36.4  C) Temp src: Oral BP: 124/85 Pulse: 85   Resp: 23 SpO2: 92 % O2 Device: Nasal cannula Oxygen Delivery: 1 LPM  Vitals:    02/06/25 0716 02/07/25 0500 02/08/25 0600   Weight: 86.6 kg (191 lb) 90.7 kg (200 lb) 89.5 kg (197 lb 6.4 oz)     Vital Signs with Ranges  Temp:  [97.6  F (36.4  C)-98.4  F (36.9  C)] 97.6  F (36.4  C)  Pulse:  [] 85  Resp:  [13-28] 23  BP: (118-187)/(56-85) 124/85  SpO2:  [91 %-100 %] 92 %  I/O last 3 completed shifts:  In: 1337.4 [P.O.:360; I.V.:977.4]  Out: 3525 [Urine:3525]    @TMAXR(24)@    Patient Vitals for the past 72 hrs:   Weight   02/08/25 0600 89.5 kg (197 lb 6.4 oz)   02/07/25 0500 90.7 kg (200 lb)   02/06/25 0716 86.6 kg (191 lb)     Intake/Output Summary (Last 24 hours) at 2/8/2025 1010  Last data filed at 2/8/2025 0800  Gross per 24 hour   Intake 1337.4 ml   Output 3825 ml   Net -2487.6 ml       PHYSICAL EXAM:  General - Alert and oriented x3, appears comfortable, NAD  Cardiovascular - Regular rate and rhythm, no rub  Respiratory - Clear to auscultation bilaterally, no crackles or wheezes  Abd: BS present, no guarding or pain with palpation, no ascites  Extremities - No lower extremity edema bilaterally  Skin: dry, intact, no rash, good turgor  Neuro:  Grossly intact, no focal deficits  MSK:  Grossly intact  Psych:  Normal affect    LABORATORY STUDIES:     Recent Labs   Lab 02/08/25  0510 02/07/25  1247 02/07/25  0651 02/07/25  0058 02/06/25  1843 02/06/25  1138 02/06/25  0504 02/06/25  0232 02/05/25 2054 02/05/25  1012   WBC 12.6*  --  17.6*  --   --   --  22.0*  --  14.2* 16.3*   RBC 2.64*  --  2.80*  --   --   --  2.82*  --  1.82* 2.59*   HGB 7.7* 7.7* 8.2* 6.8* 7.6* 6.5* 8.4*   < > 5.5* 7.8*   HCT 22.1*  --  24.3*  --   --   --  25.9*  --  17.8* 25.0*   PLT 93*  --  80*  --   --   --  152  --  155 209    < > = values in this interval not displayed.       Basic Metabolic Panel:  Recent Labs   Lab 02/08/25  2463  02/07/25  1805 02/07/25  0602 02/06/25  1203 02/06/25  1138 02/06/25  0705 02/06/25  0504 02/05/25 2054 02/05/25 2026 02/05/25  1012    140 137  --   --   --  140 142  --  141   POTASSIUM 3.7 4.4 5.4*  --  5.4* 5.5* 5.5* 5.2  --  5.0   CHLORIDE 108* 112* 111*  --   --   --  114* 115*  --  109*   CO2 26 17* 13*  --   --   --  13* 13*  --  19*   BUN 91.9* 102.2* 120.9*  --   --   --  103.0* 91.9*  --  90.2*   CR 2.06* 2.34* 2.59*  --   --   --  1.90* 1.63*  --  1.54*   * 126* 135* 176*  --   --  212* 207*   < > 158*   GREG 7.8* 7.6* 7.4*  --   --   --  7.8* 7.7*  --  8.8    < > = values in this interval not displayed.       INR  Recent Labs   Lab 02/06/25  0731 02/05/25  1012   INR 1.45* 1.20*        Recent Labs   Lab Test 02/08/25  0510 02/07/25  1247 02/07/25  0651 02/06/25  1138 02/06/25  0731 02/05/25 2054 02/05/25  1012   INR  --   --   --   --  1.45*  --  1.20*   WBC 12.6*  --  17.6*  --   --    < > 16.3*   HGB 7.7* 7.7* 8.2*   < >  --    < > 7.8*   PLT 93*  --  80*  --   --    < > 209    < > = values in this interval not displayed.       Personally reviewed current labs      Anna Mack MD  [facility]  [phone]

## 2025-02-08 NOTE — PLAN OF CARE
[facility] - ICU    RN Progress Note:            Pertinent Assessments:      Please refer to flowsheet rows for full assessment     - Alert and orientedx4. Denies of pain. Calm and cooperative.  - No active bleeding noted.        Key Events - This Shift:     - SBP trending up. Standby hydralazine ordered if SBP >160.  - Remains on nasal cannula at 1L. 02 Sats >92% to 93%.  - Good urine output. Purewick in place.    RN Managed Protocols Ordered:  None  Protocols: None  PRN'S: None  Protocols Status: N/A              Barriers to Discharge / Downgrade:     Possible downgrade from ICU status.    Problem: Gastrointestinal Bleeding  Goal: Optimal Coping with Acute Illness  Outcome: Progressing  Intervention: Optimize Psychosocial Response  Goal: Hemostasis  Outcome: Progressing    Problem: Risk for Delirium  Goal: Optimal Coping  Intervention: Optimize Psychosocial Adjustment to Delirium  Goal: Improved Behavioral Control  Intervention: Prevent and Manage Agitation  Intervention: Minimize Safety Risk  Goal: Improved Attention and Thought Clarity  Intervention: Maximize Cognitive Function  Goal: Improved Sleep  Outcome: Progressing

## 2025-02-08 NOTE — PLAN OF CARE
Goal Outcome Evaluation:      Plan of Care Reviewed With: patient          Outcome Evaluation: Pt would prefer going home with home therapy if recommended

## 2025-02-08 NOTE — CONSULTS
Care Management Initial Consult    General Information  Assessment completed with: Mary Mora  Type of CM/SW Visit: Initial Assessment    Primary Care Provider verified and updated as needed: Yes   Readmission within the last 30 days: no previous admission in last 30 days         Advance Care Planning: Advance Care Planning Reviewed: present on chart          Communication Assessment  Patient's communication style: spoken language (English or Bilingual)    Hearing Difficulty or Deaf: yes   Wear Glasses or Blind: no    Cognitive  Cognitive/Neuro/Behavioral: (P) WDL  Level of Consciousness: (P) alert  Arousal Level: (P) opens eyes spontaneously  Orientation: (P) oriented x 4  Mood/Behavior: (P) calm, cooperative, behavior appropriate to situation  Best Language: (P) 0 - No aphasia  Speech: (P) clear, spontaneous    Living Environment:   People in home: alone     Current living Arrangements: house      Able to return to prior arrangements: yes       Family/Social Support:  Care provided by: self  Provides care for: no one  Marital Status: Single  Support system: Other (specify) (Niece's)          Description of Support System: Supportive    Support Assessment: Adequate family and caregiver support, Adequate social supports    Current Resources:   Patient receiving home care services: No        Community Resources: None  Equipment currently used at home: none  Supplies currently used at home: None    Employment/Financial:  Employment Status: retired        Financial Concerns:             Does the patient's insurance plan have a 3 day qualifying hospital stay waiver?  No    Lifestyle & Psychosocial Needs:  Social Drivers of Health     Food Insecurity: Low Risk  (2/6/2025)    Food Insecurity     Within the past 12 months, did you worry that your food would run out before you got money to buy more?: No     Within the past 12 months, did the food you bought just not last and you didn t have money to get more?: No    Depression: Not at risk (1/17/2019)    PHQ-2     PHQ-2 Score: 0   Housing Stability: Low Risk  (2/6/2025)    Housing Stability     Do you have housing? : Yes     Are you worried about losing your housing?: No   Tobacco Use: Low Risk  (2/6/2025)    Patient History     Smoking Tobacco Use: Never     Smokeless Tobacco Use: Never     Passive Exposure: Not on file   Financial Resource Strain: Low Risk  (2/6/2025)    Financial Resource Strain     Within the past 12 months, have you or your family members you live with been unable to get utilities (heat, electricity) when it was really needed?: No   Alcohol Use: Not on file   Transportation Needs: Low Risk  (2/6/2025)    Transportation Needs     Within the past 12 months, has lack of transportation kept you from medical appointments, getting your medicines, non-medical meetings or appointments, work, or from getting things that you need?: No   Physical Activity: Not on file   Interpersonal Safety: High Risk (2/6/2025)    Interpersonal Safety     Do you feel physically and emotionally safe where you currently live?: No     Within the past 12 months, have you been hit, slapped, kicked or otherwise physically hurt by someone?: No     Within the past 12 months, have you been humiliated or emotionally abused in other ways by your partner or ex-partner?: No   Stress: Not on file   Social Connections: Not on file   Health Literacy: Not on file       Functional Status:  Prior to admission patient needed assistance:   Dependent ADLs:: Ambulation-cane, Ambulation-walker  Dependent IADLs:: Independent       Mental Health Status:  Mental Health Status: No Current Concerns       Chemical Dependency Status:  Chemical Dependency Status: No Current Concerns             Values/Beliefs:  Spiritual, Cultural Beliefs, Advent Practices, Values that affect care:                 Discussed  Partnership in Safe Discharge Planning  document with patient/family: No    Additional  "Information:  Assessed. RNCM introduced self, CM role, and what services CM provides. Pt alert and engaged in conversation. Pt verified demographics and they are presented accurately in the chart. Pt has HCD in chart, pt's niece Sandra is primary HCA, and pt's niece Meenu is secondary HCA. Pt stated either niece can be contacted. Pt has an established PCP Jessica Fonseca.       Pt lives alone in a house, with 3 steps to enter. Pt has 13 steps to basement, and that is where laundry is, pt goes down them she states, \"2 times a week for laundry. \" Pt Ind with ADLS, uses a cane at home, and walker for longer distances. Pt Ind with IADLS. No  home care svcs prior. Pt states, \"I walk when it is nice out, and I play golf in the summer 3 times a week.\" Pt stated, \"I have my name on two [facility] wait lists, [facility] in [place], and [facility] in [place]. \" Pt would prefer to return home if able , and is ok with home care if needed. One of niece's to transport if safe to do so.       Handoff referral completed.     Next Steps: Follow any therapy recs. CM will continue to monitor progression of care, review team recommendations and provide discharge planning assist as needed.     Mary Villasenor RN      "

## 2025-02-08 NOTE — PROGRESS NOTES
[facility]    PROGRESS NOTE - Hospitalist Service    Assessment and Plan  89 years old female with a past medical history of asthma, NSTEMI s/p PCI on Plavix, GERD, and HTN presenting to the emergency department for evaluation of generalized weakness for the past few days.  Found to have GI bleeding and admitted with acute blood loss anemia.     Acute blood loss anemia  - Secondary to GI bleeding  - Patient presented with hemoglobin of 7.8  - Hemoglobin dropped to 5.5 after few hours with hypotension  - Rapid response was called and patient was transferred to the ICU  - S/P blood transfusion  - Hemoglobin was up to 9.0 and again dropped to 6.5  - CTA abdomen is negative for active bleeding  - Continue to monitor hemoglobin every 6 hour  - Continue to hold home aspirin and Plavix     Acute GI bleed  - GI consult, appreciate input  - S/P EGD which showed clotted blood in the gastric fundus and gastric body  - No active bleeding  - Continue IV PPI twice daily  -S/P another EGD on 2/7/2025 which showed ulcerated lesion with remnant of a blood vessel was treated again with bipolar gold probe.   -Continue to monitor hemoglobin as above  -IV iron today     Hypotension   - Secondary to acute blood loss anemia  - Patient transferred to ICU after rapid response on 2/6/2025  - Improving blood pressure with fluid and blood resuscitation  - Echo shows EF of 65 to 70% with grade 1 diastolic dysfunction  -Blood pressure is better today  -Restart Coreg and continue to hold other hypertension medications  -Transfer out of ICU.       Acute on chronic renal failure  -Baseline chronic kidney disease stage III  -Probably secondary to hypotension with hypovolemia secondary GI bleed in addition to contrast  -Nephrology consult, appreciate input  -Change IV fluid to D5W with bicarb as per nephrology   - Continue IV Lasix as per nephrology  -Continue to monitor renal function     History of asthma  - No signs or  symptoms of acute exacerbation  - Received IV steroid in the ER  - Continue nebs  - Hold on further steroid for now     Leukocytosis  - Probably stress reaction  - No sign or symptom of infection  - Unremarkable UA and CT chest for infection  - Continue to monitor CBC     History of coronary disease  -Patient denies chest pain  -Hold home aspirin and Plavix due to GI bleeding  -Hold hypertension medication because of hypotension     Chronic back pain  -Resume Tylenol # 3     Weakness and deconditioning  -PT/OT evaluation      50 MINUTES SPENT BY ME on the date of service doing chart review, history, exam, documentation & further activities per the note    Principal Problem:    Gastrointestinal hemorrhage with melena  Active Problems:    Generalized weakness    Loose stools      VTE prophylaxis:  Pneumatic Compression Devices  DIET: Orders Placed This Encounter      Regular Diet Adult      Disposition/Barriers to discharge: Monitor hemoglobin and renal function.  Advance diet.  Medically Ready for Discharge: Anticipated in 2-4 Days    Code Status: No CPR- Do NOT Intubate    Subjective:   is feeling much better today, denies any chest pain or shortness of breath, still has some weakness.  No GI bleeding episodes overnight.    PHYSICAL EXAM  Vitals:    02/06/25 0716 02/07/25 0500 02/08/25 0600   Weight: 86.6 kg (191 lb) 90.7 kg (200 lb) 89.5 kg (197 lb 6.4 oz)     B/P:114/58 T:97.9 P:85 R:20     Intake/Output Summary (Last 24 hours) at 2/8/2025 1319  Last data filed at 2/8/2025 0800  Gross per 24 hour   Intake 1337.4 ml   Output 3825 ml   Net -2487.6 ml      Body mass index is 31.86 kg/m .    Constitutional: awake, alert, cooperative, no apparent distress, and appears stated age  Respiratory: No increased work of breathing, good air exchange, clear to auscultation bilaterally, no crackles or wheezing  Cardiovascular: Normal apical impulse, regular rate and rhythm, normal S1 and S2, no S3 or S4, and no murmur  noted  GI: No scars, normal bowel sounds, soft, non-distended, non-tender, no masses palpated, no hepatosplenomegally  Skin: no bruising or bleeding and normal skin color, texture, turgor  Musculoskeletal: There is no redness, warmth, or swelling of the joints.  Full range of motion noted.  no lower extremity pitting edema present  Neurologic: Awake, alert, oriented to name, place and time.  Cranial nerves II-XII are grossly intact.  Motor is 5 out of 5 bilaterally.   Sensory is intact.    Neuropsychiatric: Appropriate with examiner      PERTINENT LABS/IMAGING:    I have personally reviewed the following data over the past 24 hrs:    12.6 (H)  \   7.7 (L)   / 93 (L)     143 108 (H) 91.9 (H) /  117 (H)   3.7 26 2.06 (H) \     ALT: N/A AST: N/A AP: N/A TBILI: N/A   ALB: 3.0 (L) TOT PROTEIN: N/A LIPASE: N/A       Imaging results reviewed over the past 24 hrs:   No results found for this or any previous visit (from the past 24 hours).    Discussed with patient, family, GI, nursing staff and discharge planner    Kirby Ayala MD  [facility] Medicine Service  [phone]

## 2025-02-09 ENCOUNTER — APPOINTMENT (OUTPATIENT)
Dept: OCCUPATIONAL THERAPY | Facility: HOSPITAL | Age: 89
DRG: 378 | End: 2025-02-09
Attending: INTERNAL MEDICINE
Payer: COMMERCIAL

## 2025-02-09 ENCOUNTER — APPOINTMENT (OUTPATIENT)
Dept: ULTRASOUND IMAGING | Facility: HOSPITAL | Age: 89
DRG: 378 | End: 2025-02-09
Attending: INTERNAL MEDICINE
Payer: COMMERCIAL

## 2025-02-09 LAB
ALBUMIN SERPL BCG-MCNC: 3 G/DL (ref 3.5–5.2)
ANION GAP SERPL CALCULATED.3IONS-SCNC: 13 MMOL/L (ref 7–15)
BUN SERPL-MCNC: 74.5 MG/DL (ref 8–23)
CALCIUM SERPL-MCNC: 7.9 MG/DL (ref 8.8–10.4)
CHLORIDE SERPL-SCNC: 104 MMOL/L (ref 98–107)
CREAT SERPL-MCNC: 1.66 MG/DL (ref 0.51–0.95)
EGFRCR SERPLBLD CKD-EPI 2021: 30 ML/MIN/1.73M2
ERYTHROCYTE [DISTWIDTH] IN BLOOD BY AUTOMATED COUNT: 18.3 % (ref 10–15)
GLUCOSE SERPL-MCNC: 123 MG/DL (ref 70–99)
HCO3 SERPL-SCNC: 27 MMOL/L (ref 22–29)
HCT VFR BLD AUTO: 24.5 % (ref 35–47)
HGB BLD-MCNC: 7.9 G/DL (ref 11.7–15.7)
MAGNESIUM SERPL-MCNC: 2 MG/DL (ref 1.7–2.3)
MCH RBC QN AUTO: 28.5 PG (ref 26.5–33)
MCHC RBC AUTO-ENTMCNC: 32.2 G/DL (ref 31.5–36.5)
MCV RBC AUTO: 88 FL (ref 78–100)
PHOSPHATE SERPL-MCNC: 4.3 MG/DL (ref 2.5–4.5)
PLATELET # BLD AUTO: 100 10E3/UL (ref 150–450)
POTASSIUM SERPL-SCNC: 3.6 MMOL/L (ref 3.4–5.3)
RBC # BLD AUTO: 2.77 10E6/UL (ref 3.8–5.2)
SODIUM SERPL-SCNC: 144 MMOL/L (ref 135–145)
WBC # BLD AUTO: 11.5 10E3/UL (ref 4–11)

## 2025-02-09 PROCEDURE — 76770 US EXAM ABDO BACK WALL COMP: CPT

## 2025-02-09 PROCEDURE — 83735 ASSAY OF MAGNESIUM: CPT | Performed by: INTERNAL MEDICINE

## 2025-02-09 PROCEDURE — 80069 RENAL FUNCTION PANEL: CPT | Performed by: INTERNAL MEDICINE

## 2025-02-09 PROCEDURE — 250N000013 HC RX MED GY IP 250 OP 250 PS 637: Performed by: INTERNAL MEDICINE

## 2025-02-09 PROCEDURE — 97530 THERAPEUTIC ACTIVITIES: CPT | Mod: GO

## 2025-02-09 PROCEDURE — 99232 SBSQ HOSP IP/OBS MODERATE 35: CPT | Performed by: INTERNAL MEDICINE

## 2025-02-09 PROCEDURE — 36415 COLL VENOUS BLD VENIPUNCTURE: CPT | Performed by: INTERNAL MEDICINE

## 2025-02-09 PROCEDURE — 250N000009 HC RX 250: Performed by: HOSPITALIST

## 2025-02-09 PROCEDURE — 85041 AUTOMATED RBC COUNT: CPT | Performed by: INTERNAL MEDICINE

## 2025-02-09 PROCEDURE — 120N000001 HC R&B MED SURG/OB

## 2025-02-09 PROCEDURE — 97165 OT EVAL LOW COMPLEX 30 MIN: CPT | Mod: GO

## 2025-02-09 PROCEDURE — 97535 SELF CARE MNGMENT TRAINING: CPT | Mod: GO

## 2025-02-09 RX ORDER — CLOPIDOGREL BISULFATE 75 MG/1
75 TABLET ORAL DAILY
Status: DISCONTINUED | OUTPATIENT
Start: 2025-02-09 | End: 2025-02-13 | Stop reason: HOSPADM

## 2025-02-09 RX ADMIN — ACETAMINOPHEN AND CODEINE PHOSPHATE 1 TABLET: 300; 30 TABLET ORAL at 10:16

## 2025-02-09 RX ADMIN — CLOPIDOGREL BISULFATE 75 MG: 75 TABLET ORAL at 10:16

## 2025-02-09 RX ADMIN — LEVOTHYROXINE SODIUM 50 MCG: 0.03 TABLET ORAL at 06:35

## 2025-02-09 RX ADMIN — FLUTICASONE FUROATE AND VILANTEROL TRIFENATATE 1 PUFF: 100; 25 POWDER RESPIRATORY (INHALATION) at 08:32

## 2025-02-09 RX ADMIN — CARVEDILOL 6.25 MG: 6.25 TABLET, FILM COATED ORAL at 17:09

## 2025-02-09 RX ADMIN — PANTOPRAZOLE SODIUM 40 MG: 40 INJECTION, POWDER, FOR SOLUTION INTRAVENOUS at 21:11

## 2025-02-09 RX ADMIN — MONTELUKAST 10 MG: 10 TABLET, FILM COATED ORAL at 21:03

## 2025-02-09 RX ADMIN — CARVEDILOL 6.25 MG: 6.25 TABLET, FILM COATED ORAL at 08:32

## 2025-02-09 RX ADMIN — PANTOPRAZOLE SODIUM 40 MG: 40 INJECTION, POWDER, FOR SOLUTION INTRAVENOUS at 10:17

## 2025-02-09 ASSESSMENT — ACTIVITIES OF DAILY LIVING (ADL)
ADLS_ACUITY_SCORE: 44
ADLS_ACUITY_SCORE: 46
ADLS_ACUITY_SCORE: 44
ADLS_ACUITY_SCORE: 46
ADLS_ACUITY_SCORE: 44
ADLS_ACUITY_SCORE: 46
ADLS_ACUITY_SCORE: 46
ADLS_ACUITY_SCORE: 44
ADLS_ACUITY_SCORE: 46
ADLS_ACUITY_SCORE: 46
ADLS_ACUITY_SCORE: 44
ADLS_ACUITY_SCORE: 44
ADLS_ACUITY_SCORE: 46

## 2025-02-09 NOTE — PROGRESS NOTES
[facility]    PROGRESS NOTE - Hospitalist Service    Assessment and Plan  89 years old female with a past medical history of asthma, NSTEMI s/p PCI on Plavix, GERD, and HTN presenting to the emergency department for evaluation of generalized weakness for the past few days.  Found to have GI bleeding and admitted with acute blood loss anemia.     Acute blood loss anemia  - Secondary to GI bleeding  - Patient presented with hemoglobin of 7.8  - Hemoglobin dropped to 5.5 after few hours with hypotension  - Rapid response was called and patient was transferred to the ICU  - S/P blood transfusion  - Hemoglobin was up to 9.0 and again dropped to 6.5  - CTA abdomen is negative for active bleeding  - Continue to monitor hemoglobin every 6 hour  - Continue to hold home aspirin and restart Plavix on 2/9/25  - Received IV iron      Acute GI bleed  - GI consult, appreciate input  - S/P EGD which showed clotted blood in the gastric fundus and gastric body  - No active bleeding  - Continue IV PPI twice daily  -S/P another EGD on 2/7/2025 which showed ulcerated lesion with remnant of a blood vessel was treated again with bipolar gold probe.   -Continue to monitor hemoglobin as above  - IV iron on 2/8/25     Hypotension   - Secondary to acute blood loss anemia  - Patient transferred to ICU after rapid response on 2/6/2025  - Improving blood pressure with fluid and blood resuscitation  - Echo shows EF of 65 to 70% with grade 1 diastolic dysfunction  - Blood pressure is better today  - Restart Coreg and continue to hold other hypertension medications  - Transfer out of ICU on 2/8/25.        Acute on chronic renal failure  - Baseline chronic kidney disease stage III  - Probably secondary to hypotension with hypovolemia secondary GI bleed in addition to contrast  - Nephrology consult, appreciate input  - Change IV fluid to D5W with bicarb as per nephrology   - Continue IV Lasix as per nephrology  - Continue to  monitor renal function  - Check renal US      History of asthma  - No signs or symptoms of acute exacerbation  - Received IV steroid in the ER  - Continue nebs  - Hold on further steroid for now     Leukocytosis  - Probably stress reaction  - No sign or symptom of infection  - Unremarkable UA and CT chest for infection  - improving  - Continue to monitor CBC     History of coronary disease  - Patient denies chest pain  - Hold home aspirin and Plavix due to GI bleeding  - Hold hypertension medication because of hypotension     Chronic back pain  - Resume Tylenol # 3     Weakness and deconditioning  -PT/OT evaluation     40 MINUTES SPENT BY ME on the date of service doing chart review, history, exam, documentation & further activities per the note    Principal Problem:    Gastrointestinal hemorrhage with melena  Active Problems:    Generalized weakness    Loose stools      VTE prophylaxis:  Pneumatic Compression Devices  DIET: Orders Placed This Encounter      Regular Diet Adult      Disposition/Barriers to discharge: monitor renal function and Hb  Medically Ready for Discharge: Anticipated in 2-4 Days   Code Status: No CPR- Do NOT Intubate    Subjective:    is feeling much better today , mild SOB, no chest pain , no bloody BMs    PHYSICAL EXAM  Vitals:    02/07/25 0500 02/08/25 0600 02/09/25 0253   Weight: 90.7 kg (200 lb) 89.5 kg (197 lb 6.4 oz) 86.7 kg (191 lb 1.6 oz)     B/P:135/63 T:97.9 P:82 R:18     Intake/Output Summary (Last 24 hours) at 2/9/2025 0948  Last data filed at 2/9/2025 0925  Gross per 24 hour   Intake 1600 ml   Output 300 ml   Net 1300 ml      Body mass index is 30.84 kg/m .    Constitutional: awake, alert, cooperative, no apparent distress, and appears stated age  Respiratory: No increased work of breathing, good air exchange, clear to auscultation bilaterally, no crackles or wheezing  Cardiovascular: Normal apical impulse, regular rate and rhythm, normal S1 and S2, no S3 or S4, and no murmur  noted  GI: No scars, normal bowel sounds, soft, non-distended, non-tender, no masses palpated, no hepatosplenomegally  Skin: no bruising or bleeding and normal skin color, texture, turgor  Musculoskeletal: There is no redness, warmth, or swelling of the joints.  Full range of motion noted.  no lower extremity pitting edema present  Neurologic: Awake, alert, oriented to name, place and time.  Cranial nerves II-XII are grossly intact.  Motor is 5 out of 5 bilaterally.   Sensory is intact.    Neuropsychiatric: Appropriate with examiner      PERTINENT LABS/IMAGING:    I have personally reviewed the following data over the past 24 hrs:    11.5 (H)  \   7.9 (L)   / 100 (L)     144 104 74.5 (H) /  123 (H)   3.6 27 1.66 (H) \     ALT: N/A AST: N/A AP: N/A TBILI: N/A   ALB: 3.0 (L) TOT PROTEIN: N/A LIPASE: N/A       Imaging results reviewed over the past 24 hrs:   No results found for this or any previous visit (from the past 24 hours).    Discussed with patient, family,nephrology, nursing staff and discharge planner    Kirby Ayala MD  [facility] Medicine Service  [phone]

## 2025-02-09 NOTE — PLAN OF CARE
Problem: Risk for Delirium  Goal: Optimal Coping  Outcome: Progressing  Intervention: Optimize Psychosocial Adjustment to Delirium  Recent Flowsheet Documentation  Taken 2/9/2025 0400 by Niels Moe RN  Supportive Measures:   active listening utilized   positive reinforcement provided   self-care encouraged  Goal: Improved Behavioral Control  Outcome: Progressing  Intervention: Prevent and Manage Agitation  Recent Flowsheet Documentation  Taken 2/9/2025 0400 by Niels Moe RN  Environment Familiarity/Consistency: daily routine followed  Intervention: Minimize Safety Risk  Recent Flowsheet Documentation  Taken 2/9/2025 0400 by Niels Moe RN  Enhanced Safety Measures:   pain management   review medications for side effects with activity   room near unit station   Goal Outcome Evaluation:         Pt arrive from ICU. A/O x4  On Tele: SR H70 . 3L of O2 NC. A1. Take pills whole. GI following. Call light within reach. Pt can understand and express her needs.

## 2025-02-09 NOTE — PROGRESS NOTES
OT Evaluation     02/09/25 1200   Appointment Info   Signing Clinician's Name / Credentials (OT) Brayden Flores, OTR/L   Living Environment   People in Home alone   Current Living Arrangements house   Home Accessibility stairs to enter home;stairs within home   Number of Stairs, Main Entrance 3   Stair Railings, Main Entrance railings safe and in good condition   Number of Stairs, Within Home, Primary greater than 10 stairs   Stair Railings, Within Home, Primary railings safe and in good condition   Transportation Anticipated family or friend will provide   Living Environment Comments Patient's bedroom/bathroom are on main level. Bathroom setup includes: tub/shower combo w/ grab bars, standard toilet w/ grab bars. Patient has access to a shower chair. Laundry located in basement (13 steps) - patient does laundry 2x/wk (endorses she throws her laundry bag down the stairs opposed to carrying it, and goes up/down the stairs sideways).   Self-Care   Usual Activity Tolerance good   Current Activity Tolerance moderate   Regular Exercise Yes   Activity/Exercise Type walking  (Walks around [place], [employer], and [employer] 3x/wk for exercise.)   Exercise Amount/Frequency 3-5 times/wk   Equipment Currently Used at Home cane, quad;cane, straight;walker, rolling   Fall history within last six months yes   Number of times patient has fallen within last six months 1   Activity/Exercise/Self-Care Comment Patient independent with ADL's at baseline. Endorses that typically, she does not use a gait aid within her home, but does use a walker for community distance mobility.   Instrumental Activities of Daily Living (IADL)   IADL Comments Patient IND with IADLs at baseline   General Information   Onset of Illness/Injury or Date of Surgery 02/05/25   Referring Physician Kirby Ayala MD   Patient/Family Therapy Goal Statement (OT) To return home   Additional Occupational Profile Info/Pertinent History of Current Problem 89 years old female  with a past medical history of asthma, NSTEMI s/p PCI on Plavix, GERD, and HTN presenting to the emergency department for evaluation of generalized weakness for the past few days.  Found to have GI bleeding and admitted with acute blood loss anemia.   Existing Precautions/Restrictions fall   Cognitive Status Examination   Orientation Status orientation to person, place and time   Cognitive Status Comments No overt concerns. Will monitor and assess as appropriate   Visual Perception   Visual Impairment/Limitations corrective lenses full-time   Sensory   Sensory Quick Adds sensation intact   Pain Assessment   Patient Currently in Pain No   Posture   Posture not impaired   Range of Motion Comprehensive   General Range of Motion no range of motion deficits identified   Strength Comprehensive (MMT)   Comment, General Manual Muscle Testing (MMT) Assessment Generalized weakness   Muscle Tone Assessment   Muscle Tone Quick Adds No deficits were identified   Coordination   Upper Extremity Coordination No deficits were identified   Bed Mobility   Bed Mobility supine-sit   Supine-Sit [place] (Bed Mobility) supervision   Comment (Bed Mobility) SBA for increased safety   Sit-Stand Transfer   Sit-Stand [place] (Transfers) supervision   Assistive Device (Sit-Stand Transfers) cane, quad   Sit/Stand Transfer Comments SBA for increased safety   Balance   Balance Assessment standing static balance   Standing Balance: Static supervision   Position/Device Used, Standing Balance supported;cane, quad   Balance Comments SBA for increased safety   Activities of Daily Living   BADL Assessment/Intervention other (see comments)  (Per clinical judgement, anticipating IND with ADLs)   Clinical Impression   Criteria for Skilled Therapeutic Interventions Met (OT) Yes, treatment indicated   OT Diagnosis Decreased IND with transfers, mobility related ADL's, and home management   OT Problem List-Impairments impacting ADL problems related  to;activity tolerance impaired;balance;mobility;strength   Assessment of Occupational Performance 1-3 Performance Deficits   Identified Performance Deficits Transfers, Mobility, Home Management   Planned Therapy Interventions (OT) ADL retraining;transfer training;home program guidelines;progressive activity/exercise   Clinical Decision Making Complexity (OT) problem focused assessment/low complexity   Risk & Benefits of therapy have been explained care plan/treatment goals reviewed;participants voiced agreement with care plan;participants included;patient   OT Total Evaluation Time   OT Eval, Low Complexity Minutes (76798) 8   OT Goals   Therapy Frequency (OT) One time eval and treatment   OT Predicted Duration/Target Date for Goal Attainment 02/09/25   OT Goals Hygiene/Grooming;Toilet Transfer/Toileting;Aerobic Activity   OT: Hygiene/Grooming supervision/stand-by assist;Goal Met   OT: Toilet Transfer/Toileting Supervision/stand-by assist;Goal Met   OT: Perform aerobic activity with stable cardiovascular response continuous activity;5 minutes;Goal Met   Interventions   Interventions Quick Adds Self-Care/Home Management;Therapeutic Activity   Self-Care/Home Management   Self-Care/Home Mgmt/ADL, Compensatory, Meal Prep Minutes (48883) 9   Symptoms Noted During/After Treatment (Meal Preparation/Planning Training) none   Treatment Detail/Skilled Intervention Patient supine when approached, agreeable to OT eval and treat. Eval completed and treat initiated. Facilitated bed mobility (supine<>sitting, sitting<>supine) by providing SBA for increased safety; however, patient is able to perform under her own power. Facilitated transfers (to/from EOB) by providing SBA for increased safety; however, patient is able to perform under her own power with use of cane for increased steadying. Facilitated toileting process (transfers to/from, clothing management, and hygiene) by providing SBA for increased safety; however, patient is  able to perform under her own power with use of cane for steadying. Patient IND performed hand hygiene standing sinkside with adequate thoroughness. See therapeutic activity for further session details. Session ended with patient supine in bed with call light within reach and all questions answered.   Therapeutic Activities   Therapeutic Activity Minutes (40896) 8   Symptoms noted during/after treatment fatigue   Treatment Detail/Skilled Intervention Facilitated ambulation of short household distances within room, as well as facilitated ambulation of household distances by providing SBA for increased safety. Patient utilized cane for increased steadying with mobility within room, and utilized FWW for increased steaduing with mobility outside of room. Throuhgout mobility, no rest breaks required, no losses of balance noted, and no shortness of breath obsreved. Patient ambulated roughly ~20 ft within room and ~80 ft in hallway.   OT Discharge Planning   OT Plan Discharge from OT, all goals met.   OT Discharge Recommendation (DC Rec) home with home care occupational therapy   OT Rationale for DC Rec Patient appears to be nearing her baseline for ADL's, transfers, and mobility. Patient's current biggest barriers to participation in daily activity are: generalized weakness, decreased balance, impaired mobility, and limited activity tolerance. Patient's needs will be adequately met by working with Physical Therapy.   OT Brief overview of current status SBA with bed mobility, transfers, mobility, and ADL's.   OT Total Distance Amb During Session (feet) 100  (10,10,80)   Total Session Time   Timed Code Treatment Minutes 17   Total Session Time (sum of timed and untimed services) 25

## 2025-02-09 NOTE — PROGRESS NOTES
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  Since making more urine and will monitor conservatively.  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  Unclear etiology but multiple possibility from hemodynamic, recurrent CARLYLE.  Will need further workup as an outpatient.  Kidney function back to baseline.   Avoid nephrotoxic medications  Renally dosing medication  Renal diet  Strict Is and Os     Hyperkalemia: Potassium was 5.5-5.4 for last few checks.  Suspect from GI bleed with CARLYLE causing failure to excrete potassium.  Started Lokelma 10 g 3 times daily and discontinued after one dose.   Shifted with insulin and glucose for potassium above 5.6.  Discontinue furosemide  Initiated bicarb infusion for intracellular shifting and discontinued  due to bicarb normalized.   K 3.6 today.     Acid-base: Bicarb 27.   Monitor.      BP/volume: Blood pressure acceptable.  Volume is slightly hypervolemic.  Furosemide discontinue yesterday and may need to reintroduce if she is still volume overload.   Oxygenation improving.      BMD: Calcium 7.9.  Albumin 3.0 and corrected calcium 8.7.  Phosphorus 4.3 today.  Vitamin D and PTH level checked as an outpatient.     Anemia: Hemoglobin 7.9.  Likely from GI bleed.  Received 4 units of PRBC and received approximately 1 g of iron from that.  Will need to give REED as an outpatient if hemoglobin still less than 10.    SUBJECTIVE: Patient seen and examined at bedside.  Feeling better.  Kidney function back to her baseline.    OBJECTIVE:  Physical Exam   Temp: 97.9  F (36.6  C) Temp src: Oral BP: 135/63 Pulse: 82   Resp: 18 SpO2: 95 % O2 Device: Nasal cannula Oxygen Delivery: 1 LPM  Vitals:    02/07/25 0500 02/08/25 0600 02/09/25 0253   Weight: 90.7 kg (200 lb) 89.5 kg (197 lb 6.4 oz) 86.7 kg (191 lb 1.6 oz)     Vital Signs with Ranges  Temp:  [97.7  F  (36.5  C)-98.2  F (36.8  C)] 97.9  F (36.6  C)  Pulse:  [68-87] 82  Resp:  [15-26] 18  BP: (103-135)/(50-63) 135/63  SpO2:  [85 %-97 %] 95 %  I/O last 3 completed shifts:  In: 1840 [P.O.:1840]  Out: 950 [Urine:950]    @TMAXR(24)@    Patient Vitals for the past 72 hrs:   Weight   02/09/25 0253 86.7 kg (191 lb 1.6 oz)   02/08/25 0600 89.5 kg (197 lb 6.4 oz)   02/07/25 0500 90.7 kg (200 lb)     Intake/Output Summary (Last 24 hours) at 2/8/2025 1010  Last data filed at 2/8/2025 0800  Gross per 24 hour   Intake 1337.4 ml   Output 3825 ml   Net -2487.6 ml       PHYSICAL EXAM:  General - Alert and oriented x3, appears comfortable, NAD  Cardiovascular - Regular rate and rhythm, no rub  Respiratory - Clear to auscultation bilaterally, no crackles or wheezes  Abd: BS present, no guarding or pain with palpation, no ascites  Extremities - No lower extremity edema bilaterally  Skin: dry, intact, no rash, good turgor  Neuro:  Grossly intact, no focal deficits  MSK:  Grossly intact  Psych:  Normal affect    LABORATORY STUDIES:     Recent Labs   Lab 02/09/25  0640 02/08/25  0510 02/07/25  1247 02/07/25  0651 02/07/25  0058 02/06/25  1843 02/06/25  1138 02/06/25  0504 02/06/25  0232 02/05/25 2054 02/05/25  1012   WBC 11.5* 12.6*  --  17.6*  --   --   --  22.0*  --  14.2* 16.3*   RBC 2.77* 2.64*  --  2.80*  --   --   --  2.82*  --  1.82* 2.59*   HGB 7.9* 7.7* 7.7* 8.2* 6.8* 7.6*   < > 8.4*   < > 5.5* 7.8*   HCT 24.5* 22.1*  --  24.3*  --   --   --  25.9*  --  17.8* 25.0*   * 93*  --  80*  --   --   --  152  --  155 209    < > = values in this interval not displayed.       Basic Metabolic Panel:  Recent Labs   Lab 02/09/25  0640 02/08/25  0510 02/07/25  1805 02/07/25  0602 02/06/25  1203 02/06/25  1138 02/06/25  0705 02/06/25  0504 02/05/25  2054    143 140 137  --   --   --  140 142   POTASSIUM 3.6 3.7 4.4 5.4*  --  5.4* 5.5* 5.5* 5.2   CHLORIDE 104 108* 112* 111*  --   --   --  114* 115*   CO2 27 26 17* 13*  --   --    --  13* 13*   BUN 74.5* 91.9* 102.2* 120.9*  --   --   --  103.0* 91.9*   CR 1.66* 2.06* 2.34* 2.59*  --   --   --  1.90* 1.63*   * 117* 126* 135* 176*  --   --  212* 207*   GREG 7.9* 7.8* 7.6* 7.4*  --   --   --  7.8* 7.7*       INR  Recent Labs   Lab 02/06/25  0731 02/05/25  1012   INR 1.45* 1.20*        Recent Labs   Lab Test 02/09/25  0640 02/08/25  0510 02/06/25  1138 02/06/25  0731 02/05/25 2054 02/05/25  1012   INR  --   --   --  1.45*  --  1.20*   WBC 11.5* 12.6*   < >  --    < > 16.3*   HGB 7.9* 7.7*   < >  --    < > 7.8*   * 93*   < >  --    < > 209    < > = values in this interval not displayed.       Personally reviewed current labs      Anna Mack MD  [facility]  [phone]

## 2025-02-09 NOTE — PLAN OF CARE
"[facility] - ICU    RN Progress Note:            Pertinent Assessments:      Please refer to flowsheet rows for full assessment     VSS, tried titrating off O2, O2 dropped 87-88%. Pt c/o RUQ pain, relieved with PRN tylenol and warm blanket. Up to bedside commode with SBA/cane. Midnight lasox dose held d/t BP parameters not met, Dr Mack noted in cart and it was d/c'd and renal U/S ordered. U/S tech called and needs renal U/S order clarified, can not use reason of \"CARLYLE\" will have AM RN clarify, for now pt NPO. Pt transferred to P1, report given to Joel RN-she has no questions. Pt brought down to room 120 via wheelchair.            Key Events - This Shift:       See above     RN Managed Protocols Ordered:  N/A  Protocols:  PRN'S:  Protocols Status: N/A                Barriers to Discharge / Downgrade:     None         Point of Contact Update: YES-OR-NO: Yes  If No, reason:   Name:  Phone Number:  Summary of Conversation:         Problem: Adult Inpatient Plan of Care  Goal: Absence of Hospital-Acquired Illness or Injury  Intervention: Prevent and Manage VTE (Venous Thromboembolism) Risk  Recent Flowsheet Documentation  Taken 2/9/2025 0000 by Roxana Farfan RN  VTE Prevention/Management: SCDs on (sequential compression devices)  Taken 2/8/2025 2000 by Roxana Farfan RN  VTE Prevention/Management: SCDs on (sequential compression devices)     Problem: Gastrointestinal Bleeding  Goal: Optimal Coping with Acute Illness  Intervention: Optimize Psychosocial Response  Recent Flowsheet Documentation  Taken 2/9/2025 0000 by Roxana Farfan, RN  Supportive Measures:   active listening utilized   positive reinforcement provided   relaxation techniques promoted  Taken 2/8/2025 2000 by Roxana Farfan, RN  Supportive Measures:   active listening utilized   positive reinforcement provided   relaxation techniques promoted     Problem: Gastrointestinal Bleeding  Goal: Hemostasis  Outcome: Progressing  Intervention: " Manage Gastrointestinal Bleeding  Recent Flowsheet Documentation  Taken 2/9/2025 0000 by Roxana Farfan, RN  Environmental Support:   calm environment promoted   distractions minimized   environmental consistency promoted   rest periods encouraged  Taken 2/8/2025 2000 by Roxana Farfan, RN  Environmental Support:   calm environment promoted   distractions minimized   environmental consistency promoted   rest periods encouraged     Problem: Risk for Delirium  Goal: Improved Sleep  Intervention: Promote Sleep  Recent Flowsheet Documentation  Taken 2/9/2025 0000 by Roxana Farfan, RN  Sleep/Rest Enhancement:   awakenings minimized   comfort measures   consistent schedule promoted   medication   noise level reduced   regular sleep/rest pattern promoted   room darkened  Taken 2/8/2025 2000 by Roxana Farfan, RN  Sleep/Rest Enhancement:   awakenings minimized   comfort measures   consistent schedule promoted   medication   noise level reduced   regular sleep/rest pattern promoted   room darkened   Goal Outcome Evaluation:      Plan of Care Reviewed With: patient          Outcome Evaluation: No active bleed and go home

## 2025-02-09 NOTE — PLAN OF CARE
Goal Outcome Evaluation:    Alert and oriented. Stand by assist with a cane.  Ambulated halls.  Complaints of back pain. Tylenol #3 administered.  Hgb 7.9 no signs of bleeding.  No bowel movement today.  Sleepy from being transferred in the middle of the night.

## 2025-02-09 NOTE — PROGRESS NOTES
Occupational Therapy Discharge Summary    Reason for therapy discharge:    All goals and outcomes met, no further needs identified. Patient appears to be nearing/at her baseline for ADL's, transfers, and mobility. Patient's current biggest barriers to participation in daily activity are: generalized weakness, decreased balance, impaired mobility, and limited activity tolerance. Patient's needs will be adequately met by working with Physical Therapy.    Progress towards therapy goal(s). See goals on Care Plan in [facility] electronic health record for goal details.  Goals met    Therapy recommendation(s):    Continued therapy is recommended.  Rationale/Recommendations:  Home Occupational Therapy to optimize safe return to daily activity.     Brayden Flores, OTR

## 2025-02-10 ENCOUNTER — APPOINTMENT (OUTPATIENT)
Dept: PHYSICAL THERAPY | Facility: HOSPITAL | Age: 89
DRG: 378 | End: 2025-02-10
Attending: INTERNAL MEDICINE
Payer: COMMERCIAL

## 2025-02-10 LAB
ALBUMIN SERPL BCG-MCNC: 2.7 G/DL (ref 3.5–5.2)
ANION GAP SERPL CALCULATED.3IONS-SCNC: 5 MMOL/L (ref 7–15)
BACTERIA BLD CULT: NO GROWTH
BACTERIA BLD CULT: NO GROWTH
BUN SERPL-MCNC: 52 MG/DL (ref 8–23)
CALCIUM SERPL-MCNC: 7.9 MG/DL (ref 8.8–10.4)
CHLORIDE SERPL-SCNC: 106 MMOL/L (ref 98–107)
CREAT SERPL-MCNC: 1.38 MG/DL (ref 0.51–0.95)
EGFRCR SERPLBLD CKD-EPI 2021: 37 ML/MIN/1.73M2
ERYTHROCYTE [DISTWIDTH] IN BLOOD BY AUTOMATED COUNT: 18.6 % (ref 10–15)
GLUCOSE SERPL-MCNC: 120 MG/DL (ref 70–99)
HCO3 SERPL-SCNC: 30 MMOL/L (ref 22–29)
HCT VFR BLD AUTO: 21.4 % (ref 35–47)
HGB BLD-MCNC: 7 G/DL (ref 11.7–15.7)
HGB BLD-MCNC: 7.5 G/DL (ref 11.7–15.7)
HGB BLD-MCNC: 7.6 G/DL (ref 11.7–15.7)
HGB BLD-MCNC: 7.6 G/DL (ref 11.7–15.7)
MAGNESIUM SERPL-MCNC: 1.9 MG/DL (ref 1.7–2.3)
MCH RBC QN AUTO: 29.3 PG (ref 26.5–33)
MCHC RBC AUTO-ENTMCNC: 32.7 G/DL (ref 31.5–36.5)
MCV RBC AUTO: 90 FL (ref 78–100)
PHOSPHATE SERPL-MCNC: 3.3 MG/DL (ref 2.5–4.5)
PLATELET # BLD AUTO: 108 10E3/UL (ref 150–450)
POTASSIUM SERPL-SCNC: 3.8 MMOL/L (ref 3.4–5.3)
RBC # BLD AUTO: 2.39 10E6/UL (ref 3.8–5.2)
SODIUM SERPL-SCNC: 141 MMOL/L (ref 135–145)
WBC # BLD AUTO: 9.5 10E3/UL (ref 4–11)

## 2025-02-10 PROCEDURE — 120N000001 HC R&B MED SURG/OB

## 2025-02-10 PROCEDURE — 99232 SBSQ HOSP IP/OBS MODERATE 35: CPT

## 2025-02-10 PROCEDURE — 97116 GAIT TRAINING THERAPY: CPT | Mod: GP | Performed by: PHYSICAL THERAPIST

## 2025-02-10 PROCEDURE — 36415 COLL VENOUS BLD VENIPUNCTURE: CPT | Performed by: INTERNAL MEDICINE

## 2025-02-10 PROCEDURE — 83735 ASSAY OF MAGNESIUM: CPT | Performed by: INTERNAL MEDICINE

## 2025-02-10 PROCEDURE — 250N000013 HC RX MED GY IP 250 OP 250 PS 637: Performed by: HOSPITALIST

## 2025-02-10 PROCEDURE — 250N000009 HC RX 250: Performed by: HOSPITALIST

## 2025-02-10 PROCEDURE — 85018 HEMOGLOBIN: CPT | Performed by: INTERNAL MEDICINE

## 2025-02-10 PROCEDURE — 97161 PT EVAL LOW COMPLEX 20 MIN: CPT | Mod: GP | Performed by: PHYSICAL THERAPIST

## 2025-02-10 PROCEDURE — 85041 AUTOMATED RBC COUNT: CPT | Performed by: INTERNAL MEDICINE

## 2025-02-10 PROCEDURE — 84295 ASSAY OF SERUM SODIUM: CPT | Performed by: INTERNAL MEDICINE

## 2025-02-10 PROCEDURE — 250N000013 HC RX MED GY IP 250 OP 250 PS 637: Performed by: INTERNAL MEDICINE

## 2025-02-10 PROCEDURE — 99232 SBSQ HOSP IP/OBS MODERATE 35: CPT | Performed by: INTERNAL MEDICINE

## 2025-02-10 RX ORDER — POLYETHYLENE GLYCOL 3350 17 G/17G
17 POWDER, FOR SOLUTION ORAL DAILY
Status: DISCONTINUED | OUTPATIENT
Start: 2025-02-10 | End: 2025-02-13 | Stop reason: HOSPADM

## 2025-02-10 RX ADMIN — MONTELUKAST 10 MG: 10 TABLET, FILM COATED ORAL at 21:02

## 2025-02-10 RX ADMIN — LEVOTHYROXINE SODIUM 50 MCG: 0.03 TABLET ORAL at 06:00

## 2025-02-10 RX ADMIN — SENNOSIDES AND DOCUSATE SODIUM 1 TABLET: 50; 8.6 TABLET ORAL at 11:51

## 2025-02-10 RX ADMIN — PANTOPRAZOLE SODIUM 40 MG: 40 INJECTION, POWDER, FOR SOLUTION INTRAVENOUS at 21:02

## 2025-02-10 RX ADMIN — PANTOPRAZOLE SODIUM 40 MG: 40 INJECTION, POWDER, FOR SOLUTION INTRAVENOUS at 09:31

## 2025-02-10 RX ADMIN — CARVEDILOL 6.25 MG: 6.25 TABLET, FILM COATED ORAL at 18:05

## 2025-02-10 RX ADMIN — FLUTICASONE FUROATE AND VILANTEROL TRIFENATATE 1 PUFF: 100; 25 POWDER RESPIRATORY (INHALATION) at 09:33

## 2025-02-10 RX ADMIN — CARVEDILOL 6.25 MG: 6.25 TABLET, FILM COATED ORAL at 09:31

## 2025-02-10 RX ADMIN — POLYETHYLENE GLYCOL 3350 17 G: 17 POWDER, FOR SOLUTION ORAL at 11:55

## 2025-02-10 RX ADMIN — ACETAMINOPHEN 650 MG: 325 TABLET ORAL at 05:47

## 2025-02-10 RX ADMIN — ALBUTEROL SULFATE 2 PUFF: 90 AEROSOL, METERED RESPIRATORY (INHALATION) at 09:35

## 2025-02-10 RX ADMIN — CALCIUM CARBONATE (ANTACID) CHEW TAB 500 MG 1000 MG: 500 CHEW TAB at 05:46

## 2025-02-10 ASSESSMENT — ACTIVITIES OF DAILY LIVING (ADL)
ADLS_ACUITY_SCORE: 46
ADLS_ACUITY_SCORE: 42
ADLS_ACUITY_SCORE: 42
ADLS_ACUITY_SCORE: 43
ADLS_ACUITY_SCORE: 46
ADLS_ACUITY_SCORE: 43
ADLS_ACUITY_SCORE: 42
ADLS_ACUITY_SCORE: 46
ADLS_ACUITY_SCORE: 43
ADLS_ACUITY_SCORE: 46
ADLS_ACUITY_SCORE: 43
ADLS_ACUITY_SCORE: 42
ADLS_ACUITY_SCORE: 42
ADLS_ACUITY_SCORE: 46
ADLS_ACUITY_SCORE: 42
ADLS_ACUITY_SCORE: 46
ADLS_ACUITY_SCORE: 42
ADLS_ACUITY_SCORE: 43
ADLS_ACUITY_SCORE: 46

## 2025-02-10 NOTE — PROGRESS NOTES
[facility]    PROGRESS NOTE - Hospitalist Service    Assessment and Plan  89 years old female with a past medical history of asthma, NSTEMI s/p PCI on Plavix, GERD, and HTN presenting to the emergency department for evaluation of generalized weakness for the past few days.  Found to have GI bleeding and admitted with acute blood loss anemia.     Acute blood loss anemia  - Secondary to GI bleeding  - Patient presented with hemoglobin of 7.8  - Hemoglobin dropped to 5.5 after few hours with hypotension  - Rapid response was called and patient was transferred to the ICU  - S/P blood transfusion  - Hemoglobin was up to 9.0 and again dropped to 6.5  - CTA abdomen is negative for active bleeding  - Continue to monitor hemoglobin every 6 hour  - Continue to hold home aspirin and restart Plavix on 2/9/25  - Received IV iron   -Hemoglobin drops again to 7.0 in the morning  -Hold Plavix and continue to monitor hemoglobin     Acute GI bleed  - GI consult, appreciate input  - S/P EGD which showed clotted blood in the gastric fundus and gastric body  - No active bleeding  - Continue IV PPI twice daily  -S/P another EGD on 2/7/2025 which showed ulcerated lesion with remnant of a blood vessel was treated again with bipolar gold probe.   -Continue to monitor hemoglobin as above  - IV iron on 2/8/25  -Hemoglobin dropped to 7.0 today, continue to monitor every 6 hours     Hypotension   - Secondary to acute blood loss anemia  - Patient transferred to ICU after rapid response on 2/6/2025  - Improving blood pressure with fluid and blood resuscitation  - Echo shows EF of 65 to 70% with grade 1 diastolic dysfunction  - Blood pressure is better today  - Restart Coreg and continue to hold other hypertension medications  - Transfer out of ICU on 2/8/25.     Acute on chronic renal failure  - Baseline chronic kidney disease stage III  - Probably secondary to hypotension with hypovolemia secondary GI bleed in addition  to contrast  - Nephrology consult, appreciate input  - Change IV fluid to D5W with bicarb as per nephrology   - Continue IV Lasix as per nephrology  - Continue to monitor renal function  -Unremarkable renal US for obstructive uropathy     History of asthma  - No signs or symptoms of acute exacerbation  - Received IV steroid in the ER  - Continue nebs  - Hold on further steroid for now     Leukocytosis  - Probably stress reaction  - No sign or symptom of infection  - Unremarkable UA and CT chest for infection  -Resolved    History of coronary disease  - Patient denies chest pain  - Hold home aspirin and Plavix due to GI bleeding  - Hold hypertension medication because of hypotension     Chronic back pain  - Resume Tylenol # 3     Weakness and deconditioning  -PT/OT evaluation   -Plan for home care on discharge.      40 MINUTES SPENT BY ME on the date of service doing chart review, history, exam, documentation & further activities per the note    Principal Problem:    Gastrointestinal hemorrhage with melena  Active Problems:    Generalized weakness    Loose stools      VTE prophylaxis:  Pneumatic Compression Devices  DIET: Orders Placed This Encounter      Regular Diet Adult      Disposition/Barriers to discharge: Monitor hemoglobin and renal function.  Medically Ready for Discharge: Anticipated in 2-4 Days    Code Status: No CPR- Do NOT Intubate    Subjective:   is feeling about the same today, no bowel movement.  Denies any chest pain or shortness of breath.    PHYSICAL EXAM  Vitals:    02/07/25 0500 02/08/25 0600 02/09/25 0253   Weight: 90.7 kg (200 lb) 89.5 kg (197 lb 6.4 oz) 86.7 kg (191 lb 1.6 oz)     B/P:122/58 T:98.3 P:73 R:18     Intake/Output Summary (Last 24 hours) at 2/10/2025 1514  Last data filed at 2/10/2025 0900  Gross per 24 hour   Intake 600 ml   Output --   Net 600 ml      Body mass index is 30.84 kg/m .    Constitutional: awake, alert, cooperative, no apparent distress, and appears stated  age  Respiratory: No increased work of breathing, good air exchange, clear to auscultation bilaterally, no crackles or wheezing  Cardiovascular: Normal apical impulse, regular rate and rhythm, normal S1 and S2, no S3 or S4, and no murmur noted  GI: No scars, normal bowel sounds, soft, non-distended, non-tender, no masses palpated, no hepatosplenomegally  Skin: no bruising or bleeding and normal skin color, texture, turgor  Musculoskeletal: There is no redness, warmth, or swelling of the joints.  Full range of motion noted.  no lower extremity pitting edema present  Neurologic: Awake, alert, oriented to name, place and time.  Cranial nerves II-XII are grossly intact.  Motor is 5 out of 5 bilaterally.   Sensory is intact.    Neuropsychiatric: Appropriate with examiner      PERTINENT LABS/IMAGING:    I have personally reviewed the following data over the past 24 hrs:    9.5  \   7.6 (L)   / 108 (L)     141 106 52.0 (H) /  120 (H)   3.8 30 (H) 1.38 (H) \     ALT: N/A AST: N/A AP: N/A TBILI: N/A   ALB: 2.7 (L) TOT PROTEIN: N/A LIPASE: N/A       Imaging results reviewed over the past 24 hrs:   No results found for this or any previous visit (from the past 24 hours).    Discussed with patient, family, nephrology, nursing staff and discharge planner    Kirby Ayala MD  [facility]  [phone]

## 2025-02-10 NOTE — PLAN OF CARE
Problem: Adult Inpatient Plan of Care  Goal: Readiness for Transition of Care  Outcome: Progressing     Problem: Gastrointestinal Bleeding  Goal: Optimal Coping with Acute Illness  Outcome: Progressing       Goal Outcome Evaluation:         Pt A&Ox4, VSS on room air. Denying pain all shift. Declines turns and repositions but is able to move in bed independently on her own. No BM for three days offered PRN senna for this but pt declining. Encouraged prune juice. Bed alarm on, call light within reach.

## 2025-02-10 NOTE — PROGRESS NOTES
Care Management Follow Up    Length of Stay (days): 5    Expected Discharge Date: 02/11/2025     Concerns to be Addressed:    Care progression - discharge planning   Patient plan of care discussed at interdisciplinary rounds: Yes    Anticipated Discharge Disposition:  Therapy rec home care PT/OT    Anticipated Discharge Services: Accepted by  [facility] for RN/PT/OT  Anticipated Discharge DME:  NA    Patient/family educated on Medicare website which has current facility and service quality ratings:  NA  Education Provided on the Discharge Plan:  Yes per team  Patient/Family in Agreement with the Plan:  Yes    Referrals Placed by CM/SW:  Yes  Private pay costs discussed: Not applicable    Discussed  Partnership in Safe Discharge Planning  document with patient/family: No     Handoff Completed: No, handoff not indicated or clinically appropriate    Additional Information:  Met with patient and a niece at bedside to discuss the therapy rec for home with home care PT/OT. Patient agreed. OK to add RN.    Referral sent     Social Hx:  Assessment: Follow. Live alone in a house. Ind w/ADLS (walker/cane), and IADLS. Niece Sandra is primary HCA, and pt's niece Meenu is secondary HCA. Stated either niece can be contacted. Pt is on waitlist already for two ALFs.    Last note: 02/08/25   Plan: Therapy rec home w/home care, [facility] accepted for RN/PT/OT  Needs: Medical stability  Hand off sent: Yes   Transport: Family/friend     Next Steps: RNCM to follow for medical progression, recommendations, and final discharge plan.     Mai Zabala RN

## 2025-02-10 NOTE — PROGRESS NOTES
RENAL PROGRESS NOTE    CC:  CARLYLE    ASSESSMENT & PLAN:     PLAN:   -Continue expectant management (avoid nephrotoxins, renal dose medications, avoid hyper/Hypotension, daily wt, daily I/O).   -Renal function, electrolytes, acid/base, and CKD-MBD stable. With stability Nephrology will sign of, please re-consult if need arise.   -She was agreeable and has an out-patient Nephrology follow up 3/10/25 11 AM with Olga SCHAFER  -PATRICIA daily    CARLYLE on  CKD 3/4: BL Cr 1.3-1.6, variable. Cr janelle to 2.3 here.  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 .  Unclear etiology but multiple possibility from hemodynamic, recurrent CARLYEL, and age related changes.  Will need further workup as an outpatient. REnal funtion now back to BL.    Hyperkalemia: Potassium was 5.5-5.4 for last few checks.  Suspect from GI bleed with CARLYLE causing failure to excrete potassium.  Started Lokelma 10 g 3 times daily and discontinued after one dose.   Shifted with insulin and glucose for potassium above 5.6.  Discontinue furosemide  Initiated bicarb infusion for intracellular shifting and discontinued  due to bicarb normalized.   K 3.6 today.     Acid-base: Bicarb 30, contraction alkalosis?/Dry? Monitor.      BP/volume: VSS.  Volume is slightly hypervolemic. Furosemide discontinue over the weekend and may need to reintroduce if she is still volume overload. Oxygenation improving.      BMD: Calcium 7.9.  Albumin 3.0 and corrected calcium 8.7.  Phosphorus 4.3 today.  Vitamin D and PTH level checked as an outpatient.     Anemia: Hemoglobin 7.9.  Likely from GI bleed.  Received 4 units of PRBC and received approximately 1 g of iron from that.  Will need to give REED as an outpatient if hemoglobin still less than 10.    SUBJECTIVE:    PT resting in bed  Daughter at bedside  PT globally reports feeling better, no complaints  Denies pina, n, v, c, d, sob,  fever, rash or CP  Denies HA, feeling dizzy  Denies edema  Reports drinking PO fluids  No urinary complaints, feels able to empty bladder  She ahs never followed with a renal provider, stated PCP follows kidney function, but she would like to see neph upon discharge for good measure. I will set up f/unit(s) appt.   Discussed labs plan and answered all questions    OBJECTIVE:  Physical Exam   Temp: 98.3  F (36.8  C) Temp src: Oral BP: 122/58 Pulse: 73   Resp: 18 SpO2: 92 % O2 Device: None (Room air) Oxygen Delivery: 1.5 LPM  Vitals:    02/07/25 0500 02/08/25 0600 02/09/25 0253   Weight: 90.7 kg (200 lb) 89.5 kg (197 lb 6.4 oz) 86.7 kg (191 lb 1.6 oz)     Vital Signs with Ranges  Temp:  [98  F (36.7  C)-99  F (37.2  C)] 98.3  F (36.8  C)  Pulse:  [73-83] 73  Resp:  [18] 18  BP: (109-146)/(54-65) 122/58  SpO2:  [90 %-93 %] 92 %  I/O last 3 completed shifts:  In: 600 [P.O.:600]  Out: -     Patient Vitals for the past 72 hrs:   Weight   02/09/25 0253 86.7 kg (191 lb 1.6 oz)   02/08/25 0600 89.5 kg (197 lb 6.4 oz)     Intake/Output Summary (Last 24 hours) at 2/10/2025 1137  Last data filed at 2/10/2025 0900  Gross per 24 hour   Intake 600 ml   Output --   Net 600 ml       PHYSICAL EXAM:  GEN: NAD, aox3, good historian, elderly female  CV: RRR no JVD  Lung: clear and equal, on RA  Ab: soft and NT  Ext: +very mild trace ankle edema BL and well perfused  Skin: no rash  Psych: cooperative, good historian  Neuro: NFD, No asterixis.   : no mendez      LABORATORY STUDIES:     Recent Labs   Lab 02/10/25  0716 02/09/25  0640 02/08/25  0510 02/07/25  1247 02/07/25  0651 02/07/25  0058 02/06/25  1138 02/06/25  0504 02/06/25  0232 02/05/25 2054   WBC 9.5 11.5* 12.6*  --  17.6*  --   --  22.0*  --  14.2*   RBC 2.39* 2.77* 2.64*  --  2.80*  --   --  2.82*  --  1.82*   HGB 7.0* 7.9* 7.7* 7.7* 8.2* 6.8*   < > 8.4*   < > 5.5*   HCT 21.4* 24.5* 22.1*  --  24.3*  --   --  25.9*  --  17.8*   * 100* 93*  --  80*  --   --  152  --   155    < > = values in this interval not displayed.       Basic Metabolic Panel:  Recent Labs   Lab 02/10/25  0716 02/09/25  0640 02/08/25  0510 02/07/25  1805 02/07/25  0602 02/06/25  1203 02/06/25  1138 02/06/25  0705 02/06/25  0504    144 143 140 137  --   --   --  140   POTASSIUM 3.8 3.6 3.7 4.4 5.4*  --  5.4*   < > 5.5*   CHLORIDE 106 104 108* 112* 111*  --   --   --  114*   CO2 30* 27 26 17* 13*  --   --   --  13*   BUN 52.0* 74.5* 91.9* 102.2* 120.9*  --   --   --  103.0*   CR 1.38* 1.66* 2.06* 2.34* 2.59*  --   --   --  1.90*   * 123* 117* 126* 135* 176*  --   --  212*   GREG 7.9* 7.9* 7.8* 7.6* 7.4*  --   --   --  7.8*    < > = values in this interval not displayed.       INR  Recent Labs   Lab 02/06/25  0731 02/05/25  1012   INR 1.45* 1.20*        Recent Labs   Lab Test 02/10/25  0716 02/09/25  0640 02/06/25  1138 02/06/25  0731 02/05/25  2054 02/05/25  1012   INR  --   --   --  1.45*  --  1.20*   WBC 9.5 11.5*   < >  --    < > 16.3*   HGB 7.0* 7.9*   < >  --    < > 7.8*   * 100*   < >  --    < > 209    < > = values in this interval not displayed.       Personally reviewed current labs    Marcie BERG-BC  [facility]  [phone]

## 2025-02-10 NOTE — PROGRESS NOTES
"   02/10/25 3292   Appointment Info   Signing Clinician's Name / Credentials (PT) Carmen Chambers, PT, DPT   Living Environment   People in Home alone   Current Living Arrangements house   Home Accessibility stairs to enter home;stairs within home   Number of Stairs, Main Entrance 3   Stair Railings, Main Entrance railings safe and in good condition   Number of Stairs, Within Home, Primary greater than 10 stairs   Stair Railings, Within Home, Primary railings safe and in good condition   Living Environment Comments Pt reports descending sideways down basement stairs for laundry.   Self-Care   Equipment Currently Used at Home cane, straight  (tripod base)   Fall history within last six months no   Activity/Exercise/Self-Care Comment Patient denies falls this session. Patient independent with all ADLs/IADLs. Reports using cane only as needed when feeling unsteady. Reports using walker in the summertime when ambulating outdoors. In the winter, patient states she ambulates around department stores with shopping cart.   General Information   Onset of Illness/Injury or Date of Surgery 02/05/25   Referring Physician Kirby Ayala MD   Patient/Family Therapy Goals Statement (PT) None stated.   Pertinent History of Current Problem (include personal factors and/or comorbidities that impact the POC) Per MD note: \"89 years old female with a past medical history of asthma, NSTEMI s/p PCI on Plavix, GERD, and HTN presenting to the emergency department for evaluation of generalized weakness for the past few days.  Found to have GI bleeding and admitted with acute blood loss anemia.\"   Existing Precautions/Restrictions fall   Range of Motion (ROM)   Range of Motion ROM is WFL   Strength (Manual Muscle Testing)   Strength (Manual Muscle Testing) strength is WFL   Bed Mobility   Bed Mobility supine-sit   Supine-Sit [place] (Bed Mobility) supervision;verbal cues   Impairments Contributing to Impaired Bed Mobility decreased " strength   Assistive Device (Bed Mobility) bed rails   Transfers   Transfers sit-stand transfer   Transfer Safety Concerns Noted decreased weight-shifting ability   Impairments Contributing to Impaired Transfers impaired balance;decreased strength   Sit-Stand Transfer   Sit-Stand [place] (Transfers) supervision;verbal cues   Assistive Device (Sit-Stand Transfers) walker, front-wheeled   Gait/Stairs (Locomotion)   [place] Level (Gait) verbal cues;contact guard   Assistive Device (Gait) walker, front-wheeled   Distance in Feet (Gait) 30'   Pattern (Gait) step-to   Deviations/Abnormal Patterns (Gait) jono decreased;gait speed decreased   Comment, (Gait/Stairs) Stairs not yet attempted. Based on functional mobility observed, pt likely able to complete stairs.   Clinical Impression   Criteria for Skilled Therapeutic Intervention Yes, treatment indicated   PT Diagnosis (PT) impaired functional mobility   Influenced by the following impairments decreased strength, impaired balance, decreased activity tolerance   Functional limitations due to impairments transfers, ambulation, stairs   Clinical Presentation (PT Evaluation Complexity) stable   Clinical Presentation Rationale Clinical judgment.   Clinical Decision Making (Complexity) low complexity   Planned Therapy Interventions (PT) balance training;bed mobility training;gait training;home exercise program;neuromuscular re-education;patient/family education;stair training;strengthening;transfer training   Risk & Benefits of therapy have been explained evaluation/treatment results reviewed;participants voiced agreement with care plan;participants included;patient   PT Total Evaluation Time   PT Eval, Low Complexity Minutes (61685) 14   Physical Therapy Goals   PT Frequency Daily   PT Predicted Duration/Target Date for Goal Attainment 02/17/25   PT Goals Bed Mobility;Transfers;Gait;Stairs   PT: Bed Mobility Independent;Supine to/from sit   PT: Transfers  Supervision/stand-by assist;Sit to/from stand;Assistive device   PT: Gait Supervision/stand-by assist;Assistive device;Greater than 200 feet  (LRAD)   PT: Stairs Supervision/stand-by assist;Rail on both sides;Greater than 10 stairs   Interventions   Interventions Quick Adds Gait Training   Gait Training   Gait Training Minutes (58833) 10   Symptoms Noted During/After Treatment (Gait Training) shortness of breath   Treatment Detail/Skilled Intervention Pt has cane from home in room, but requests use of walker for ambulation. Pt short of breath following walk, reports this is common due to PMH significant for asthma. O2 sats 94% on 1L O2 following walk. While ambulating, pt ascends/descends slight inclined ramp over 20'. CGA while ascending/descending ramp, otherwise SBA for rest of walk. Pt seated in chair following walk, chair alarm engaged, call light in reach.   Distance in Feet additional 90'   [place] Level (Gait Training) contact guard   Physical Assistance Level (Gait Training) verbal cues;supervision   Assistive Device (Gait Training) rolling walker  (FWW)   Gait Analysis Deviations decreased jono;decreased step length   Impairments (Gait Analysis/Training) balance impaired;strength decreased   PT Discharge Planning   PT Plan stairs, gait with FWW vs cane (has own in room)   PT Discharge Recommendation (DC Rec) home with assist;home with home care physical therapy   PT Rationale for DC Rec Patient able to complete transfers and ambulation with stand-by to contact guard assist of 1. Reports daily checks from a neighbor. Anticipate patient will be safe to discharge home when medically ready. Recommend continued physical therapy at discharge to maximize strength, independence and endurance.   PT Brief overview of current status Supine > sit, SBA. Sit <> stand, SBA with FWW. Ambulates 120' with FWW, stand-by to CGA.   PT Total Distance Amb During Session (feet) 120   PT Equipment Needed at Discharge  walker, rolling  (FWW; pt requesting to use walker at this time)   Physical Therapy Time and Intention   Timed Code Treatment Minutes 10   Total Session Time (sum of timed and untimed services) 24

## 2025-02-10 NOTE — PROGRESS NOTES
SPIRITUAL HEALTH SERVICES PROGRESS NOTE  [facility]; P1    Saw pt Mary Corral due to admission screening response    Patient/Family Understanding of Illness and Goals of Care -  engaged easily in conversation, sharing that she came in due to weakness and learned that she had an ulcer. She notes that it was cauterized, but that her hemoglobin level still has not come up to normal yet. She leads a fairly active lifestyle (see below) and is feeling a bit restless here in the hospital.  is hoping that she will be be able to discharge to home tomorrow.      Distress and Loss -  is adjusting to changes in her health and overall wellbeing. She notes that many of her friends are  beginning to experience significant changes in their health as well.  shares that her family does not want her to live alone much longer and that she is on waiting lists for several senior living facilities.     Strengths, Coping, and Resources -  notes that she typically is quite busy, going to lunch with friends multiple times a week, walking indoors often, going to bingo, going to the casino monthly, and playing golf in the summer. She has a robust group of friends and neighbors and feels comfortable asking others for help.  is accepting of the fact that she is aging. She notes that listening to her doctors and staying on top of her medications has been helpful.     Meaning, Beliefs, and Spirituality -  is Confucianism. She is connected to [facility]. She has received communion several times while here in the hospital and does not feel the need to notify her parish at this time. No other concerns noted.     Plan of Care: Spiritual care staff will remain available for further follow-up as requested by patient, family or staff.      Radha Stewart M.Div.      Office: [phone] (for non-urgent requests)  Please Vocera or page through [facility] for time-sensitive requests

## 2025-02-10 NOTE — PLAN OF CARE
Problem: Risk for Delirium  Goal: Optimal Coping  Outcome: Progressing  Intervention: Optimize Psychosocial Adjustment to Delirium  Recent Flowsheet Documentation  Taken 2/10/2025 0000 by Niels Moe RN  Supportive Measures:   active listening utilized   positive reinforcement provided   self-care encouraged  Goal: Improved Sleep  Outcome: Progressing  Intervention: Promote Sleep  Recent Flowsheet Documentation  Taken 2/10/2025 0000 by Niels Moe RN  Sleep/Rest Enhancement:   comfort measures   medication   regular sleep/rest pattern promoted   Goal Outcome Evaluation:               LOC: A/O x 4   Neuro: no tremor, steady gait   Ambulates: SBA with a Cane   Resp:   Cardiac: No tele was Discontinued   Skin: Intact edematous   GI: passing gas and stool   : VoIds well  PRN: none given   Pt slept between cares. She got up once during the night to use the toilet. C/o UPPER ABDOMINAL PAIN THAT RADIATES RIGHT TO LEFT upon inspirations. .Given PRN Tylenol and Tums for relief.  Call light within reach. Pt can verbalize understanding and needs. Left in stable condition in the care of  The oncoming nurse.

## 2025-02-10 NOTE — PLAN OF CARE
Goal Outcome Evaluation:      Plan of Care Reviewed With: patient    Overall Patient Progress: improvingOverall Patient Progress: improving    Outcome Evaluation: No BM for 5 days, miralax and senna given, encourage increased ambulation.  Hgb q6 hour, patient denies dizziness. dyspnea headache.

## 2025-02-11 ENCOUNTER — APPOINTMENT (OUTPATIENT)
Dept: PHYSICAL THERAPY | Facility: HOSPITAL | Age: 89
DRG: 378 | End: 2025-02-11
Attending: INTERNAL MEDICINE
Payer: COMMERCIAL

## 2025-02-11 LAB
ANION GAP SERPL CALCULATED.3IONS-SCNC: 7 MMOL/L (ref 7–15)
BUN SERPL-MCNC: 38.2 MG/DL (ref 8–23)
CALCIUM SERPL-MCNC: 8.2 MG/DL (ref 8.8–10.4)
CHLORIDE SERPL-SCNC: 107 MMOL/L (ref 98–107)
CREAT SERPL-MCNC: 1.24 MG/DL (ref 0.51–0.95)
EGFRCR SERPLBLD CKD-EPI 2021: 42 ML/MIN/1.73M2
ERYTHROCYTE [DISTWIDTH] IN BLOOD BY AUTOMATED COUNT: 18.9 % (ref 10–15)
GLUCOSE SERPL-MCNC: 115 MG/DL (ref 70–99)
HCO3 SERPL-SCNC: 29 MMOL/L (ref 22–29)
HCT VFR BLD AUTO: 22.6 % (ref 35–47)
HGB BLD-MCNC: 7.2 G/DL (ref 11.7–15.7)
HGB BLD-MCNC: 7.4 G/DL (ref 11.7–15.7)
HGB BLD-MCNC: 7.5 G/DL (ref 11.7–15.7)
MAGNESIUM SERPL-MCNC: 1.9 MG/DL (ref 1.7–2.3)
MCH RBC QN AUTO: 28.8 PG (ref 26.5–33)
MCHC RBC AUTO-ENTMCNC: 31.9 G/DL (ref 31.5–36.5)
MCV RBC AUTO: 90 FL (ref 78–100)
PLATELET # BLD AUTO: 137 10E3/UL (ref 150–450)
POTASSIUM SERPL-SCNC: 4.2 MMOL/L (ref 3.4–5.3)
RBC # BLD AUTO: 2.5 10E6/UL (ref 3.8–5.2)
SODIUM SERPL-SCNC: 143 MMOL/L (ref 135–145)
WBC # BLD AUTO: 10.8 10E3/UL (ref 4–11)

## 2025-02-11 PROCEDURE — 83735 ASSAY OF MAGNESIUM: CPT | Performed by: INTERNAL MEDICINE

## 2025-02-11 PROCEDURE — 36415 COLL VENOUS BLD VENIPUNCTURE: CPT | Performed by: INTERNAL MEDICINE

## 2025-02-11 PROCEDURE — 97530 THERAPEUTIC ACTIVITIES: CPT | Mod: GP | Performed by: PHYSICAL THERAPIST

## 2025-02-11 PROCEDURE — 86901 BLOOD TYPING SEROLOGIC RH(D): CPT | Performed by: INTERNAL MEDICINE

## 2025-02-11 PROCEDURE — 250N000013 HC RX MED GY IP 250 OP 250 PS 637: Performed by: INTERNAL MEDICINE

## 2025-02-11 PROCEDURE — 86923 COMPATIBILITY TEST ELECTRIC: CPT | Performed by: INTERNAL MEDICINE

## 2025-02-11 PROCEDURE — 82310 ASSAY OF CALCIUM: CPT | Performed by: INTERNAL MEDICINE

## 2025-02-11 PROCEDURE — 94640 AIRWAY INHALATION TREATMENT: CPT

## 2025-02-11 PROCEDURE — 250N000009 HC RX 250: Performed by: INTERNAL MEDICINE

## 2025-02-11 PROCEDURE — 999N000157 HC STATISTIC RCP TIME EA 10 MIN

## 2025-02-11 PROCEDURE — 250N000009 HC RX 250: Performed by: HOSPITALIST

## 2025-02-11 PROCEDURE — 120N000001 HC R&B MED SURG/OB

## 2025-02-11 PROCEDURE — 85018 HEMOGLOBIN: CPT | Performed by: INTERNAL MEDICINE

## 2025-02-11 PROCEDURE — 85027 COMPLETE CBC AUTOMATED: CPT | Performed by: INTERNAL MEDICINE

## 2025-02-11 PROCEDURE — 97116 GAIT TRAINING THERAPY: CPT | Mod: GP | Performed by: PHYSICAL THERAPIST

## 2025-02-11 PROCEDURE — 99232 SBSQ HOSP IP/OBS MODERATE 35: CPT | Performed by: INTERNAL MEDICINE

## 2025-02-11 PROCEDURE — 250N000011 HC RX IP 250 OP 636: Performed by: INTERNAL MEDICINE

## 2025-02-11 PROCEDURE — 258N000003 HC RX IP 258 OP 636: Performed by: INTERNAL MEDICINE

## 2025-02-11 RX ORDER — FUROSEMIDE 10 MG/ML
10 INJECTION INTRAMUSCULAR; INTRAVENOUS ONCE
Status: COMPLETED | OUTPATIENT
Start: 2025-02-11 | End: 2025-02-11

## 2025-02-11 RX ORDER — AMOXICILLIN 250 MG
1 CAPSULE ORAL 2 TIMES DAILY
Status: DISCONTINUED | OUTPATIENT
Start: 2025-02-11 | End: 2025-02-13 | Stop reason: HOSPADM

## 2025-02-11 RX ADMIN — ATORVASTATIN CALCIUM 40 MG: 40 TABLET, FILM COATED ORAL at 08:43

## 2025-02-11 RX ADMIN — MONTELUKAST 10 MG: 10 TABLET, FILM COATED ORAL at 21:18

## 2025-02-11 RX ADMIN — POLYETHYLENE GLYCOL 3350 17 G: 17 POWDER, FOR SOLUTION ORAL at 08:49

## 2025-02-11 RX ADMIN — SENNOSIDES AND DOCUSATE SODIUM 1 TABLET: 50; 8.6 TABLET ORAL at 21:18

## 2025-02-11 RX ADMIN — PANTOPRAZOLE SODIUM 40 MG: 40 INJECTION, POWDER, FOR SOLUTION INTRAVENOUS at 21:19

## 2025-02-11 RX ADMIN — IRON SUCROSE 200 MG: 20 INJECTION, SOLUTION INTRAVENOUS at 10:31

## 2025-02-11 RX ADMIN — FUROSEMIDE 10 MG: 10 INJECTION, SOLUTION INTRAVENOUS at 14:55

## 2025-02-11 RX ADMIN — CLOPIDOGREL BISULFATE 75 MG: 75 TABLET ORAL at 08:43

## 2025-02-11 RX ADMIN — ACETAMINOPHEN AND CODEINE PHOSPHATE 1 TABLET: 300; 30 TABLET ORAL at 13:51

## 2025-02-11 RX ADMIN — CARVEDILOL 6.25 MG: 6.25 TABLET, FILM COATED ORAL at 18:13

## 2025-02-11 RX ADMIN — LEVOTHYROXINE SODIUM 50 MCG: 0.03 TABLET ORAL at 06:16

## 2025-02-11 RX ADMIN — SENNOSIDES AND DOCUSATE SODIUM 1 TABLET: 50; 8.6 TABLET ORAL at 10:31

## 2025-02-11 RX ADMIN — FLUTICASONE FUROATE AND VILANTEROL TRIFENATATE 1 PUFF: 100; 25 POWDER RESPIRATORY (INHALATION) at 08:43

## 2025-02-11 RX ADMIN — PANTOPRAZOLE SODIUM 40 MG: 40 INJECTION, POWDER, FOR SOLUTION INTRAVENOUS at 09:01

## 2025-02-11 RX ADMIN — CARVEDILOL 6.25 MG: 6.25 TABLET, FILM COATED ORAL at 08:43

## 2025-02-11 RX ADMIN — IPRATROPIUM BROMIDE AND ALBUTEROL SULFATE 3 ML: .5; 3 SOLUTION RESPIRATORY (INHALATION) at 21:45

## 2025-02-11 ASSESSMENT — ACTIVITIES OF DAILY LIVING (ADL)
ADLS_ACUITY_SCORE: 38
ADLS_ACUITY_SCORE: 38
ADLS_ACUITY_SCORE: 39
ADLS_ACUITY_SCORE: 39
ADLS_ACUITY_SCORE: 38
ADLS_ACUITY_SCORE: 39
ADLS_ACUITY_SCORE: 39
ADLS_ACUITY_SCORE: 38
ADLS_ACUITY_SCORE: 39
ADLS_ACUITY_SCORE: 38
ADLS_ACUITY_SCORE: 39
ADLS_ACUITY_SCORE: 38
ADLS_ACUITY_SCORE: 39
ADLS_ACUITY_SCORE: 38
ADLS_ACUITY_SCORE: 39
ADLS_ACUITY_SCORE: 38
ADLS_ACUITY_SCORE: 39
ADLS_ACUITY_SCORE: 38
ADLS_ACUITY_SCORE: 39

## 2025-02-11 NOTE — PROGRESS NOTES
[facility]    PROGRESS NOTE - Hospitalist Service    Assessment and Plan  89 years old female with a past medical history of asthma, NSTEMI s/p PCI on Plavix, GERD, and HTN presenting to the emergency department for evaluation of generalized weakness for the past few days.  Found to have GI bleeding and admitted with acute blood loss anemia.     Acute blood loss anemia  - Secondary to GI bleeding  - Patient presented with hemoglobin of 7.8  - Hemoglobin dropped to 5.5 after few hours with hypotension  - Rapid response was called and patient was transferred to the ICU  - S/P blood transfusion  - Hemoglobin was up to 9.0 and again dropped to 6.5  - CTA abdomen is negative for active bleeding  - Continue to monitor hemoglobin every 6 hour  - Continue to hold home aspirin and restart Plavix on 2/9/25  - Received IV iron   - Hemoglobin is on the low side but stable overall  -Hold Plavix initially but restart yesterday.    Acute GI bleed  - GI consult, appreciate input  - S/P EGD which showed clotted blood in the gastric fundus and gastric body  - No active bleeding  - Continue IV PPI twice daily  -S/P another EGD on 2/7/2025 which showed ulcerated lesion with remnant of a blood vessel was treated again with bipolar gold probe.   -Continue to monitor hemoglobin as above  - IV iron on 2/8/25 and 2/10/2025  -Hemoglobin is stable around 7.5, continue to monitor after starting Plavix     Hypotension   - Secondary to acute blood loss anemia  - Patient transferred to ICU after rapid response on 2/6/2025  - Improving blood pressure with fluid and blood resuscitation  - Echo shows EF of 65 to 70% with grade 1 diastolic dysfunction  - Blood pressure is better today  - Restart Coreg and continue to hold other hypertension medications  - Transfer out of ICU on 2/8/25.     Acute on chronic renal failure  - Baseline chronic kidney disease stage III  - Probably secondary to hypotension with hypovolemia  secondary GI bleed in addition to contrast  - Nephrology consult, appreciate input  - Change IV fluid to D5W with bicarb as per nephrology   - - Continue to monitor renal function  -Unremarkable renal US for obstructive uropathy     History of asthma  - No signs or symptoms of acute exacerbation  - Received IV steroid in the ER  - Continue nebs  - Hold on further steroid for now     Leukocytosis  - Probably stress reaction  - No sign or symptom of infection  - Unremarkable UA and CT chest for infection  - Resolved     History of coronary disease  - Patient denies chest pain  - Hold home aspirin and Plavix due to GI bleeding  - Hold hypertension medication because of hypotension     Chronic back pain  - Resume Tylenol # 3     Weakness and deconditioning  -PT/OT evaluation   -Plan for home care on discharge.    Constipation  -Started on bowel regimen     40 MINUTES SPENT BY ME on the date of service doing chart review, history, exam, documentation & further activities per the note    Principal Problem:    Gastrointestinal hemorrhage with melena  Active Problems:    Generalized weakness    Loose stools      VTE prophylaxis:  Pneumatic Compression Devices  DIET: Orders Placed This Encounter      Regular Diet Adult      Disposition/Barriers to discharge: Monitor hemoglobin, IV iron  Medically Ready for Discharge: Anticipated Tomorrow    Code Status: No CPR- Do NOT Intubate    Subjective:   is feeling about the same, improving shortness of breath, no nausea or vomiting.  Tolerating oral diet, no BM yet.  Niece at bedside    PHYSICAL EXAM  Vitals:    02/07/25 0500 02/08/25 0600 02/09/25 0253   Weight: 90.7 kg (200 lb) 89.5 kg (197 lb 6.4 oz) 86.7 kg (191 lb 1.6 oz)     B/P:128/60 T:98 P:73 R:18     Intake/Output Summary (Last 24 hours) at 2/11/2025 1424  Last data filed at 2/11/2025 1036  Gross per 24 hour   Intake 940 ml   Output 400 ml   Net 540 ml      Body mass index is 30.84 kg/m .    Constitutional: awake, alert,  cooperative, no apparent distress, and appears stated age  Respiratory: No increased work of breathing, good air exchange, clear to auscultation bilaterally, no crackles or wheezing  Cardiovascular: Normal apical impulse, regular rate and rhythm, normal S1 and S2, no S3 or S4, and no murmur noted  GI: No scars, normal bowel sounds, soft, non-distended, non-tender, no masses palpated, no hepatosplenomegally  Skin: no bruising or bleeding and normal skin color, texture, turgor  Musculoskeletal: There is no redness, warmth, or swelling of the joints.  Full range of motion noted.  no lower extremity pitting edema present  Neurologic: Awake, alert, oriented to name, place and time.  Cranial nerves II-XII are grossly intact.  Motor is 5 out of 5 bilaterally.   Sensory is intact.    Neuropsychiatric: Appropriate with examiner      PERTINENT LABS/IMAGING:    I have personally reviewed the following data over the past 24 hrs:    10.8  \   7.5 (L)   / 137 (L)     143 107 38.2 (H) /  115 (H)   4.2 29 1.24 (H) \       Imaging results reviewed over the past 24 hrs:   No results found for this or any previous visit (from the past 24 hours).    Discussed with patient, family, nursing staff and discharge planner    Kirby Ayala MD  [facility] Medicine Service  [phone]

## 2025-02-11 NOTE — PLAN OF CARE
Problem: Adult Inpatient Plan of Care  Goal: Plan of Care Review  Description: The Plan of Care Review/Shift note should be completed every shift.  The Outcome Evaluation is a brief statement about your assessment that the patient is improving, declining, or no change.  This information will be displayed automatically on your shift  note.  Outcome: Progressing  Goal: Absence of Hospital-Acquired Illness or Injury  Outcome: Progressing  Intervention: Identify and Manage Fall Risk  Recent Flowsheet Documentation  Taken 2/11/2025 0041 by Sisi Hurst RN  Safety Promotion/Fall Prevention:   clutter free environment maintained   mobility aid in reach   nonskid shoes/slippers when out of bed   patient and family education   safety round/check completed   room organization consistent   increase visualization of patient   lighting adjusted  Taken 2/10/2025 1938 by Sisi Hurst RN  Safety Promotion/Fall Prevention:   clutter free environment maintained   mobility aid in reach   nonskid shoes/slippers when out of bed   patient and family education   safety round/check completed   room organization consistent   increase visualization of patient   lighting adjusted  Intervention: Prevent Skin Injury  Recent Flowsheet Documentation  Taken 2/10/2025 1937 by Sisi Hurst RN  Body Position:   position changed independently   supine, legs elevated  Goal: Optimal Comfort and Wellbeing  Outcome: Progressing     Problem: Gastrointestinal Bleeding  Goal: Optimal Coping with Acute Illness  Outcome: Progressing  Intervention: Optimize Psychosocial Response  Recent Flowsheet Documentation  Taken 2/11/2025 0041 by Sisi Hurst RN  Supportive Measures:   active listening utilized   positive reinforcement provided   self-care encouraged  Taken 2/10/2025 1938 by Sisi Hurst RN  Supportive Measures:   active listening utilized   positive reinforcement provided   self-care encouraged  Goal: Hemostasis  Outcome:  Progressing  Intervention: Manage Gastrointestinal Bleeding  Recent Flowsheet Documentation  Taken 2/11/2025 0041 by Sisi Hurst, RN  Environmental Support:   calm environment promoted   distractions minimized   environmental consistency promoted   rest periods encouraged  Taken 2/10/2025 1938 by Sisi Hurst, RN  Environmental Support:   calm environment promoted   distractions minimized   environmental consistency promoted   rest periods encouraged   Goal Outcome Evaluation:       Patient alert and oriented x 4. Denied pain. Refused PRN laxative tonight. Hemoglobin of 7.6. Next hemoglobin checks at 6 am. Call light within reach. Bed alarm on.

## 2025-02-11 NOTE — PLAN OF CARE
Goal Outcome Evaluation:  Patient A&O x 4 and able to make needs known. LS clear but diminished in lower lobes, denies SOB, weaned Off O2. O2 currently 93% on RA . Denies pain and nausea. Voiding adequately, No BM for a few days; Miralax and senna given. SBA with a cane for ambulation and transfers. Strict I&O. Edema noted to all extremeties. IV Lasix given.     Problem: Gastrointestinal Bleeding  Goal: Optimal Coping with Acute Illness  Outcome: Progressing   Hgb at 7.2;  serial Hgb checks every 6 hrs. No active bleeding noted. Patient denies lightheadedness/dizziness. IV Iron sucrose given .Plavix and ASA on hold.

## 2025-02-11 NOTE — PROGRESS NOTES
Care Management Follow Up    Length of Stay (days): 6    Expected Discharge Date: 02/12/2025     Concerns to be Addressed:    Care progression - discharge planning   Patient plan of care discussed at interdisciplinary rounds: Yes    Anticipated Discharge Disposition:  Therapy rec home care PT/OT    Anticipated Discharge Services: Accepted by  [facility] for RN/PT/OT  Anticipated Discharge DME:  NA    Patient/family educated on Medicare website which has current facility and service quality ratings:  NA  Education Provided on the Discharge Plan:  Yes per team  Patient/Family in Agreement with the Plan:  Yes    Referrals Placed by CM/SW:  Yes  Private pay costs discussed: Not applicable    Discussed  Partnership in Safe Discharge Planning  document with patient/family: No     Handoff Completed: No, handoff not indicated or clinically appropriate    Additional Information:  Per provider, Dr. Ayala, patient is not ready. May be one more day.    Social Hx:  Assessment: Follow. Live alone in a house. Ind w/ADLS (walker/cane), and IADLS. Niece Sandra is primary HCA, and pt's niece Meenu is secondary HCA. Stated either niece can be contacted. Pt is on waitlist already for two ALFs.    Last note: 02/11/25   Plan: Therapy rec home w/home care, [facility] accepted for RN/PT/OT  Needs: Medical stability  Hand off sent: Yes   Transport: Family/friend     Next Steps: RNCM to follow for medical progression, recommendations, and final discharge plan.     Mai Zabala RN

## 2025-02-12 ENCOUNTER — APPOINTMENT (OUTPATIENT)
Dept: PHYSICAL THERAPY | Facility: HOSPITAL | Age: 89
DRG: 378 | End: 2025-02-12
Attending: INTERNAL MEDICINE
Payer: COMMERCIAL

## 2025-02-12 LAB
ABO + RH BLD: NORMAL
ANION GAP SERPL CALCULATED.3IONS-SCNC: 5 MMOL/L (ref 7–15)
BLD GP AB SCN SERPL QL: NEGATIVE
BLD PROD TYP BPU: NORMAL
BLOOD COMPONENT TYPE: NORMAL
BUN SERPL-MCNC: 32.2 MG/DL (ref 8–23)
CALCIUM SERPL-MCNC: 8 MG/DL (ref 8.8–10.4)
CHLORIDE SERPL-SCNC: 105 MMOL/L (ref 98–107)
CODING SYSTEM: NORMAL
CREAT SERPL-MCNC: 1.42 MG/DL (ref 0.51–0.95)
CROSSMATCH: NORMAL
EGFRCR SERPLBLD CKD-EPI 2021: 36 ML/MIN/1.73M2
ERYTHROCYTE [DISTWIDTH] IN BLOOD BY AUTOMATED COUNT: 18.9 % (ref 10–15)
GLUCOSE SERPL-MCNC: 106 MG/DL (ref 70–99)
HCO3 SERPL-SCNC: 30 MMOL/L (ref 22–29)
HCT VFR BLD AUTO: 23 % (ref 35–47)
HGB BLD-MCNC: 7 G/DL (ref 11.7–15.7)
HGB BLD-MCNC: 7 G/DL (ref 11.7–15.7)
HGB BLD-MCNC: 8.3 G/DL (ref 11.7–15.7)
ISSUE DATE AND TIME: NORMAL
MAGNESIUM SERPL-MCNC: 1.9 MG/DL (ref 1.7–2.3)
MCH RBC QN AUTO: 29.7 PG (ref 26.5–33)
MCHC RBC AUTO-ENTMCNC: 30.4 G/DL (ref 31.5–36.5)
MCV RBC AUTO: 98 FL (ref 78–100)
PLATELET # BLD AUTO: 142 10E3/UL (ref 150–450)
POTASSIUM SERPL-SCNC: 4.2 MMOL/L (ref 3.4–5.3)
RBC # BLD AUTO: 2.36 10E6/UL (ref 3.8–5.2)
SODIUM SERPL-SCNC: 140 MMOL/L (ref 135–145)
SPECIMEN EXP DATE BLD: NORMAL
UNIT ABO/RH: NORMAL
UNIT NUMBER: NORMAL
UNIT STATUS: NORMAL
UNIT TYPE ISBT: 7300
WBC # BLD AUTO: 10.6 10E3/UL (ref 4–11)

## 2025-02-12 PROCEDURE — 250N000009 HC RX 250: Performed by: HOSPITALIST

## 2025-02-12 PROCEDURE — 97116 GAIT TRAINING THERAPY: CPT | Mod: GP

## 2025-02-12 PROCEDURE — 85018 HEMOGLOBIN: CPT | Performed by: INTERNAL MEDICINE

## 2025-02-12 PROCEDURE — P9016 RBC LEUKOCYTES REDUCED: HCPCS | Performed by: INTERNAL MEDICINE

## 2025-02-12 PROCEDURE — 250N000013 HC RX MED GY IP 250 OP 250 PS 637: Performed by: INTERNAL MEDICINE

## 2025-02-12 PROCEDURE — 85027 COMPLETE CBC AUTOMATED: CPT | Performed by: INTERNAL MEDICINE

## 2025-02-12 PROCEDURE — 36415 COLL VENOUS BLD VENIPUNCTURE: CPT | Performed by: INTERNAL MEDICINE

## 2025-02-12 PROCEDURE — 82435 ASSAY OF BLOOD CHLORIDE: CPT | Performed by: INTERNAL MEDICINE

## 2025-02-12 PROCEDURE — 83735 ASSAY OF MAGNESIUM: CPT | Performed by: INTERNAL MEDICINE

## 2025-02-12 PROCEDURE — 99232 SBSQ HOSP IP/OBS MODERATE 35: CPT | Performed by: INTERNAL MEDICINE

## 2025-02-12 PROCEDURE — 120N000001 HC R&B MED SURG/OB

## 2025-02-12 RX ORDER — BISACODYL 10 MG
10 SUPPOSITORY, RECTAL RECTAL DAILY
Status: DISCONTINUED | OUTPATIENT
Start: 2025-02-12 | End: 2025-02-13 | Stop reason: HOSPADM

## 2025-02-12 RX ORDER — SODIUM PHOSPHATE,MONO-DIBASIC 19G-7G/118
1 ENEMA (ML) RECTAL ONCE
Status: COMPLETED | OUTPATIENT
Start: 2025-02-12 | End: 2025-02-12

## 2025-02-12 RX ADMIN — PANTOPRAZOLE SODIUM 40 MG: 40 INJECTION, POWDER, FOR SOLUTION INTRAVENOUS at 09:27

## 2025-02-12 RX ADMIN — CARVEDILOL 6.25 MG: 6.25 TABLET, FILM COATED ORAL at 18:35

## 2025-02-12 RX ADMIN — SENNOSIDES AND DOCUSATE SODIUM 1 TABLET: 50; 8.6 TABLET ORAL at 09:27

## 2025-02-12 RX ADMIN — SODIUM PHOSPHATE, DIBASIC AND SODIUM PHOSPHATE, MONOBASIC 1 ENEMA: 7; 19 ENEMA RECTAL at 10:42

## 2025-02-12 RX ADMIN — MONTELUKAST 10 MG: 10 TABLET, FILM COATED ORAL at 22:05

## 2025-02-12 RX ADMIN — PANTOPRAZOLE SODIUM 40 MG: 40 INJECTION, POWDER, FOR SOLUTION INTRAVENOUS at 22:05

## 2025-02-12 RX ADMIN — CARVEDILOL 6.25 MG: 6.25 TABLET, FILM COATED ORAL at 09:27

## 2025-02-12 RX ADMIN — LEVOTHYROXINE SODIUM 50 MCG: 0.03 TABLET ORAL at 06:37

## 2025-02-12 RX ADMIN — ATORVASTATIN CALCIUM 40 MG: 40 TABLET, FILM COATED ORAL at 09:27

## 2025-02-12 RX ADMIN — POLYETHYLENE GLYCOL 3350 17 G: 17 POWDER, FOR SOLUTION ORAL at 09:33

## 2025-02-12 RX ADMIN — BISACODYL 10 MG: 10 SUPPOSITORY RECTAL at 09:27

## 2025-02-12 RX ADMIN — FLUTICASONE FUROATE AND VILANTEROL TRIFENATATE 1 PUFF: 100; 25 POWDER RESPIRATORY (INHALATION) at 09:33

## 2025-02-12 RX ADMIN — SENNOSIDES AND DOCUSATE SODIUM 1 TABLET: 50; 8.6 TABLET ORAL at 22:05

## 2025-02-12 ASSESSMENT — ACTIVITIES OF DAILY LIVING (ADL)
ADLS_ACUITY_SCORE: 38

## 2025-02-12 NOTE — PROGRESS NOTES
[facility]    PROGRESS NOTE - Hospitalist Service    Assessment and Plan  89 years old female with a past medical history of asthma, NSTEMI s/p PCI on Plavix, GERD, and HTN presenting to the emergency department for evaluation of generalized weakness for the past few days.  Found to have GI bleeding and admitted with acute blood loss anemia.  GI consulted and S/P EGDx2 , required multiple blood transfusion.     Acute blood loss anemia  - Secondary to GI bleeding  - Patient presented with hemoglobin of 7.8  - Hemoglobin dropped to 5.5 after few hours with hypotension  - Rapid response was called and patient was transferred to the ICU  - S/P blood transfusion  - Hemoglobin was up to 9.0 and again dropped to 6.5  - CTA abdomen is negative for active bleeding  - Continue to monitor hemoglobin every 6 hour  - Continue to hold home aspirin and restart Plavix on 2/9/25  - Received IV iron   - Hemoglobin is on the low side but stable overall  - Hold Plavix initially but restart on 2/1025     Acute GI bleed  - GI consult, appreciate input  - S/P EGD which showed clotted blood in the gastric fundus and gastric body  - No active bleeding  - Continue IV PPI twice daily while in the hospital, switch to oral on discharge for 3 months course  -S/P another EGD on 2/7/2025 which showed ulcerated lesion with remnant of a blood vessel was treated again with bipolar gold probe.   -Continue to monitor hemoglobin as above  - IV iron on 2/8/25 and 2/10/2025  -Hemoglobin is stable around 7.5,   -Slight drop in hemoglobin today to 7.0  -Discussed with GI over the phone, no concern for rebleeding at this point  -Transfuse 1 unit and continue to monitor hemoglobin as per GI.     Hypotension   - Secondary to acute blood loss anemia  - Patient transferred to ICU after rapid response on 2/6/2025  - Improving blood pressure with fluid and blood resuscitation  - Echo shows EF of 65 to 70% with grade 1 diastolic  dysfunction  - Blood pressure is better today  - Restart Coreg and continue to hold other hypertension medications  - Transfer out of ICU on 2/8/25.  -Doubt patient would need Norvasc and losartan on discharge     Acute on chronic renal failure  - Baseline chronic kidney disease stage III  - Probably secondary to hypotension with hypovolemia secondary GI bleed in addition to contrast  - Nephrology consult, appreciate input  - Change IV fluid to D5W with bicarb as per nephrology   - - Continue to monitor renal function  -Unremarkable renal US for obstructive uropathy     History of asthma  - No signs or symptoms of acute exacerbation  - Received IV steroid in the ER  - Continue nebs  - Hold on further steroid for now     Leukocytosis  - Probably stress reaction  - No sign or symptom of infection  - Unremarkable UA and CT chest for infection  - Resolved     History of coronary disease  - Patient denies chest pain  - Hold home aspirin and Plavix due to GI bleeding  - Hold hypertension medication because of hypotension     Chronic back pain  - Resume Tylenol # 3     Weakness and deconditioning  -PT/OT evaluation   -Plan for home care on discharge.     Constipation  -Started on bowel regimen  -Had bowel movement after Dulcolax and enema today      40 MINUTES SPENT BY ME on the date of service doing chart review, history, exam, documentation & further activities per the note    Principal Problem:    Gastrointestinal hemorrhage with melena  Active Problems:    Generalized weakness    Loose stools      VTE prophylaxis:  Pneumatic Compression Devices  DIET: Orders Placed This Encounter      Regular Diet Adult      Disposition/Barriers to discharge: Blood transfusion, monitor hemoglobin.  Medically Ready for Discharge: Anticipated Tomorrow   Code Status: No CPR- Do NOT Intubate    Subjective:   is feeling about the same today, still no BM this morning.  Denies any chest pain or shortness of breath.    PHYSICAL  EXAM  Vitals:    02/07/25 0500 02/08/25 0600 02/09/25 0253   Weight: 90.7 kg (200 lb) 89.5 kg (197 lb 6.4 oz) 86.7 kg (191 lb 1.6 oz)     B/P:116/54 T:98.8 P:68 R:20     Intake/Output Summary (Last 24 hours) at 2/12/2025 1224  Last data filed at 2/12/2025 0940  Gross per 24 hour   Intake 480 ml   Output 475 ml   Net 5 ml      Body mass index is 30.84 kg/m .    Constitutional: awake, alert, cooperative, no apparent distress, and appears stated age  Respiratory: No increased work of breathing, good air exchange, clear to auscultation bilaterally, no crackles or wheezing  Cardiovascular: Normal apical impulse, regular rate and rhythm, normal S1 and S2, no S3 or S4, and no murmur noted  GI: No scars, normal bowel sounds, soft, non-distended, non-tender, no masses palpated, no hepatosplenomegally  Skin: no bruising or bleeding and normal skin color, texture, turgor  Musculoskeletal: There is no redness, warmth, or swelling of the joints.  Full range of motion noted.  no lower extremity pitting edema present  Neurologic: Awake, alert, oriented to name, place and time.  Cranial nerves II-XII are grossly intact.  Motor is 5 out of 5 bilaterally.   Sensory is intact.    Neuropsychiatric: Appropriate with examiner      PERTINENT LABS/IMAGING:    I have personally reviewed the following data over the past 24 hrs:    10.6  \   7.0 (L)   / 142 (L)     140 105 32.2 (H) /  106 (H)   4.2 30 (H) 1.42 (H) \       Imaging results reviewed over the past 24 hrs:   No results found for this or any previous visit (from the past 24 hours).    Discussed with patient, family, GI, nursing staff and discharge planner    Kirby Ayala MD  [facility] Medicine Service  [phone]

## 2025-02-12 NOTE — PLAN OF CARE
Problem: Adult Inpatient Plan of Care  Goal: Plan of Care Review    Problem: Adult Inpatient Plan of Care  Goal: Optimal Comfort and Wellbeing  Outcome: Progressing     Outcome: Progressing   Goal Outcome Evaluation:    A&O x4. Intermittently forgetful. Pt reports abdominal discomfort and constipation. No BM this shift, pt is passing gas. IV in left wrist SL. Denies pain. Regular diet. Able to make needs known. Up with assist of 1 with cane. 2L via NC.

## 2025-02-12 NOTE — PLAN OF CARE
Goal Outcome Evaluation:                      A&Ox4 but forgetful. Pt O2 sats were 87% on RA. 1L O2 NC applied O2 sats increased to 90%. Writer instructed pt on incentive spirometer use, pt demonstrated proper technique. O2 sats increased to 94% 1L NC after I.S. LS are diminished.  Assist of 1 with cane. No signs of active bleeding noted. No BM this shift.

## 2025-02-12 NOTE — PROGRESS NOTES
CLINICAL NUTRITION SERVICES    Reviewed nutrition risk factors due to LOS. No concerns with oral intake per RN/[employer] (room service meal ordering system). Reviewed wt hx. No unintentional wt loss. No indication of pressure injury.    Follow Up / Monitoring:   Per policy unless consulted

## 2025-02-12 NOTE — PROGRESS NOTES
Care Management Follow Up    Length of Stay (days): 7    Expected Discharge Date: 02/12/2025     Concerns to be Addressed:    Care progression - discharge planning   Patient plan of care discussed at interdisciplinary rounds: Yes    Anticipated Discharge Disposition:  Therapy rec home care PT/OT    Anticipated Discharge Services: Accepted by  [facility] for RN/PT/OT  Anticipated Discharge DME:  NA    Patient/family educated on Medicare website which has current facility and service quality ratings:  NA  Education Provided on the Discharge Plan:  Yes per team  Patient/Family in Agreement with the Plan:  Yes    Referrals Placed by CM/SW:  Yes  Private pay costs discussed: Not applicable    Discussed  Partnership in Safe Discharge Planning  document with patient/family: No     Handoff Completed: No, handoff not indicated or clinically appropriate    Additional Information:  Per provider, Dr. Ayala, patient is not ready. GI consult. May be ready tomorrow.    Social Hx:  Assessment: Follow. Live alone in a house. Ind w/ADLS (walker/cane), and IADLS. Niece Sandra is primary HCA, and pt's niece Meenu is secondary HCA. Stated either niece can be contacted. Pt is on waitlist already for two ALFs.    Last note: 02/12/25   Plan: [facility] accepted for RN/PT/OT  Needs: Medical stability  Hand off sent: Yes   Transport: Family/friend     Next Steps: RNCM to follow for medical progression, recommendations, and final discharge plan.     Mai Zabala RN

## 2025-02-13 VITALS
HEART RATE: 65 BPM | OXYGEN SATURATION: 89 % | HEIGHT: 66 IN | TEMPERATURE: 99 F | SYSTOLIC BLOOD PRESSURE: 123 MMHG | WEIGHT: 191.1 LBS | DIASTOLIC BLOOD PRESSURE: 60 MMHG | RESPIRATION RATE: 16 BRPM | BODY MASS INDEX: 30.71 KG/M2

## 2025-02-13 LAB
ANION GAP SERPL CALCULATED.3IONS-SCNC: 12 MMOL/L (ref 7–15)
BUN SERPL-MCNC: 26.7 MG/DL (ref 8–23)
CALCIUM SERPL-MCNC: 8 MG/DL (ref 8.8–10.4)
CHLORIDE SERPL-SCNC: 102 MMOL/L (ref 98–107)
CREAT SERPL-MCNC: 1.39 MG/DL (ref 0.51–0.95)
EGFRCR SERPLBLD CKD-EPI 2021: 37 ML/MIN/1.73M2
ERYTHROCYTE [DISTWIDTH] IN BLOOD BY AUTOMATED COUNT: 17.3 % (ref 10–15)
GLUCOSE SERPL-MCNC: 115 MG/DL (ref 70–99)
HCO3 SERPL-SCNC: 25 MMOL/L (ref 22–29)
HCT VFR BLD AUTO: 25.9 % (ref 35–47)
HGB BLD-MCNC: 8.3 G/DL (ref 11.7–15.7)
HGB BLD-MCNC: 8.4 G/DL (ref 11.7–15.7)
MAGNESIUM SERPL-MCNC: 1.9 MG/DL (ref 1.7–2.3)
MCH RBC QN AUTO: 29.6 PG (ref 26.5–33)
MCHC RBC AUTO-ENTMCNC: 32.4 G/DL (ref 31.5–36.5)
MCV RBC AUTO: 91 FL (ref 78–100)
PLATELET # BLD AUTO: 172 10E3/UL (ref 150–450)
POTASSIUM SERPL-SCNC: 4.1 MMOL/L (ref 3.4–5.3)
RBC # BLD AUTO: 2.84 10E6/UL (ref 3.8–5.2)
SODIUM SERPL-SCNC: 139 MMOL/L (ref 135–145)
WBC # BLD AUTO: 11.8 10E3/UL (ref 4–11)

## 2025-02-13 PROCEDURE — 250N000009 HC RX 250: Performed by: HOSPITALIST

## 2025-02-13 PROCEDURE — 82310 ASSAY OF CALCIUM: CPT | Performed by: INTERNAL MEDICINE

## 2025-02-13 PROCEDURE — 36415 COLL VENOUS BLD VENIPUNCTURE: CPT | Performed by: INTERNAL MEDICINE

## 2025-02-13 PROCEDURE — 250N000013 HC RX MED GY IP 250 OP 250 PS 637: Performed by: INTERNAL MEDICINE

## 2025-02-13 PROCEDURE — 83735 ASSAY OF MAGNESIUM: CPT | Performed by: INTERNAL MEDICINE

## 2025-02-13 PROCEDURE — 94640 AIRWAY INHALATION TREATMENT: CPT

## 2025-02-13 PROCEDURE — 85027 COMPLETE CBC AUTOMATED: CPT | Performed by: INTERNAL MEDICINE

## 2025-02-13 PROCEDURE — 99239 HOSP IP/OBS DSCHRG MGMT >30: CPT | Performed by: INTERNAL MEDICINE

## 2025-02-13 PROCEDURE — 999N000157 HC STATISTIC RCP TIME EA 10 MIN

## 2025-02-13 RX ORDER — PANTOPRAZOLE SODIUM 40 MG/1
40 TABLET, DELAYED RELEASE ORAL 2 TIMES DAILY
Qty: 180 TABLET | Refills: 0 | Status: SHIPPED | OUTPATIENT
Start: 2025-02-13 | End: 2025-05-14

## 2025-02-13 RX ADMIN — SENNOSIDES AND DOCUSATE SODIUM 1 TABLET: 50; 8.6 TABLET ORAL at 09:28

## 2025-02-13 RX ADMIN — FLUTICASONE FUROATE AND VILANTEROL TRIFENATATE 1 PUFF: 100; 25 POWDER RESPIRATORY (INHALATION) at 09:30

## 2025-02-13 RX ADMIN — CARVEDILOL 6.25 MG: 6.25 TABLET, FILM COATED ORAL at 09:29

## 2025-02-13 RX ADMIN — POLYETHYLENE GLYCOL 3350 17 G: 17 POWDER, FOR SOLUTION ORAL at 09:30

## 2025-02-13 RX ADMIN — ALBUTEROL SULFATE 2.5 MG: 2.5 SOLUTION RESPIRATORY (INHALATION) at 11:23

## 2025-02-13 RX ADMIN — LEVOTHYROXINE SODIUM 50 MCG: 0.03 TABLET ORAL at 06:37

## 2025-02-13 RX ADMIN — PANTOPRAZOLE SODIUM 40 MG: 40 INJECTION, POWDER, FOR SOLUTION INTRAVENOUS at 09:30

## 2025-02-13 RX ADMIN — CLOPIDOGREL BISULFATE 75 MG: 75 TABLET ORAL at 09:29

## 2025-02-13 RX ADMIN — ATORVASTATIN CALCIUM 40 MG: 40 TABLET, FILM COATED ORAL at 09:29

## 2025-02-13 ASSESSMENT — ACTIVITIES OF DAILY LIVING (ADL)
ADLS_ACUITY_SCORE: 38

## 2025-02-13 NOTE — PLAN OF CARE
Goal Outcome Evaluation:      Plan of Care Reviewed With: patient    Overall Patient Progress: improvingOverall Patient Progress: improving     Hgb came back 8.3 at 1800. Patient had a loose BM. O2 increased to 1.5 L NC. Patient hoping to discharge tomorrow.

## 2025-02-13 NOTE — DISCHARGE SUMMARY
"[facility]  Hospitalist Discharge Summary      Date of Admission:  2/5/2025  Date of Discharge:  2/13/2025  Discharging Provider: Kirby Ayala MD  Discharge Service: Hospitalist Service    Discharge Diagnoses   Acute blood loss anemia status post blood transfusion  GI bleeding this post EGD x 2  Hypotension, resolved  Acute on chronic renal failure, improved  History of asthma   leukocytosis, improving  History of coronary artery disease   chronic back pain  Constipation, resolved   Weakness and deconditioning      Clinically Significant Risk Factors     # Obesity: Estimated body mass index is 30.84 kg/m  as calculated from the following:    Height as of this encounter: 1.676 m (5' 6\").    Weight as of this encounter: 86.7 kg (191 lb 1.6 oz).       Follow-ups Needed After Discharge   Follow-up Appointments       Hospital Follow-up with Existing Primary Care Provider (PCP)      Please see details below         Schedule Primary Care visit within: 7 Days   Recommended labs and Imaging (to be ordered by Primary Care Provider): hemoglobin in 2-3 days               Unresulted Labs Ordered in the Past 30 Days of this Admission       No orders found from 1/6/2025 to 2/6/2025.        These results will be followed up by PCP    Discharge Disposition   Discharged to home with home care  Condition at discharge: Stable    Hospital Course     89 years old female with a past medical history of asthma, NSTEMI s/p PCI on Plavix, GERD, and HTN presenting to the emergency department for evaluation of generalized weakness for the past few days.  Found to have GI bleeding and admitted with acute blood loss anemia.  GI consulted and S/P EGDx2 , required multiple blood transfusion.  H&P for details     Acute blood loss anemia  - Secondary to GI bleeding  - Patient presented with hemoglobin of 7.8  - Hemoglobin dropped to 5.5 after few hours with hypotension  - Rapid response was called and patient was " transferred to the ICU  - S/P blood transfusion  - Hemoglobin was up to 9.0 and again dropped to 6.5  - CTA abdomen is negative for active bleeding  - Continue to monitor hemoglobin every 6 hour  - Continue to hold home aspirin and restart Plavix on 2/9/25  - Received IV iron   - Hemoglobin is on the low side but stable overall  - Hold Plavix initially but restart on 2/10/25  -Continue to hold aspirin on discharge recommended by GI     Acute GI bleed  - GI consult, appreciate input  - S/P EGD which showed clotted blood in the gastric fundus and gastric body  - No active bleeding  - Continue IV PPI twice daily while in the hospital, switch to oral on discharge for 3 months course  -S/P another EGD on 2/7/2025 which showed ulcerated lesion with remnant of a blood vessel was treated again with bipolar gold probe.   -Continue to monitor hemoglobin as above  - IV iron on 2/8/25 and 2/10/2025  -Hemoglobin is stable around 7.5,   -Slight drop in hemoglobin today to 7.0  -Discussed with GI over the phone, no concern for rebleeding at this point  -Transfuse 1 unit and continue to monitor hemoglobin as per GI.  -Stable hemoglobin afterwards  -Continue to monitor globin as outpatient  -Continue PPI on discharge twice daily  -Follow-up with GI as scheduled     Hypotension   - Secondary to acute blood loss anemia  - Patient transferred to ICU after rapid response on 2/6/2025  - Improving blood pressure with fluid and blood resuscitation  - Echo shows EF of 65 to 70% with grade 1 diastolic dysfunction  - Blood pressure is better today  - Restart Coreg and continue to hold other hypertension medications  - Transfer out of ICU on 2/8/25.  - discontinue Norvasc and losartan on discharge  -Continue to monitor blood pressure     Acute on chronic renal failure  - Baseline chronic kidney disease stage III  - Probably secondary to hypotension with hypovolemia secondary GI bleed in addition to contrast  - Nephrology consult, appreciate  input  - Change IV fluid to D5W with bicarb as per nephrology   - - Continue to monitor renal function  -Unremarkable renal US for obstructive uropathy     History of asthma  - No signs or symptoms of acute exacerbation  - Received IV steroid in the ER  - Continue nebs and inhalers   - Patient is on prednisone as needed  -Weaned off oxygen    Leukocytosis  - Probably stress reaction  - No sign or symptom of infection  - Unremarkable UA and CT chest for infection  - Resolved     History of coronary disease  - Patient denies chest pain  - Hold home aspirin and Plavix due to GI bleeding  - Hold hypertension medication because of hypotension     Chronic back pain  - Resume Tylenol # 3     Weakness and deconditioning  -PT/OT evaluation   -Plan for home care on discharge.     Constipation  -Started on bowel regimen  -Had bowel movement after Dulcolax and enema today    Discussed with patient, family, GI, nursing staff and discharge planner.      Consultations This Hospital Stay   PHARMACY TO DOSE VANCO  GASTROENTEROLOGY IP CONSULT  CARE MANAGEMENT / SOCIAL WORK IP CONSULT  NEPHROLOGY IP CONSULT  NEPHROLOGY IP CONSULT  PHYSICAL THERAPY ADULT IP CONSULT  OCCUPATIONAL THERAPY ADULT IP CONSULT  GASTROENTEROLOGY IP CONSULT    Code Status   No CPR- Do NOT Intubate    Time Spent on this Encounter   I, Kirby Ayala MD, personally saw the patient today and spent greater than 30 minutes discharging this patient.       Kirby Ayala MD  [address] [address]  Phone: [phone]  Fax: [phone]  ______________________________________________________________________    Physical Exam   Vital Signs: Temp: 99  F (37.2  C) Temp src: Oral BP: 123/60 Pulse: 65   Resp: 16 SpO2: (!) 89 % O2 Device: None (Room air) Oxygen Delivery: 1.5 LPM  Weight: 191 lbs 1.6 oz  Constitutional: awake, alert, cooperative, no apparent distress, and appears stated age  Respiratory: No  increased work of breathing, good air exchange, clear to auscultation bilaterally, no crackles or wheezing  Cardiovascular: Normal apical impulse, regular rate and rhythm, normal S1 and S2, no S3 or S4, and no murmur noted  GI: No scars, normal bowel sounds, soft, non-distended, non-tender, no masses palpated, no hepatosplenomegally  Skin: no bruising or bleeding and normal skin color, texture, turgor  Musculoskeletal: There is no redness, warmth, or swelling of the joints.  Full range of motion noted.  no lower extremity pitting edema present  Neurologic: Awake, alert, oriented to name, place and time.  Cranial nerves II-XII are grossly intact.  Motor is 5 out of 5 bilaterally.   Sensory is intact.    Neuropsychiatric: Appropriate with examiner          Primary Care Physician   Jessica Fonseca    Discharge Orders      Primary Care - Care Coordination Referral      Home Care Referral      Reason for your hospital stay    GI bleeding , gastric ulcer     Activity    Your activity upon discharge: activity as tolerated     Diet    Follow this diet upon discharge: Current Diet:Orders Placed This Encounter      Regular Diet Adult     Hospital Follow-up with Existing Primary Care Provider (PCP)    Please see details below            Significant Results and Procedures   Most Recent 3 CBC's:  Recent Labs   Lab Test 02/13/25  0702 02/12/25  2351 02/12/25  1758 02/12/25  0741 02/11/25  1147 02/11/25  0720   WBC 11.8*  --   --  10.6  --  10.8   HGB 8.4* 8.3* 8.3* 7.0*   < > 7.2*   MCV 91  --   --  98  --  90     --   --  142*  --  137*    < > = values in this interval not displayed.     Most Recent 3 BMP's:  Recent Labs   Lab Test 02/13/25  0702 02/12/25  0741 02/11/25  0720    140 143   POTASSIUM 4.1 4.2 4.2   CHLORIDE 102 105 107   CO2 25 30* 29   BUN 26.7* 32.2* 38.2*   CR 1.39* 1.42* 1.24*   ANIONGAP 12 5* 7   GREG 8.0* 8.0* 8.2*   * 106* 115*     Most Recent 2 LFT's:  Recent Labs   Lab Test  02/05/25  1012 12/10/24  1424   AST 12 17   ALT 16 17   ALKPHOS 65 99   BILITOTAL 0.4 0.4   ,   Results for orders placed or performed during the hospital encounter of 02/05/25   CT Chest Pulmonary Embolism w Contrast    Narrative    EXAM: CT CHEST PULMONARY EMBOLISM W CONTRAST  LOCATION: [facility]  DATE: 2/5/2025    INDICATION: Generalized weakness. Intermittent hypoxia. Shortness of breath. History of asthma.  COMPARISON: 5/2/2024. Others.  TECHNIQUE: CT chest pulmonary angiogram during arterial phase injection of IV contrast. Multiplanar reformats and MIP reconstructions were performed. Dose reduction techniques were used.   CONTRAST: 75ml isovue 370    FINDINGS:  ANGIOGRAM CHEST: No pulmonary embolism. Nonaneurysmal aorta without dissection.    LUNGS AND PLEURA: Prior right lower lobe inflammatory nodular opacities and groundglass have resolved. Mild bandlike basilar atelectasis or scarring. Mild scattered airway debris, pronounced in the right lower lobe. No pleural effusion or pneumothorax.    MEDIASTINUM/AXILLAE: No adenopathy. Nonaneurysmal aorta. No pericardial effusion. Small hiatus.    CORONARY ARTERY CALCIFICATION: Mild.    UPPER ABDOMEN: Moderate to severe narrowing of the left renal artery origin with focal coarse atherosclerosis at this location.    MUSCULOSKELETAL: Degenerative changes spine.      Impression    IMPRESSION:    1.  No pulmonary embolism.    2.  No acute findings to explain symptoms.    3.  Mild scattered right basilar predominant airway debris; aspiration not excluded.     CTA Abdomen Pelvis with Contrast    Narrative    EXAM: CTA ABDOMEN PELVIS WITH CONTRAST  LOCATION: [facility]  DATE: 2/5/2025    INDICATION: Loose stools past few days, dark black loose stool today. Weakness and SOB. Evaluate for GI bleed and diverticulitis  COMPARISON: CT chest 5/2/2024  TECHNIQUE: CT angiogram abdomen pelvis during arterial phase of injection of IV  contrast. 2D and 3D MIP reconstructions were performed by the CT technologist. Dose reduction techniques were used.  CONTRAST: 75ml isovue 370    FINDINGS:  ANGIOGRAM ABDOMEN/PELVIS: No active GI bleed.    The celiac, SMA and PANFILO are patent with mild origin stenoses. Single bilateral renal arteries with severe left origin stenosis and mild right origin stenosis. Mild aortoiliac atherosclerosis with no aneurysm nor stenosis.    LOWER CHEST: Bibasilar subsegmental atelectasis versus scar. Small sliding-type hiatal hernia.    HEPATOBILIARY: Normal.    PANCREAS: Normal.    SPLEEN: Normal.    ADRENAL GLANDS: Normal.    KIDNEYS/BLADDER: Simple bilateral cortical and parapelvic cysts do not require imaging follow-up. No urinary tract calculus or hydronephrosis. The bladder is negative.    BOWEL: Extensive sigmoid diverticulosis. No obstruction nor inflammatory change. No formed stool in the colon. Normal appendix. No peritoneal fluid.    LYMPH NODES: No lymphadenopathy.    PELVIC ORGANS: Normal uterus and ovaries.    MUSCULOSKELETAL: Degenerative changes are present within the spine and hips. No suspicious osseous lesions.      Impression    IMPRESSION:  1.  No evidence of active GI bleed nor acute inflammatory bowel process.  2.  Extensive colonic diverticulosis.  3.  No formed stool within the colon.   US Renal Complete Non-Vascular    Narrative    EXAM: US RENAL COMPLETE NON-VASCULAR  LOCATION: [facility]  DATE: 2/9/2025    INDICATION: ned  COMPARISON: None.  TECHNIQUE: Routine Bilateral Renal and Bladder Ultrasound.    FINDINGS:    RIGHT KIDNEY: 10.6 x 5.9 x 5.4 cm. No hydronephrosis. 2.6 cm simple cyst. No follow-up needed. Parapelvic cysts.     LEFT KIDNEY: 9.4 x 4.9 x 4.8 cm. No hydronephrosis. Parapelvic cyst.     BLADDER: Normal.      Impression    IMPRESSION:  1.  No hydronephrosis.    2.  Benign cyst right kidney. No follow-up needed. A few parapelvic cysts.   Echocardiogram Complete      Value    LVEF  65-70%    [facility]    [id number]  [id number]  [id number]  [id number]^PHYLLIS^CECELIA[place] [address]     Name: GOSIA PEREZ  MRN: [id number]  : 1935  Study Date: 2025 01:40 PM  Age: 89 yrs  Gender: Female  Patient Location: [facility]  Reason For Study: Dyspnea  Ordering Physician: CECELIA FERGUSON  Performed By: ACE     BSA: 2.0 m2  Height: 66 in  Weight: 191 lb  HR: 95  BP: 98/51 mmHg  ______________________________________________________________________________  Procedure  Echocardiogram with two-dimensional, color and spectral Doppler. Definity (NDC  #[id number]) given intravenously. Adequate quality two-dimensional was  performed and interpreted.  ______________________________________________________________________________  Interpretation Summary     The left ventricle is normal in size. There is normal left ventricular wall  thickness.  Hyperdynamic left ventricular function The visual ejection fraction is 65-70%.  No regional wall motion abnormalities noted.  Grade I or early diastolic dysfunction.     The right ventricle is normal in size and function.  Normal left atrial size. Right atrial size is normal.  IVC diameter <2.1 cm collapsing >50% with sniff suggests a normal RA pressure  of 3 mmHg.  When compared to previous study from 2024 the LV systolic function is more  hyperdynamic.  ______________________________________________________________________________  Left Ventricle  The left ventricle is normal in size. There is normal left ventricular wall  thickness. Hyperdynamic left ventricular function. The visual ejection  fraction is 65-70%. Grade I or early diastolic dysfunction. No regional wall  motion abnormalities noted.     Right Ventricle  The right ventricle is normal in size and function.     Atria  Normal left atrial size. Right atrial size is normal.     Mitral Valve  Mitral valve leaflets appear normal. There is trace mitral  regurgitation.  There is no mitral valve stenosis.     Tricuspid Valve  Tricuspid valve leaflets appear normal. There is trace tricuspid  regurgitation. Right ventricular systolic pressure could not be approximated  due to inadequate tricuspid regurgitation.     Aortic Valve  The aortic valve is trileaflet. No aortic regurgitation is present. No aortic  stenosis is present.     Pulmonic Valve  The pulmonic valve is not well visualized. There is trace pulmonic valvular  regurgitation.     Vessels  The aorta root is normal. IVC diameter <2.1 cm collapsing >50% with sniff  suggests a normal RA pressure of 3 mmHg.     Pericardium  There is no pericardial effusion.     Rhythm  Sinus rhythm was noted.  ______________________________________________________________________________  MMode/2D Measurements & Calculations     IVSd: 0.90 cm  LVIDd: 4.0 cm  LVIDs: 2.9 cm  LVPWd: 1.0 cm  FS: 27.4 %  LV mass(C)d: 119.7 grams  LV mass(C)dI: 61.0 grams/m2  Ao root diam: 2.7 cm  LVOT diam: 2.0 cm  LVOT area: 3.1 cm2  Ao root diam index Ht(cm/m): 1.6  Ao root diam index BSA (cm/m2): 1.4  EF Biplane: 73.6 %  LA Volume (BP): 28.3 ml     LA Volume Index (BP): 14.4 ml/m2  LA Volume Indexed (AL/bp): 15.7 ml/m2  RV Base: 2.3 cm  RWT: 0.52  TAPSE: 1.8 cm     Time Measurements  MM HR: 76.0 BPM     Doppler Measurements & Calculations  MV E max gennaro: 81.0 cm/sec  MV A max gennaro: 137.0 cm/sec  MV E/A: 0.59  MV dec slope: 431.0 cm/sec2  MV dec time: 0.19 sec  Ao V2 max: 192.0 cm/sec  Ao max PG: 15.0 mmHg  Ao V2 mean: 140.0 cm/sec  Ao mean P.0 mmHg  Ao V2 VTI: 33.3 cm  KEZIA(I,D): 2.0 cm2  KEZIA(V,D): 2.1 cm2  LV V1 max P.7 mmHg  LV V1 max: 129.0 cm/sec  LV V1 VTI: 21.1 cm  SV(LVOT): 66.3 ml  SI(LVOT): 33.8 ml/m2  AV Gennaro Ratio (DI): 0.67  KEZIA Index (cm2/m2): 1.0  E/E': 11.3  E/E' av.5  Lateral E/e': 11.3  Medial E/e': 11.6  Peak E' Gennaro: 7.2 cm/sec  RV S Gennaro: 15.9 cm/sec      ______________________________________________________________________________  Report approved by: Ata Mann MD on 02/06/2025 03:12 PM             Discharge Medications   Discharge Medication List as of 2/13/2025 12:29 PM        START taking these medications    Details   pantoprazole (PROTONIX) 40 MG EC tablet Take 1 tablet (40 mg) by mouth 2 times daily., Disp-180 tablet, R-0, E-Prescribe           CONTINUE these medications which have NOT CHANGED    Details   acetaminophen (TYLENOL) 325 MG tablet Take 2 tablets (650 mg) by mouth every 4 hours as needed for other (surgical pain), Disp-100 tablet, R-0, E-Prescribe      acetaminophen-codeine (TYLENOL #3) 300-30 MG tablet Take 1 tablet by mouth every 6 hours as needed for severe pain (used when she has to walk long distances), Historical      albuterol (2.5 MG/3ML) 0.083% nebulizer solution Take 1 vial by nebulization every 4 hours as needed for shortness of breath / dyspnea or wheezing, Historical      albuterol (PROAIR HFA/PROVENTIL HFA/VENTOLIN HFA) 108 (90 BASE) MCG/ACT Inhaler Inhale 2 puffs into the lungs every 4 hours as needed for shortness of breath, Historical      allopurinol (ZYLOPRIM) 100 MG tablet Take 100 mg by mouth 2 times daily as needed Gout flares, Historical      atorvastatin (LIPITOR) 40 MG tablet Take 1 tablet (40 mg) by mouth daily, Disp-90 tablet, R-3, E-Prescribe      carvedilol (COREG) 6.25 MG tablet Take 1 tablet (6.25 mg) by mouth 2 times daily (with meals)., Disp-180 tablet, R-2, E-Prescribe      cetirizine (ZYRTEC) 10 MG tablet Take 10 mg by mouth daily as needed for allergies, Historical      clopidogrel (PLAVIX) 75 MG tablet Take 1 tablet (75 mg) by mouth daily Dose to start tomorrow., Disp-90 tablet, R-3, E-Prescribe      fluticasone-salmeterol (ADVAIR) 250-50 MCG/DOSE diskus inhaler Inhale 1 puff into the lungs daily, Historical      levothyroxine (SYNTHROID/LEVOTHROID) 50 MCG tablet Take 50 mcg by mouth daily, Historical       losartan (COZAAR) 25 MG tablet Take 1 tablet (25 mg) by mouth 2 times daily, Disp-180 tablet, R-3, E-Prescribe      montelukast (SINGULAIR) 10 MG tablet Take 10 mg by mouth at bedtime, Historical      multivitamin w/minerals (THERA-VIT-M) tablet Take 1 tablet by mouth daily, Historical      polyethylene glycol (MIRALAX/GLYCOLAX) powder Take 17 g by mouth daily as needed for constipation, Historical      predniSONE (DELTASONE) 20 MG tablet Take 20 mg by mouth daily as needed (Self doses in winter for lung infections.), Historical      valACYclovir (VALTREX) 1000 mg tablet Take 1,000 mg by mouth 2 times daily as needed (for one day), Historical      Vitamin D3 (CHOLECALCIFEROL) 25 mcg (1000 units) tablet Take 1,000 Units by mouth daily, Historical           STOP taking these medications       amLODIPine (NORVASC) 5 MG tablet Comments:   Reason for Stopping:         aspirin 81 MG EC tablet Comments:   Reason for Stopping:         ferrous sulfate 45 MG TBCR CR tablet Comments:   Reason for Stopping:             Allergies   Allergies   Allergen Reactions    Amoxicillin Hives    Keflex [Cephalexin] Hives    Lisinopril Shortness Of Breath    Penicillins Hives

## 2025-02-13 NOTE — PLAN OF CARE
Problem: Gastrointestinal Bleeding  Goal: Hemostasis  Outcome: Progressing     Problem: Fall Injury Risk  Goal: Absence of Fall and Fall-Related Injury  Outcome: Progressing  Intervention: Identify and Manage Contributors  Recent Flowsheet Documentation  Taken 2/13/2025 0037 by Bin Toure, RN  Medication Review/Management: medications reviewed  Intervention: Promote Injury-Free Environment  Recent Flowsheet Documentation  Taken 2/13/2025 0037 by Bin Toure, RN  Safety Promotion/Fall Prevention:   activity supervised   clutter free environment maintained   increased rounding and observation   increase visualization of patient   nonskid shoes/slippers when out of bed   room near nurse's station   room organization consistent   safety round/check completed   supervised activity   Goal Outcome Evaluation:       Pt is alert and oriented x4.Up with assist of one with cane to the bathroom. Oxygen at 1.5 lpm/nc sats maintained between 91-93%. Pt denies pain. No bloody or black stool noted this shift. Hgb level at 12 mn was 8.3 Bed alarm on for safety. Call light within reach. VSS.

## 2025-02-13 NOTE — PLAN OF CARE
Physical Therapy Discharge Summary    Reason for therapy discharge:    Discharged to home with home therapy.    Progress towards therapy goal(s). See goals on Care Plan in [facility] electronic health record for goal details.  Goals partially met.  Barriers to achieving goals:   discharge from facility.    Therapy recommendation(s):    Continued therapy is recommended.  Rationale/Recommendations:  to improve overall strength and functional mobility.

## 2025-02-13 NOTE — PROGRESS NOTES
Care Management Discharge Note    Discharge Date: 02/13/2025       Discharge Disposition: Home, Home Care - [facility] for RN/PT/OT    Discharge Services: Home Care - [facility] for RN/PT/OT    Discharge DME: None    Discharge Transportation: family or friend will provide    Private pay costs discussed: Not applicable    Does the patient's insurance plan have a 3 day qualifying hospital stay waiver?  No    PAS Confirmation Code: NA  Patient/family educated on Medicare website which has current facility and service quality ratings: NA    Education Provided on the Discharge Plan: Yes per team  Persons Notified of Discharge Plans: Patient  Patient/Family in Agreement with the Plan: yes    Handoff Referral Completed: No, handoff not indicated or clinically appropriate    Additional Information:  Patient discharge to home with Home Care - [facility] for RN/PT/OT. Family will transport.    Orders sent to Home Care - [facility]    Mai Zabala RN

## 2025-02-13 NOTE — PLAN OF CARE
Goal Outcome Evaluation:    Problem: Adult Inpatient Plan of Care  Goal: Optimal Comfort and Wellbeing  Outcome: Progressing     Problem: Gastrointestinal Bleeding  Goal: Optimal Coping with Acute Illness  Outcome: Progressing

## 2025-02-13 NOTE — PROGRESS NOTES
Care Management Follow Up    Length of Stay (days): 8    Expected Discharge Date: 02/13/2025     Concerns to be Addressed:     Care progression - discharge planning  Patient plan of care discussed at interdisciplinary rounds: Yes    Anticipated Discharge Disposition: Home with home care for RN/PT/OT - [facility]    Anticipated Discharge Services: Home Care  Anticipated Discharge DME: None    Patient/family educated on Medicare website which has current facility and service quality ratings: NA  Education Provided on the Discharge Plan: Yes per team  Patient/Family in Agreement with the Plan: yes    Referrals Placed by CM/SW: Yes, Homecare  Private pay costs discussed: Not applicable    Discussed  Partnership in Safe Discharge Planning  document with patient/family: Yes: patient's niMeenu garrett     Handoff Completed: No, handoff not indicated or clinically appropriate    Additional Information:  Bedside nurse, West, said the niece is here and would like to discuss discharge plans.    Meet with patient and her niece, Meenu, at bedside to discuss the above. Explained how home care works and how patient will be contacted for a start of care (SOC) appt for initial assessment.    Meenu wants to make sure the home care agency can draw the hgb lab.  Informed patient and Meenu if the orders include the lab, then the home care agency will be able to draw the lab(s).    Social Hx:  Assessment: Follow. Live alone in a house. Ind w/ADLS (walker/cane), and IADLS. Niece Sandra is primary HCA, and pt's niece Meenu is secondary HCA. Stated either niece can be contacted. Pt is on waitlist already for two ALFs.    Last note: 02/13/25   Plan: [facility] accepted for RN/PT/OT  Needs: Medical stability  Hand off sent: Yes   Transport: Family/friend     Next Steps: RNCM to follow for medical progression, recommendations, and final discharge plan.     Mai Zabala RN

## 2025-02-13 NOTE — PLAN OF CARE
Goal Outcome Evaluation: Denies pain , nausea and SOB. LS clear but diminished in lower lobes. On 1.5 L O2 via NC. A1 with a cane. Voiding adequately. Good appetite.     Problem: Gastrointestinal Bleeding  Goal: Optimal Coping with Acute Illness  Outcome: Progressing  Loose/pasty medium black stool x 1. Hemoglobin stable at 8.4. Denies lightheadedness/dizziness.      Problem: Delirium  Goal: Optimal Coping  Outcome: Progressing   A&O x 4 but sometimes forgetful.

## 2025-02-13 NOTE — PROGRESS NOTES
SPIRITUAL HEALTH SERVICES Note     Saw pt Mary Corral and administered Orthodoxy sacrament of anointing for the healing of the sick.    Fr. Cj Dunn

## 2025-02-13 NOTE — PLAN OF CARE
Goal Outcome Evaluation:      Plan of Care Reviewed With: patient    Overall Patient Progress: improvingOverall Patient Progress: improving     Gave Dulcalax suppository that resulted in a mucousy reddish-brown smear. Gave Fleet enema and patient produced a moderate-sized soft, dark brown BM. Patient weaned down to 1 L NC. Hgb was 7.0 this am. Gave 1 unit PRBC at 1140.

## 2025-02-13 NOTE — PLAN OF CARE
Goal Outcome Evaluation:      Plan of Care Reviewed With: patient    Overall Patient Progress: improvingOverall Patient Progress: improving     Patient discharged home with niece. Went over paperwork with patient and belongings returned.

## 2025-02-17 ENCOUNTER — APPOINTMENT (OUTPATIENT)
Dept: CT IMAGING | Facility: HOSPITAL | Age: 89
End: 2025-02-17
Attending: EMERGENCY MEDICINE
Payer: COMMERCIAL

## 2025-02-17 ENCOUNTER — APPOINTMENT (OUTPATIENT)
Dept: PHYSICAL THERAPY | Facility: HOSPITAL | Age: 89
End: 2025-02-17
Attending: HOSPITALIST
Payer: COMMERCIAL

## 2025-02-17 ENCOUNTER — HOSPITAL ENCOUNTER (INPATIENT)
Facility: HOSPITAL | Age: 89
LOS: 1 days | Discharge: HOME | End: 2025-02-18
Attending: EMERGENCY MEDICINE | Admitting: HOSPITALIST
Payer: COMMERCIAL

## 2025-02-17 DIAGNOSIS — N18.9 ACUTE RENAL FAILURE SUPERIMPOSED ON CHRONIC KIDNEY DISEASE, UNSPECIFIED ACUTE RENAL FAILURE TYPE, UNSPECIFIED CKD STAGE: ICD-10-CM

## 2025-02-17 DIAGNOSIS — N17.9 ACUTE RENAL FAILURE SUPERIMPOSED ON CHRONIC KIDNEY DISEASE, UNSPECIFIED ACUTE RENAL FAILURE TYPE, UNSPECIFIED CKD STAGE: ICD-10-CM

## 2025-02-17 DIAGNOSIS — I21.4 NSTEMI (NON-ST ELEVATED MYOCARDIAL INFARCTION) (H): Primary | ICD-10-CM

## 2025-02-17 DIAGNOSIS — R34 DECREASED URINATION: ICD-10-CM

## 2025-02-17 LAB
ABO + RH BLD: NORMAL
ALBUMIN SERPL BCG-MCNC: 3 G/DL (ref 3.5–5.2)
ALBUMIN UR-MCNC: NEGATIVE MG/DL
ALBUMIN UR-MCNC: NEGATIVE MG/DL
ALP SERPL-CCNC: 67 U/L (ref 40–150)
ALT SERPL W P-5'-P-CCNC: 10 U/L (ref 0–50)
ANION GAP SERPL CALCULATED.3IONS-SCNC: 11 MMOL/L (ref 7–15)
ANION GAP SERPL CALCULATED.3IONS-SCNC: 11 MMOL/L (ref 7–15)
APPEARANCE UR: CLEAR
APPEARANCE UR: CLEAR
AST SERPL W P-5'-P-CCNC: 20 U/L (ref 0–45)
BACTERIA #/AREA URNS HPF: ABNORMAL /HPF
BASOPHILS # BLD AUTO: 0 10E3/UL (ref 0–0.2)
BASOPHILS NFR BLD AUTO: 0 %
BILIRUB SERPL-MCNC: 0.4 MG/DL
BILIRUB UR QL STRIP: NEGATIVE
BILIRUB UR QL STRIP: NEGATIVE
BLD GP AB SCN SERPL QL: NEGATIVE
BUN SERPL-MCNC: 23.3 MG/DL (ref 8–23)
BUN SERPL-MCNC: 25 MG/DL (ref 8–23)
CALCIUM SERPL-MCNC: 7.9 MG/DL (ref 8.8–10.4)
CALCIUM SERPL-MCNC: 8 MG/DL (ref 8.8–10.4)
CHLORIDE SERPL-SCNC: 100 MMOL/L (ref 98–107)
CHLORIDE SERPL-SCNC: 104 MMOL/L (ref 98–107)
COLOR UR AUTO: ABNORMAL
COLOR UR AUTO: COLORLESS
CREAT SERPL-MCNC: 1.84 MG/DL (ref 0.51–0.95)
CREAT SERPL-MCNC: 2.08 MG/DL (ref 0.51–0.95)
EGFRCR SERPLBLD CKD-EPI 2021: 23 ML/MIN/1.73M2
EGFRCR SERPLBLD CKD-EPI 2021: 26 ML/MIN/1.73M2
EOSINOPHIL # BLD AUTO: 0.5 10E3/UL (ref 0–0.7)
EOSINOPHIL NFR BLD AUTO: 4 %
ERYTHROCYTE [DISTWIDTH] IN BLOOD BY AUTOMATED COUNT: 16.8 % (ref 10–15)
ERYTHROCYTE [DISTWIDTH] IN BLOOD BY AUTOMATED COUNT: 16.9 % (ref 10–15)
GLUCOSE SERPL-MCNC: 114 MG/DL (ref 70–99)
GLUCOSE SERPL-MCNC: 116 MG/DL (ref 70–99)
GLUCOSE UR STRIP-MCNC: NEGATIVE MG/DL
GLUCOSE UR STRIP-MCNC: NEGATIVE MG/DL
HCO3 SERPL-SCNC: 25 MMOL/L (ref 22–29)
HCO3 SERPL-SCNC: 26 MMOL/L (ref 22–29)
HCT VFR BLD AUTO: 26 % (ref 35–47)
HCT VFR BLD AUTO: 26.1 % (ref 35–47)
HGB BLD-MCNC: 8.1 G/DL (ref 11.7–15.7)
HGB BLD-MCNC: 8.2 G/DL (ref 11.7–15.7)
HGB UR QL STRIP: ABNORMAL
HGB UR QL STRIP: ABNORMAL
HYALINE CASTS: 16 /LPF
IMM GRANULOCYTES # BLD: 0.1 10E3/UL
IMM GRANULOCYTES NFR BLD: 1 %
KETONES UR STRIP-MCNC: NEGATIVE MG/DL
KETONES UR STRIP-MCNC: NEGATIVE MG/DL
LEUKOCYTE ESTERASE UR QL STRIP: ABNORMAL
LEUKOCYTE ESTERASE UR QL STRIP: NEGATIVE
LYMPHOCYTES # BLD AUTO: 1.5 10E3/UL (ref 0.8–5.3)
LYMPHOCYTES NFR BLD AUTO: 13 %
MCH RBC QN AUTO: 29 PG (ref 26.5–33)
MCH RBC QN AUTO: 29.1 PG (ref 26.5–33)
MCHC RBC AUTO-ENTMCNC: 31 G/DL (ref 31.5–36.5)
MCHC RBC AUTO-ENTMCNC: 31.5 G/DL (ref 31.5–36.5)
MCV RBC AUTO: 92 FL (ref 78–100)
MCV RBC AUTO: 94 FL (ref 78–100)
MONOCYTES # BLD AUTO: 0.9 10E3/UL (ref 0–1.3)
MONOCYTES NFR BLD AUTO: 7 %
NEUTROPHILS # BLD AUTO: 8.5 10E3/UL (ref 1.6–8.3)
NEUTROPHILS NFR BLD AUTO: 74 %
NITRATE UR QL: NEGATIVE
NITRATE UR QL: NEGATIVE
NRBC # BLD AUTO: 0 10E3/UL
NRBC BLD AUTO-RTO: 0 /100
PH UR STRIP: 5 [PH] (ref 5–7)
PH UR STRIP: 5 [PH] (ref 5–7)
PLATELET # BLD AUTO: 241 10E3/UL (ref 150–450)
PLATELET # BLD AUTO: 281 10E3/UL (ref 150–450)
POTASSIUM SERPL-SCNC: 3.7 MMOL/L (ref 3.4–5.3)
POTASSIUM SERPL-SCNC: 3.7 MMOL/L (ref 3.4–5.3)
PROT SERPL-MCNC: 5.5 G/DL (ref 6.4–8.3)
RBC # BLD AUTO: 2.79 10E6/UL (ref 3.8–5.2)
RBC # BLD AUTO: 2.82 10E6/UL (ref 3.8–5.2)
RBC URINE: 1 /HPF
RBC URINE: 1 /HPF
SODIUM SERPL-SCNC: 137 MMOL/L (ref 135–145)
SODIUM SERPL-SCNC: 140 MMOL/L (ref 135–145)
SODIUM UR-SCNC: <20 MMOL/L
SP GR UR STRIP: 1.01 (ref 1–1.03)
SP GR UR STRIP: 1.01 (ref 1–1.03)
SPECIMEN EXP DATE BLD: NORMAL
SQUAMOUS EPITHELIAL: 1 /HPF
SQUAMOUS EPITHELIAL: <1 /HPF
UROBILINOGEN UR STRIP-MCNC: <2 MG/DL
UROBILINOGEN UR STRIP-MCNC: <2 MG/DL
WBC # BLD AUTO: 11.4 10E3/UL (ref 4–11)
WBC # BLD AUTO: 9.8 10E3/UL (ref 4–11)
WBC URINE: 4 /HPF
WBC URINE: 7 /HPF

## 2025-02-17 PROCEDURE — 82374 ASSAY BLOOD CARBON DIOXIDE: CPT | Performed by: HOSPITALIST

## 2025-02-17 PROCEDURE — 84300 ASSAY OF URINE SODIUM: CPT | Performed by: INTERNAL MEDICINE

## 2025-02-17 PROCEDURE — 84155 ASSAY OF PROTEIN SERUM: CPT | Performed by: EMERGENCY MEDICINE

## 2025-02-17 PROCEDURE — 36415 COLL VENOUS BLD VENIPUNCTURE: CPT | Performed by: HOSPITALIST

## 2025-02-17 PROCEDURE — 85041 AUTOMATED RBC COUNT: CPT | Performed by: HOSPITALIST

## 2025-02-17 PROCEDURE — 81001 URINALYSIS AUTO W/SCOPE: CPT | Performed by: HOSPITALIST

## 2025-02-17 PROCEDURE — 99285 EMERGENCY DEPT VISIT HI MDM: CPT | Mod: 25

## 2025-02-17 PROCEDURE — 74176 CT ABD & PELVIS W/O CONTRAST: CPT

## 2025-02-17 PROCEDURE — 97116 GAIT TRAINING THERAPY: CPT | Mod: GP

## 2025-02-17 PROCEDURE — 99222 1ST HOSP IP/OBS MODERATE 55: CPT | Performed by: INTERNAL MEDICINE

## 2025-02-17 PROCEDURE — 85025 COMPLETE CBC W/AUTO DIFF WBC: CPT | Performed by: EMERGENCY MEDICINE

## 2025-02-17 PROCEDURE — 97161 PT EVAL LOW COMPLEX 20 MIN: CPT | Mod: GP

## 2025-02-17 PROCEDURE — 81001 URINALYSIS AUTO W/SCOPE: CPT | Performed by: EMERGENCY MEDICINE

## 2025-02-17 PROCEDURE — 120N000001 HC R&B MED SURG/OB

## 2025-02-17 PROCEDURE — 258N000003 HC RX IP 258 OP 636: Performed by: EMERGENCY MEDICINE

## 2025-02-17 PROCEDURE — 99207 PR NO BILLABLE SERVICE THIS VISIT: CPT | Performed by: GENERAL PRACTICE

## 2025-02-17 PROCEDURE — 85018 HEMOGLOBIN: CPT | Performed by: HOSPITALIST

## 2025-02-17 PROCEDURE — 86900 BLOOD TYPING SEROLOGIC ABO: CPT | Performed by: EMERGENCY MEDICINE

## 2025-02-17 PROCEDURE — 82040 ASSAY OF SERUM ALBUMIN: CPT | Performed by: EMERGENCY MEDICINE

## 2025-02-17 PROCEDURE — 250N000013 HC RX MED GY IP 250 OP 250 PS 637: Performed by: HOSPITALIST

## 2025-02-17 PROCEDURE — 82310 ASSAY OF CALCIUM: CPT | Performed by: HOSPITALIST

## 2025-02-17 PROCEDURE — 36415 COLL VENOUS BLD VENIPUNCTURE: CPT | Performed by: EMERGENCY MEDICINE

## 2025-02-17 PROCEDURE — 999N000111 HC STATISTIC OT IP EVAL DEFER

## 2025-02-17 PROCEDURE — 51798 US URINE CAPACITY MEASURE: CPT

## 2025-02-17 RX ORDER — ONDANSETRON 2 MG/ML
4 INJECTION INTRAMUSCULAR; INTRAVENOUS EVERY 6 HOURS PRN
Status: DISCONTINUED | OUTPATIENT
Start: 2025-02-17 | End: 2025-02-18 | Stop reason: HOSPADM

## 2025-02-17 RX ORDER — CETIRIZINE HYDROCHLORIDE 10 MG/1
10 TABLET ORAL DAILY PRN
Status: DISCONTINUED | OUTPATIENT
Start: 2025-02-17 | End: 2025-02-18 | Stop reason: HOSPADM

## 2025-02-17 RX ORDER — AMOXICILLIN 250 MG
1 CAPSULE ORAL 2 TIMES DAILY PRN
Status: DISCONTINUED | OUTPATIENT
Start: 2025-02-17 | End: 2025-02-18 | Stop reason: HOSPADM

## 2025-02-17 RX ORDER — AMOXICILLIN 250 MG
2 CAPSULE ORAL 2 TIMES DAILY PRN
Status: DISCONTINUED | OUTPATIENT
Start: 2025-02-17 | End: 2025-02-18 | Stop reason: HOSPADM

## 2025-02-17 RX ORDER — LEVOTHYROXINE SODIUM 25 UG/1
50 TABLET ORAL DAILY
Status: DISCONTINUED | OUTPATIENT
Start: 2025-02-17 | End: 2025-02-18 | Stop reason: HOSPADM

## 2025-02-17 RX ORDER — ATORVASTATIN CALCIUM 40 MG/1
40 TABLET, FILM COATED ORAL DAILY
Status: DISCONTINUED | OUTPATIENT
Start: 2025-02-17 | End: 2025-02-18 | Stop reason: HOSPADM

## 2025-02-17 RX ORDER — ONDANSETRON 4 MG/1
4 TABLET, ORALLY DISINTEGRATING ORAL EVERY 6 HOURS PRN
Status: DISCONTINUED | OUTPATIENT
Start: 2025-02-17 | End: 2025-02-18 | Stop reason: HOSPADM

## 2025-02-17 RX ORDER — CARVEDILOL 3.12 MG/1
3.12 TABLET ORAL 2 TIMES DAILY WITH MEALS
Status: DISCONTINUED | OUTPATIENT
Start: 2025-02-17 | End: 2025-02-18 | Stop reason: HOSPADM

## 2025-02-17 RX ORDER — POLYETHYLENE GLYCOL 3350 17 G/17G
17 POWDER, FOR SOLUTION ORAL 2 TIMES DAILY PRN
Status: DISCONTINUED | OUTPATIENT
Start: 2025-02-17 | End: 2025-02-18 | Stop reason: HOSPADM

## 2025-02-17 RX ORDER — BISACODYL 10 MG
10 SUPPOSITORY, RECTAL RECTAL DAILY PRN
Status: DISCONTINUED | OUTPATIENT
Start: 2025-02-17 | End: 2025-02-18 | Stop reason: HOSPADM

## 2025-02-17 RX ORDER — CLOPIDOGREL BISULFATE 75 MG/1
75 TABLET ORAL DAILY
Status: DISCONTINUED | OUTPATIENT
Start: 2025-02-17 | End: 2025-02-18 | Stop reason: HOSPADM

## 2025-02-17 RX ORDER — ACETAMINOPHEN 650 MG/1
650 SUPPOSITORY RECTAL EVERY 4 HOURS PRN
Status: DISCONTINUED | OUTPATIENT
Start: 2025-02-17 | End: 2025-02-18 | Stop reason: HOSPADM

## 2025-02-17 RX ORDER — LIDOCAINE 40 MG/G
CREAM TOPICAL
Status: DISCONTINUED | OUTPATIENT
Start: 2025-02-17 | End: 2025-02-18 | Stop reason: HOSPADM

## 2025-02-17 RX ORDER — ALBUTEROL SULFATE 90 UG/1
2 INHALANT RESPIRATORY (INHALATION) EVERY 4 HOURS PRN
Status: DISCONTINUED | OUTPATIENT
Start: 2025-02-17 | End: 2025-02-18 | Stop reason: HOSPADM

## 2025-02-17 RX ORDER — ACETAMINOPHEN 325 MG/1
650 TABLET ORAL EVERY 4 HOURS PRN
Status: DISCONTINUED | OUTPATIENT
Start: 2025-02-17 | End: 2025-02-18 | Stop reason: HOSPADM

## 2025-02-17 RX ORDER — SODIUM CHLORIDE 9 MG/ML
INJECTION, SOLUTION INTRAVENOUS CONTINUOUS
Status: DISCONTINUED | OUTPATIENT
Start: 2025-02-17 | End: 2025-02-17

## 2025-02-17 RX ORDER — FLUTICASONE FUROATE AND VILANTEROL 100; 25 UG/1; UG/1
1 POWDER RESPIRATORY (INHALATION) DAILY
Status: DISCONTINUED | OUTPATIENT
Start: 2025-02-17 | End: 2025-02-18 | Stop reason: HOSPADM

## 2025-02-17 RX ORDER — POLYETHYLENE GLYCOL 3350 17 G/17G
17 POWDER, FOR SOLUTION ORAL DAILY PRN
Status: DISCONTINUED | OUTPATIENT
Start: 2025-02-17 | End: 2025-02-17 | Stop reason: ALTCHOICE

## 2025-02-17 RX ORDER — DOCUSATE SODIUM 100 MG/1
100 CAPSULE, LIQUID FILLED ORAL 2 TIMES DAILY
Status: DISCONTINUED | OUTPATIENT
Start: 2025-02-17 | End: 2025-02-18 | Stop reason: HOSPADM

## 2025-02-17 RX ORDER — PANTOPRAZOLE SODIUM 40 MG/1
40 TABLET, DELAYED RELEASE ORAL 2 TIMES DAILY
Status: DISCONTINUED | OUTPATIENT
Start: 2025-02-17 | End: 2025-02-18 | Stop reason: HOSPADM

## 2025-02-17 RX ORDER — ALBUTEROL SULFATE 0.83 MG/ML
2.5 SOLUTION RESPIRATORY (INHALATION) EVERY 4 HOURS PRN
Status: DISCONTINUED | OUTPATIENT
Start: 2025-02-17 | End: 2025-02-18 | Stop reason: HOSPADM

## 2025-02-17 RX ORDER — LOSARTAN POTASSIUM 25 MG/1
25 TABLET ORAL 2 TIMES DAILY
Status: DISCONTINUED | OUTPATIENT
Start: 2025-02-17 | End: 2025-02-17

## 2025-02-17 RX ORDER — CALCIUM CARBONATE 500 MG/1
1000 TABLET, CHEWABLE ORAL 4 TIMES DAILY PRN
Status: DISCONTINUED | OUTPATIENT
Start: 2025-02-17 | End: 2025-02-18 | Stop reason: HOSPADM

## 2025-02-17 RX ORDER — MONTELUKAST SODIUM 10 MG/1
10 TABLET ORAL AT BEDTIME
Status: DISCONTINUED | OUTPATIENT
Start: 2025-02-17 | End: 2025-02-18 | Stop reason: HOSPADM

## 2025-02-17 RX ORDER — ACETAMINOPHEN 325 MG/1
650 TABLET ORAL EVERY 4 HOURS PRN
Status: DISCONTINUED | OUTPATIENT
Start: 2025-02-17 | End: 2025-02-17 | Stop reason: ALTCHOICE

## 2025-02-17 RX ADMIN — PANTOPRAZOLE SODIUM 40 MG: 20 TABLET, DELAYED RELEASE ORAL at 19:55

## 2025-02-17 RX ADMIN — DOCUSATE SODIUM 100 MG: 100 CAPSULE, LIQUID FILLED ORAL at 07:49

## 2025-02-17 RX ADMIN — CETIRIZINE HYDROCHLORIDE 10 MG: 10 TABLET, FILM COATED ORAL at 07:49

## 2025-02-17 RX ADMIN — LEVOTHYROXINE SODIUM 50 MCG: 0.03 TABLET ORAL at 07:47

## 2025-02-17 RX ADMIN — ATORVASTATIN CALCIUM 40 MG: 40 TABLET, FILM COATED ORAL at 07:49

## 2025-02-17 RX ADMIN — DOCUSATE SODIUM 100 MG: 100 CAPSULE, LIQUID FILLED ORAL at 19:56

## 2025-02-17 RX ADMIN — CARVEDILOL 3.12 MG: 3.12 TABLET, FILM COATED ORAL at 07:48

## 2025-02-17 RX ADMIN — MONTELUKAST 10 MG: 10 TABLET, FILM COATED ORAL at 22:45

## 2025-02-17 RX ADMIN — CLOPIDOGREL BISULFATE 75 MG: 75 TABLET ORAL at 07:49

## 2025-02-17 RX ADMIN — SODIUM CHLORIDE: 0.9 INJECTION, SOLUTION INTRAVENOUS at 05:41

## 2025-02-17 RX ADMIN — PANTOPRAZOLE SODIUM 40 MG: 20 TABLET, DELAYED RELEASE ORAL at 07:49

## 2025-02-17 RX ADMIN — CARVEDILOL 3.12 MG: 3.12 TABLET, FILM COATED ORAL at 18:09

## 2025-02-17 RX ADMIN — FLUTICASONE FUROATE AND VILANTEROL TRIFENATATE 1 PUFF: 100; 25 POWDER RESPIRATORY (INHALATION) at 07:46

## 2025-02-17 ASSESSMENT — ACTIVITIES OF DAILY LIVING (ADL)
ADLS_ACUITY_SCORE: 44
ADLS_ACUITY_SCORE: 42
ADLS_ACUITY_SCORE: 59
ADLS_ACUITY_SCORE: 42
ADLS_ACUITY_SCORE: 59
ADLS_ACUITY_SCORE: 42

## 2025-02-17 NOTE — CONSULTS
"[facility]  [facility]   Nephrology Consultation/Initial Inpatient Care    Mary Corral   MRN: [id number]  : 1935   DOA: 2025     REASON FOR CONSULTATION: We are asked to see pt by Dr. Celestin    HISTORY OF PRESENT ILLNESS:89 year old female with CKD; known to our group from last admit  Was here from the ; noted to have baseline CR of 1.3-1.6 and had ARF with CR up to 2.3 during admit; thought secondary to hemodynamics and planned renal follow up  CR was 1.4 on discharge   Presents to ER early this am with complaint of poor urine output and some leg edema--says she thought she was drinking way more than she was taking in;  found to have worsening renal function  Given some IVF; mendez placed; holding losartan; pt says has been on for years; bp typically 130s    Pt feeling ok this am and has urine output  Says she was drinking quite a bit at home to stay hydrated  No N/V; says she was eating ok  Says her legs feel softer now; they were \"tight as a drum\" at home  Says typically her bp runs 130s; she is not dizzy      REVIEW OF SYSTEMS:  ROS was completely reviewed and otherwise negative and non-contributory    Past Medical History:   Diagnosis Date    Allergic rhinitis     Chronic sinusitis     Conductive hearing loss     COPD (chronic obstructive pulmonary disease) (H)     CRI (chronic renal insufficiency)     mild    Gastroesophageal reflux disease     Hearing problem     Hypertension     Mediastinal mass     Nasal polyps     Pulmonary nodule     Seasonal allergies     Uncomplicated asthma        Social History     Socioeconomic History    Marital status: Single     Spouse name: Not on file    Number of children: Not on file    Years of education: Not on file    Highest education level: Not on file   Occupational History    Not on file   Tobacco Use    Smoking status: Never    Smokeless tobacco: Never    Tobacco comments:     smoked twice " a week for a couple years while in college, less than 1/2 pack    Substance and Sexual Activity    Alcohol use: Yes     Alcohol/week: 0.0 standard drinks of alcohol     Comment: Alcoholic Drinks/day: occasional    Drug use: No    Sexual activity: Never   Other Topics Concern    Not on file   Social History Narrative    Not on file     Social Drivers of Health     Financial Resource Strain: Low Risk  (2/6/2025)    Financial Resource Strain     Within the past 12 months, have you or your family members you live with been unable to get utilities (heat, electricity) when it was really needed?: No   Food Insecurity: Low Risk  (2/6/2025)    Food Insecurity     Within the past 12 months, did you worry that your food would run out before you got money to buy more?: No     Within the past 12 months, did the food you bought just not last and you didn t have money to get more?: No   Transportation Needs: Low Risk  (2/6/2025)    Transportation Needs     Within the past 12 months, has lack of transportation kept you from medical appointments, getting your medicines, non-medical meetings or appointments, work, or from getting things that you need?: No   Physical Activity: Not on file   Stress: Not on file   Social Connections: Not on file   Interpersonal Safety: High Risk (2/6/2025)    Interpersonal Safety     Do you feel physically and emotionally safe where you currently live?: No     Within the past 12 months, have you been hit, slapped, kicked or otherwise physically hurt by someone?: No     Within the past 12 months, have you been humiliated or emotionally abused in other ways by your partner or ex-partner?: No   Housing Stability: Low Risk  (2/6/2025)    Housing Stability     Do you have housing? : Yes     Are you worried about losing your housing?: No       Family History   Problem Relation Age of Onset    Esophageal Cancer Mother     Colon Cancer Mother     Mental Illness Mother     Dementia Mother     Unknown/Adopted  Mother     Asthma Mother     Diabetes Mother     Cerebrovascular Disease Mother     Thyroid Disease Mother     Congenital Anomalies Mother     Bleeding Disorder Mother     Migraines Mother     Muscular Disorder Mother     Parkinsonism Father     Mental Illness Father     Unknown/Adopted Father     Asthma Father     Cancer Father     Hypertension Father     Cerebrovascular Disease Father     Thyroid Disease Father     Congenital Anomalies Father     Bleeding Disorder Father     Migraines Father     Muscular Disorder Father     Mental Illness Sister     Dementia Sister     Unknown/Adopted Sister     Asthma Sister     Cerebrovascular Disease Sister     Thyroid Disease Sister     Congenital Anomalies Sister     Bleeding Disorder Sister     Migraines Sister     Muscular Disorder Sister     Cancer Mother         esophageal       Allergies   Allergen Reactions    Amoxicillin Hives    Keflex [Cephalexin] Hives    Lisinopril Shortness Of Breath    Penicillins Hives       MEDICATIONS:  Current Facility-Administered Medications   Medication Dose Route Frequency Provider Last Rate Last Admin    atorvastatin (LIPITOR) tablet 40 mg  40 mg Oral Daily Addis Celestin MD   40 mg at 02/17/25 0749    carvedilol (COREG) tablet 3.125 mg  3.125 mg Oral BID w/meals Addis Celestin MD   3.125 mg at 02/17/25 0748    clopidogrel (PLAVIX) tablet 75 mg  75 mg Oral Daily Addis Celestin MD   75 mg at 02/17/25 0749    docusate sodium (COLACE) capsule 100 mg  100 mg Oral BID Addis Celestin MD   100 mg at 02/17/25 0749    fluticasone-vilanterol (BREO ELLIPTA) 100-25 MCG/ACT inhaler 1 puff  1 puff Inhalation Daily Addis Celestin MD   1 puff at 02/17/25 0746    levothyroxine (SYNTHROID/LEVOTHROID) tablet 50 mcg  50 mcg Oral Daily Addis Celestin MD   50 mcg at 02/17/25 0747    [Held by provider] losartan (COZAAR) tablet 25 mg  25 mg Oral BID Addis Celestin MD        montelukast (SINGULAIR) tablet 10 mg  10 mg Oral At Bedtime Addis Celestin MD         "pantoprazole (PROTONIX) EC tablet 40 mg  40 mg Oral BID Addis Celestin MD   40 mg at 02/17/25 0749    sodium chloride (PF) 0.9% PF flush 3 mL  3 mL Intracatheter Q8H Addis Celestin MD   3 mL at 02/17/25 0750         PHYSICAL EXAM    /59   Pulse 66   Temp 97.9  F (36.6  C) (Oral)   Ht 1.702 m (5' 7\")   Wt 83.9 kg (185 lb)   SpO2 92%   BMI 28.98 kg/m        Intake/Output Summary (Last 24 hours) at 2/17/2025 0953  Last data filed at 2/17/2025 0750  Gross per 24 hour   Intake 107.5 ml   Output 70 ml   Net 37.5 ml       Alert/oriented x 3; awake and NAD  HEENT NC/AT; perrla; OP clear without lesions; mmm  Neck supple without LAD, TM  CV; RRR without rub or murmur  Lung: clear and equal; no extra sounds  Ab: soft and NT; not distended; normal bs  Ext: ++ ankle edema; some edema by hips and well perfused  Skin; no rash  Neuro; grossly intact    LABORATORIES    Last Renal Panel:  Sodium   Date Value Ref Range Status   02/17/2025 140 135 - 145 mmol/L Final   03/15/2017 142 133 - 144 mmol/L Final     Potassium   Date Value Ref Range Status   02/17/2025 3.7 3.4 - 5.3 mmol/L Final   04/18/2022 4.8 3.5 - 5.0 mmol/L Final   03/15/2017 4.6 3.4 - 5.3 mmol/L Final     Chloride   Date Value Ref Range Status   02/17/2025 104 98 - 107 mmol/L Final   04/18/2022 110 (H) 98 - 107 mmol/L Final   03/15/2017 112 (H) 94 - 109 mmol/L Final     Carbon Dioxide   Date Value Ref Range Status   03/15/2017 18 (L) 20 - 32 mmol/L Final     Carbon Dioxide (CO2)   Date Value Ref Range Status   02/17/2025 25 22 - 29 mmol/L Final   04/18/2022 24 22 - 31 mmol/L Final     Anion Gap   Date Value Ref Range Status   02/17/2025 11 7 - 15 mmol/L Final   04/18/2022 10 5 - 18 mmol/L Final   03/15/2017 12 3 - 14 mmol/L Final     Glucose   Date Value Ref Range Status   02/17/2025 114 (H) 70 - 99 mg/dL Final   04/18/2022 115 70 - 125 mg/dL Final   03/15/2017 167 (H) 70 - 99 mg/dL Final     GLUCOSE BY METER POCT   Date Value Ref Range Status   02/06/2025 " "176 (H) 70 - 99 mg/dL Final     Urea Nitrogen   Date Value Ref Range Status   02/17/2025 23.3 (H) 8.0 - 23.0 mg/dL Final   04/18/2022 28 8 - 28 mg/dL Final   03/15/2017 41 (H) 7 - 30 mg/dL Final     Creatinine   Date Value Ref Range Status   02/17/2025 1.84 (H) 0.51 - 0.95 mg/dL Final   03/15/2017 1.42 (H) 0.52 - 1.04 mg/dL Final     GFR Estimate   Date Value Ref Range Status   02/17/2025 26 (L) >60 mL/min/1.73m2 Final     Comment:     eGFR calculated using 2021 CKD-EPI equation.   03/10/2021 33 (L) >60 mL/min/1.73m2 Final   03/15/2017 36 (L) >60 mL/min/1.7m2 Final     Comment:     Non  GFR Calc     Calcium   Date Value Ref Range Status   02/17/2025 8.0 (L) 8.8 - 10.4 mg/dL Final   03/15/2017 8.5 8.5 - 10.1 mg/dL Final     Phosphorus   Date Value Ref Range Status   02/10/2025 3.3 2.5 - 4.5 mg/dL Final   03/14/2017 4.4 2.5 - 4.5 mg/dL Final     Albumin   Date Value Ref Range Status   02/17/2025 3.0 (L) 3.5 - 5.2 g/dL Final   04/18/2022 3.4 (L) 3.5 - 5.0 g/dL Final     No components found for: \"URINE\"   Lab Results   Component Value Date    WBC 9.8 02/17/2025    WBC 9.6 03/15/2017     Lab Results   Component Value Date    RBC 2.82 02/17/2025    RBC 3.60 03/15/2017     Lab Results   Component Value Date    HGB 8.2 02/17/2025    HGB 10.4 03/15/2017     Lab Results   Component Value Date    HCT 26.0 02/17/2025    HCT 31.2 03/15/2017     No components found for: \"MCT\"  Lab Results   Component Value Date    MCV 92 02/17/2025    MCV 87 03/15/2017     Lab Results   Component Value Date    MCH 29.1 02/17/2025    MCH 28.9 03/15/2017     Lab Results   Component Value Date    MCHC 31.5 02/17/2025    MCHC 33.3 03/15/2017     Lab Results   Component Value Date    RDW 16.8 02/17/2025    RDW 13.5 03/15/2017     Lab Results   Component Value Date     02/17/2025     03/15/2017      Latest Reference Range & Units 02/17/25 08:52   Color Urine Colorless, Straw, Light Yellow, Yellow  Colorless   Appearance " Urine Clear  Clear   Glucose Urine Negative mg/dL Negative   Bilirubin Urine Negative  Negative   Ketones Urine Negative mg/dL Negative   Specific Gravity Urine 1.001 - 1.030  1.006   pH Urine 5.0 - 7.0  5.0   Protein Albumin Urine Negative mg/dL Negative   Urobilinogen mg/dL <2.0 mg/dL <2.0   Nitrite Urine Negative  Negative   Blood Urine Negative  0.5 mg/dL !   Leukocyte Esterase Urine Negative  75 Junior/uL !   WBC Urine <=5 /HPF 7 (H)   RBC Urine <=2 /HPF 1   Bacteria Urine None Seen /HPF Few !   Squamous Epithelial /HPF Urine <=1 /HPF <1       I reviewed all labs    ASSESSMENT/PLAN:  89 year old female with recurrent ARF/CKD    ARF/CKD; has underlying CKD with baseline 1.3-1.6; suffered recent episode of ARF thought secondary to hemodynamics but did recover to baseline on discharge with CR 1.4; now recurrent ARF and seems likely to be hemodynamic again; seems that we were holding her ARB (for low bp and hyperkalemia and ARF) and it was resumed on discharge resulting in lower bp (rayne for this age pt with advanced CKD); CR somewhat improved today; continue to follow and manage expectantly; no indication for dialysis at this point and has   HTN; target bp for pt over 80s is liberalized to 130-150s; discontinue ARB; follow on BB  CAD; h/o PCI in past; on plavix; on BB; off ASA due to recent GI bleed  Hyperlipid; on statin  Pyuria; follow up urine culture  Hypothyroid; on replacement  Asthma; on Advair, Singulair  Anemia; recent episode of blood loss from PUD; hgb in 8s and stable; no evidence of active bleed; will be considered for OP REED based on progress; transfuse prn  PUD with recent bleed: EGD showed gastric ulcer with visible vessel; on BID PPI and plan repeat EGD as OP  Potassium status; was generous last admit but ok now  Volume status; total body elevated with edema; can try teds while waiting for ARF and bp to improve         Yoana Bravo MD  Nephrology

## 2025-02-17 NOTE — ED PROVIDER NOTES
NAME: Mary Corral  AGE: 89 year old female  YOB: 1935  MRN: [id number]  EVALUATION DATE & TIME: No admission date for patient encounter.    PCP: Jessica Fonseca    ED PROVIDER: Adrian Meehan M.D.    Chief Complaint   Patient presents with    Urinary Retention     FINAL IMPRESSION:  1. Acute renal failure superimposed on chronic kidney disease, unspecified acute renal failure type, unspecified CKD stage    2. Decreased urination      MEDICAL DECISION MAKIN:58 AM I met with the patient, obtained history, performed an initial exam, and discussed options and plan for diagnostics and treatment here in the ED.   4:00 AM patient discussed with Dr. Celestin from hospitalist service for admission.  I did speak with patient prior to speak with hospitalist and she was comfortable with plan for readmission.  Awaiting callback from nephrology as well.  5:02 AM I spoke with Dr. Chen nephrology who recommended imaging to evaluate for hydronephrosis and if no hydronephrosis start with a small dose of diuretics to see if this helps stimulate urination and  fluid movement.  They will plan to see her this morning.    Patient was clinically assessed and consented to treatment. After assessment, medical decision making and workup were discussed with the patient. The patient was agreeable to plan for testing, workup, and treatment.  Pertinent Labs & Imaging studies reviewed. (See chart for details)       Medical Decision Making  Obtained supplemental history:Supplemental history obtained?: Documented in chart  Reviewed external records: External records reviewed?: Documented in chart  Care impacted by chronic illness:Documented in Chart, Heart Disease, and Hypertension  Care significantly affected by social determinants of health:Access to Medical Care  Did you consider but not order tests?: In addition to work-up documented, I considered the following work up:   Did you interpret images independently?:  Independent interpretation of ECG and images noted in documentation, when applicable.  Consultation discussion with other provider:Did you involve another provider (consultant, , pharmacy, etc.)?: I discussed the care with another health care provider, see documentation for details.  Admit.  Not Applicable    Mary Corral is a 89 year old female who presents with urinary retention.   Differential diagnosis includes but not limited to urinary retention, urinary tract infection, ureteral obstruction, dehydration.  Patient just admitted previously for GI bleed and found to have acute kidney injury on top of chronic kidney disease.  Patient had been doing well and after discharging home drinking plenty of fluids.  Patient however with decreased urine output for the last 24 hours.  On bladder scan she only had about 53 mL of urine in the bladder.  Will plan to straight cath for urine sample as well I did discuss with her check of labs given that she has not urinated despite drinking over 8 bottles of water today.  Labs showed stable CBC with hemoglobin stable at 8.1 down from 8.5 on discharge.  Concern however was that her creatinine had gone up from 1.37 to 2.0.  I discussed with patient and will give her a little bit of fluid but also patient having some peripheral edema both in the legs and the hands which could be concerning for this worsening kidney disease.  Patient with no symptoms otherwise except for the decreased urine output but no dysuria or flank pain.  Straight cath urine was also clear and after discussion with patient I do feel she would warrant admission and will discuss with both hospitalist and nephrology.  Patient discussed with hospitalist will plan for admission.  Nephrology called back and recommended diuretic but only if she has no signs of hydronephrosis on imaging.  CT scan ordered and pending at this time and will plan for admission to floor bed with hospitalist.    0 minutes of critical care  time    MEDICATIONS GIVEN IN THE EMERGENCY:  Medications   sodium chloride 0.9 % infusion ( Intravenous Rate/Dose Verify 2/17/25 0750)   albuterol (PROVENTIL) neb solution 2.5 mg (has no administration in time range)   albuterol (PROVENTIL HFA/VENTOLIN HFA) inhaler (has no administration in time range)   atorvastatin (LIPITOR) tablet 40 mg (40 mg Oral $Given 2/17/25 0749)   carvedilol (COREG) tablet 3.125 mg (3.125 mg Oral $Given 2/17/25 0748)   cetirizine (zyrTEC) tablet 10 mg (10 mg Oral $Given 2/17/25 0749)   clopidogrel (PLAVIX) tablet 75 mg (75 mg Oral $Given 2/17/25 0749)   fluticasone-vilanterol (BREO ELLIPTA) 100-25 MCG/ACT inhaler 1 puff (1 puff Inhalation $Given 2/17/25 0746)   levothyroxine (SYNTHROID/LEVOTHROID) tablet 50 mcg (50 mcg Oral $Given 2/17/25 0747)   losartan (COZAAR) tablet 25 mg ( Oral Automatically Held 2/20/25 2000)   montelukast (SINGULAIR) tablet 10 mg (has no administration in time range)   pantoprazole (PROTONIX) EC tablet 40 mg (40 mg Oral $Given 2/17/25 0749)   lidocaine 1 % 0.1-1 mL (has no administration in time range)   lidocaine (LMX4) cream (has no administration in time range)   sodium chloride (PF) 0.9% PF flush 3 mL (3 mLs Intracatheter $Given 2/17/25 0750)   sodium chloride (PF) 0.9% PF flush 3 mL (has no administration in time range)   senna-docusate (SENOKOT-S/PERICOLACE) 8.6-50 MG per tablet 1 tablet (has no administration in time range)     Or   senna-docusate (SENOKOT-S/PERICOLACE) 8.6-50 MG per tablet 2 tablet (has no administration in time range)   calcium carbonate (TUMS) chewable tablet 1,000 mg (has no administration in time range)   acetaminophen (TYLENOL) tablet 650 mg (has no administration in time range)     Or   acetaminophen (TYLENOL) Suppository 650 mg (has no administration in time range)   melatonin tablet 1 mg (has no administration in time range)   docusate sodium (COLACE) capsule 100 mg (100 mg Oral $Given 2/17/25 1950)   bisacodyl (DULCOLAX)  suppository 10 mg (has no administration in time range)   polyethylene glycol (MIRALAX) Packet 17 g (has no administration in time range)   ondansetron (ZOFRAN ODT) ODT tab 4 mg (has no administration in time range)     Or   ondansetron (ZOFRAN) injection 4 mg (has no administration in time range)       NEW PRESCRIPTIONS STARTED AT TODAY'S ER VISIT:  New Prescriptions    No medications on file          =================================================================    HPI    Patient information was obtained from: Patient    Use of : N/A    Mary Corral is a 89 year old female with a past medical history of HTN, chronic sinusitis, COPD, CRI, gastroesophageal reflux disease, NSTEMI, and asthma who presents decreased urine output.  Patient believes she may have urinary retention.  Patient just discharged on 2/13 after having GI bleed and receiving blood products.  She also had acute kidney injury on top of chronic kidney disease and creatinine had increased but improved on discharge.  Patient reports nephrology told her she was good to go home and continue drinking plenty fluids and follow-up with primary doctor for recheck of labs.  Patient reports that she had been feeling well on 2/14 and 2/15.  She reports on 2/16 she began noticing that even though she usually gets up and urinates in the morning she did not have much urine.  She reports throughout the day she kept going to the bathroom but did not produce much urine if at all any.  She did not have any pain did not have any flank pain, no nausea vomiting, no dysuria or urgency.  Patient reports she became anxious and called on-call physician who recommended she come to the ER given that she had not produced much urine in the last 24 hours despite going to the bathroom multiple times very little came out.  Patient does report drinking over 8 bottles of water daily for the last 2 days as instructed on discharge from the hospital.      REVIEW OF SYSTEMS    Review of Systems     PAST MEDICAL HISTORY:  Past Medical History:   Diagnosis Date    Allergic rhinitis     Chronic sinusitis     Conductive hearing loss     COPD (chronic obstructive pulmonary disease) (H)     CRI (chronic renal insufficiency)     mild    Gastroesophageal reflux disease     Hearing problem     Hypertension     Mediastinal mass     Nasal polyps     Pulmonary nodule     Seasonal allergies     Uncomplicated asthma        PAST SURGICAL HISTORY:  Past Surgical History:   Procedure Laterality Date    APPENDECTOMY      BIOPSY MASS NECK N/A 11/21/2016    Procedure: BIOPSY MASS NECK;  Surgeon: Lucia Quinteros MD;  Location: [facility] OR    CATARACT IOL, RT/LT Bilateral 2016    CV CORONARY ANGIOGRAM N/A 4/29/2024    Procedure: Coronary Angiogram;  Surgeon: Marino Lind MD;  Location: [facility]    CV LEFT HEART CATH N/A 4/29/2024    Procedure: Left Heart Catheterization;  Surgeon: Marino Lind MD;  Location: [facility] CV    CV PCI N/A 4/29/2024    Procedure: Percutaneous Coronary Intervention;  Surgeon: Marino Lind MD;  Location: [facility]    CV PCI ASPIRATION THROMECTOMY N/A 4/29/2024    Procedure: Percutaneous Coronary Intervention Aspiration Thrombectomy;  Surgeon: Marino Lind MD;  Location: [facility] CV    ENDOSCOPIC ULTRASOUND UPPER GASTROINTESTINAL TRACT (GI) N/A 11/21/2016    Procedure: ENDOSCOPIC ULTRASOUND, ESOPHAGOSCOPY / UPPER GASTROINTESTINAL TRACT (GI);  Surgeon: Lucia Quinteros MD;  Location:  OR    ESOPHAGOSCOPY, GASTROSCOPY, DUODENOSCOPY (EGD), COMBINED N/A 2/7/2025    Procedure: ESOPHAGOGASTRODUODENOSCOPY WITH CAUTERIZATION;  Surgeon: Taj Francis MD;  Location: [facility] OR    ESOPHAGOSCOPY, GASTROSCOPY, DUODENOSCOPY (EGD), DILATATION, COMBINED N/A 2/6/2023    Procedure: ESOPHAGOGASTRODUODENOSCOPY with esophageal dilitation;  Surgeon: Kodak Huerta MD, MD;  Location: [facility] GI    ROTATOR CUFF REPAIR RT/LT Right 30 years  ago    THYROIDECTOMY N/A 3/14/2017    Procedure: THYROIDECTOMY;  Surgeon: Misbah Julien MD;  Location: [facility] OR       CURRENT MEDICATIONS:      Current Facility-Administered Medications:     acetaminophen (TYLENOL) tablet 650 mg, 650 mg, Oral, Q4H PRN **OR** acetaminophen (TYLENOL) Suppository 650 mg, 650 mg, Rectal, Q4H PRN, Addis Celestin MD    albuterol (PROVENTIL HFA/VENTOLIN HFA) inhaler, 2 puff, Inhalation, Q4H PRN, Addis Celestin MD    albuterol (PROVENTIL) neb solution 2.5 mg, 2.5 mg, Nebulization, Q4H PRN, Addis Celestin MD    atorvastatin (LIPITOR) tablet 40 mg, 40 mg, Oral, Daily, Addis Celestin MD, 40 mg at 02/17/25 0749    bisacodyl (DULCOLAX) suppository 10 mg, 10 mg, Rectal, Daily PRN, Addis Celestin MD    calcium carbonate (TUMS) chewable tablet 1,000 mg, 1,000 mg, Oral, 4x Daily PRN, Addis Celestin MD    carvedilol (COREG) tablet 3.125 mg, 3.125 mg, Oral, BID w/meals, Addis Celestin MD, 3.125 mg at 02/17/25 0748    cetirizine (zyrTEC) tablet 10 mg, 10 mg, Oral, Daily PRN, Addis Celestin MD, 10 mg at 02/17/25 0749    clopidogrel (PLAVIX) tablet 75 mg, 75 mg, Oral, Daily, Addis Celestin MD, 75 mg at 02/17/25 0749    docusate sodium (COLACE) capsule 100 mg, 100 mg, Oral, BID, Addis Celestin MD, 100 mg at 02/17/25 0749    fluticasone-vilanterol (BREO ELLIPTA) 100-25 MCG/ACT inhaler 1 puff, 1 puff, Inhalation, Daily, Addis Celestin MD, 1 puff at 02/17/25 0746    levothyroxine (SYNTHROID/LEVOTHROID) tablet 50 mcg, 50 mcg, Oral, Daily, Addis Celestin MD, 50 mcg at 02/17/25 0747    lidocaine (LMX4) cream, , Topical, Q1H PRN, Addis Celestin MD    lidocaine 1 % 0.1-1 mL, 0.1-1 mL, Other, Q1H PRN, Addis Celestin MD    [Held by provider] losartan (COZAAR) tablet 25 mg, 25 mg, Oral, BID, Addis Celestin MD    melatonin tablet 1 mg, 1 mg, Oral, At Bedtime PRN, Addis Celestin MD    montelukast (SINGULAIR) tablet 10 mg, 10 mg, Oral, At Bedtime, Addis Celestin MD    ondansetron (ZOFRAN ODT) ODT tab 4 mg, 4 mg,  Oral, Q6H PRN **OR** ondansetron (ZOFRAN) injection 4 mg, 4 mg, Intravenous, Q6H PRN, Addis Celestin MD    pantoprazole (PROTONIX) EC tablet 40 mg, 40 mg, Oral, BID, Addis Celestin MD, 40 mg at 02/17/25 0749    polyethylene glycol (MIRALAX) Packet 17 g, 17 g, Oral, BID PRN, Addis Celestin MD    senna-docusate (SENOKOT-S/PERICOLACE) 8.6-50 MG per tablet 1 tablet, 1 tablet, Oral, BID PRN **OR** senna-docusate (SENOKOT-S/PERICOLACE) 8.6-50 MG per tablet 2 tablet, 2 tablet, Oral, BID PRN, Addis Celestin MD    sodium chloride (PF) 0.9% PF flush 3 mL, 3 mL, Intracatheter, Q8H, Addis Celestin MD, 3 mL at 02/17/25 0750    sodium chloride (PF) 0.9% PF flush 3 mL, 3 mL, Intracatheter, q1 min prn, Addis Celestin MD    sodium chloride 0.9 % infusion, , Intravenous, Continuous, WallAdrian MD, Last Rate: 50 mL/hr at 02/17/25 0750, Rate Verify at 02/17/25 0750    Current Outpatient Medications:     acetaminophen (TYLENOL) 325 MG tablet, Take 2 tablets (650 mg) by mouth every 4 hours as needed for other (surgical pain), Disp: 100 tablet, Rfl: 0    acetaminophen-codeine (TYLENOL #3) 300-30 MG tablet, Take 1 tablet by mouth every 6 hours as needed for severe pain (used when she has to walk long distances), Disp: , Rfl:     albuterol (2.5 MG/3ML) 0.083% nebulizer solution, Take 1 vial by nebulization every 4 hours as needed for shortness of breath / dyspnea or wheezing, Disp: , Rfl:     albuterol (PROAIR HFA/PROVENTIL HFA/VENTOLIN HFA) 108 (90 BASE) MCG/ACT Inhaler, Inhale 2 puffs into the lungs every 4 hours as needed for shortness of breath, Disp: , Rfl:     allopurinol (ZYLOPRIM) 100 MG tablet, Take 100 mg by mouth 2 times daily as needed Gout flares, Disp: , Rfl:     atorvastatin (LIPITOR) 40 MG tablet, Take 1 tablet (40 mg) by mouth daily, Disp: 90 tablet, Rfl: 3    carvedilol (COREG) 6.25 MG tablet, Take 1 tablet (6.25 mg) by mouth 2 times daily (with meals)., Disp: 180 tablet, Rfl: 2    cetirizine (ZYRTEC) 10  MG tablet, Take 10 mg by mouth daily as needed for allergies, Disp: , Rfl:     clopidogrel (PLAVIX) 75 MG tablet, Take 1 tablet (75 mg) by mouth daily Dose to start tomorrow., Disp: 90 tablet, Rfl: 3    fluticasone-salmeterol (ADVAIR) 250-50 MCG/DOSE diskus inhaler, Inhale 1 puff into the lungs daily, Disp: , Rfl:     levothyroxine (SYNTHROID/LEVOTHROID) 50 MCG tablet, Take 50 mcg by mouth daily, Disp: , Rfl:     losartan (COZAAR) 25 MG tablet, Take 1 tablet (25 mg) by mouth 2 times daily, Disp: 180 tablet, Rfl: 3    montelukast (SINGULAIR) 10 MG tablet, Take 10 mg by mouth at bedtime, Disp: , Rfl:     multivitamin w/minerals (THERA-VIT-M) tablet, Take 1 tablet by mouth daily, Disp: , Rfl:     pantoprazole (PROTONIX) 40 MG EC tablet, Take 1 tablet (40 mg) by mouth 2 times daily., Disp: 180 tablet, Rfl: 0    polyethylene glycol (MIRALAX/GLYCOLAX) powder, Take 17 g by mouth daily as needed for constipation, Disp: , Rfl:     predniSONE (DELTASONE) 20 MG tablet, Take 20 mg by mouth daily as needed (Self doses in winter for lung infections.), Disp: , Rfl:     valACYclovir (VALTREX) 1000 mg tablet, Take 1,000 mg by mouth 2 times daily as needed (for one day), Disp: , Rfl:     Vitamin D3 (CHOLECALCIFEROL) 25 mcg (1000 units) tablet, Take 1,000 Units by mouth daily, Disp: , Rfl:     ALLERGIES:  Allergies   Allergen Reactions    Amoxicillin Hives    Keflex [Cephalexin] Hives    Lisinopril Shortness Of Breath    Penicillins Hives       FAMILY HISTORY:  Family History   Problem Relation Age of Onset    Esophageal Cancer Mother     Colon Cancer Mother     Mental Illness Mother     Dementia Mother     Unknown/Adopted Mother     Asthma Mother     Diabetes Mother     Cerebrovascular Disease Mother     Thyroid Disease Mother     Congenital Anomalies Mother     Bleeding Disorder Mother     Migraines Mother     Muscular Disorder Mother     Parkinsonism Father     Mental Illness Father     Unknown/Adopted Father     Asthma Father      Cancer Father     Hypertension Father     Cerebrovascular Disease Father     Thyroid Disease Father     Congenital Anomalies Father     Bleeding Disorder Father     Migraines Father     Muscular Disorder Father     Mental Illness Sister     Dementia Sister     Unknown/Adopted Sister     Asthma Sister     Cerebrovascular Disease Sister     Thyroid Disease Sister     Congenital Anomalies Sister     Bleeding Disorder Sister     Migraines Sister     Muscular Disorder Sister     Cancer Mother         esophageal       SOCIAL HISTORY:   Social History     Socioeconomic History    Marital status: Single   Tobacco Use    Smoking status: Never    Smokeless tobacco: Never    Tobacco comments:     smoked twice a week for a couple years while in college, less than 1/2 pack    Substance and Sexual Activity    Alcohol use: Yes     Alcohol/week: 0.0 standard drinks of alcohol     Comment: Alcoholic Drinks/day: occasional    Drug use: No    Sexual activity: Never     Social Drivers of Health     Financial Resource Strain: Low Risk  (2/6/2025)    Financial Resource Strain     Within the past 12 months, have you or your family members you live with been unable to get utilities (heat, electricity) when it was really needed?: No   Food Insecurity: Low Risk  (2/6/2025)    Food Insecurity     Within the past 12 months, did you worry that your food would run out before you got money to buy more?: No     Within the past 12 months, did the food you bought just not last and you didn t have money to get more?: No   Transportation Needs: Low Risk  (2/6/2025)    Transportation Needs     Within the past 12 months, has lack of transportation kept you from medical appointments, getting your medicines, non-medical meetings or appointments, work, or from getting things that you need?: No   Interpersonal Safety: High Risk (2/6/2025)    Interpersonal Safety     Do you feel physically and emotionally safe where you currently live?: No     Within the  "past 12 months, have you been hit, slapped, kicked or otherwise physically hurt by someone?: No     Within the past 12 months, have you been humiliated or emotionally abused in other ways by your partner or ex-partner?: No   Housing Stability: Low Risk  (2/6/2025)    Housing Stability     Do you have housing? : Yes     Are you worried about losing your housing?: No       PHYSICAL EXAM:    Vitals: /59   Pulse 66   Temp 97.9  F (36.6  C) (Oral)   Ht 1.702 m (5' 7\")   Wt 83.9 kg (185 lb)   SpO2 92%   BMI 28.98 kg/m     Physical Exam  Vitals and nursing note reviewed.   Constitutional:       General: She is not in acute distress.     Appearance: Normal appearance. She is obese. She is not ill-appearing or toxic-appearing.   HENT:      Head: Normocephalic.      Mouth/Throat:      Pharynx: Oropharynx is clear.   Cardiovascular:      Rate and Rhythm: Normal rate and regular rhythm.      Heart sounds: Normal heart sounds.   Pulmonary:      Effort: Pulmonary effort is normal. No respiratory distress.      Breath sounds: Normal breath sounds.   Abdominal:      General: Abdomen is flat. There is no distension.      Palpations: Abdomen is soft.      Tenderness: There is no abdominal tenderness. There is no right CVA tenderness, left CVA tenderness or guarding.   Musculoskeletal:         General: Swelling (Trace bilateral upper extremity edema) present.      Cervical back: Normal range of motion.      Right lower leg: Edema present.      Left lower leg: Edema (1+ lower extremity edema) present.   Skin:     General: Skin is warm and dry.      Capillary Refill: Capillary refill takes less than 2 seconds.      Coloration: Skin is not pale.      Findings: No erythema.   Neurological:      General: No focal deficit present.      Mental Status: She is alert.   Psychiatric:         Behavior: Behavior normal.           LAB:  All pertinent labs reviewed and interpreted.  Labs Ordered and Resulted from Time of ED Arrival to " Time of ED Departure   ROUTINE UA WITH MICROSCOPIC REFLEX TO CULTURE - Abnormal       Result Value    Color Urine Light Yellow      Appearance Urine Clear      Glucose Urine Negative      Bilirubin Urine Negative      Ketones Urine Negative      Specific Gravity Urine 1.012      Blood Urine 0.1 mg/dL (*)     pH Urine 5.0      Protein Albumin Urine Negative      Urobilinogen Urine <2.0      Nitrite Urine Negative      Leukocyte Esterase Urine Negative      RBC Urine 1      WBC Urine 4      Squamous Epithelials Urine 1      Hyaline Casts Urine 16 (*)    COMPREHENSIVE METABOLIC PANEL - Abnormal    Sodium 137      Potassium 3.7      Carbon Dioxide (CO2) 26      Anion Gap 11      Urea Nitrogen 25.0 (*)     Creatinine 2.08 (*)     GFR Estimate 23 (*)     Calcium 7.9 (*)     Chloride 100      Glucose 116 (*)     Alkaline Phosphatase 67      AST 20      ALT 10      Protein Total 5.5 (*)     Albumin 3.0 (*)     Bilirubin Total 0.4     CBC WITH PLATELETS AND DIFFERENTIAL - Abnormal    WBC Count 11.4 (*)     RBC Count 2.79 (*)     Hemoglobin 8.1 (*)     Hematocrit 26.1 (*)     MCV 94      MCH 29.0      MCHC 31.0 (*)     RDW 16.9 (*)     Platelet Count 281      % Neutrophils 74      % Lymphocytes 13      % Monocytes 7      % Eosinophils 4      % Basophils 0      % Immature Granulocytes 1      NRBCs per 100 WBC 0      Absolute Neutrophils 8.5 (*)     Absolute Lymphocytes 1.5      Absolute Monocytes 0.9      Absolute Eosinophils 0.5      Absolute Basophils 0.0      Absolute Immature Granulocytes 0.1      Absolute NRBCs 0.0     CBC WITH PLATELETS - Abnormal    WBC Count 9.8      RBC Count 2.82 (*)     Hemoglobin 8.2 (*)     Hematocrit 26.0 (*)     MCV 92      MCH 29.1      MCHC 31.5      RDW 16.8 (*)     Platelet Count 241     SODIUM RANDOM URINE    Sodium Urine mmol/L <20     ROUTINE UA WITH MICROSCOPIC   BASIC METABOLIC PANEL   TYPE AND SCREEN, ADULT    ABO/RH(D) B POS      Antibody Screen Negative      SPECIMEN EXPIRATION DATE  [id number]     ABO/RH TYPE AND SCREEN       RADIOLOGY:  CT Abdomen Pelvis w/o Contrast   Final Result   IMPRESSION:    1.  No obstructing ureteral or bladder calculi. No ureteral dilatation. Stable mild prominence to the right and left renal pelvis without significant calyceal dilatation. Minimal interval increase in edema surrounding the right and left renal pelvis.    This can be seen with infectious or inflammatory etiologies.      2.  Stable parapelvic and cortical renal cysts bilaterally, no follow-up.      3.  Marked vascular calcification at the origin of the renal arteries bilaterally, greater on the left.      4.  Colonic diverticulosis without diverticulitis. No bowel obstruction.      5.  New minimal bibasilar pleural fluid collections with atelectasis.                               PROCEDURES:   Procedures       I, Alejandro Hunt, am serving as a scribe to document services personally performed by Dr. Adrian Meehan  based on my observation and the provider's statements to me. I, Adrian Meehan MD attest that Alejandro Hunt is acting in a scribe capacity, has observed my performance of the services and has documented them in accordance with my direction.      Adrian Meehan M.D.  Emergency Medicine  [facility] Emergency Department     Adrian Meehan MD  02/17/25 0837

## 2025-02-17 NOTE — PROGRESS NOTES
Brief Note:  She has no urinary concerns today.  Patient denied acute hospital at home.  She does not have a ride tonight and would like to go home tomorrow.  She has follow-up with her PCP tomorrow.  PTA losartan can be discontinued per nephrology to allow a higher blood pressure.

## 2025-02-17 NOTE — ED TRIAGE NOTES
Pt brought in by [facility] from home, Pt states she is unable to void, last time she voided was Sunday morning. Was recently here Thursday for a bleeding ulcer.     Triage Assessment (Adult)       Row Name 02/17/25 0100          Triage Assessment    Airway WDL WDL        Respiratory WDL    Respiratory WDL WDL        Skin Circulation/Temperature WDL    Skin Circulation/Temperature WDL WDL        Cardiac WDL    Cardiac WDL WDL        Peripheral/Neurovascular WDL    Peripheral Neurovascular WDL WDL        Cognitive/Neuro/Behavioral WDL    Cognitive/Neuro/Behavioral WDL WDL                     
Detail Level: Detailed

## 2025-02-17 NOTE — H&P
[facility]    History and Physical - Hospitalist Service       Date of Admission:  2025  Mary Corral,  1935, MRN [id number]  PCP: Jessica Fonseca    Assessment & Plan      Mary Corral is a 89 year old female admitted on 2025. She has history of CKD 3-4, asthma, CAD s/p PCI on Plavix, GERD, and HTN, just hospitalized - for GIB and CARLYLE, here for anuria and anasarca found to have recurrent CARLYLE    #CARLYLE  -recent hospital stay had CARLYLE from [facility], Cr had recovered to baseline (~1.5) by discharge on   -now Cr back up to 2 and very poor UOP, cause unclear, patient otherwise doing well  -Bladder scan showed no retention  -Consult nephrology  -Gentle IVF ordered and trend Cr  -hold home losartan    #Acute blood loss anemia  -Hgb stable from recent discharge, currently 8.1  -denies s/s ongoing bleeding    #CAD s/p stents  -home Plavix, statin. Decrease home Coreg dose a bit given BPs  and avoid hypotension to support kidneys  -recently taken off of ASA due GIB    #PUD  -last admission had EGD showed gastric ulcer with visible vessel  -continue PPI BID  -no s/s recurrent bleeding  -outpatient GI followup for repeat EGD in 2 months  -continue regular diet ad ga    #Asthma  -home Advair, Singulair    #Hypothyroidism, home Synthroid       DVT Prophylaxis: Moderate risk. SCDs  Diet: Combination Diet Regular Diet Adult  Russo Catheter: Not present  Lines: None     Cardiac Monitoring: None  Code Status: No CPR- Do NOT Intubate. Discussed and confirmed with patient    Clinically Significant Risk Factors Present on Admission               # Hypoalbuminemia: Lowest albumin = 3 g/dL at 2025  1:35 AM, will monitor as appropriate   # Drug Induced Platelet Defect: home medication list includes an antiplatelet medication    # Acute Kidney Injury, unspecified: based on a >150% or 0.3 mg/dL increase in last creatinine compared to past 90 day average, will monitor renal  "function  # Hypertension: Home medication list includes antihypertensive(s)      # Anemia: based on hgb <11       # Overweight: Estimated body mass index is 28.98 kg/m  as calculated from the following:    Height as of this encounter: 1.702 m (5' 7\").    Weight as of this encounter: 83.9 kg (185 lb).         # Financial/Environmental Concerns:    # Asthma: noted on problem list        Disposition Plan      Expected Discharge Date: 02/19/2025                The patient's care was discussed with the Patient.    Addis Celestin MD  Hospitalist Service  [facility]  Securely message with what3words (more info)  Text page via [facility] Paging/Directory     ______________________________________________________________________    Chief Complaint   oliguria    History is obtained from the patient    History of Present Illness   Mary Corral is a 89 year old female who is here for aforementioned symptoms.   She reports she has done well since going home from the hospital. She has been eating and drinking well. Despite this has noted decline in UOP and now with minimal UOP. Noticing increasing edema of BUE and BLE. Denies any pain. No fevers, n/v, diarrhea, constipation. BMs have normalized in color.      Past Medical History    Past Medical History:   Diagnosis Date    Allergic rhinitis     Chronic sinusitis     Conductive hearing loss     COPD (chronic obstructive pulmonary disease) (H)     CRI (chronic renal insufficiency)     mild    Gastroesophageal reflux disease     Hearing problem     Hypertension     Mediastinal mass     Nasal polyps     Pulmonary nodule     Seasonal allergies     Uncomplicated asthma        Past Surgical History   Past Surgical History:   Procedure Laterality Date    APPENDECTOMY      BIOPSY MASS NECK N/A 11/21/2016    Procedure: BIOPSY MASS NECK;  Surgeon: Lucia Quinteros MD;  Location: [facility] OR    CATARACT IOL, RT/LT Bilateral 2016    CV CORONARY ANGIOGRAM N/A 4/29/2024    Procedure: " Coronary Angiogram;  Surgeon: Marino Lind MD;  Location: [facility] CV    CV LEFT HEART CATH N/A 4/29/2024    Procedure: Left Heart Catheterization;  Surgeon: Marino Lind MD;  Location: [facility] LAB CV    CV PCI N/A 4/29/2024    Procedure: Percutaneous Coronary Intervention;  Surgeon: Marino Lind MD;  Location: [facility] CV    CV PCI ASPIRATION THROMECTOMY N/A 4/29/2024    Procedure: Percutaneous Coronary Intervention Aspiration Thrombectomy;  Surgeon: Marino Lind MD;  Location: [facility] CV    ENDOSCOPIC ULTRASOUND UPPER GASTROINTESTINAL TRACT (GI) N/A 11/21/2016    Procedure: ENDOSCOPIC ULTRASOUND, ESOPHAGOSCOPY / UPPER GASTROINTESTINAL TRACT (GI);  Surgeon: Lucia Quinteros MD;  Location:  OR    ESOPHAGOSCOPY, GASTROSCOPY, DUODENOSCOPY (EGD), COMBINED N/A 2/7/2025    Procedure: ESOPHAGOGASTRODUODENOSCOPY WITH CAUTERIZATION;  Surgeon: Taj Francis MD;  Location: [facility] OR    ESOPHAGOSCOPY, GASTROSCOPY, DUODENOSCOPY (EGD), DILATATION, COMBINED N/A 2/6/2023    Procedure: ESOPHAGOGASTRODUODENOSCOPY with esophageal dilitation;  Surgeon: Kodak Huerta MD, MD;  Location: [facility]    ROTATOR CUFF REPAIR RT/LT Right 30 years ago    THYROIDECTOMY N/A 3/14/2017    Procedure: THYROIDECTOMY;  Surgeon: Misbah Julien MD;  Location: [facility] OR       Prior to Admission Medications   Prior to Admission Medications   Prescriptions Last Dose Informant Patient Reported? Taking?   Vitamin D3 (CHOLECALCIFEROL) 25 mcg (1000 units) tablet   Yes Yes   Sig: Take 1,000 Units by mouth daily   acetaminophen (TYLENOL) 325 MG tablet   No Yes   Sig: Take 2 tablets (650 mg) by mouth every 4 hours as needed for other (surgical pain)   acetaminophen-codeine (TYLENOL #3) 300-30 MG tablet   Yes Yes   Sig: Take 1 tablet by mouth every 6 hours as needed for severe pain (used when she has to walk long distances)   albuterol (2.5 MG/3ML) 0.083% nebulizer solution   Yes Yes   Sig:  Take 1 vial by nebulization every 4 hours as needed for shortness of breath / dyspnea or wheezing   albuterol (PROAIR HFA/PROVENTIL HFA/VENTOLIN HFA) 108 (90 BASE) MCG/ACT Inhaler   Yes Yes   Sig: Inhale 2 puffs into the lungs every 4 hours as needed for shortness of breath   allopurinol (ZYLOPRIM) 100 MG tablet   Yes Yes   Sig: Take 100 mg by mouth 2 times daily as needed Gout flares   atorvastatin (LIPITOR) 40 MG tablet 2/16/2025 Morning  No Yes   Sig: Take 1 tablet (40 mg) by mouth daily   carvedilol (COREG) 6.25 MG tablet 2/16/2025 Evening  No Yes   Sig: Take 1 tablet (6.25 mg) by mouth 2 times daily (with meals).   cetirizine (ZYRTEC) 10 MG tablet   Yes Yes   Sig: Take 10 mg by mouth daily as needed for allergies   clopidogrel (PLAVIX) 75 MG tablet 2/16/2025 Morning  No Yes   Sig: Take 1 tablet (75 mg) by mouth daily Dose to start tomorrow.   fluticasone-salmeterol (ADVAIR) 250-50 MCG/DOSE diskus inhaler 2/16/2025 Noon  Yes Yes   Sig: Inhale 1 puff into the lungs daily   levothyroxine (SYNTHROID/LEVOTHROID) 50 MCG tablet 2/16/2025 Morning  Yes Yes   Sig: Take 50 mcg by mouth daily   losartan (COZAAR) 25 MG tablet 2/16/2025 Evening  No Yes   Sig: Take 1 tablet (25 mg) by mouth 2 times daily   montelukast (SINGULAIR) 10 MG tablet 2/16/2025 Bedtime  Yes Yes   Sig: Take 10 mg by mouth at bedtime   multivitamin w/minerals (THERA-VIT-M) tablet 2/16/2025  Yes Yes   Sig: Take 1 tablet by mouth daily   pantoprazole (PROTONIX) 40 MG EC tablet 2/16/2025 Evening  No Yes   Sig: Take 1 tablet (40 mg) by mouth 2 times daily.   polyethylene glycol (MIRALAX/GLYCOLAX) powder   Yes Yes   Sig: Take 17 g by mouth daily as needed for constipation   predniSONE (DELTASONE) 20 MG tablet   Yes Yes   Sig: Take 20 mg by mouth daily as needed (Self doses in winter for lung infections.)   valACYclovir (VALTREX) 1000 mg tablet   Yes Yes   Sig: Take 1,000 mg by mouth 2 times daily as needed (for one day)      Facility-Administered  Medications: None        Social History   I have reviewed this patient's social history and updated it with pertinent information if needed.  Social History     Tobacco Use    Smoking status: Never    Smokeless tobacco: Never    Tobacco comments:     smoked twice a week for a couple years while in college, less than 1/2 pack    Substance Use Topics    Alcohol use: Yes     Alcohol/week: 0.0 standard drinks of alcohol     Comment: Alcoholic Drinks/day: occasional    Drug use: No        Physical Exam   Vital Signs: Temp: 97.9  F (36.6  C) Temp src: Oral BP: 119/59 Pulse: 66     SpO2: 92 % O2 Device: None (Room air)    Weight: 185 lbs 0 oz  General: in no apparent distress, non-toxic, and alert female lying in hospital bed oriented x3  HEENT: Head normocephalic atraumatic, oral mucosa moist. Sclerae anicteric  CV: Regular rhythm, normal rate, no murmurs  Resp: No wheezes, no rales or rhonchi, no focal consolidations  GI: Belly soft, nondistended, nontender, bowel sounds present  Skin:  very pale.  Extremities:  trace edema of BUE and BLE  Psych: Normal affect, mood euthymic  Neuro: Grossly normal    Medical Decision Making                 Data   Imaging results reviewed over the past 24 hrs:   Recent Results (from the past 24 hours)   CT Abdomen Pelvis w/o Contrast    Narrative    EXAM: CT ABDOMEN PELVIS W/O CONTRAST  LOCATION: [facility]  DATE: 2/17/2025    INDICATION: Evaluate for hydronephrosis, with acute on chronic kidney failure.  COMPARISON: 2/5/2025  TECHNIQUE: CT scan of the abdomen and pelvis was performed without IV contrast. Multiplanar reformats were obtained. Dose reduction techniques were used.  CONTRAST: None.    FINDINGS:   LOWER CHEST: New minimal bibasilar pleural fluid collections, greater on the right. Mild atelectasis both lung bases. Coronary artery calcification. Minimal esophageal hiatal hernia.    HEPATOBILIARY: No intrahepatic or extrahepatic biliary dilatation. No  calcified gallstones. Noncontrast liver unremarkable.    PANCREAS: No ductal dilatation or acute inflammatory change.    SPLEEN: Normal size.    ADRENAL GLANDS: Unremarkable.    GENITOURINARY: No new or significant hydronephrosis bilaterally. New minimal edema surrounding the right and left renal pelves. No obstructing ureteral calculi or ureteral dilatation. Noncontrast bladder unremarkable. Bilateral parapelvic renal cysts   unchanged, no follow-up. Cortical cysts unchanged. Mild right renal atrophy.    GASTROINTESTINAL: No obstruction or inflammatory change. Colonic diverticulosis without diverticulitis. Appendix unremarkable.    LYMPH NODES: No lymphadenopathy.    VASCULATURE: Normal caliber aorta. Moderate vascular calcification with heavy vascular calcification at the renal arteries, greater on the left.    PELVIC ORGANS: Unremarkable. No free fluid.    MUSCULOSKELETAL: Marked degenerative changes both hips, greater on the left. Marked lower lumbar facet arthropathy. Degenerative hypertrophic changes throughout the spine.      Impression    IMPRESSION:   1.  No obstructing ureteral or bladder calculi. No ureteral dilatation. Stable mild prominence to the right and left renal pelvis without significant calyceal dilatation. Minimal interval increase in edema surrounding the right and left renal pelvis.   This can be seen with infectious or inflammatory etiologies.    2.  Stable parapelvic and cortical renal cysts bilaterally, no follow-up.    3.  Marked vascular calcification at the origin of the renal arteries bilaterally, greater on the left.    4.  Colonic diverticulosis without diverticulitis. No bowel obstruction.    5.  New minimal bibasilar pleural fluid collections with atelectasis.                   Recent Results (from the past 12 hours)   Comprehensive metabolic panel    Collection Time: 02/17/25  1:35 AM   Result Value Ref Range    Sodium 137 135 - 145 mmol/L    Potassium 3.7 3.4 - 5.3 mmol/L     Carbon Dioxide (CO2) 26 22 - 29 mmol/L    Anion Gap 11 7 - 15 mmol/L    Urea Nitrogen 25.0 (H) 8.0 - 23.0 mg/dL    Creatinine 2.08 (H) 0.51 - 0.95 mg/dL    GFR Estimate 23 (L) >60 mL/min/1.73m2    Calcium 7.9 (L) 8.8 - 10.4 mg/dL    Chloride 100 98 - 107 mmol/L    Glucose 116 (H) 70 - 99 mg/dL    Alkaline Phosphatase 67 40 - 150 U/L    AST 20 0 - 45 U/L    ALT 10 0 - 50 U/L    Protein Total 5.5 (L) 6.4 - 8.3 g/dL    Albumin 3.0 (L) 3.5 - 5.2 g/dL    Bilirubin Total 0.4 <=1.2 mg/dL   CBC with platelets and differential    Collection Time: 02/17/25  1:35 AM   Result Value Ref Range    WBC Count 11.4 (H) 4.0 - 11.0 10e3/uL    RBC Count 2.79 (L) 3.80 - 5.20 10e6/uL    Hemoglobin 8.1 (L) 11.7 - 15.7 g/dL    Hematocrit 26.1 (L) 35.0 - 47.0 %    MCV 94 78 - 100 fL    MCH 29.0 26.5 - 33.0 pg    MCHC 31.0 (L) 31.5 - 36.5 g/dL    RDW 16.9 (H) 10.0 - 15.0 %    Platelet Count 281 150 - 450 10e3/uL    % Neutrophils 74 %    % Lymphocytes 13 %    % Monocytes 7 %    % Eosinophils 4 %    % Basophils 0 %    % Immature Granulocytes 1 %    NRBCs per 100 WBC 0 <1 /100    Absolute Neutrophils 8.5 (H) 1.6 - 8.3 10e3/uL    Absolute Lymphocytes 1.5 0.8 - 5.3 10e3/uL    Absolute Monocytes 0.9 0.0 - 1.3 10e3/uL    Absolute Eosinophils 0.5 0.0 - 0.7 10e3/uL    Absolute Basophils 0.0 0.0 - 0.2 10e3/uL    Absolute Immature Granulocytes 0.1 <=0.4 10e3/uL    Absolute NRBCs 0.0 10e3/uL   Adult Type and Screen    Collection Time: 02/17/25  1:35 AM   Result Value Ref Range    ABO/RH(D) B POS     Antibody Screen Negative Negative    SPECIMEN EXPIRATION DATE [id number]    UA with Microscopic reflex to Culture    Collection Time: 02/17/25  2:30 AM    Specimen: Urine, Catheter   Result Value Ref Range    Color Urine Light Yellow Colorless, Straw, Light Yellow, Yellow    Appearance Urine Clear Clear    Glucose Urine Negative Negative mg/dL    Bilirubin Urine Negative Negative    Ketones Urine Negative Negative mg/dL    Specific Gravity Urine 1.012  1.001 - 1.030    Blood Urine 0.1 mg/dL (A) Negative    pH Urine 5.0 5.0 - 7.0    Protein Albumin Urine Negative Negative mg/dL    Urobilinogen Urine <2.0 <2.0 mg/dL    Nitrite Urine Negative Negative    Leukocyte Esterase Urine Negative Negative    RBC Urine 1 <=2 /HPF    WBC Urine 4 <=5 /HPF    Squamous Epithelials Urine 1 <=1 /HPF    Hyaline Casts Urine 16 (H) <=2 /LPF   Sodium random urine    Collection Time: 02/17/25  2:30 AM   Result Value Ref Range    Sodium Urine mmol/L <20 mmol/L

## 2025-02-17 NOTE — ED NOTES
[facility]  Expected date: 2/17/25  Expected time: 12:56 AM  Means of arrival: Ambulance  Comments:  Kurtis. 89 yo female with urinary retention. A&O.  VSS.

## 2025-02-17 NOTE — MEDICATION SCRIBE - ADMISSION MEDICATION HISTORY
Admission medication history completed at [facility]. Please see Pharmacist Admission Medication History note from 02/05/202025.

## 2025-02-17 NOTE — PROGRESS NOTES
02/17/25 0886   Appointment Info   Signing Clinician's Name / Credentials (PT) Lorelei Tay DPT   Living Environment   People in Home alone   Current Living Arrangements house   Home Accessibility stairs to enter home   Number of Stairs, Main Entrance 3   Stair Railings, Main Entrance railings safe and in good condition   Number of Stairs, Within Home, Primary greater than 10 stairs  (basement)   Stair Railings, Within Home, Primary railings safe and in good condition   Transportation Anticipated family or friend will provide   Self-Care   Usual Activity Tolerance good   Current Activity Tolerance moderate   Equipment Currently Used at Home cane, straight   Fall history within last six months no   General Information   Onset of Illness/Injury or Date of Surgery 02/17/25   Referring Physician Addis Celestin MD   Patient/Family Therapy Goals Statement (PT) return home   Pertinent History of Current Problem (include personal factors and/or comorbidities that impact the POC) 89 year old female admitted on 2/17/2025. She has history of CKD 3-4, asthma, CAD s/p PCI on Plavix, GERD, and HTN, just hospitalized 2/5-2/13 for GIB and CARLYLE, here for anuria and anasarca found to have recurrent CARLYLE   Strength (Manual Muscle Testing)   Strength (Manual Muscle Testing) strength is WFL   Bed Mobility   Bed Mobility supine-sit   Supine-Sit Dickens (Bed Mobility) minimum assist (75% patient effort)   Bed Mobility Limitations decreased ability to use arms for pushing/pulling;impaired ability to control trunk for mobility   Impairments Contributing to Impaired Bed Mobility decreased strength   Transfers   Transfers sit-stand transfer   Sit-Stand Transfer   Sit-Stand Dickens (Transfers) contact guard   Assistive Device (Sit-Stand Transfers) cane, straight   Gait/Stairs (Locomotion)   [place] Level (Gait) contact guard   Assistive Device (Gait) cane, straight   Distance in Feet (Gait) 15'   Pattern (Gait) step-through    Deviations/Abnormal Patterns (Gait) gait speed decreased   Clinical Impression   Criteria for Skilled Therapeutic Intervention Yes, treatment indicated   PT Diagnosis (PT) Impaired functional mobility   Influenced by the following impairments Fatigue, balance deficits   Functional limitations due to impairments Impaired transfers, gait   Clinical Presentation (PT Evaluation Complexity) stable   Clinical Presentation Rationale Presents as diagnosed   Clinical Decision Making (Complexity) low complexity   Planned Therapy Interventions (PT) balance training;bed mobility training;gait training;strengthening;stair training;transfer training   Risk & Benefits of therapy have been explained patient   PT Total Evaluation Time   PT Eval, Low Complexity Minutes (87608) 10   Physical Therapy Goals   PT Frequency 6x/week   PT Predicted Duration/Target Date for Goal Attainment 02/24/25   PT Goals Bed Mobility;Transfers;Gait;Stairs   PT: Bed Mobility Independent;Supine to/from sit   PT: Transfers Modified independent;Sit to/from stand;Bed to/from chair;Assistive device   PT: Gait Modified independent;Straight cane;150 feet   PT: Stairs Supervision/stand-by assist;3 stairs   Therapeutic Activity   Treatment Detail/Skilled Intervention Pt performed toilet transfer, managed brief and harjeet-cares independently. Stood at sink for hand hygiene, SBA. Supine > sit, mod A due to elevated height of ED bed.   Gait Training   Gait Training Minutes (75607) 10   Symptoms Noted During/After Treatment (Gait Training) shortness of breath;fatigue   Treatment Detail/Skilled Intervention Slow ambulation with SEC. Cues for safety awareness.   Distance in Feet 115'   [place] Level (Gait Training) stand-by assist   Physical Assistance Level (Gait Training) supervision   Weight Bearing (Gait Training) weight-bearing as tolerated   Assistive Device (Gait Training) straight cane   Gait Analysis Deviations decreased jono;decreased step length   PT  Discharge Planning   PT Plan progress amb with SEC, stairs   PT Discharge Recommendation (DC Rec) home   PT Rationale for DC Rec Pt appears close to baseline mobility. Should be able to d/c home.   PT Brief overview of current status Amb 115' with SEC and SBA.   PT Total Distance Amb During Session (feet) 130   PT Equipment Needed at Discharge cane, straight   Physical Therapy Time and Intention   Timed Code Treatment Minutes 10   Total Session Time (sum of timed and untimed services) 20     Lorelei Tay, PT, DPT  2/17/2025

## 2025-02-17 NOTE — PLAN OF CARE
Occupational Therapy Discharge Summary    Occupational Therapy: Orders received. Chart reviewed and discussed with care team.? Occupational Therapy not indicated at this time Per PT no OT needs.? Defer discharge recommendations to PT.? Will complete orders.     LEX Wilde, OTR/L, 2/17/2025, 10:52 AM

## 2025-02-17 NOTE — PLAN OF CARE
Goal Outcome Evaluation:      Plan of Care Reviewed With: patient    Overall Patient Progress: improving    SUBJECTIVE:  Report Given to P4, RN New Kidney disease.    Allen Mojica RN

## 2025-02-18 VITALS
SYSTOLIC BLOOD PRESSURE: 137 MMHG | HEART RATE: 69 BPM | OXYGEN SATURATION: 92 % | WEIGHT: 185 LBS | HEIGHT: 67 IN | TEMPERATURE: 98.9 F | BODY MASS INDEX: 29.03 KG/M2 | DIASTOLIC BLOOD PRESSURE: 63 MMHG | RESPIRATION RATE: 20 BRPM

## 2025-02-18 LAB
ANION GAP SERPL CALCULATED.3IONS-SCNC: 8 MMOL/L (ref 7–15)
BUN SERPL-MCNC: 21 MG/DL (ref 8–23)
CALCIUM SERPL-MCNC: 8.2 MG/DL (ref 8.8–10.4)
CHLORIDE SERPL-SCNC: 107 MMOL/L (ref 98–107)
CREAT SERPL-MCNC: 1.57 MG/DL (ref 0.51–0.95)
EGFRCR SERPLBLD CKD-EPI 2021: 32 ML/MIN/1.73M2
ERYTHROCYTE [DISTWIDTH] IN BLOOD BY AUTOMATED COUNT: 17 % (ref 10–15)
GLUCOSE SERPL-MCNC: 95 MG/DL (ref 70–99)
HCO3 SERPL-SCNC: 27 MMOL/L (ref 22–29)
HCT VFR BLD AUTO: 25.1 % (ref 35–47)
HGB BLD-MCNC: 7.8 G/DL (ref 11.7–15.7)
MCH RBC QN AUTO: 28.7 PG (ref 26.5–33)
MCHC RBC AUTO-ENTMCNC: 31.1 G/DL (ref 31.5–36.5)
MCV RBC AUTO: 92 FL (ref 78–100)
PLATELET # BLD AUTO: 237 10E3/UL (ref 150–450)
POTASSIUM SERPL-SCNC: 3.8 MMOL/L (ref 3.4–5.3)
RBC # BLD AUTO: 2.72 10E6/UL (ref 3.8–5.2)
SODIUM SERPL-SCNC: 142 MMOL/L (ref 135–145)
WBC # BLD AUTO: 11.5 10E3/UL (ref 4–11)

## 2025-02-18 PROCEDURE — 82565 ASSAY OF CREATININE: CPT | Performed by: GENERAL PRACTICE

## 2025-02-18 PROCEDURE — 82435 ASSAY OF BLOOD CHLORIDE: CPT | Performed by: GENERAL PRACTICE

## 2025-02-18 PROCEDURE — 99239 HOSP IP/OBS DSCHRG MGMT >30: CPT | Performed by: INTERNAL MEDICINE

## 2025-02-18 PROCEDURE — 85014 HEMATOCRIT: CPT | Performed by: GENERAL PRACTICE

## 2025-02-18 PROCEDURE — 36415 COLL VENOUS BLD VENIPUNCTURE: CPT | Performed by: GENERAL PRACTICE

## 2025-02-18 PROCEDURE — 80048 BASIC METABOLIC PNL TOTAL CA: CPT | Performed by: GENERAL PRACTICE

## 2025-02-18 PROCEDURE — 250N000013 HC RX MED GY IP 250 OP 250 PS 637: Performed by: HOSPITALIST

## 2025-02-18 PROCEDURE — 99232 SBSQ HOSP IP/OBS MODERATE 35: CPT | Performed by: INTERNAL MEDICINE

## 2025-02-18 RX ORDER — CARVEDILOL 3.12 MG/1
3.12 TABLET ORAL 2 TIMES DAILY WITH MEALS
Qty: 60 TABLET | Refills: 1 | Status: SHIPPED | OUTPATIENT
Start: 2025-02-18

## 2025-02-18 RX ADMIN — CARVEDILOL 3.12 MG: 3.12 TABLET, FILM COATED ORAL at 08:20

## 2025-02-18 RX ADMIN — FLUTICASONE FUROATE AND VILANTEROL TRIFENATATE 1 PUFF: 100; 25 POWDER RESPIRATORY (INHALATION) at 08:20

## 2025-02-18 RX ADMIN — ATORVASTATIN CALCIUM 40 MG: 40 TABLET, FILM COATED ORAL at 08:20

## 2025-02-18 RX ADMIN — LEVOTHYROXINE SODIUM 50 MCG: 0.03 TABLET ORAL at 08:20

## 2025-02-18 RX ADMIN — CLOPIDOGREL BISULFATE 75 MG: 75 TABLET ORAL at 08:20

## 2025-02-18 RX ADMIN — DOCUSATE SODIUM 100 MG: 100 CAPSULE, LIQUID FILLED ORAL at 08:20

## 2025-02-18 RX ADMIN — PANTOPRAZOLE SODIUM 40 MG: 20 TABLET, DELAYED RELEASE ORAL at 08:20

## 2025-02-18 ASSESSMENT — ACTIVITIES OF DAILY LIVING (ADL)
ADLS_ACUITY_SCORE: 44
DEPENDENT_IADLS:: INDEPENDENT
ADLS_ACUITY_SCORE: 44

## 2025-02-18 NOTE — DISCHARGE SUMMARY
[facility] MEDICINE  DISCHARGE SUMMARY     Primary Care Physician: Jessica Fonseca  Admission Date: 2/17/2025   Discharge Provider: MELBA Knott Discharge Date: 2/18/2025   Diet: as below   Code Status: Prior   Activity: DCACTIVITY: Activity as tolerated        Condition at Discharge: Stable     REASON FOR PRESENTATION(See Admission Note for Details)   Low urine output    PRINCIPAL & ACTIVE DISCHARGE DIAGNOSES     Active Problems:    Decreased urination    Acute renal failure superimposed on chronic kidney disease, unspecified acute renal failure type, unspecified CKD stage      PENDING LABS     Unresulted Labs Ordered in the Past 30 Days of this Admission       No orders found for last 31 day(s).              PROCEDURES ( this hospitalization only)          RECOMMENDATIONS TO OUTPATIENT PROVIDER FOR F/U VISIT     Follow-up Appointments       Hospital Follow-up with Existing Primary Care Provider (PCP)      Please see details below         Schedule Primary Care visit within: 7 Days                   DISPOSITION     Home    SUMMARY OF HOSPITAL COURSE:      Mary Corral is a 89 year old female admitted on 2/17/2025. She has history of CKD 3-4, asthma, CAD s/p PCI on Plavix, GERD, and HTN, just hospitalized 2/5-2/13 for GIB and CARLYLE, here for anuria and anasarca found to have recurrent CARLYLE     #CARLYLE  -recent hospital stay had CARLYLE from [facility], Cr had recovered to baseline (~1.5) by discharge on 2/13  -now Cr back up to 2 and very poor UOP, cause unclear, patient otherwise doing well  -Bladder scan showed no retention  -Nephrology consulted  -Losartan stopped for good. Creatinine back to baseline with some gentle hydration     #Acute blood loss anemia  -Due to recent GI bleed, addressed on admission 2/5- 2/13, resolved now.  -Hgb stable. No e/o active bleeding. Drop from 8.1 to 7.8 is due to hemodilution     #CAD s/p stents  -Cont home Plavix, statin. Decreased home  Coreg dose a bit given BPs  and avoid hypotension to support kidneys  -Recently taken off of ASA due GIB     #PUD  -last admission had EGD showed gastric ulcer with visible vessel  -continue PPI BID  -no s/s recurrent bleeding  -outpatient GI followup for repeat EGD in 2 months  -continue regular diet ad ga     #Asthma  -home Advair, Singulair     #Hypothyroidism, home Synthroid    Patient seen this morning for the first time. She was ready to go home, just waiting to see me. Patient is happy that her creatinine improved. Lives independently. Still driving. Able to do all ADL on her own. Declines [facility].    Discharge Medications with Med changes:     Discharge Medication List as of 2/18/2025 12:58 PM        CONTINUE these medications which have CHANGED    Details   carvedilol (COREG) 3.125 MG tablet Take 1 tablet (3.125 mg) by mouth 2 times daily (with meals)., Disp-60 tablet, R-1, E-Prescribe           CONTINUE these medications which have NOT CHANGED    Details   acetaminophen (TYLENOL) 325 MG tablet Take 2 tablets (650 mg) by mouth every 4 hours as needed for other (surgical pain), Disp-100 tablet, R-0, E-Prescribe      acetaminophen-codeine (TYLENOL #3) 300-30 MG tablet Take 1 tablet by mouth every 6 hours as needed for severe pain (used when she has to walk long distances), Historical      albuterol (2.5 MG/3ML) 0.083% nebulizer solution Take 1 vial by nebulization every 4 hours as needed for shortness of breath / dyspnea or wheezing, Historical      albuterol (PROAIR HFA/PROVENTIL HFA/VENTOLIN HFA) 108 (90 BASE) MCG/ACT Inhaler Inhale 2 puffs into the lungs every 4 hours as needed for shortness of breath, Historical      allopurinol (ZYLOPRIM) 100 MG tablet Take 100 mg by mouth 2 times daily as needed Gout flares, Historical      atorvastatin (LIPITOR) 40 MG tablet Take 1 tablet (40 mg) by mouth daily, Disp-90 tablet, R-3, E-Prescribe      cetirizine (ZYRTEC) 10 MG tablet Take 10 mg by mouth daily as needed for  allergies, Historical      clopidogrel (PLAVIX) 75 MG tablet Take 1 tablet (75 mg) by mouth daily Dose to start tomorrow., Disp-90 tablet, R-3, E-Prescribe      fluticasone-salmeterol (ADVAIR) 250-50 MCG/DOSE diskus inhaler Inhale 1 puff into the lungs daily, Historical      levothyroxine (SYNTHROID/LEVOTHROID) 50 MCG tablet Take 50 mcg by mouth daily, Historical      montelukast (SINGULAIR) 10 MG tablet Take 10 mg by mouth at bedtime, Historical      multivitamin w/minerals (THERA-VIT-M) tablet Take 1 tablet by mouth daily, Historical      pantoprazole (PROTONIX) 40 MG EC tablet Take 1 tablet (40 mg) by mouth 2 times daily., Disp-180 tablet, R-0, E-Prescribe      polyethylene glycol (MIRALAX/GLYCOLAX) powder Take 17 g by mouth daily as needed for constipation, Historical      predniSONE (DELTASONE) 20 MG tablet Take 20 mg by mouth daily as needed (Self doses in winter for lung infections.), Historical      valACYclovir (VALTREX) 1000 mg tablet Take 1,000 mg by mouth 2 times daily as needed (for one day), Historical      Vitamin D3 (CHOLECALCIFEROL) 25 mcg (1000 units) tablet Take 1,000 Units by mouth daily, Historical           STOP taking these medications       losartan (COZAAR) 25 MG tablet Comments:   Reason for Stopping:                     Rationale for medication changes:      Please see above        Consults   Nephrology       Immunizations given this encounter     Most Recent Immunizations   Administered Date(s) Administered    COVID-19 12+ (MODERNA) 09/17/2024    COVID-19 Bivalent 18+ (Moderna) 09/15/2022    COVID-19 Monovalent 18+ (Moderna) 04/06/2022    Influenza (H1N1) 12/08/2009    Influenza (High Dose) Trivalent,PF (Fluzone) 10/01/2024    Influenza (IIV3) PF 09/30/2013    Influenza Vaccine 18-64 (Flublok) 09/25/2019    Influenza Vaccine 65+ (FLUAD) 09/21/2020    Influenza Vaccine 65+ (Fluzone HD) 10/12/2023    Influenza Vaccine >6 months,quad, PF 09/19/2014    Influenza Vaccine, 6+MO IM (QUADRIVALENT  "W/PRESERVATIVES) 09/12/2017    Pneumo Conj 13-V (2010&after) 11/27/2018    Pneumococcal 23 valent 11/27/2018    RSV Vaccine (Arexvy) 09/21/2023    TD,PF 7+ (Tenivac) 07/21/2005    TDAP (Adacel,Boostrix) 02/11/2019    Zoster recombinant adjuvanted (SHINGRIX) 11/27/2018    Zoster vaccine, live 05/15/2012           Anticoagulation Information      Recent INR results: No results for input(s): \"INR\" in the last 168 hours.  Warfarin doses (if applicable) or name of other anticoagulant: NA      SIGNIFICANT IMAGING FINDINGS     Results for orders placed or performed during the hospital encounter of 02/17/25   CT Abdomen Pelvis w/o Contrast    Impression    IMPRESSION:   1.  No obstructing ureteral or bladder calculi. No ureteral dilatation. Stable mild prominence to the right and left renal pelvis without significant calyceal dilatation. Minimal interval increase in edema surrounding the right and left renal pelvis.   This can be seen with infectious or inflammatory etiologies.    2.  Stable parapelvic and cortical renal cysts bilaterally, no follow-up.    3.  Marked vascular calcification at the origin of the renal arteries bilaterally, greater on the left.    4.  Colonic diverticulosis without diverticulitis. No bowel obstruction.    5.  New minimal bibasilar pleural fluid collections with atelectasis.                     SIGNIFICANT LABORATORY FINDINGS     Most Recent 3 CBC's:  Recent Labs   Lab Test 02/18/25  0512 02/17/25  0815 02/17/25  0135   WBC 11.5* 9.8 11.4*   HGB 7.8* 8.2* 8.1*   MCV 92 92 94    241 281     Most Recent 3 BMP's:  Recent Labs   Lab Test 02/18/25  0512 02/17/25  0815 02/17/25  0135    140 137   POTASSIUM 3.8 3.7 3.7   CHLORIDE 107 104 100   CO2 27 25 26   BUN 21.0 23.3* 25.0*   CR 1.57* 1.84* 2.08*   ANIONGAP 8 11 11   GREG 8.2* 8.0* 7.9*   GLC 95 114* 116*     Most Recent 2 LFT's:  Recent Labs   Lab Test 02/17/25  0135 02/05/25  1012   AST 20 12   ALT 10 16   ALKPHOS 67 65   BILITOTAL " "0.4 0.4     Most Recent 3 INR's:  Recent Labs   Lab Test 02/06/25  0731 02/05/25  1012 02/17/18  1905   INR 1.45* 1.20* 1.00         Discharge Orders        Primary Care - Care Coordination Referral      Reason for your hospital stay    Acute renal failure     Activity    Your activity upon discharge: activity as tolerated     Diet    Follow this diet upon discharge: Current Diet:Orders Placed This Encounter      Combination Diet Regular Diet Adult     Hospital Follow-up with Existing Primary Care Provider (PCP)    Please see details below            Examination   /63 (BP Location: Right arm)   Pulse 69   Temp 98.9  F (37.2  C) (Oral)   Resp 20   Ht 1.702 m (5' 7\")   Wt 83.9 kg (185 lb)   SpO2 92%   BMI 28.98 kg/m        General: Not in obvious distress.  HEENT: NC, AT   Chest: Clear to auscultation bilaterally  Heart: S1S2 normal, regular. No M/R/G  Abdomen: Soft. NT, ND. Bowel sounds- active.  Extremities: 1+ leg swelling bilaterally, and 1+ RUE swelling (not new)  Neuro: Alert and awake, grossly non-focal      Please see EMR for more detailed significant labs, imaging, consultant notes etc.    I, MELBA Knott, personally saw the patient today and spent greater than 30 minutes discharging this patient.    MELBA Knott  [facility]    CC:Jessica Fonseca    "

## 2025-02-18 NOTE — PLAN OF CARE
Goal Outcome Evaluation:      Plan of Care Reviewed With: patient          Outcome Evaluation: Goal is home. Family to transport at discharge.

## 2025-02-18 NOTE — PLAN OF CARE
Problem: Anemia  Goal: Anemia Symptom Improvement  Outcome: Not Progressing  Intervention: Monitor and Manage Anemia  Recent Flowsheet Documentation  Taken 2/18/2025 0820 by Faustina Gonzalez RN  Safety Promotion/Fall Prevention:   activity supervised   treat underlying cause   room near nurse's station   patient and family education   nonskid shoes/slippers when out of bed   lighting adjusted   clutter free environment maintained     Problem: Adult Inpatient Plan of Care  Goal: Readiness for Transition of Care  Outcome: Progressing     Problem: Comorbidity Management  Goal: Blood Pressure in Desired Range  Outcome: Progressing  Intervention: Maintain Blood Pressure Management  Recent Flowsheet Documentation  Taken 2/18/2025 0820 by Faustina Gonzalez RN  Medication Review/Management: medications reviewed   Goal Outcome Evaluation:               Pt has chronic anemia and Hgb. Was 7.8.  Pt is alert and orientated x 4.  Pt hoping to discharge today.

## 2025-02-18 NOTE — PLAN OF CARE
Physical Therapy Discharge Summary    Reason for therapy discharge:    Discharged to home.    Progress towards therapy goal(s). See goals on Care Plan in [facility] electronic health record for goal details.  Goals partially met.  Barriers to achieving goals:   discharge from facility.    Therapy recommendation(s):    No further therapy is recommended.

## 2025-02-18 NOTE — PROGRESS NOTES
[facility]  [facility]   Follow up    Mary Corral   MRN: [id number]  : 1935   DOA: 2025   CC:ARF/CKD      Assessment and Plan:    89 year old female with recurrent ARF/CKD     ARF/CKD; has underlying CKD with baseline 1.3-1.6; suffered recent episode of ARF thought secondary to hemodynamics but did recover to baseline on discharge with CR 1.4; now recurrent ARF and seems likely to be hemodynamic again; improved again today; retire ARB for now  HTN; target bp for pt over 80s is liberalized to 130-150s; discontinue ARB; follow on BB  CAD; h/o PCI in past; on plavix; on BB; off ASA due to recent GI bleed  Hyperlipid; on statin  Hypothyroid; on replacement  Asthma; on Advair, Singulair  Anemia; recent episode of blood loss from PUD; hgb in 8s and stable; no evidence of active bleed; will be considered for OP REED based on progress; transfuse prn  PUD with recent bleed: EGD showed gastric ulcer with visible vessel; on BID PPI and plan repeat EGD as OP  Potassium status; was generous last admit but ok now  Volume status; total body elevated with edema; can try teds while waiting for ARF and bp to improve  Pt will follow up in our clinic as previously scheduled    Subjective  Feeling good; was ready to go home yestrerday!  Not dizzy no SOB  Checks bp each am  Objective    Vital signs in last 24 hours  Temp:  [97.9  F (36.6  C)-98.9  F (37.2  C)] 98.9  F (37.2  C)  Pulse:  [69-76] 69  Resp:  [18-20] 20  BP: ()/(49-72) 137/63  SpO2:  [90 %-95 %] 92 %      Intake/Output last 3 shifts  I/O last 3 completed shifts:  In: 307.5 [P.O.:200; I.V.:107.5]  Out: 200 [Urine:200]  Intake/Output this shift:  I/O this shift:  In: -   Out: 200 [Urine:200]    Physical Exam  Alert/oriented x 3, awake, NAD  CV: RRR without murmur or rub  Lung: clear and equal; no extra sounds  Ab: soft and NT; not distended; normal bs  Ext: some edema and well perfused  Skin; no  rash    Pertinent Labs     Last Renal Panel:  Sodium   Date Value Ref Range Status   02/18/2025 142 135 - 145 mmol/L Final   03/15/2017 142 133 - 144 mmol/L Final     Potassium   Date Value Ref Range Status   02/18/2025 3.8 3.4 - 5.3 mmol/L Final   04/18/2022 4.8 3.5 - 5.0 mmol/L Final   03/15/2017 4.6 3.4 - 5.3 mmol/L Final     Chloride   Date Value Ref Range Status   02/18/2025 107 98 - 107 mmol/L Final   04/18/2022 110 (H) 98 - 107 mmol/L Final   03/15/2017 112 (H) 94 - 109 mmol/L Final     Carbon Dioxide   Date Value Ref Range Status   03/15/2017 18 (L) 20 - 32 mmol/L Final     Carbon Dioxide (CO2)   Date Value Ref Range Status   02/18/2025 27 22 - 29 mmol/L Final   04/18/2022 24 22 - 31 mmol/L Final     Anion Gap   Date Value Ref Range Status   02/18/2025 8 7 - 15 mmol/L Final   04/18/2022 10 5 - 18 mmol/L Final   03/15/2017 12 3 - 14 mmol/L Final     Glucose   Date Value Ref Range Status   02/18/2025 95 70 - 99 mg/dL Final   04/18/2022 115 70 - 125 mg/dL Final   03/15/2017 167 (H) 70 - 99 mg/dL Final     GLUCOSE BY METER POCT   Date Value Ref Range Status   02/06/2025 176 (H) 70 - 99 mg/dL Final     Urea Nitrogen   Date Value Ref Range Status   02/18/2025 21.0 8.0 - 23.0 mg/dL Final   04/18/2022 28 8 - 28 mg/dL Final   03/15/2017 41 (H) 7 - 30 mg/dL Final     Creatinine   Date Value Ref Range Status   02/18/2025 1.57 (H) 0.51 - 0.95 mg/dL Final   03/15/2017 1.42 (H) 0.52 - 1.04 mg/dL Final     GFR Estimate   Date Value Ref Range Status   02/18/2025 32 (L) >60 mL/min/1.73m2 Final     Comment:     eGFR calculated using 2021 CKD-EPI equation.   03/10/2021 33 (L) >60 mL/min/1.73m2 Final   03/15/2017 36 (L) >60 mL/min/1.7m2 Final     Comment:     Non  GFR Calc     Calcium   Date Value Ref Range Status   02/18/2025 8.2 (L) 8.8 - 10.4 mg/dL Final   03/15/2017 8.5 8.5 - 10.1 mg/dL Final     Phosphorus   Date Value Ref Range Status   02/10/2025 3.3 2.5 - 4.5 mg/dL Final   03/14/2017 4.4 2.5 - 4.5 mg/dL  Final     Albumin   Date Value Ref Range Status   02/17/2025 3.0 (L) 3.5 - 5.2 g/dL Final   04/18/2022 3.4 (L) 3.5 - 5.0 g/dL Final     Recent Labs   Lab 02/18/25  0512 02/17/25  0815 02/17/25  0135 02/13/25  0702 02/12/25  2351   HGB 7.8* 8.2* 8.1* 8.4* 8.3*          I reviewed all lab results  Yoana Bravo MD

## 2025-02-18 NOTE — PLAN OF CARE
"  Problem: Adult Inpatient Plan of Care  Goal: Plan of Care Review  Description: The Plan of Care Review/Shift note should be completed every shift.  The Outcome Evaluation is a brief statement about your assessment that the patient is improving, declining, or no change.  This information will be displayed automatically on your shift  note.  Outcome: Progressing  Goal: Patient-Specific Goal (Individualized)  Description: You can add care plan individualizations to a care plan. Examples of Individualization might be:  \"Parent requests to be called daily at 9am for status\", \"I have a hard time hearing out of my right ear\", or \"Do not touch me to wake me up as it startles  me\".  Outcome: Progressing  Goal: Absence of Hospital-Acquired Illness or Injury  Outcome: Progressing  Intervention: Identify and Manage Fall Risk  Recent Flowsheet Documentation  Taken 2/18/2025 0030 by Elodia Domínguez RN  Safety Promotion/Fall Prevention:   activity supervised   treat underlying cause   room near nurse's station   patient and family education   nonskid shoes/slippers when out of bed   lighting adjusted   clutter free environment maintained  Intervention: Prevent Skin Injury  Recent Flowsheet Documentation  Taken 2/18/2025 0030 by Elodia Domínguez RN  Body Position: position changed independently  Skin Protection: incontinence pads utilized  Intervention: Prevent and Manage VTE (Venous Thromboembolism) Risk  Recent Flowsheet Documentation  Taken 2/18/2025 0030 by Elodia Domínguez RN  VTE Prevention/Management: SCDs on (sequential compression devices)  Intervention: Prevent Infection  Recent Flowsheet Documentation  Taken 2/18/2025 0030 by Elodia Domínguez RN  Infection Prevention:   single patient room provided   rest/sleep promoted   personal protective equipment utilized   hand hygiene promoted   equipment surfaces disinfected   environmental surveillance performed  Goal: Optimal Comfort and Wellbeing  Outcome: " Progressing  Intervention: Provide Person-Centered Care  Recent Flowsheet Documentation  Taken 2/18/2025 0030 by Elodia Domínguez RN  Trust Relationship/Rapport: care explained  Goal: Readiness for Transition of Care  Outcome: Progressing     Problem: Delirium  Goal: Optimal Coping  Outcome: Progressing  Intervention: Optimize Psychosocial Adjustment to Delirium  Recent Flowsheet Documentation  Taken 2/18/2025 0030 by Elodia Domínguez RN  Family/Support System Care: involvement promoted  Goal: Improved Behavioral Control  Outcome: Progressing  Intervention: Prevent and Manage Agitation  Recent Flowsheet Documentation  Taken 2/18/2025 0030 by Elodia Domínguez RN  Environment Familiarity/Consistency: daily routine followed  Intervention: Minimize Safety Risk  Recent Flowsheet Documentation  Taken 2/18/2025 0030 by Elodia Domínguez RN  Enhanced Safety Measures:   review medications for side effects with activity   pain management   patient/family teach back on injury risk  Trust Relationship/Rapport: care explained  Goal: Improved Attention and Thought Clarity  Outcome: Progressing  Intervention: Maximize Cognitive Function  Recent Flowsheet Documentation  Taken 2/18/2025 0030 by Elodia Domínguez RN  Reorientation Measures:   clock in view   calendar in view  Goal: Improved Sleep  Outcome: Progressing   Goal Outcome Evaluation:       Patient is A & O x4, denied pain all shift, up to the bathroom x3,  1st urine output not measured 2nd with 200 pink urine out, bladder scanned for 270, 3rd with 200 pink urine out, bladder scanned for 26 ml's and had a med&sm bm. Right hand and arm swollen elevated on pillow with resolution, bilateral legs tight  with +2 edema. Patient had SCD's on all night, taken off at 0645. 02 sat's 90% on room air. WBC's=11.5, patient is SBA with cane, bed alrm on for safety.

## 2025-02-18 NOTE — PLAN OF CARE
Problem: Adult Inpatient Plan of Care  Goal: Absence of Hospital-Acquired Illness or Injury  Intervention: Identify and Manage Fall Risk  Recent Flowsheet Documentation  Taken 2/17/2025 1558 by Viky Callahan RN  Safety Promotion/Fall Prevention:   activity supervised   assistive device/personal items within reach   mobility aid in reach   supervised activity  Intervention: Prevent Skin Injury  Recent Flowsheet Documentation  Taken 2/17/2025 1558 by Viky Callahan RN  Body Position: position changed independently  Skin Protection: incontinence pads utilized  Intervention: Prevent and Manage VTE (Venous Thromboembolism) Risk  Recent Flowsheet Documentation  Taken 2/17/2025 1558 by Viky Callahan RN  VTE Prevention/Management: SCDs on (sequential compression devices)  Intervention: Prevent Infection  Recent Flowsheet Documentation  Taken 2/17/2025 1558 by Viky Callahan RN  Infection Prevention:   hand hygiene promoted   equipment surfaces disinfected   rest/sleep promoted   Goal Outcome Evaluation:       Pt alert & oriented x4. VS stable on RA w/ elevated BP of 148/66 upon being brought up from ED & 167/72 later in shift. Lung sounds clear, w/ some intermittent wheezes after activity due to hx of asthma. Pt denies SOB & chest pain. Stand by assist w/ cane. Up to bathroom x2 during shift. Bladder scan post void of 61 mL. Denies pain. Does report soreness/tenderness in RUE. RUE has 3+ edema in the arm, wrist, and hand, ,this was present upon being brought up from ED.

## 2025-02-18 NOTE — CONSULTS
Care Management Initial Consult    General Information  Assessment completed with: Patient,    Type of CM/SW Visit: Initial Assessment    Primary Care Provider verified and updated as needed: Yes   Readmission within the last 30 days: unable to assess      Reason for Consult: discharge planning  Advance Care Planning: Advance Care Planning Reviewed: present on chart          Communication Assessment  Patient's communication style: spoken language (English or Bilingual)    Hearing Difficulty or Deaf: yes   Wear Glasses or Blind: no    Cognitive  Cognitive/Neuro/Behavioral: WDL                      Living Environment:   People in home: alone     Current living Arrangements: house      Able to return to prior arrangements: yes       Family/Social Support:  Care provided by: self  Provides care for: no one  Marital Status: Single  Support system: Friend, Neighbor ([place])          Description of Support System: Supportive, Involved    Support Assessment: Adequate family and caregiver support    Current Resources:   Patient receiving home care services: No        Community Resources: None  Equipment currently used at home: cane, straight, walker, rolling  Supplies currently used at home: None    Employment/Financial:  Employment Status: retired        Financial Concerns: none   Referral to Financial Worker: No       Does the patient's insurance plan have a 3 day qualifying hospital stay waiver?  No    Lifestyle & Psychosocial Needs:  Social Drivers of Health     Food Insecurity: Low Risk  (2/17/2025)    Food Insecurity     Within the past 12 months, did you worry that your food would run out before you got money to buy more?: No     Within the past 12 months, did the food you bought just not last and you didn t have money to get more?: No   Depression: Not at risk (1/17/2019)    PHQ-2     PHQ-2 Score: 0   Housing Stability: Low Risk  (2/17/2025)    Housing Stability     Do you have housing? : Yes     Are you worried  about losing your housing?: No   Tobacco Use: Low Risk  (2/17/2025)    Patient History     Smoking Tobacco Use: Never     Smokeless Tobacco Use: Never     Passive Exposure: Not on file   Financial Resource Strain: Low Risk  (2/17/2025)    Financial Resource Strain     Within the past 12 months, have you or your family members you live with been unable to get utilities (heat, electricity) when it was really needed?: No   Alcohol Use: Not on file   Transportation Needs: Low Risk  (2/17/2025)    Transportation Needs     Within the past 12 months, has lack of transportation kept you from medical appointments, getting your medicines, non-medical meetings or appointments, work, or from getting things that you need?: No   Physical Activity: Not on file   Interpersonal Safety: Low Risk  (2/17/2025)    Interpersonal Safety     Do you feel physically and emotionally safe where you currently live?: Yes     Within the past 12 months, have you been hit, slapped, kicked or otherwise physically hurt by someone?: No     Within the past 12 months, have you been humiliated or emotionally abused in other ways by your partner or ex-partner?: No   Recent Concern: Interpersonal Safety - High Risk (2/6/2025)    Interpersonal Safety     Do you feel physically and emotionally safe where you currently live?: No     Within the past 12 months, have you been hit, slapped, kicked or otherwise physically hurt by someone?: No     Within the past 12 months, have you been humiliated or emotionally abused in other ways by your partner or ex-partner?: No   Stress: Not on file   Social Connections: Not on file   Health Literacy: Not on file       Functional Status:  Prior to admission patient needed assistance:   Dependent ADLs:: Independent, Ambulation-cane  Dependent IADLs:: Independent       Mental Health Status:  Mental Health Status: No Current Concerns       Chemical Dependency Status:  Chemical Dependency Status: No Current Concerns              Values/Beliefs:  Spiritual, Cultural Beliefs, Jain Practices, Values that affect care: no               Discussed  Partnership in Safe Discharge Planning  document with patient/family: No    Additional Information:  9:01 AM  Initial CM Assessment completed with Pt. Pt lives alone in a house. Pt is independent at baseline. Primarily uses a cane but has own a walker for longer distances. No services. Pt's nieces are supportive and involved. Pt reported she would decline home care if recommended at discharge. Goal is home. Family to transport at discharge.     Pt denied any further questions or concerns for CM at this time.     Next Steps: No further care management intervention anticipated at this time.  Please re-consult if further needs arise.  Care management signing off.         JOSY Winn

## 2025-02-18 NOTE — PLAN OF CARE
Problem: Anemia  Goal: Anemia Symptom Improvement  Outcome: Not Progressing  Intervention: Monitor and Manage Anemia  Recent Flowsheet Documentation  Taken 2/18/2025 0820 by Faustina Gonzalez RN  Safety Promotion/Fall Prevention:   activity supervised   treat underlying cause   room near nurse's station   patient and family education   nonskid shoes/slippers when out of bed   lighting adjusted   clutter free environment maintained     Problem: Adult Inpatient Plan of Care  Goal: Readiness for Transition of Care  Outcome: Progressing     Problem: Comorbidity Management  Goal: Blood Pressure in Desired Range  Outcome: Progressing  Intervention: Maintain Blood Pressure Management  Recent Flowsheet Documentation  Taken 2/18/2025 0820 by Faustina Gonzalez RN  Medication Review/Management: medications reviewed   Goal Outcome Evaluation:         Pt discharged to home.

## 2025-03-08 ENCOUNTER — HOSPITAL ENCOUNTER (INPATIENT)
Facility: HOSPITAL | Age: 89
LOS: 3 days | Discharge: NURSING, REHAB OR LONG-TERM CARE | DRG: 683 | End: 2025-03-11
Attending: FAMILY MEDICINE | Admitting: HOSPITALIST
Payer: COMMERCIAL

## 2025-03-08 ENCOUNTER — APPOINTMENT (OUTPATIENT)
Dept: RADIOLOGY | Facility: HOSPITAL | Age: 89
DRG: 683 | End: 2025-03-08
Attending: FAMILY MEDICINE
Payer: COMMERCIAL

## 2025-03-08 ENCOUNTER — APPOINTMENT (OUTPATIENT)
Dept: CT IMAGING | Facility: HOSPITAL | Age: 89
DRG: 683 | End: 2025-03-08
Attending: HOSPITALIST
Payer: COMMERCIAL

## 2025-03-08 DIAGNOSIS — Z95.5 HISTORY OF CORONARY ANGIOPLASTY WITH INSERTION OF STENT: ICD-10-CM

## 2025-03-08 DIAGNOSIS — J90 PLEURAL EFFUSION: ICD-10-CM

## 2025-03-08 DIAGNOSIS — A04.72 C. DIFFICILE COLITIS: ICD-10-CM

## 2025-03-08 DIAGNOSIS — E61.1 IRON DEFICIENCY: ICD-10-CM

## 2025-03-08 DIAGNOSIS — N34.2 INFECTIVE URETHRITIS: ICD-10-CM

## 2025-03-08 DIAGNOSIS — N17.9 ACUTE KIDNEY INJURY SUPERIMPOSED ON CKD: ICD-10-CM

## 2025-03-08 DIAGNOSIS — N18.9 ACUTE KIDNEY INJURY SUPERIMPOSED ON CKD: ICD-10-CM

## 2025-03-08 DIAGNOSIS — R52 PAIN: ICD-10-CM

## 2025-03-08 DIAGNOSIS — K52.9 COLITIS: Primary | ICD-10-CM

## 2025-03-08 PROBLEM — I50.33 ACUTE ON CHRONIC DIASTOLIC CONGESTIVE HEART FAILURE (H): Status: ACTIVE | Noted: 2025-03-08

## 2025-03-08 PROBLEM — N18.30 ANEMIA OF CHRONIC KIDNEY FAILURE, STAGE 3 (MODERATE) (H): Status: ACTIVE | Noted: 2025-03-08

## 2025-03-08 PROBLEM — E89.0 S/P THYROIDECTOMY: Status: ACTIVE | Noted: 2017-03-15

## 2025-03-08 PROBLEM — N18.32 ACUTE KIDNEY INJURY SUPERIMPOSED ON STAGE 3B CHRONIC KIDNEY DISEASE (H): Status: ACTIVE | Noted: 2025-02-17

## 2025-03-08 PROBLEM — Z98.890 S/P THYROIDECTOMY: Status: ACTIVE | Noted: 2017-03-15

## 2025-03-08 PROBLEM — E87.1 HYPONATREMIA: Status: ACTIVE | Noted: 2025-03-08

## 2025-03-08 PROBLEM — E83.42 HYPOMAGNESEMIA: Status: ACTIVE | Noted: 2025-03-08

## 2025-03-08 PROBLEM — Z90.89 S/P THYROIDECTOMY: Status: ACTIVE | Noted: 2017-03-15

## 2025-03-08 PROBLEM — D63.1 ANEMIA OF CHRONIC KIDNEY FAILURE, STAGE 3 (MODERATE) (H): Status: ACTIVE | Noted: 2025-03-08

## 2025-03-08 PROBLEM — Z66 DNR (DO NOT RESUSCITATE): Status: ACTIVE | Noted: 2025-03-08

## 2025-03-08 LAB
ANION GAP SERPL CALCULATED.3IONS-SCNC: 17 MMOL/L (ref 7–15)
BASE EXCESS BLDV CALC-SCNC: -0.4 MMOL/L (ref -3–3)
BASOPHILS # BLD AUTO: 0 10E3/UL (ref 0–0.2)
BASOPHILS NFR BLD AUTO: 0 %
BUN SERPL-MCNC: 33.9 MG/DL (ref 8–23)
CALCIUM SERPL-MCNC: 7.9 MG/DL (ref 8.8–10.4)
CHLORIDE SERPL-SCNC: 95 MMOL/L (ref 98–107)
CK SERPL-CCNC: 552 U/L (ref 26–192)
CREAT SERPL-MCNC: 3.15 MG/DL (ref 0.51–0.95)
CRP SERPL-MCNC: 195.4 MG/L
EGFRCR SERPLBLD CKD-EPI 2021: 14 ML/MIN/1.73M2
EOSINOPHIL # BLD AUTO: 0.1 10E3/UL (ref 0–0.7)
EOSINOPHIL NFR BLD AUTO: 1 %
ERYTHROCYTE [DISTWIDTH] IN BLOOD BY AUTOMATED COUNT: 16.5 % (ref 10–15)
EST. AVERAGE GLUCOSE BLD GHB EST-MCNC: 117 MG/DL
FLUAV RNA SPEC QL NAA+PROBE: NEGATIVE
FLUBV RNA RESP QL NAA+PROBE: NEGATIVE
GLUCOSE SERPL-MCNC: 118 MG/DL (ref 70–99)
HBA1C MFR BLD: 5.7 %
HCO3 BLDV-SCNC: 25 MMOL/L (ref 21–28)
HCO3 SERPL-SCNC: 22 MMOL/L (ref 22–29)
HCT VFR BLD AUTO: 27.1 % (ref 35–47)
HGB BLD-MCNC: 8.5 G/DL (ref 11.7–15.7)
HOLD SPECIMEN: NORMAL
IMM GRANULOCYTES # BLD: 0.1 10E3/UL
IMM GRANULOCYTES NFR BLD: 0 %
LYMPHOCYTES # BLD AUTO: 1 10E3/UL (ref 0.8–5.3)
LYMPHOCYTES NFR BLD AUTO: 6 %
MAGNESIUM SERPL-MCNC: 1.4 MG/DL (ref 1.7–2.3)
MCH RBC QN AUTO: 28.3 PG (ref 26.5–33)
MCHC RBC AUTO-ENTMCNC: 31.4 G/DL (ref 31.5–36.5)
MCV RBC AUTO: 90 FL (ref 78–100)
MONOCYTES # BLD AUTO: 1.1 10E3/UL (ref 0–1.3)
MONOCYTES NFR BLD AUTO: 7 %
NEUTROPHILS # BLD AUTO: 13.9 10E3/UL (ref 1.6–8.3)
NEUTROPHILS NFR BLD AUTO: 86 %
NRBC # BLD AUTO: 0 10E3/UL
NRBC BLD AUTO-RTO: 0 /100
NT-PROBNP SERPL-MCNC: 4176 PG/ML (ref 0–1800)
O2/TOTAL GAS SETTING VFR VENT: 21 %
OXYHGB MFR BLDV: 62 % (ref 70–75)
PCO2 BLDV: 42 MM HG (ref 40–50)
PH BLDV: 7.38 [PH] (ref 7.32–7.43)
PLATELET # BLD AUTO: 308 10E3/UL (ref 150–450)
PO2 BLDV: 36 MM HG (ref 25–47)
POTASSIUM SERPL-SCNC: 3.4 MMOL/L (ref 3.4–5.3)
PROCALCITONIN SERPL IA-MCNC: 0.36 NG/ML
RBC # BLD AUTO: 3 10E6/UL (ref 3.8–5.2)
RSV RNA SPEC NAA+PROBE: NEGATIVE
SAO2 % BLDV: 63.1 % (ref 70–75)
SARS-COV-2 RNA RESP QL NAA+PROBE: NEGATIVE
SODIUM SERPL-SCNC: 134 MMOL/L (ref 135–145)
TROPONIN T SERPL HS-MCNC: 45 NG/L
TROPONIN T SERPL HS-MCNC: 50 NG/L
TSH SERPL DL<=0.005 MIU/L-ACNC: 1.3 UIU/ML (ref 0.3–4.2)
WBC # BLD AUTO: 16.2 10E3/UL (ref 4–11)

## 2025-03-08 PROCEDURE — 83880 ASSAY OF NATRIURETIC PEPTIDE: CPT | Performed by: FAMILY MEDICINE

## 2025-03-08 PROCEDURE — 250N000013 HC RX MED GY IP 250 OP 250 PS 637: Performed by: HOSPITALIST

## 2025-03-08 PROCEDURE — 84443 ASSAY THYROID STIM HORMONE: CPT | Performed by: HOSPITALIST

## 2025-03-08 PROCEDURE — 250N000011 HC RX IP 250 OP 636: Performed by: FAMILY MEDICINE

## 2025-03-08 PROCEDURE — 71045 X-RAY EXAM CHEST 1 VIEW: CPT

## 2025-03-08 PROCEDURE — 120N000001 HC R&B MED SURG/OB

## 2025-03-08 PROCEDURE — 84484 ASSAY OF TROPONIN QUANT: CPT | Performed by: FAMILY MEDICINE

## 2025-03-08 PROCEDURE — 83036 HEMOGLOBIN GLYCOSYLATED A1C: CPT | Performed by: HOSPITALIST

## 2025-03-08 PROCEDURE — 71250 CT THORAX DX C-: CPT

## 2025-03-08 PROCEDURE — 82805 BLOOD GASES W/O2 SATURATION: CPT | Performed by: FAMILY MEDICINE

## 2025-03-08 PROCEDURE — 80048 BASIC METABOLIC PNL TOTAL CA: CPT | Performed by: FAMILY MEDICINE

## 2025-03-08 PROCEDURE — 86140 C-REACTIVE PROTEIN: CPT | Performed by: HOSPITALIST

## 2025-03-08 PROCEDURE — 85025 COMPLETE CBC W/AUTO DIFF WBC: CPT | Performed by: FAMILY MEDICINE

## 2025-03-08 PROCEDURE — 96365 THER/PROPH/DIAG IV INF INIT: CPT

## 2025-03-08 PROCEDURE — 36415 COLL VENOUS BLD VENIPUNCTURE: CPT | Performed by: FAMILY MEDICINE

## 2025-03-08 PROCEDURE — 99285 EMERGENCY DEPT VISIT HI MDM: CPT | Mod: 25

## 2025-03-08 PROCEDURE — 82550 ASSAY OF CK (CPK): CPT | Performed by: HOSPITALIST

## 2025-03-08 PROCEDURE — 93005 ELECTROCARDIOGRAM TRACING: CPT | Performed by: FAMILY MEDICINE

## 2025-03-08 PROCEDURE — 82310 ASSAY OF CALCIUM: CPT | Performed by: FAMILY MEDICINE

## 2025-03-08 PROCEDURE — 84145 PROCALCITONIN (PCT): CPT | Performed by: HOSPITALIST

## 2025-03-08 PROCEDURE — 258N000003 HC RX IP 258 OP 636: Performed by: FAMILY MEDICINE

## 2025-03-08 PROCEDURE — 99223 1ST HOSP IP/OBS HIGH 75: CPT | Performed by: HOSPITALIST

## 2025-03-08 PROCEDURE — 83735 ASSAY OF MAGNESIUM: CPT | Performed by: FAMILY MEDICINE

## 2025-03-08 PROCEDURE — 87637 SARSCOV2&INF A&B&RSV AMP PRB: CPT | Performed by: FAMILY MEDICINE

## 2025-03-08 RX ORDER — LIDOCAINE 40 MG/G
CREAM TOPICAL
Status: DISCONTINUED | OUTPATIENT
Start: 2025-03-08 | End: 2025-03-11 | Stop reason: HOSPADM

## 2025-03-08 RX ORDER — CLOPIDOGREL BISULFATE 75 MG/1
75 TABLET ORAL DAILY
Status: DISCONTINUED | OUTPATIENT
Start: 2025-03-09 | End: 2025-03-11 | Stop reason: HOSPADM

## 2025-03-08 RX ORDER — PANTOPRAZOLE SODIUM 40 MG/1
40 TABLET, DELAYED RELEASE ORAL 2 TIMES DAILY
Status: DISCONTINUED | OUTPATIENT
Start: 2025-03-08 | End: 2025-03-11 | Stop reason: HOSPADM

## 2025-03-08 RX ORDER — AMOXICILLIN 250 MG
2 CAPSULE ORAL 2 TIMES DAILY PRN
Status: DISCONTINUED | OUTPATIENT
Start: 2025-03-08 | End: 2025-03-11 | Stop reason: HOSPADM

## 2025-03-08 RX ORDER — ALBUTEROL SULFATE 0.83 MG/ML
2.5 SOLUTION RESPIRATORY (INHALATION) EVERY 4 HOURS PRN
Status: DISCONTINUED | OUTPATIENT
Start: 2025-03-08 | End: 2025-03-11 | Stop reason: HOSPADM

## 2025-03-08 RX ORDER — ONDANSETRON 4 MG/1
4 TABLET, ORALLY DISINTEGRATING ORAL EVERY 6 HOURS PRN
Status: DISCONTINUED | OUTPATIENT
Start: 2025-03-08 | End: 2025-03-11 | Stop reason: HOSPADM

## 2025-03-08 RX ORDER — CETIRIZINE HYDROCHLORIDE 10 MG/1
10 TABLET ORAL DAILY PRN
Status: DISCONTINUED | OUTPATIENT
Start: 2025-03-08 | End: 2025-03-11 | Stop reason: HOSPADM

## 2025-03-08 RX ORDER — FUROSEMIDE 20 MG/1
20 TABLET ORAL EVERY OTHER DAY
Status: ON HOLD | COMMUNITY
Start: 2025-02-26 | End: 2025-03-11

## 2025-03-08 RX ORDER — AMOXICILLIN 250 MG
1 CAPSULE ORAL 2 TIMES DAILY PRN
Status: DISCONTINUED | OUTPATIENT
Start: 2025-03-08 | End: 2025-03-11 | Stop reason: HOSPADM

## 2025-03-08 RX ORDER — GUAIFENESIN 200 MG/10ML
200 LIQUID ORAL EVERY 4 HOURS PRN
Status: DISCONTINUED | OUTPATIENT
Start: 2025-03-08 | End: 2025-03-11 | Stop reason: HOSPADM

## 2025-03-08 RX ORDER — MULTIPLE VITAMINS W/ MINERALS TAB 9MG-400MCG
1 TAB ORAL
Status: DISCONTINUED | OUTPATIENT
Start: 2025-03-09 | End: 2025-03-11 | Stop reason: HOSPADM

## 2025-03-08 RX ORDER — ONDANSETRON 2 MG/ML
4 INJECTION INTRAMUSCULAR; INTRAVENOUS EVERY 6 HOURS PRN
Status: DISCONTINUED | OUTPATIENT
Start: 2025-03-08 | End: 2025-03-11 | Stop reason: HOSPADM

## 2025-03-08 RX ORDER — POLYETHYLENE GLYCOL 3350 17 G/17G
17 POWDER, FOR SOLUTION ORAL DAILY PRN
Status: DISCONTINUED | OUTPATIENT
Start: 2025-03-08 | End: 2025-03-11 | Stop reason: HOSPADM

## 2025-03-08 RX ORDER — POTASSIUM CHLORIDE 1500 MG/1
40 TABLET, EXTENDED RELEASE ORAL ONCE
Status: COMPLETED | OUTPATIENT
Start: 2025-03-08 | End: 2025-03-08

## 2025-03-08 RX ORDER — CALCIUM CARBONATE 500 MG/1
1000 TABLET, CHEWABLE ORAL 4 TIMES DAILY PRN
Status: DISCONTINUED | OUTPATIENT
Start: 2025-03-08 | End: 2025-03-11 | Stop reason: HOSPADM

## 2025-03-08 RX ORDER — ATORVASTATIN CALCIUM 40 MG/1
40 TABLET, FILM COATED ORAL DAILY
Status: DISCONTINUED | OUTPATIENT
Start: 2025-03-09 | End: 2025-03-11 | Stop reason: HOSPADM

## 2025-03-08 RX ORDER — MAGNESIUM SULFATE HEPTAHYDRATE 40 MG/ML
2 INJECTION, SOLUTION INTRAVENOUS ONCE
Status: DISCONTINUED | OUTPATIENT
Start: 2025-03-08 | End: 2025-03-08

## 2025-03-08 RX ORDER — FLUTICASONE FUROATE AND VILANTEROL 100; 25 UG/1; UG/1
1 POWDER RESPIRATORY (INHALATION) DAILY
Status: DISCONTINUED | OUTPATIENT
Start: 2025-03-09 | End: 2025-03-11 | Stop reason: HOSPADM

## 2025-03-08 RX ORDER — ACETAMINOPHEN 325 MG/1
975 TABLET ORAL 3 TIMES DAILY
Status: DISCONTINUED | OUTPATIENT
Start: 2025-03-09 | End: 2025-03-11 | Stop reason: HOSPADM

## 2025-03-08 RX ORDER — MAGNESIUM SULFATE HEPTAHYDRATE 40 MG/ML
2 INJECTION, SOLUTION INTRAVENOUS ONCE
Status: COMPLETED | OUTPATIENT
Start: 2025-03-08 | End: 2025-03-08

## 2025-03-08 RX ORDER — CARVEDILOL 3.12 MG/1
3.12 TABLET ORAL 2 TIMES DAILY WITH MEALS
Status: DISCONTINUED | OUTPATIENT
Start: 2025-03-09 | End: 2025-03-09

## 2025-03-08 RX ORDER — ALBUTEROL SULFATE 90 UG/1
2 INHALANT RESPIRATORY (INHALATION) EVERY 4 HOURS PRN
Status: DISCONTINUED | OUTPATIENT
Start: 2025-03-08 | End: 2025-03-11 | Stop reason: HOSPADM

## 2025-03-08 RX ORDER — SODIUM CHLORIDE 9 MG/ML
INJECTION, SOLUTION INTRAVENOUS CONTINUOUS
Status: DISCONTINUED | OUTPATIENT
Start: 2025-03-08 | End: 2025-03-09

## 2025-03-08 RX ORDER — LEVOTHYROXINE SODIUM 25 UG/1
50 TABLET ORAL
Status: DISCONTINUED | OUTPATIENT
Start: 2025-03-09 | End: 2025-03-11 | Stop reason: HOSPADM

## 2025-03-08 RX ORDER — MONTELUKAST SODIUM 10 MG/1
10 TABLET ORAL AT BEDTIME
Status: DISCONTINUED | OUTPATIENT
Start: 2025-03-08 | End: 2025-03-11 | Stop reason: HOSPADM

## 2025-03-08 RX ORDER — ACETAMINOPHEN 325 MG/1
650 TABLET ORAL EVERY 4 HOURS PRN
Status: DISCONTINUED | OUTPATIENT
Start: 2025-03-08 | End: 2025-03-11

## 2025-03-08 RX ADMIN — POTASSIUM CHLORIDE 40 MEQ: 1500 TABLET, EXTENDED RELEASE ORAL at 22:26

## 2025-03-08 RX ADMIN — MAGNESIUM SULFATE HEPTAHYDRATE 2 G: 40 INJECTION, SOLUTION INTRAVENOUS at 21:27

## 2025-03-08 RX ADMIN — SODIUM CHLORIDE 500 ML: 0.9 INJECTION, SOLUTION INTRAVENOUS at 22:22

## 2025-03-08 ASSESSMENT — ACTIVITIES OF DAILY LIVING (ADL)
ADLS_ACUITY_SCORE: 59

## 2025-03-08 ASSESSMENT — COLUMBIA-SUICIDE SEVERITY RATING SCALE - C-SSRS
2. HAVE YOU ACTUALLY HAD ANY THOUGHTS OF KILLING YOURSELF IN THE PAST MONTH?: NO
1. IN THE PAST MONTH, HAVE YOU WISHED YOU WERE DEAD OR WISHED YOU COULD GO TO SLEEP AND NOT WAKE UP?: NO

## 2025-03-09 ENCOUNTER — APPOINTMENT (OUTPATIENT)
Dept: PHYSICAL THERAPY | Facility: HOSPITAL | Age: 89
DRG: 683 | End: 2025-03-09
Attending: HOSPITALIST
Payer: COMMERCIAL

## 2025-03-09 ENCOUNTER — APPOINTMENT (OUTPATIENT)
Dept: OCCUPATIONAL THERAPY | Facility: HOSPITAL | Age: 89
DRG: 683 | End: 2025-03-09
Attending: HOSPITALIST
Payer: COMMERCIAL

## 2025-03-09 PROBLEM — K52.9 COLITIS: Status: ACTIVE | Noted: 2025-03-09

## 2025-03-09 LAB
ADV 40+41 DNA STL QL NAA+NON-PROBE: NEGATIVE
ALBUMIN SERPL BCG-MCNC: 2.9 G/DL (ref 3.5–5.2)
ALBUMIN UR-MCNC: 30 MG/DL
ANION GAP SERPL CALCULATED.3IONS-SCNC: 14 MMOL/L (ref 7–15)
ANION GAP SERPL CALCULATED.3IONS-SCNC: 15 MMOL/L (ref 7–15)
APPEARANCE UR: ABNORMAL
ASTRO TYP 1-8 RNA STL QL NAA+NON-PROBE: NEGATIVE
BACTERIA #/AREA URNS HPF: ABNORMAL /HPF
BILIRUB UR QL STRIP: NEGATIVE
BUN SERPL-MCNC: 32.1 MG/DL (ref 8–23)
BUN SERPL-MCNC: 34.4 MG/DL (ref 8–23)
C CAYETANENSIS DNA STL QL NAA+NON-PROBE: NEGATIVE
C DIFF GDH STL QL IA: POSITIVE
C DIFF TOX A+B STL QL IA: NEGATIVE
C DIFF TOX B STL QL: POSITIVE
CALCIUM SERPL-MCNC: 7.7 MG/DL (ref 8.8–10.4)
CALCIUM SERPL-MCNC: 7.8 MG/DL (ref 8.8–10.4)
CAMPYLOBACTER DNA SPEC NAA+PROBE: NEGATIVE
CHLORIDE SERPL-SCNC: 99 MMOL/L (ref 98–107)
CHLORIDE SERPL-SCNC: 99 MMOL/L (ref 98–107)
COLOR UR AUTO: YELLOW
CREAT SERPL-MCNC: 2.82 MG/DL (ref 0.51–0.95)
CREAT SERPL-MCNC: 3.07 MG/DL (ref 0.51–0.95)
CREAT UR-MCNC: 309.1 MG/DL
CRYPTOSP DNA STL QL NAA+NON-PROBE: NEGATIVE
E COLI O157 DNA STL QL NAA+NON-PROBE: NORMAL
E HISTOLYT DNA STL QL NAA+NON-PROBE: NEGATIVE
EAEC ASTA GENE ISLT QL NAA+PROBE: NEGATIVE
EC STX1+STX2 GENES STL QL NAA+NON-PROBE: NEGATIVE
EGFRCR SERPLBLD CKD-EPI 2021: 14 ML/MIN/1.73M2
EGFRCR SERPLBLD CKD-EPI 2021: 16 ML/MIN/1.73M2
EPEC EAE GENE STL QL NAA+NON-PROBE: NEGATIVE
ERYTHROCYTE [DISTWIDTH] IN BLOOD BY AUTOMATED COUNT: 16.6 % (ref 10–15)
ETEC LTA+ST1A+ST1B TOX ST NAA+NON-PROBE: NEGATIVE
FRACT EXCRET NA UR+SERPL-RTO: 0.3 %
G LAMBLIA DNA STL QL NAA+NON-PROBE: NEGATIVE
GLUCOSE SERPL-MCNC: 102 MG/DL (ref 70–99)
GLUCOSE SERPL-MCNC: 89 MG/DL (ref 70–99)
GLUCOSE UR STRIP-MCNC: NEGATIVE MG/DL
HCO3 SERPL-SCNC: 23 MMOL/L (ref 22–29)
HCO3 SERPL-SCNC: 23 MMOL/L (ref 22–29)
HCT VFR BLD AUTO: 24.4 % (ref 35–47)
HGB BLD-MCNC: 7.8 G/DL (ref 11.7–15.7)
HGB UR QL STRIP: NEGATIVE
HOLD SPECIMEN: NORMAL
HYALINE CASTS: 124 /LPF
KETONES UR STRIP-MCNC: NEGATIVE MG/DL
LABORATORY COMMENT REPORT: ABNORMAL
LACTATE SERPL-SCNC: 0.8 MMOL/L (ref 0.7–2)
LEUKOCYTE ESTERASE UR QL STRIP: ABNORMAL
MAGNESIUM SERPL-MCNC: 1.9 MG/DL (ref 1.7–2.3)
MCH RBC QN AUTO: 29.3 PG (ref 26.5–33)
MCHC RBC AUTO-ENTMCNC: 32 G/DL (ref 31.5–36.5)
MCV RBC AUTO: 92 FL (ref 78–100)
MUCOUS THREADS #/AREA URNS LPF: PRESENT /LPF
NITRATE UR QL: NEGATIVE
NOROVIRUS GI+II RNA STL QL NAA+NON-PROBE: NEGATIVE
OSMOLALITY UR: 309 MMOL/KG (ref 100–1200)
P SHIGELLOIDES DNA STL QL NAA+NON-PROBE: NEGATIVE
PH UR STRIP: 5 [PH] (ref 5–7)
PHOSPHATE SERPL-MCNC: 3.8 MG/DL (ref 2.5–4.5)
PLATELET # BLD AUTO: 244 10E3/UL (ref 150–450)
POTASSIUM SERPL-SCNC: 3.7 MMOL/L (ref 3.4–5.3)
POTASSIUM SERPL-SCNC: 4 MMOL/L (ref 3.4–5.3)
RBC # BLD AUTO: 2.66 10E6/UL (ref 3.8–5.2)
RBC URINE: 0 /HPF
RVA RNA STL QL NAA+NON-PROBE: NEGATIVE
SALMONELLA SP RPOD STL QL NAA+PROBE: NEGATIVE
SAPO I+II+IV+V RNA STL QL NAA+NON-PROBE: NEGATIVE
SHIGELLA SP+EIEC IPAH ST NAA+NON-PROBE: NEGATIVE
SODIUM SERPL-SCNC: 136 MMOL/L (ref 135–145)
SODIUM SERPL-SCNC: 137 MMOL/L (ref 135–145)
SODIUM UR-SCNC: 41 MMOL/L
SP GR UR STRIP: 1.02 (ref 1–1.03)
SQUAMOUS EPITHELIAL: 2 /HPF
UROBILINOGEN UR STRIP-MCNC: 2 MG/DL
V CHOLERAE DNA SPEC QL NAA+PROBE: NEGATIVE
VIBRIO DNA SPEC NAA+PROBE: NEGATIVE
WBC # BLD AUTO: 11.6 10E3/UL (ref 4–11)
WBC CLUMPS #/AREA URNS HPF: PRESENT /HPF
WBC URINE: >182 /HPF
Y ENTEROCOL DNA STL QL NAA+PROBE: NEGATIVE

## 2025-03-09 PROCEDURE — 87324 CLOSTRIDIUM AG IA: CPT | Performed by: HOSPITALIST

## 2025-03-09 PROCEDURE — 86038 ANTINUCLEAR ANTIBODIES: CPT | Performed by: HOSPITALIST

## 2025-03-09 PROCEDURE — 82570 ASSAY OF URINE CREATININE: CPT | Performed by: HOSPITALIST

## 2025-03-09 PROCEDURE — 99232 SBSQ HOSP IP/OBS MODERATE 35: CPT | Performed by: STUDENT IN AN ORGANIZED HEALTH CARE EDUCATION/TRAINING PROGRAM

## 2025-03-09 PROCEDURE — 258N000003 HC RX IP 258 OP 636: Performed by: STUDENT IN AN ORGANIZED HEALTH CARE EDUCATION/TRAINING PROGRAM

## 2025-03-09 PROCEDURE — 87493 C DIFF AMPLIFIED PROBE: CPT | Performed by: HOSPITALIST

## 2025-03-09 PROCEDURE — 83605 ASSAY OF LACTIC ACID: CPT | Performed by: HOSPITALIST

## 2025-03-09 PROCEDURE — 250N000013 HC RX MED GY IP 250 OP 250 PS 637: Performed by: HOSPITALIST

## 2025-03-09 PROCEDURE — 87040 BLOOD CULTURE FOR BACTERIA: CPT | Performed by: HOSPITALIST

## 2025-03-09 PROCEDURE — 97535 SELF CARE MNGMENT TRAINING: CPT | Mod: GO

## 2025-03-09 PROCEDURE — 97161 PT EVAL LOW COMPLEX 20 MIN: CPT | Mod: GP

## 2025-03-09 PROCEDURE — 87507 IADNA-DNA/RNA PROBE TQ 12-25: CPT | Performed by: HOSPITALIST

## 2025-03-09 PROCEDURE — 97166 OT EVAL MOD COMPLEX 45 MIN: CPT | Mod: GO

## 2025-03-09 PROCEDURE — 80069 RENAL FUNCTION PANEL: CPT | Performed by: HOSPITALIST

## 2025-03-09 PROCEDURE — 80048 BASIC METABOLIC PNL TOTAL CA: CPT | Performed by: HOSPITALIST

## 2025-03-09 PROCEDURE — 84300 ASSAY OF URINE SODIUM: CPT | Performed by: HOSPITALIST

## 2025-03-09 PROCEDURE — 258N000003 HC RX IP 258 OP 636: Performed by: HOSPITALIST

## 2025-03-09 PROCEDURE — 250N000011 HC RX IP 250 OP 636: Mod: JZ | Performed by: HOSPITALIST

## 2025-03-09 PROCEDURE — 81003 URINALYSIS AUTO W/O SCOPE: CPT | Performed by: FAMILY MEDICINE

## 2025-03-09 PROCEDURE — 85014 HEMATOCRIT: CPT | Performed by: HOSPITALIST

## 2025-03-09 PROCEDURE — 97116 GAIT TRAINING THERAPY: CPT | Mod: GP

## 2025-03-09 PROCEDURE — 36415 COLL VENOUS BLD VENIPUNCTURE: CPT | Performed by: HOSPITALIST

## 2025-03-09 PROCEDURE — 87186 SC STD MICRODIL/AGAR DIL: CPT | Performed by: FAMILY MEDICINE

## 2025-03-09 PROCEDURE — 83735 ASSAY OF MAGNESIUM: CPT | Performed by: HOSPITALIST

## 2025-03-09 PROCEDURE — 120N000001 HC R&B MED SURG/OB

## 2025-03-09 PROCEDURE — 250N000013 HC RX MED GY IP 250 OP 250 PS 637: Performed by: STUDENT IN AN ORGANIZED HEALTH CARE EDUCATION/TRAINING PROGRAM

## 2025-03-09 PROCEDURE — 83935 ASSAY OF URINE OSMOLALITY: CPT | Performed by: HOSPITALIST

## 2025-03-09 RX ORDER — VANCOMYCIN HYDROCHLORIDE 125 MG/1
125 CAPSULE ORAL 4 TIMES DAILY
Status: DISCONTINUED | OUTPATIENT
Start: 2025-03-09 | End: 2025-03-11 | Stop reason: HOSPADM

## 2025-03-09 RX ORDER — METRONIDAZOLE 500 MG/100ML
500 INJECTION, SOLUTION INTRAVENOUS EVERY 12 HOURS
Status: DISCONTINUED | OUTPATIENT
Start: 2025-03-09 | End: 2025-03-11

## 2025-03-09 RX ORDER — SODIUM CHLORIDE 9 MG/ML
INJECTION, SOLUTION INTRAVENOUS CONTINUOUS
Status: DISCONTINUED | OUTPATIENT
Start: 2025-03-09 | End: 2025-03-10

## 2025-03-09 RX ORDER — LEVOFLOXACIN 5 MG/ML
250 INJECTION, SOLUTION INTRAVENOUS
Status: DISCONTINUED | OUTPATIENT
Start: 2025-03-11 | End: 2025-03-10

## 2025-03-09 RX ORDER — LEVOFLOXACIN 5 MG/ML
500 INJECTION, SOLUTION INTRAVENOUS ONCE
Status: COMPLETED | OUTPATIENT
Start: 2025-03-09 | End: 2025-03-09

## 2025-03-09 RX ORDER — LEVOFLOXACIN 5 MG/ML
250 INJECTION, SOLUTION INTRAVENOUS EVERY 24 HOURS
Status: DISCONTINUED | OUTPATIENT
Start: 2025-03-09 | End: 2025-03-09 | Stop reason: DRUGHIGH

## 2025-03-09 RX ADMIN — SODIUM CHLORIDE: 0.9 INJECTION, SOLUTION INTRAVENOUS at 20:48

## 2025-03-09 RX ADMIN — MONTELUKAST 10 MG: 10 TABLET, FILM COATED ORAL at 23:01

## 2025-03-09 RX ADMIN — ACETAMINOPHEN 975 MG: 325 TABLET ORAL at 09:45

## 2025-03-09 RX ADMIN — PANTOPRAZOLE SODIUM 40 MG: 40 TABLET, DELAYED RELEASE ORAL at 09:45

## 2025-03-09 RX ADMIN — VANCOMYCIN HYDROCHLORIDE 125 MG: 125 CAPSULE ORAL at 14:26

## 2025-03-09 RX ADMIN — LEVOTHYROXINE SODIUM 50 MCG: 0.03 TABLET ORAL at 09:46

## 2025-03-09 RX ADMIN — ACETAMINOPHEN 975 MG: 325 TABLET ORAL at 20:48

## 2025-03-09 RX ADMIN — METRONIDAZOLE 500 MG: 500 INJECTION, SOLUTION INTRAVENOUS at 03:10

## 2025-03-09 RX ADMIN — VANCOMYCIN HYDROCHLORIDE 125 MG: 125 CAPSULE ORAL at 09:45

## 2025-03-09 RX ADMIN — ATORVASTATIN CALCIUM 40 MG: 40 TABLET, FILM COATED ORAL at 09:46

## 2025-03-09 RX ADMIN — LEVOFLOXACIN 500 MG: 500 INJECTION, SOLUTION INTRAVENOUS at 04:23

## 2025-03-09 RX ADMIN — PANTOPRAZOLE SODIUM 40 MG: 40 TABLET, DELAYED RELEASE ORAL at 00:16

## 2025-03-09 RX ADMIN — MONTELUKAST 10 MG: 10 TABLET, FILM COATED ORAL at 00:16

## 2025-03-09 RX ADMIN — CARVEDILOL 3.12 MG: 3.12 TABLET, FILM COATED ORAL at 19:09

## 2025-03-09 RX ADMIN — CLOPIDOGREL 75 MG: 75 TABLET ORAL at 09:46

## 2025-03-09 RX ADMIN — FLUTICASONE FUROATE AND VILANTEROL TRIFENATATE 1 PUFF: 100; 25 POWDER RESPIRATORY (INHALATION) at 09:44

## 2025-03-09 RX ADMIN — METRONIDAZOLE 500 MG: 500 INJECTION, SOLUTION INTRAVENOUS at 16:26

## 2025-03-09 RX ADMIN — SODIUM CHLORIDE: 0.9 INJECTION, SOLUTION INTRAVENOUS at 01:31

## 2025-03-09 RX ADMIN — VANCOMYCIN HYDROCHLORIDE 125 MG: 125 CAPSULE ORAL at 19:09

## 2025-03-09 RX ADMIN — ACETAMINOPHEN 975 MG: 325 TABLET ORAL at 14:26

## 2025-03-09 RX ADMIN — PANTOPRAZOLE SODIUM 40 MG: 40 TABLET, DELAYED RELEASE ORAL at 20:48

## 2025-03-09 RX ADMIN — CARVEDILOL 3.12 MG: 3.12 TABLET, FILM COATED ORAL at 09:44

## 2025-03-09 ASSESSMENT — ACTIVITIES OF DAILY LIVING (ADL)
ADLS_ACUITY_SCORE: 59
ADLS_ACUITY_SCORE: 52
ADLS_ACUITY_SCORE: 59
ADLS_ACUITY_SCORE: 65
ADLS_ACUITY_SCORE: 59
DEPENDENT_IADLS:: INDEPENDENT
ADLS_ACUITY_SCORE: 59
ADLS_ACUITY_SCORE: 65
ADLS_ACUITY_SCORE: 59
ADLS_ACUITY_SCORE: 65
ADLS_ACUITY_SCORE: 59

## 2025-03-09 NOTE — CONSULTS
GI CONSULT NOTE      Name: Mary Corral    Medical Record #: [id number]    YOB: 1935    Date: 3/9/2025    CC: We were consulted by  Dennis Marroquin MD to see Mary Corral in regards to multifocal colitis on CT scan.    HPI: This is a 89 year old female with a history of CKD stage III, asthma, CAD S/P PCI, GERD, hypertension and recurrent hospitalization in recent months including hospitalization for UGIB.   Fell at home and called paramedics, who found SBP 89.  Then went to hospitals, where she was felt OK to return home.  Then returned home, had weakness, and came in to ED.  Has been bothered by diarrhea. CT showed multifocal colitis.  Stool study showing positive C. difficile PCR and GDH.  WBC 16K on admit.  Reports urgent, watery stools (~3 per day) that have been going on after returning home after last month's hospitalization.    On aspirin Plavix for cardiac stent placed May 2024.    Hospitalized Feb 2025 for upper GI bleed.  Required 2 EGD's that admission.  Had ulcerated lesion with visible vessel.    Hgb was 5.6 last month versus 8.5 on admit.     Colonoscopy approximately 7 years ago per report.    Past medical history  Past Medical History:   Diagnosis Date    Allergic rhinitis     Chronic sinusitis     Conductive hearing loss     COPD (chronic obstructive pulmonary disease) (H)     CRI (chronic renal insufficiency)     mild    Gastroesophageal reflux disease     Hearing problem     Hypertension     Mediastinal mass     Nasal polyps     Pulmonary nodule     Seasonal allergies     Uncomplicated asthma         Family history  Family History   Problem Relation Age of Onset    Esophageal Cancer Mother     Colon Cancer Mother     Mental Illness Mother     Dementia Mother     Unknown/Adopted Mother     Asthma Mother     Diabetes Mother     Cerebrovascular Disease Mother     Thyroid Disease Mother     Congenital Anomalies Mother     Bleeding Disorder Mother     Migraines Mother     Muscular Disorder  Mother     Parkinsonism Father     Mental Illness Father     Unknown/Adopted Father     Asthma Father     Cancer Father     Hypertension Father     Cerebrovascular Disease Father     Thyroid Disease Father     Congenital Anomalies Father     Bleeding Disorder Father     Migraines Father     Muscular Disorder Father     Mental Illness Sister     Dementia Sister     Unknown/Adopted Sister     Asthma Sister     Cerebrovascular Disease Sister     Thyroid Disease Sister     Congenital Anomalies Sister     Bleeding Disorder Sister     Migraines Sister     Muscular Disorder Sister     Cancer Mother         esophageal      Social history  Social History     Tobacco Use    Smoking status: Never    Smokeless tobacco: Never    Tobacco comments:     smoked twice a week for a couple years while in college, less than 1/2 pack    Substance Use Topics    Alcohol use: Yes     Alcohol/week: 0.0 standard drinks of alcohol     Comment: Alcoholic Drinks/day: occasional    Drug use: No     Medications:   Current Outpatient Medications   Medication Sig Dispense Refill    acetaminophen (TYLENOL) 325 MG tablet Take 2 tablets (650 mg) by mouth every 4 hours as needed for other (surgical pain) 100 tablet 0    albuterol (2.5 MG/3ML) 0.083% nebulizer solution Take 1 vial by nebulization every 4 hours as needed for shortness of breath / dyspnea or wheezing      albuterol (PROAIR HFA/PROVENTIL HFA/VENTOLIN HFA) 108 (90 BASE) MCG/ACT Inhaler Inhale 2 puffs into the lungs every 4 hours as needed for shortness of breath      allopurinol (ZYLOPRIM) 100 MG tablet Take 100 mg by mouth 2 times daily as needed Gout flares      atorvastatin (LIPITOR) 40 MG tablet Take 1 tablet (40 mg) by mouth daily 90 tablet 3    carvedilol (COREG) 3.125 MG tablet Take 1 tablet (3.125 mg) by mouth 2 times daily (with meals). 60 tablet 1    cetirizine (ZYRTEC) 10 MG tablet Take 10 mg by mouth daily as needed for allergies      clopidogrel (PLAVIX) 75 MG tablet Take 1  "tablet (75 mg) by mouth daily Dose to start tomorrow. 90 tablet 3    fluticasone-salmeterol (ADVAIR) 250-50 MCG/DOSE diskus inhaler Inhale 1 puff into the lungs daily as needed.      furosemide (LASIX) 20 MG tablet Take 20 mg by mouth every other day.      levothyroxine (SYNTHROID/LEVOTHROID) 50 MCG tablet Take 50 mcg by mouth daily      montelukast (SINGULAIR) 10 MG tablet Take 10 mg by mouth at bedtime      multivitamin w/minerals (THERA-VIT-M) tablet Take 1 tablet by mouth every morning.      pantoprazole (PROTONIX) 40 MG EC tablet Take 1 tablet (40 mg) by mouth 2 times daily. 180 tablet 0    polyethylene glycol (MIRALAX/GLYCOLAX) powder Take 17 g by mouth daily as needed for constipation      predniSONE (DELTASONE) 20 MG tablet Take 20 mg by mouth daily as needed (Self doses in winter for lung infections.)      valACYclovir (VALTREX) 1000 mg tablet Take 1,000 mg by mouth 2 times daily as needed (for one day)      Vitamin D3 (CHOLECALCIFEROL) 25 mcg (1000 units) tablet Take 1,000 Units by mouth every morning.          Allergies:    Allergies   Allergen Reactions    Amoxicillin Hives    Keflex [Cephalexin] Hives    Lisinopril Shortness Of Breath    Penicillins Hives      REVIEW OF SYSTEMS (ROS): Review of systems is as per HPI.  Remainder of complete review of systems is negative.      PHYSICAL EXAMINATION:      /63 (BP Location: Right arm)   Pulse 78   Temp 97.7  F (36.5  C) (Oral)   Resp 22   Ht 1.702 m (5' 7\")   Wt 82.6 kg (182 lb)   SpO2 93%   BMI 28.51 kg/m    GEN: Well developed, well nourished 89 year old male in no acute distress.  HEENT: sclera anicteric, moist mucous membranes, neck soft and supple.  LYMPH: No cervical lymphadenopathy  PULM: breathing comfortably   CARDIO: Regular rate and rhythm  GI: Non-distended.  Bowel sounds positive.  Soft.  Non-tender to palpation.  No guarding.  EXT: warm, no lower extremity edema  NEURO: Alert and oriented.  Speech fluid.    PSYCH: Mental status " "appropriate, mood and affect normal.      LABS and IMAGING  Recent Labs   Lab 03/09/25  0713 03/08/25 1956   WBC 11.6* 16.2*   RBC 2.66* 3.00*   HGB 7.8* 8.5*   HCT 24.4* 27.1*   MCV 92 90   MCH 29.3 28.3   MCHC 32.0 31.4*   RDW 16.6* 16.5*    308      Recent Labs   Lab 03/09/25  0713 03/09/25  0150 03/08/25 1956    136 134*   CO2 23 23 22   BUN 32.1* 34.4* 33.9*     No results for input(s): \"BILITOT\", \"ALKPHOS\", \"AST\", \"ALT\" in the last 168 hours.  Lab Results   Component Value Date    INR 1.45 (H) 02/06/2025    INR 1.20 (H) 02/05/2025    INR 1.00 02/17/2018       CT Chest Abdomen Pelvis w/o Contrast    Result Date: 3/9/2025  EXAM: CT CHEST ABDOMEN PELVIS W/O CONTRAST LOCATION: [facility] DATE: 3/8/2025 INDICATION: Follow up, evaluate for any pyelonephritis, rule out pneumonia COMPARISON: Portable chest radiograph from 3/8/2025, CT abdomen/pelvis from 2/17/2025. CT chest from 2/5/2025. TECHNIQUE: CT scan of the chest, abdomen, and pelvis was performed without IV contrast. Multiplanar reformats were obtained. Dose reduction techniques were used. CONTRAST: None. FINDINGS: LUNGS AND PLEURA: Bandlike atelectasis or scarring in the right middle lobe and lingula. Bibasilar atelectasis. Trace left pleural effusion. MEDIASTINUM/AXILLAE: Normal heart size. No pericardial effusion. Small hiatal hernia. Extensive multivessel coronary artery disease. CORONARY ARTERY CALCIFICATION: Previous intervention (stents or CABG). HEPATOBILIARY: No calcified gallstones. Liver within normal limits for noncontrast appearance. PANCREAS: Normal. SPLEEN: Normal. ADRENAL GLANDS: Normal. KIDNEYS/BLADDER: Benign right midpole dorsal cyst requires no follow-up. No hydronephrosis. Normal bladder. BOWEL: Nondistended stomach. Normal caliber small bowel. Wall thickening of the cecum with associated edema and adjacent enlarged lymph nodes. There is also wall thickening and pericolonic inflammatory change " associated with the distal descending colon, sigmoid colon, and rectum. No free air. LYMPH NODES: Normal. VASCULATURE: Mild aortoiliac atherosclerotic calcification. PELVIC ORGANS: Normal. MUSCULOSKELETAL: Osteopenia with moderate degenerative disc change of the visualized spine. Severe degenerative joint space loss of the left hip.     IMPRESSION: 1.  Findings consistent with nonspecific multifocal colitis. Consider gastroenterology consultation. 2.  Bibasilar atelectasis with trace left pleural effusion.    XR Chest Port 1 View    Result Date: 3/8/2025  EXAM: XR CHEST PORT 1 VIEW LOCATION: [facility] DATE: 3/8/2025 INDICATION: Weakness. COMPARISON: CT chest 2/5/2025.     IMPRESSION: Possible small left pleural effusion. Mild basilar atelectasis. Remaining lungs are clear. No pleural effusion or pneumothorax. Normal heart size.      ASSESSMENT  This is a 89 year old female with a history of CKD stage III, asthma, CAD S/P PCI, GERD, hypertension and recurrent hospitalization in recent months including hospitalization for UGIB.   Presented with lightheadedness and fall. We are consulted due to CT showing multifocal colitis.      Colitis  C difficile  Leukocytosis  Stool study showing positive C. difficile PCR and GDH. Suspect C diff explains colitis and leukocytosis.  Recent hospitalization risk factor for C diff.      Peptic ulcer disease  Gastric ulcer with visible vessel treated 1 month ago. EGD on 2/6 and 2/7.    Hgb 5.6 on 2/5 versus 8.5 on admit.  She is not sure if she was on PPI at home.  Her home meds list pantoprazole 40mg BID.     Patient Active Problem List   Diagnosis    Uncomplicated asthma    Seasonal allergies    Pulmonary nodule    Nasal polyps    Mediastinal mass    Hearing problem    Gastroesophageal reflux disease    CRI (chronic renal insufficiency)    COPD (chronic obstructive pulmonary disease) (H)    Conductive hearing loss    Chronic sinusitis    Substernal goiter     S/P thyroidectomy    ACP (advance care planning)    Chest pain    NSTEMI (non-ST elevated myocardial infarction) (H)    Generalized weakness    Loose stools    Gastrointestinal hemorrhage with melena    Decreased urination    Acute kidney injury superimposed on stage 3b chronic kidney disease (H)    Pleural effusion    History of coronary angioplasty with insertion of stent    Acute on chronic diastolic congestive heart failure (H)    Hyponatremia    Hypomagnesemia    DNR (do not resuscitate)    Hypertension    Anemia of chronic kidney failure, stage 3 (moderate) (H)    Colitis     PLAN  1.  Vancomycin 125 mg p.o. 4 times daily x 14 days.  2.  Encourage fluids  3.  Patient's home bathroom should be bleached in order to decrease spread to other contacts.  4.  Continue PPI.  Take 30 min prior to first meal of the day.   5.  Monitor stool and watch for any concerns of GI bleeding.     We will not continue to follow though can be contacted if future concerns or questions arise.     Thank you for asking us to consult on this patient.      A total of 35 min was spent on today's care.   Prashant Jimenez PA-C   3/9/2025 11:54 AM  [facility]  [phone]

## 2025-03-09 NOTE — PLAN OF CARE
Goal Outcome Evaluation:       discharge to transitional care facility or back home once medically ready.

## 2025-03-09 NOTE — PLAN OF CARE
Problem: Diarrhea  Goal: Effective Diarrhea Management  Outcome: Progressing   Goal Outcome Evaluation:  Denies pain and nausea.  Had 2 loose stools.  Continues on enteric precautions.  Normal saline running at 75 cc/hr.  Up with CGA with walker

## 2025-03-09 NOTE — CONSULTS
Care Management Initial Consult    General Information  Assessment completed with: Mary Mora  Type of CM/SW Visit: Initial Assessment    Primary Care Provider verified and updated as needed: Yes   Readmission within the last 30 days: current reason for admission unrelated to previous admission   Return Category: New Diagnosis  Reason for Consult: discharge planning, utilization management concerns (+ elevated risk score)  Advance Care Planning: Advance Care Planning Reviewed: present on chart          Communication Assessment  Patient's communication style: spoken language (English or Bilingual)             Cognitive  Cognitive/Neuro/Behavioral: WDL    Living Environment:   People in home: alone     Current living Arrangements: house      Able to return to prior arrangements: yes       Family/Social Support:  Care provided by: self  Provides care for: no one  Marital Status: Single  Support system: Friend, Neighbor, Other (specify) (nieces and nephews)          Description of Support System: Supportive, Involved    Support Assessment: Patient communicates needs well met    Current Resources:   Patient receiving home care services: Yes  Skilled Home Care Services: ________________________  -- TBD, have a message out to [employer], her home care agency.     Community Resources: None  Equipment currently used at home: cane, straight, walker, rolling  Supplies currently used at home: None    Employment/Financial:  Employment Status: retired     Employment/ Comments: no  background  Financial Concerns: none           Does the patient's insurance plan have a 3 day qualifying hospital stay waiver?  No    Lifestyle & Psychosocial Needs:  Social Drivers of Health     Food Insecurity: Low Risk  (2/17/2025)    Food Insecurity     Within the past 12 months, did you worry that your food would run out before you got money to buy more?: No     Within the past 12 months, did the food you bought just not  last and you didn t have money to get more?: No   Depression: Not at risk (1/17/2019)    PHQ-2     PHQ-2 Score: 0   Housing Stability: Low Risk  (2/17/2025)    Housing Stability     Do you have housing? : Yes     Are you worried about losing your housing?: No   Tobacco Use: Low Risk  (2/17/2025)    Patient History     Smoking Tobacco Use: Never     Smokeless Tobacco Use: Never     Passive Exposure: Not on file   Financial Resource Strain: Low Risk  (2/17/2025)    Financial Resource Strain     Within the past 12 months, have you or your family members you live with been unable to get utilities (heat, electricity) when it was really needed?: No   Alcohol Use: Not on file   Transportation Needs: Low Risk  (2/17/2025)    Transportation Needs     Within the past 12 months, has lack of transportation kept you from medical appointments, getting your medicines, non-medical meetings or appointments, work, or from getting things that you need?: No   Physical Activity: Not on file   Interpersonal Safety: Low Risk  (2/17/2025)    Interpersonal Safety     Do you feel physically and emotionally safe where you currently live?: Yes     Within the past 12 months, have you been hit, slapped, kicked or otherwise physically hurt by someone?: No     Within the past 12 months, have you been humiliated or emotionally abused in other ways by your partner or ex-partner?: No   Recent Concern: Interpersonal Safety - High Risk (2/6/2025)    Interpersonal Safety     Do you feel physically and emotionally safe where you currently live?: No     Within the past 12 months, have you been hit, slapped, kicked or otherwise physically hurt by someone?: No     Within the past 12 months, have you been humiliated or emotionally abused in other ways by your partner or ex-partner?: No   Stress: Not on file   Social Connections: Not on file   Health Literacy: Not on file       Functional Status:  Prior to admission patient needed assistance:   Dependent  "ADLs:: Ambulation-walker  Dependent IADLs:: Independent  Assesssment of Functional Status: Not at  functional baseline (feels weaker than usual)    Mental Health Status:  Mental Health Status: No Current Concerns       Chemical Dependency Status:  Chemical Dependency Status: No Current Concerns             Values/Beliefs:  Spiritual, Cultural Beliefs, Pentecostal Practices, Values that affect care: no               Discussed  Partnership in Safe Discharge Planning  document with patient/family: No    Additional Information:  Met with  in the ED. She lives home alone in a house in [place]. She is independent at baseline. Still driving, still golfing. Said she had no health issues up until the age of 87 and \"now I just seem to be encountering all these problems.\"     She is open to home care since her last hospitalization. She was using a walker but otherwise independent in her ADLs and IADLs. Is not , does not have children but has sufficient support through her friends and neighbors.     She is hoping she could discharge home but \"sometimes I wonder if maybe I could do a little transitional care to get my strength back up.\" PT and OT are consulted but have not yet seen patient.  is on the fence about whether she would need transitional care or just be able to discharge home once medically ready.     Next Steps:   wait for PT and OT's discharge recommendations then develop discharge plan.  Verify home care  Per discharge note from  on 2/13 - patient was sent home with [facility] PT/RN/OT  [facility]  ADDENDUM: 3/9 1352:  Per [facility] - patient's home care was outsourced to [facility]   Message sent to [email or web address] [facility] [employer] to confirm.     Ivory Campuzano RN  [facility] ED Care Manager  Desk Phone #: [phone]  Cell #: [phone]        "

## 2025-03-09 NOTE — ED NOTES
"[facility] ED Handoff Report    ED Chief Complaint: Generalized weakness    ED Diagnosis:  (N17.9,  N18.9) Acute kidney injury superimposed on CKD    (J90) Pleural effusion    (Z95.5) History of coronary angioplasty with insertion of stent    PMH:    Past Medical History:   Diagnosis Date    Allergic rhinitis     Chronic sinusitis     Conductive hearing loss     COPD (chronic obstructive pulmonary disease) (H)     CRI (chronic renal insufficiency)     mild    Gastroesophageal reflux disease     Hearing problem     Hypertension     Mediastinal mass     Nasal polyps     Pulmonary nodule     Seasonal allergies     Uncomplicated asthma         Code Status:  Full Code     Falls Risk: Yes Band: Applied    Current Living Situation/Residence: lives in an assisted living facility     Elimination Status: Continent: Yes     Activity Level: SBA w/ walker    Patients Preferred Language:  English     Needed: No    Vital Signs:  /61   Pulse 82   Temp 97.9  F (36.6  C) (Oral)   Resp 17   Ht 1.702 m (5' 7\")   Wt 82.6 kg (182 lb)   SpO2 93%   BMI 28.51 kg/m       Cardiac Rhythm: NSR    Pain Score: 0/10    Is the Patient Confused:  No    Last Food or Drink: 03/08/25    Focused Assessment: Patient was discharged from the hospital week and has been receiving home care. Today she reached down to  something on the floor and fell. She called EMS to help her off the floor. She does take blood thinners but did not hit her head and does not report any pain from the fall. She then went to [facility] and was told she had low blood pressure. She reports multiple medications recently. Patient reports feeling weak and lightheaded. Patient has a bleeding ulcer in February and required blood transfusions. She denies bloody stools. Had labs checked at the clinic today and she stated her hemoglobin was greater then 9. Pt is alert and oriented, calm and cooperative. Stool and urine sample still needed at this " time.     Tests Performed: Done: Labs and Imaging    Treatments Provided: Magnesium replacement, potassium replacement    Family Dynamics/Concerns: No    Family Updated On Visitor Policy: Yes    Plan of Care Communicated to Family: Yes    Who Was Updated about Plan of Care: Daughter    Belongings Checklist Done and Signed by Patient: Yes    Covid: symptomatic, negative    RN: Dariel Peter RN  3/8/2025 10:34 PM

## 2025-03-09 NOTE — ED PROVIDER NOTES
EMERGENCY DEPARTMENT ENCOUNTER      NAME: Mary Corral  AGE: 89 year old female  YOB: 1935  MRN: [id number]  EVALUATION DATE & TIME: 3/8/2025  8:21 PM    PCP: Jessica Fonseca    ED PROVIDER: Jaime Resendiz M.D.    Chief Complaint   Patient presents with    Generalized Weakness    Hypotension    Fall       FINAL IMPRESSION:  1. Acute kidney injury superimposed on CKD    2. Pleural effusion    3. History of coronary angioplasty with insertion of stent        ED COURSE & MEDICAL DECISION MAKING:    Pertinent Labs & Imaging studies independently interpreted by me. (See chart for details)  Reviewed most recent hospital admission from February 17 when patient was admitted for decreased urination, acute kidney injury, recent acute blood loss anemia due to GI bleed as well.  At that visit Coreg was decreased, losartan was stopped,.    ED Course as of 03/08/25 2227   Sat Mar 08, 2025   2044 Patient seen and examined, presents today with lightheadedness and a fall.  Recently admitted for GI bleed and then readmitted with acute kidney injury.  Today was sitting on her walker doing her taxes, leaned forward and fell.  Was unable to get up off the floor and so called a neighbor.  She has had intermittent lightheadedness today, also notes that her blood pressure has been quite labile.  Denies chest pain, intermittent shortness of breath of the last couple of days but notes history of asthma.  Denies nausea, vomiting, diarrhea although has had some looser stools, no dark stools.  No urinary symptoms.  On exam here patient is vitally stable, no abdominal tenderness, lungs are clear, heart is regular.  Labs are ordered along with chest x-ray, urinalysis, EKG   2110 Labs independently interpreted by me with leukocytosis, anemia which is stable.  Magnesium 1.4 which will be replaced intravenously   2142 Labs independently interpreted by me with elevated BNP, troponin 50 which is elevated for the patient.   Creatinine 3.15 which is significantly worse than baseline for the patient.   2142 Chest x-ray independently interpreted by me with question of small effusion, no other acute findings.   2202 Care discussed with Dr. Marroquin, hospitalist.  Request normal saline bolus, we did discuss that patient has pleural effusion and elevated BNP.  Patient will be given under bolus, patient is already getting magnesium replacement.     At the conclusion of the encounter I discussed the results of all of the tests and the disposition. The questions were answered. The patient or family acknowledged understanding and was agreeable with the care plan.     Medical Decision Making  Obtained supplemental history:Supplemental history obtained?: No  Reviewed external records: External records reviewed?: Documented in chart  Care impacted by chronic illness:Chronic Kidney Disease and Hypertension  Did you consider but not order tests?: Work up considered but not performed and documented in chart, if applicable  Did you interpret images independently?: Independent interpretation of ECG and images noted in documentation, when applicable.  Consultation discussion with other provider:Did you involve another provider (consultant, , pharmacy, etc.)?: I discussed the care with another health care provider, see documentation for details.  Admit.    MIPS (CTPE, Dental pain, Russo, Sinusitis, Asthma/COPD, Head Trauma): Not Applicable      MEDICATIONS GIVEN IN THE EMERGENCY:  Medications   lidocaine 1 % 0.1-1 mL (has no administration in time range)   lidocaine (LMX4) cream (has no administration in time range)   sodium chloride (PF) 0.9% PF flush 3 mL (3 mLs Intracatheter Not Given 3/8/25 2210)   sodium chloride (PF) 0.9% PF flush 3 mL (has no administration in time range)   senna-docusate (SENOKOT-S/PERICOLACE) 8.6-50 MG per tablet 1 tablet (has no administration in time range)     Or   senna-docusate (SENOKOT-S/PERICOLACE) 8.6-50 MG per tablet 2  tablet (has no administration in time range)   calcium carbonate (TUMS) chewable tablet 1,000 mg (has no administration in time range)   sodium chloride 0.9 % infusion (has no administration in time range)   acetaminophen (TYLENOL) tablet 975 mg (has no administration in time range)   melatonin tablet 1 mg (has no administration in time range)   ondansetron (ZOFRAN ODT) ODT tab 4 mg (has no administration in time range)     Or   ondansetron (ZOFRAN) injection 4 mg (has no administration in time range)   benzocaine-menthol (CHLORASEPTIC) 6-10 MG lozenge 1 lozenge (has no administration in time range)   guaiFENesin (ROBITUSSIN) 20 mg/mL solution 200 mg (has no administration in time range)   potassium chloride meaghan ER (KLOR-CON M20) CR tablet 40 mEq (has no administration in time range)   magnesium sulfate 2 g in 50 mL sterile water intermittent infusion (2 g Intravenous $New Bag 3/8/25 2127)   sodium chloride 0.9% BOLUS 500 mL (500 mLs Intravenous $New Bag 3/8/25 2222)       NEW PRESCRIPTIONS STARTED AT TODAY'S ER VISIT  New Prescriptions    No medications on file       =================================================================    HPI    Patient information was obtained from: patient      Mary Corral is a 89 year old female with a pertinent history of chronic kidney disease, GI bleed who presents to this ED for evaluation of lightheadedness.  Patient reports she was sitting doing her taxes, leaned forward to pick something off the floor and fell out of her chair, no head injury, however was unable to get herself up.  She notes that she has been having some intermittent episodes of lightheadedness and felt weaker than usual over the last couple of days.  No chest pain, intermittent shortness of breath but she says she has a history of asthma.  No lower extremity swelling, since she did have some but that is improving.  Notes labile blood pressures since her hospital discharge    REVIEW OF SYSTEMS   Review of  Systems   All other systems reviewed and negative    PAST MEDICAL HISTORY:  Past Medical History:   Diagnosis Date    Allergic rhinitis     Chronic sinusitis     Conductive hearing loss     COPD (chronic obstructive pulmonary disease) (H)     CRI (chronic renal insufficiency)     mild    Gastroesophageal reflux disease     Hearing problem     Hypertension     Mediastinal mass     Nasal polyps     Pulmonary nodule     Seasonal allergies     Uncomplicated asthma        PAST SURGICAL HISTORY:  Past Surgical History:   Procedure Laterality Date    APPENDECTOMY      BIOPSY MASS NECK N/A 11/21/2016    Procedure: BIOPSY MASS NECK;  Surgeon: Lucia Quinteros MD;  Location: [facility] OR    CATARACT IOL, RT/LT Bilateral 2016    CV CORONARY ANGIOGRAM N/A 4/29/2024    Procedure: Coronary Angiogram;  Surgeon: Marino Lind MD;  Location: [facility]    CV LEFT HEART CATH N/A 4/29/2024    Procedure: Left Heart Catheterization;  Surgeon: Marino Lind MD;  Location: [facility] CV    CV PCI N/A 4/29/2024    Procedure: Percutaneous Coronary Intervention;  Surgeon: Marino Lind MD;  Location: [facility]    CV PCI ASPIRATION THROMECTOMY N/A 4/29/2024    Procedure: Percutaneous Coronary Intervention Aspiration Thrombectomy;  Surgeon: Marino Lind MD;  Location: [facility] CV    ENDOSCOPIC ULTRASOUND UPPER GASTROINTESTINAL TRACT (GI) N/A 11/21/2016    Procedure: ENDOSCOPIC ULTRASOUND, ESOPHAGOSCOPY / UPPER GASTROINTESTINAL TRACT (GI);  Surgeon: Lucia Quinteros MD;  Location:  OR    ESOPHAGOSCOPY, GASTROSCOPY, DUODENOSCOPY (EGD), COMBINED N/A 2/7/2025    Procedure: ESOPHAGOGASTRODUODENOSCOPY WITH CAUTERIZATION;  Surgeon: Taj Francis MD;  Location: [facility] OR    ESOPHAGOSCOPY, GASTROSCOPY, DUODENOSCOPY (EGD), DILATATION, COMBINED N/A 2/6/2023    Procedure: ESOPHAGOGASTRODUODENOSCOPY with esophageal dilitation;  Surgeon: Kodak Huerta MD, MD;  Location: [facility]    ROTATOR CUFF  REPAIR RT/LT Right 30 years ago    THYROIDECTOMY N/A 3/14/2017    Procedure: THYROIDECTOMY;  Surgeon: Misbah Julien MD;  Location: [facility] OR       CURRENT MEDICATIONS:    Current Facility-Administered Medications   Medication Dose Route Frequency Provider Last Rate Last Admin    [START ON 3/9/2025] acetaminophen (TYLENOL) tablet 975 mg  975 mg Oral TID Dennis Marroquin MD        benzocaine-menthol (CHLORASEPTIC) 6-10 MG lozenge 1 lozenge  1 lozenge Buccal Q1H PRN Dennis Marroquin MD        calcium carbonate (TUMS) chewable tablet 1,000 mg  1,000 mg Oral 4x Daily PRN Dennis Marroquin MD        guaiFENesin (ROBITUSSIN) 20 mg/mL solution 200 mg  200 mg Oral Q4H PRN Dennis Marroquin MD        lidocaine (LMX4) cream   Topical Q1H PRN Dennis Marroquin MD        lidocaine 1 % 0.1-1 mL  0.1-1 mL Other Q1H PRN Dennis Marroquin MD        melatonin tablet 1 mg  1 mg Oral At Bedtime PRN Dennis Marroquin MD        ondansetron (ZOFRAN ODT) ODT tab 4 mg  4 mg Oral Q6H PRN Dennis Marroquin MD        Or    ondansetron (ZOFRAN) injection 4 mg  4 mg Intravenous Q6H PRN Dennis Marroquin MD        potassium chloride meaghan ER (KLOR-CON M20) CR tablet 40 mEq  40 mEq Oral Once Dennis Marroquin MD        senna-docusate (SENOKOT-S/PERICOLACE) 8.6-50 MG per tablet 1 tablet  1 tablet Oral BID PRN Dennis Marroquin MD        Or    senna-docusate (SENOKOT-S/PERICOLACE) 8.6-50 MG per tablet 2 tablet  2 tablet Oral BID PRN Dennis Marroquin MD        sodium chloride (PF) 0.9% PF flush 3 mL  3 mL Intracatheter Q8H Dennis Marroquin MD        sodium chloride (PF) 0.9% PF flush 3 mL  3 mL Intracatheter q1 min prn Dennis Marroquin MD        sodium chloride 0.9 % infusion   Intravenous Continuous Dennis Marroquin MD         Current Outpatient Medications   Medication Sig Dispense Refill    acetaminophen (TYLENOL) 325 MG tablet Take 2 tablets (650 mg) by mouth every 4 hours as needed for other (surgical pain) 100 tablet 0    acetaminophen-codeine (TYLENOL #3) 300-30 MG  tablet Take 1 tablet by mouth every 6 hours as needed for severe pain (used when she has to walk long distances)      albuterol (2.5 MG/3ML) 0.083% nebulizer solution Take 1 vial by nebulization every 4 hours as needed for shortness of breath / dyspnea or wheezing      albuterol (PROAIR HFA/PROVENTIL HFA/VENTOLIN HFA) 108 (90 BASE) MCG/ACT Inhaler Inhale 2 puffs into the lungs every 4 hours as needed for shortness of breath      allopurinol (ZYLOPRIM) 100 MG tablet Take 100 mg by mouth 2 times daily as needed Gout flares      atorvastatin (LIPITOR) 40 MG tablet Take 1 tablet (40 mg) by mouth daily 90 tablet 3    carvedilol (COREG) 3.125 MG tablet Take 1 tablet (3.125 mg) by mouth 2 times daily (with meals). 60 tablet 1    cetirizine (ZYRTEC) 10 MG tablet Take 10 mg by mouth daily as needed for allergies      clopidogrel (PLAVIX) 75 MG tablet Take 1 tablet (75 mg) by mouth daily Dose to start tomorrow. 90 tablet 3    fluticasone-salmeterol (ADVAIR) 250-50 MCG/DOSE diskus inhaler Inhale 1 puff into the lungs daily      levothyroxine (SYNTHROID/LEVOTHROID) 50 MCG tablet Take 50 mcg by mouth daily      montelukast (SINGULAIR) 10 MG tablet Take 10 mg by mouth at bedtime      multivitamin w/minerals (THERA-VIT-M) tablet Take 1 tablet by mouth daily      pantoprazole (PROTONIX) 40 MG EC tablet Take 1 tablet (40 mg) by mouth 2 times daily. 180 tablet 0    polyethylene glycol (MIRALAX/GLYCOLAX) powder Take 17 g by mouth daily as needed for constipation      predniSONE (DELTASONE) 20 MG tablet Take 20 mg by mouth daily as needed (Self doses in winter for lung infections.)      valACYclovir (VALTREX) 1000 mg tablet Take 1,000 mg by mouth 2 times daily as needed (for one day)      Vitamin D3 (CHOLECALCIFEROL) 25 mcg (1000 units) tablet Take 1,000 Units by mouth daily         ALLERGIES:  Allergies   Allergen Reactions    Amoxicillin Hives    Keflex [Cephalexin] Hives    Lisinopril Shortness Of Breath    Penicillins Hives        FAMILY HISTORY:  Family History   Problem Relation Age of Onset    Esophageal Cancer Mother     Colon Cancer Mother     Mental Illness Mother     Dementia Mother     Unknown/Adopted Mother     Asthma Mother     Diabetes Mother     Cerebrovascular Disease Mother     Thyroid Disease Mother     Congenital Anomalies Mother     Bleeding Disorder Mother     Migraines Mother     Muscular Disorder Mother     Parkinsonism Father     Mental Illness Father     Unknown/Adopted Father     Asthma Father     Cancer Father     Hypertension Father     Cerebrovascular Disease Father     Thyroid Disease Father     Congenital Anomalies Father     Bleeding Disorder Father     Migraines Father     Muscular Disorder Father     Mental Illness Sister     Dementia Sister     Unknown/Adopted Sister     Asthma Sister     Cerebrovascular Disease Sister     Thyroid Disease Sister     Congenital Anomalies Sister     Bleeding Disorder Sister     Migraines Sister     Muscular Disorder Sister     Cancer Mother         esophageal       SOCIAL HISTORY:   Social History     Socioeconomic History    Marital status: Single   Tobacco Use    Smoking status: Never    Smokeless tobacco: Never    Tobacco comments:     smoked twice a week for a couple years while in college, less than 1/2 pack    Substance and Sexual Activity    Alcohol use: Yes     Alcohol/week: 0.0 standard drinks of alcohol     Comment: Alcoholic Drinks/day: occasional    Drug use: No    Sexual activity: Never     Social Drivers of Health     Financial Resource Strain: Low Risk  (2/17/2025)    Financial Resource Strain     Within the past 12 months, have you or your family members you live with been unable to get utilities (heat, electricity) when it was really needed?: No   Food Insecurity: Low Risk  (2/17/2025)    Food Insecurity     Within the past 12 months, did you worry that your food would run out before you got money to buy more?: No     Within the past 12 months, did the food  "you bought just not last and you didn t have money to get more?: No   Transportation Needs: Low Risk  (2/17/2025)    Transportation Needs     Within the past 12 months, has lack of transportation kept you from medical appointments, getting your medicines, non-medical meetings or appointments, work, or from getting things that you need?: No   Interpersonal Safety: Low Risk  (2/17/2025)    Interpersonal Safety     Do you feel physically and emotionally safe where you currently live?: Yes     Within the past 12 months, have you been hit, slapped, kicked or otherwise physically hurt by someone?: No     Within the past 12 months, have you been humiliated or emotionally abused in other ways by your partner or ex-partner?: No   Recent Concern: Interpersonal Safety - High Risk (2/6/2025)    Interpersonal Safety     Do you feel physically and emotionally safe where you currently live?: No     Within the past 12 months, have you been hit, slapped, kicked or otherwise physically hurt by someone?: No     Within the past 12 months, have you been humiliated or emotionally abused in other ways by your partner or ex-partner?: No   Housing Stability: Low Risk  (2/17/2025)    Housing Stability     Do you have housing? : Yes     Are you worried about losing your housing?: No       VITALS:  /61   Pulse 82   Temp 97.9  F (36.6  C) (Oral)   Resp 17   Ht 1.702 m (5' 7\")   Wt 82.6 kg (182 lb)   SpO2 93%   BMI 28.51 kg/m      PHYSICAL EXAM:  Physical Exam  Vitals and nursing note reviewed.   Constitutional:       Appearance: Normal appearance.   HENT:      Head: Normocephalic and atraumatic.      Right Ear: External ear normal.      Left Ear: External ear normal.      Nose: Nose normal.      Mouth/Throat:      Mouth: Mucous membranes are moist.   Eyes:      Extraocular Movements: Extraocular movements intact.      Conjunctiva/sclera: Conjunctivae normal.      Pupils: Pupils are equal, round, and reactive to light. "   Cardiovascular:      Rate and Rhythm: Normal rate and regular rhythm.   Pulmonary:      Effort: Pulmonary effort is normal.      Breath sounds: Normal breath sounds. No wheezing or rales.   Abdominal:      General: Abdomen is flat. There is no distension.      Palpations: Abdomen is soft.      Tenderness: There is no abdominal tenderness. There is no guarding.   Musculoskeletal:         General: Normal range of motion.      Cervical back: Normal range of motion and neck supple.      Right lower leg: No edema.      Left lower leg: No edema.   Lymphadenopathy:      Cervical: No cervical adenopathy.   Skin:     General: Skin is warm and dry.   Neurological:      General: No focal deficit present.      Mental Status: She is alert and oriented to person, place, and time. Mental status is at baseline.      Comments: No gross focal neurologic deficits   Psychiatric:         Mood and Affect: Mood normal.         Behavior: Behavior normal.         Thought Content: Thought content normal.          LAB:  All pertinent labs reviewed and interpreted.  Results for orders placed or performed during the hospital encounter of 03/08/25   XR Chest Port 1 View    Impression    IMPRESSION: Possible small left pleural effusion. Mild basilar atelectasis. Remaining lungs are clear. No pleural effusion or pneumothorax. Normal heart size.      Extra Blue Top Tube   Result Value Ref Range    Hold Specimen JIC    Extra Red Top Tube   Result Value Ref Range    Hold Specimen JIC    Extra Green Top (Lithium Heparin) Tube   Result Value Ref Range    Hold Specimen JIC    Extra Purple Top Tube   Result Value Ref Range    Hold Specimen JIC    Basic metabolic panel   Result Value Ref Range    Sodium 134 (L) 135 - 145 mmol/L    Potassium 3.4 3.4 - 5.3 mmol/L    Chloride 95 (L) 98 - 107 mmol/L    Carbon Dioxide (CO2) 22 22 - 29 mmol/L    Anion Gap 17 (H) 7 - 15 mmol/L    Urea Nitrogen 33.9 (H) 8.0 - 23.0 mg/dL    Creatinine 3.15 (H) 0.51 - 0.95 mg/dL     GFR Estimate 14 (L) >60 mL/min/1.73m2    Calcium 7.9 (L) 8.8 - 10.4 mg/dL    Glucose 118 (H) 70 - 99 mg/dL   Result Value Ref Range    Magnesium 1.4 (L) 1.7 - 2.3 mg/dL   Result Value Ref Range    Troponin T, High Sensitivity 50 (H) <=14 ng/L   Blood gas venous   Result Value Ref Range    pH Venous 7.38 7.32 - 7.43    pCO2 Venous 42 40 - 50 mm Hg    pO2 Venous 36 25 - 47 mm Hg    Bicarbonate Venous 25 21 - 28 mmol/L    Base Excess/Deficit Venous -0.4 -3.0 - 3.0 mmol/L    FIO2 21     Oxyhemoglobin Venous 62 (L) 70 - 75 %    O2 Sat, Venous 63.1 (L) 70.0 - 75.0 %   Nt probnp inpatient (BNP)   Result Value Ref Range    N terminal Pro BNP Inpatient 4,176 (H) 0 - 1,800 pg/mL   Influenza A/B, RSV and SARS-CoV2 PCR (COVID-19) Nasopharyngeal    Specimen: Nasopharyngeal; Swab   Result Value Ref Range    Influenza A PCR Negative Negative    Influenza B PCR Negative Negative    RSV PCR Negative Negative    SARS CoV2 PCR Negative Negative   CBC with platelets and differential   Result Value Ref Range    WBC Count 16.2 (H) 4.0 - 11.0 10e3/uL    RBC Count 3.00 (L) 3.80 - 5.20 10e6/uL    Hemoglobin 8.5 (L) 11.7 - 15.7 g/dL    Hematocrit 27.1 (L) 35.0 - 47.0 %    MCV 90 78 - 100 fL    MCH 28.3 26.5 - 33.0 pg    MCHC 31.4 (L) 31.5 - 36.5 g/dL    RDW 16.5 (H) 10.0 - 15.0 %    Platelet Count 308 150 - 450 10e3/uL    % Neutrophils 86 %    % Lymphocytes 6 %    % Monocytes 7 %    % Eosinophils 1 %    % Basophils 0 %    % Immature Granulocytes 0 %    NRBCs per 100 WBC 0 <1 /100    Absolute Neutrophils 13.9 (H) 1.6 - 8.3 10e3/uL    Absolute Lymphocytes 1.0 0.8 - 5.3 10e3/uL    Absolute Monocytes 1.1 0.0 - 1.3 10e3/uL    Absolute Eosinophils 0.1 0.0 - 0.7 10e3/uL    Absolute Basophils 0.0 0.0 - 0.2 10e3/uL    Absolute Immature Granulocytes 0.1 <=0.4 10e3/uL    Absolute NRBCs 0.0 10e3/uL       RADIOLOGY:  Reviewed all pertinent imaging. Please see official radiology report.  XR Chest Port 1 View   Final Result   IMPRESSION: Possible small  left pleural effusion. Mild basilar atelectasis. Remaining lungs are clear. No pleural effusion or pneumothorax. Normal heart size.              Jaime Resendiz M.D.  Emergency Medicine  [facility] EMERGENCY DEPARTMENT  [address] [place] [id number]  [phone]  Dept: [phone]       Jaime Resendiz MD  03/08/25 3053

## 2025-03-09 NOTE — PROGRESS NOTES
CT abdomen noted with multifocal colitis, stool studies already requested, will also get blood culture and lactic acid and start Levaquin Flagyl empirically, also will request GI consultation.   within functional limits

## 2025-03-09 NOTE — ED TRIAGE NOTES
Patient was discharged from the hospital week and has been receiving home care. Today she reached down to  something on the floor and fell. She called EMS to help her off the floor. She does take blood thinners but did not hit her head and does not report any pain from the fall. She then went to [facility] and was told she had low blood pressure. She reports multiple medications recently. Patient reports feeling weak and lightheaded. Patient has a bleeding ulcer in February and required blood transfusions. She denies bloody stools. Had labs checked at the clinic today and she stated her hemoglobin was greater then 9.    Triage Assessment (Adult)       Row Name 03/08/25 1924          Triage Assessment    Airway WDL WDL        Respiratory WDL    Respiratory WDL WDL        Cardiac WDL    Cardiac WDL X        Cognitive/Neuro/Behavioral WDL    Cognitive/Neuro/Behavioral WDL X     Level of Consciousness alert     Arousal Level opens eyes spontaneously     Orientation oriented x 4     Speech clear

## 2025-03-09 NOTE — PROGRESS NOTES
"   03/09/25 4305   Appointment Info   Signing Clinician's Name / Credentials (PT) ASHLEY Ken   Living Environment   People in Home alone   Current Living Arrangements house   Home Accessibility stairs to enter home   Number of Stairs, Main Entrance 3   Stair Railings, Main Entrance railings safe and in good condition   Number of Stairs, Within Home, Primary greater than 10 stairs   Stair Railings, Within Home, Primary railings safe and in good condition   Living Environment Comments Pt doesn't access basement anymore.   Self-Care   Usual Activity Tolerance good   Current Activity Tolerance moderate   Regular Exercise Yes   Activity/Exercise Type walking   Equipment Currently Used at Home cane, straight;walker, rolling   Fall history within last six months yes   Number of times patient has fallen within last six months 2   General Information   Onset of Illness/Injury or Date of Surgery 03/08/25   Referring Physician Dennis Marroquin MD   Patient/Family Therapy Goals Statement (PT) Go to TCU, get stronger   Pertinent History of Current Problem (include personal factors and/or comorbidities that impact the POC) \"88 y/o F presents today with lightheadedness and a fall.  Recently admitted for GI bleed and then readmitted with acute kidney injury.  Past medical history is significant for CKD stage III, asthma, CAD S/P PCI, GERD, hypertension.  Has had recurrent hospitalization in recent months.\" per chart   Existing Precautions/Restrictions fall   Cognition   Affect/Mental Status (Cognition) WFL   Cognitive Status Comments Pt drives, golfs regularly.   Pain Assessment   Patient Currently in Pain Yes, see Vital Sign flowsheet  (Stomach pain.)   Range of Motion (ROM)   Range of Motion ROM is WFL   Strength (Manual Muscle Testing)   Strength (Manual Muscle Testing) Deficits observed during functional mobility   Bed Mobility   Bed Mobility supine-sit;sit-supine   Supine-Sit [place] (Bed Mobility) minimum assist " (75% patient effort)   Sit-Supine [place] (Bed Mobility) minimum assist (75% patient effort)   Bed Mobility Limitations decreased ability to use arms for pushing/pulling;decreased ability to use legs for bridging/pushing   Impairments Contributing to Impaired Bed Mobility decreased strength   Assistive Device (Bed Mobility) bed rails   Transfers   Transfers sit-stand transfer   Impairments Contributing to Impaired Transfers impaired balance;decreased strength   Sit-Stand Transfer   Sit-Stand [place] (Transfers) contact guard   Assistive Device (Sit-Stand Transfers) walker, front-wheeled   Gait/Stairs (Locomotion)   [place] Level (Gait) contact guard   Assistive Device (Gait) walker, front-wheeled   Distance in Feet (Gait) 10   Pattern (Gait) step-through   Deviations/Abnormal Patterns (Gait) gait speed decreased;stride length decreased   Comment, (Gait/Stairs) Stairs not assessed due to safety.   Clinical Impression   Criteria for Skilled Therapeutic Intervention Yes, treatment indicated   PT Diagnosis (PT) Impaired functional mobility   Influenced by the following impairments Deconditioning, impaired balance, decreased strengtth   Functional limitations due to impairments Bed mobility, transfers, gait, stairs   Clinical Presentation (PT Evaluation Complexity) stable   Clinical Presentation Rationale Pt presents as medically diagnosed.   Clinical Decision Making (Complexity) low complexity   Planned Therapy Interventions (PT) balance training;bed mobility training;gait training;strengthening;stair training;transfer training   Risk & Benefits of therapy have been explained evaluation/treatment results reviewed;care plan/treatment goals reviewed;patient   PT Total Evaluation Time   PT Nany Low Complexity Minutes (95959) 9   Physical Therapy Goals   PT Frequency 5x/week   PT Predicted Duration/Target Date for Goal Attainment 03/16/25   PT Goals Transfers;Bed Mobility;Gait;Stairs   PT: Bed Mobility  "Independent;Supine to/from sit   PT: Transfers Modified independent;Sit to/from stand;Assistive device   PT: Gait Modified independent;Rolling walker;150 feet   PT: Stairs Supervision/stand-by assist;3 stairs;Rail on right   Interventions   Interventions Quick Adds Gait Training   Gait Training   Gait Training Minutes (81775) 10   Symptoms Noted During/After Treatment (Gait Training) shortness of breath;fatigue   Treatment Detail/Skilled Intervention Pt on 2L O2 throughout session. Pt ambulated with FWW using ', decreased gait speed compared to baseline. Pt expressed interest in TCU \"to get stronger\" with education provided to pt regarding TCU recommendation.   Distance in Feet 100   [place] Level (Gait Training) contact guard   Physical Assistance Level (Gait Training) supervision;verbal cues;1 person assist   Assistive Device (Gait Training) rolling walker   Pattern Analysis (Gait Training) swing-through gait   Gait Analysis Deviations decreased jono;decreased step length   Impairments (Gait Analysis/Training) balance impaired;strength decreased   PT Discharge Planning   PT Plan Progress ambulation with FWW, assess stairs as safe, LE strengthening   PT Discharge Recommendation (DC Rec) Transitional Care Facility   PT Rationale for DC Rec Pt requires Ax1 for mobility with closer contact due to fall risk. Requires TCU for continued strengthening prior to d/c to home.   PT Brief overview of current status Bed mobility Chris, transfers FWW CGA, amb CGA FWW   PT Total Distance Amb During Session (feet) 110   PT Equipment Needed at Discharge cane, straight;walker, rolling  (Pt owns 4WW.)   Physical Therapy Time and Intention   Timed Code Treatment Minutes 10   Total Session Time (sum of timed and untimed services) 19     "

## 2025-03-09 NOTE — MEDICATION SCRIBE - ADMISSION MEDICATION HISTORY
Medication Scribe Admission Medication History    Admission medication history is complete. The information provided in this note is only as accurate as the sources available at the time of the update.    Information Source(s): Patient via in-person    Pertinent Information: patient reports self management of medications.     Patient reports no longer taking: Tylenol #3    Changes made to PTA medication list:  Added: lasix  Deleted: Tylenol #3  Changed: Advair to daily PRN (per patient)    Allergies reviewed with patient and updates made in EHR: yes    Medication History Completed By: Jaime Puga 3/8/2025 10:38 PM    PTA Med List   Medication Sig Last Dose/Taking    acetaminophen (TYLENOL) 325 MG tablet Take 2 tablets (650 mg) by mouth every 4 hours as needed for other (surgical pain) Taking As Needed    albuterol (2.5 MG/3ML) 0.083% nebulizer solution Take 1 vial by nebulization every 4 hours as needed for shortness of breath / dyspnea or wheezing Taking As Needed    albuterol (PROAIR HFA/PROVENTIL HFA/VENTOLIN HFA) 108 (90 BASE) MCG/ACT Inhaler Inhale 2 puffs into the lungs every 4 hours as needed for shortness of breath Taking As Needed    allopurinol (ZYLOPRIM) 100 MG tablet Take 100 mg by mouth 2 times daily as needed Gout flares Taking As Needed    atorvastatin (LIPITOR) 40 MG tablet Take 1 tablet (40 mg) by mouth daily 3/8/2025 Morning    carvedilol (COREG) 3.125 MG tablet Take 1 tablet (3.125 mg) by mouth 2 times daily (with meals). 3/8/2025 Morning    cetirizine (ZYRTEC) 10 MG tablet Take 10 mg by mouth daily as needed for allergies Taking As Needed    clopidogrel (PLAVIX) 75 MG tablet Take 1 tablet (75 mg) by mouth daily Dose to start tomorrow. 3/8/2025 Morning    fluticasone-salmeterol (ADVAIR) 250-50 MCG/DOSE diskus inhaler Inhale 1 puff into the lungs daily as needed. Taking As Needed    furosemide (LASIX) 20 MG tablet Take 20 mg by mouth every other day. 3/7/2025 Morning    levothyroxine  (SYNTHROID/LEVOTHROID) 50 MCG tablet Take 50 mcg by mouth daily 3/8/2025 Morning    montelukast (SINGULAIR) 10 MG tablet Take 10 mg by mouth at bedtime 3/7/2025 Evening    multivitamin w/minerals (THERA-VIT-M) tablet Take 1 tablet by mouth every morning. 3/8/2025 Morning    pantoprazole (PROTONIX) 40 MG EC tablet Take 1 tablet (40 mg) by mouth 2 times daily. 3/8/2025 Morning    polyethylene glycol (MIRALAX/GLYCOLAX) powder Take 17 g by mouth daily as needed for constipation Taking As Needed    predniSONE (DELTASONE) 20 MG tablet Take 20 mg by mouth daily as needed (Self doses in winter for lung infections.) Taking As Needed    valACYclovir (VALTREX) 1000 mg tablet Take 1,000 mg by mouth 2 times daily as needed (for one day) Taking As Needed    Vitamin D3 (CHOLECALCIFEROL) 25 mcg (1000 units) tablet Take 1,000 Units by mouth every morning. 3/8/2025 Morning

## 2025-03-09 NOTE — PROGRESS NOTES
[facility]    Medicine Progress Note - Hospitalist Service    Date of Admission:  3/8/2025    Assessment & Plan   88 y/o F presents today with lightheadedness and a fall.  Recently admitted for GI bleed and then readmitted with acute kidney injury.  Past medical history is significant for CKD stage III, asthma, CAD S/P PCI, GERD, hypertension.  Has had recurrent hospitalization in recent months.     Acute kidney injury superimposed on stage 3b chronic kidney disease (H)  -Possibly prerenal from recent Lasix and volume loss due to  GI losses   -Hold home Lasix and monitor and adjust volumes based on patient progression.  -CKD at baseline  - Monitor trend  - Avoid nephrotoxic meds  - Cr 3.07-->2.82  - nephrology consulted       Generalized weakness  Multifocal colitis  C. difficile colitis  UTI  Will check TSH and CK and PT OT evaluation after hydration.  CT abdomen findings are consistent with nonspecific multifocal colitis.  UA result is also consistent with infection  C. difficile antigen and toxin are detected  Started on levofloxacin and metronidazole  Started on vancomycin 125 mg oral 4 times daily  GI consulted     Hypomagnesemia  Replace per protocol     chronic anemia- anemia of chronic disease with kidney problems stage IIIb and chronic blood loss anemia  -hx recent GI bleed, addressed on admission 2/5- 2/13, resolved now.  -Hgb stable. No e/o active bleeding.  Monitor     CAD s/p stents with possible acute/or chronic diastolic heart failure  demand ischemia from CARLYLE  -Cont home Plavix, statin. Decreased home Coreg dose a bit given BPs  and avoid hypotension to support kidneys  -Recently taken off of ASA due GIB  Recent echo echo showed some diastolic dysfunction and taken of aspirin as well but maintained on Plavix,  serial troponins are flat likely due to CARLYLE, and no active chest pain or shortness of breath more than usual, no leg swellings per se-The left ventricle is normal in  "size. There is normal left ventricular wall  thickness.  Hyperdynamic left ventricular function The visual ejection fraction is 65-70%.  No regional wall motion abnormalities noted.  Grade I or early diastolic dysfunction.     GERD.    -last admission had EGD showed gastric ulcer with visible vessel  -continue PPI BID  -no s/s recurrent bleeding  -outpatient GI followup for repeat EGD in 1 months  -continue regular diet ad ga     Asthma , Chronic Obstructive Pulmonary Disease   -home Advair, Singulair  -Continue outpatient follow-up     Hypothyroidism, home Synthroid                Diet: Combination Diet Regular Diet Adult    DVT Prophylaxis: Pneumatic Compression Devices  Russo Catheter: Not present  Lines: None     Cardiac Monitoring: None  Code Status: Full Code      Clinically Significant Risk Factors Present on Admission         # Hyponatremia: Lowest Na = 134 mmol/L in last 2 days, will monitor as appropriate  # Hypochloremia: Lowest Cl = 95 mmol/L in last 2 days, will monitor as appropriate    # Hypomagnesemia: Lowest Mg = 1.4 mg/dL in last 2 days, will replace as needed   # Hypoalbuminemia: Lowest albumin = 2.9 g/dL at 3/9/2025  7:13 AM, will monitor as appropriate   # Drug Induced Platelet Defect: home medication list includes an antiplatelet medication    # Acute Kidney Injury, unspecified: based on a >150% or 0.3 mg/dL increase in last creatinine compared to past 90 day average, will monitor renal function  # Hypertension: Noted on problem list  # Chronic heart failure with preserved ejection fraction: heart failure noted on problem list and last echo with EF >50%     # Anemia: based on hgb <11       # Overweight: Estimated body mass index is 28.51 kg/m  as calculated from the following:    Height as of this encounter: 1.702 m (5' 7\").    Weight as of this encounter: 82.6 kg (182 lb).         # Financial/Environmental Concerns: none  # Asthma: noted on problem list        Social Drivers of Health  "   Interpersonal Safety: Low Risk  (2/17/2025)    Interpersonal Safety     Do you feel physically and emotionally safe where you currently live?: Yes     Within the past 12 months, have you been hit, slapped, kicked or otherwise physically hurt by someone?: No     Within the past 12 months, have you been humiliated or emotionally abused in other ways by your partner or ex-partner?: No   Recent Concern: Interpersonal Safety - High Risk (2/6/2025)    Interpersonal Safety     Do you feel physically and emotionally safe where you currently live?: No     Within the past 12 months, have you been hit, slapped, kicked or otherwise physically hurt by someone?: No     Within the past 12 months, have you been humiliated or emotionally abused in other ways by your partner or ex-partner?: No          Disposition Plan     Medically Ready for Discharge: Anticipated in 2-4 Days             Deniz Fine MD  Hospitalist Service  [facility]  Securely message with [employer] (more info)  Text page via [facility] Paging/Directory   ______________________________________________________________________    Interval History   Boarding in ER awaiting bed assignment.  Patient has been getting recurrent admissions into the hospital in recent days.  She came for having recurrent diarrhea and worsening of kidney function.  Lab results discussed with the patient.    Physical Exam   Vital Signs: Temp: 97.7  F (36.5  C) Temp src: Oral BP: 127/63 Pulse: 78   Resp: 22 SpO2: 93 % O2 Device: Nasal cannula Oxygen Delivery: 2 LPM  Weight: 182 lbs 0 oz    General Appearance: No distress noted  Respiratory: Good air entry bilaterally  Cardiovascular: S1 and S2 heard, no murmur or gallop  GI: Soft abdomen, no tenderness, normoactive bowel sounds  Skin: Intact and warm       Medical Decision Making       40 MINUTES SPENT BY ME on the date of service doing chart review, history, exam, documentation & further activities per the note.       Data

## 2025-03-09 NOTE — H&P
[facility]    History and Physical - Hospitalist Service       Date of Admission:  3/8/2025    Assessment & Plan      Mary Corral is a 89 year old female admitted on 3/8/2025.     Principal Problem:    Acute kidney injury superimposed on stage 3b chronic kidney disease (H)  Active Problems:    Gastroesophageal reflux disease    COPD (chronic obstructive pulmonary disease) (H)    S/P thyroidectomy    Generalized weakness    Pleural effusion    History of coronary angioplasty with insertion of stent    Acute on chronic diastolic congestive heart failure (H)    Hyponatremia    Hypomagnesemia    Anemia of chronic kidney failure, stage 3 (moderate) (H)    89 y/o F presents today with lightheadedness and a fall.  Recently admitted for GI bleed and then readmitted with acute kidney injury.  Her Lasix has been increased recently.  She is also reports loose bowel movement 2-3 a day no abdominal pain but some discomfort.  Today was sitting on her walker doing her taxes, leaned forward and fell.  Not syncopal episode or chest pain.  Was unable to get up off the floor and so called a neighbor.  She has had intermittent lightheadedness today, also notes that her blood pressure has been quite labile recently.  Denies chest pain, intermittent shortness of breath of the last couple of days but notes history of asthma-  Not hypoxic and is not unusual for her to have this minimal shortness of breath does report deconditioning.  Denies nausea, vomiting, diarrhea although has had some looser stools, no dark stools.  No urinary symptoms.  On exam here patient is vitally stable, no abdominal tenderness, lungs are clear, heart is regular.    - leukocytosis, anemia which is stable.  Her hemoglobin was 7.5 last time is 8.7 which can still be between 7-8 placed in context of hemoconcentration with hypovolemic hyponatremia hypokalemia hypomagnesemia with possible volume loss- from GI loss history of diarrhea and  diuretic mediated urinary loss-will get a UA and urine sample and gentle fluids her BNP is also high but clinically appears hypovolemic     -Pro-Ravi normal CRP very high, Previous CT abdomen-Stable mild prominence to the right and left renal pelvis without significant calyceal dilatation.  Pending UA may be having some UTI-.  She could also have advanced degeneration arthritis and may need prednisone if infectious etiologies are ruled out.  Previous CT chest last month was fairly benign this time she has a small pleural effusion and.  Given complex study with significant elevated CRP will follow-up with repeat CT chest abdomen pelvis tonight without contrast.    - magnesium 1.4 which will be replaced intravenously, elevated BNP, troponin 50 which is elevated for the patient.  Creatinine 3.15 which is significantly worse than baseline for the patient-  Baseline stage IIIb with GFR in 30s now acute decompensation.  Chest x-ray independently interpreted by me with question of small effusion, no other acute findings.  Discussed with the patient she would like to be okay with starting CPR but does not want to prolong it.  Recent echo echo showed some diastolic dysfunction and taken of aspirin as well but maintained on Plavix,  serial troponins are flat likely due to CARLYLE, and no active chest pain or shortness of breath more than usual, no leg swellings per se-The left ventricle is normal in size. There is normal left ventricular wall  thickness.  Hyperdynamic left ventricular function The visual ejection fraction is 65-70%.  No regional wall motion abnormalities noted.  Grade I or early diastolic dysfunction.      Acute kidney injury superimposed on stage 3b chronic kidney disease (H)  -Possibly prerenal from recent Lasix and volume or GI losses will check stool samples, get urine studies and further workup based on patient progression and response to hydration  -  Hold home Lasix and monitor and adjust volumes based on  patient progression.  -She may need other antihypertensives will also consult nephrology again for tomorrow.-  -CKD at baseline Likely due to age, HTN combination.  - Monitor trend  - Avoid nephrotoxic meds        Generalized weakness  -Will check TSH and CK and PT OT evaluation after hydration.  Pro-Ravi normal CRP very high, Previous CT abdomen-Stable mild prominence to the right and left renal pelvis without significant calyceal dilatation.  Pending UA may be having some UTI-.  She could also have advanced degeneration arthritis and may need prednisone if infectious etiologies are ruled out.  Previous CT chest last month was fairly benign this time she has a small pleural effusion and.  Given complex study with significant elevated CRP will follow-up with repeat CT chest abdomen pelvis tonight without contrast.        Hyponatremia  -Clinically appears hypovolemic hydrate and monitor  Recheck TSH urine        Hypomagnesemia  -Repleted potassium also 3.4 we will give that as well    #With chronic anemia- anemia of chronic disease with kidney problems stage IIIb and chronic blood loss anemia  -hx recent GI bleed, addressed on admission 2/5- 2/13, resolved now.  -Hgb stable. No e/o active bleeding.  Monitor       #CAD s/p stents with possible acute/or chronic diastolic heart failure  -demand ischemia from CARLYLE  -Cont home Plavix, statin. Decreased home Coreg dose a bit given BPs  and avoid hypotension to support kidneys  -Recently taken off of ASA due GIB  Recent echo echo showed some diastolic dysfunction and taken of aspirin as well but maintained on Plavix,  serial troponins are flat likely due to CARLYLE, and no active chest pain or shortness of breath more than usual, no leg swellings per se-The left ventricle is normal in size. There is normal left ventricular wall  thickness.  Hyperdynamic left ventricular function The visual ejection fraction is 65-70%.  No regional wall motion abnormalities noted.  Grade I or  early diastolic dysfunction.     #GERD.  -Continue home medications  -last admission had EGD showed gastric ulcer with visible vessel  -continue PPI BID  -no s/s recurrent bleeding  -outpatient GI followup for repeat EGD in 1 months  -continue regular diet ad ga     #Asthma , Chronic Obstructive Pulmonary Disease   -home Advair, Singulair  -Continue outpatient follow-up     #Hypothyroidism, home Synthroid      Initial orders were placed.  Request consultation to OT/OPT-appreciated assistance and recommendations. .  Anticipated length of stay of more than 2 midnights of hospitalization depending on progression, planning,findings,further testing or treatment needs. Services are medically necessary for evaluation and treatment of the acute & on chronic superimposed conditions with the treatment plan as outlined above.  Current problem list also has been updated.          Diet: Combination Diet Regular Diet Adult    DVT Prophylaxis: Pneumatic Compression Devices  Russo Catheter: Not present  Lines: None     Cardiac Monitoring: None  Code Status: Full Code      Clinically Significant Risk Factors Present on Admission         # Hyponatremia: Lowest Na = 134 mmol/L in last 2 days, will monitor as appropriate  # Hypochloremia: Lowest Cl = 95 mmol/L in last 2 days, will monitor as appropriate    # Hypomagnesemia: Lowest Mg = 1.4 mg/dL in last 2 days, will replace as needed     # Drug Induced Platelet Defect: home medication list includes an antiplatelet medication    # Acute Kidney Injury, unspecified: based on a >150% or 0.3 mg/dL increase in last creatinine compared to past 90 day average, will monitor renal function  # Hypertension: Noted on problem list  # Chronic heart failure with preserved ejection fraction: heart failure noted on problem list and last echo with EF >50%     # Anemia: based on hgb <11       # Overweight: Estimated body mass index is 28.51 kg/m  as calculated from the following:    Height as of this  "encounter: 1.702 m (5' 7\").    Weight as of this encounter: 82.6 kg (182 lb).         # Financial/Environmental Concerns:    # Asthma: noted on problem list        Disposition Plan     Medically Ready for Discharge: Anticipated in 2-4 Days           Dennis Marroquin MD  Hospitalist Service  [facility]  Securely message with [employer] (more info)  Text page via [employer] Paging/Directory     ______________________________________________________________________    Chief Complaint   wEAKNESS    History is obtained from the patient, electronic health record, and emergency department physician    History of Present Illness   Mary Corral is a 89 year old female with a pertinent history of chronic kidney disease, GI bleed who presents to this ED for evaluation of lightheadedness.  Patient reports she was sitting doing her taxes, leaned forward to pick something off the floor and fell out of her chair, no head injury, however was unable to get herself up.  She notes that she has been having some intermittent episodes of lightheadedness and felt weaker than usual over the last couple of days.  No chest pain, intermittent shortness of breath but she says she has a history of asthma.  No lower extremity swelling, since she did have some but that is improving.  Notes labile blood pressures since her hospital discharge       Past Medical History    Past Medical History:   Diagnosis Date    Allergic rhinitis     Chronic sinusitis     Conductive hearing loss     COPD (chronic obstructive pulmonary disease) (H)     CRI (chronic renal insufficiency)     mild    Gastroesophageal reflux disease     Hearing problem     Hypertension     Mediastinal mass     Nasal polyps     Pulmonary nodule     Seasonal allergies     Uncomplicated asthma        Past Surgical History   Past Surgical History:   Procedure Laterality Date    APPENDECTOMY      BIOPSY MASS NECK N/A 11/21/2016    Procedure: BIOPSY MASS NECK;  Surgeon: Aldair" Lucia MARTINES MD;  Location: [facility] OR    CATARACT IOL, RT/LT Bilateral 2016    CV CORONARY ANGIOGRAM N/A 4/29/2024    Procedure: Coronary Angiogram;  Surgeon: Marino Lind MD;  Location: [facility] CV    CV LEFT HEART CATH N/A 4/29/2024    Procedure: Left Heart Catheterization;  Surgeon: Marino Lind MD;  Location: [facility] CV    CV PCI N/A 4/29/2024    Procedure: Percutaneous Coronary Intervention;  Surgeon: Marino Lind MD;  Location: [facility] CV    CV PCI ASPIRATION THROMECTOMY N/A 4/29/2024    Procedure: Percutaneous Coronary Intervention Aspiration Thrombectomy;  Surgeon: Marino Lind MD;  Location: [facility] CV    ENDOSCOPIC ULTRASOUND UPPER GASTROINTESTINAL TRACT (GI) N/A 11/21/2016    Procedure: ENDOSCOPIC ULTRASOUND, ESOPHAGOSCOPY / UPPER GASTROINTESTINAL TRACT (GI);  Surgeon: Lucia Quinteros MD;  Location: [facility] OR    ESOPHAGOSCOPY, GASTROSCOPY, DUODENOSCOPY (EGD), COMBINED N/A 2/7/2025    Procedure: ESOPHAGOGASTRODUODENOSCOPY WITH CAUTERIZATION;  Surgeon: Taj Francis MD;  Location: [facility] OR    ESOPHAGOSCOPY, GASTROSCOPY, DUODENOSCOPY (EGD), DILATATION, COMBINED N/A 2/6/2023    Procedure: ESOPHAGOGASTRODUODENOSCOPY with esophageal dilitation;  Surgeon: Kodak Huerta MD, MD;  Location: [facility] GI    ROTATOR CUFF REPAIR RT/LT Right 30 years ago    THYROIDECTOMY N/A 3/14/2017    Procedure: THYROIDECTOMY;  Surgeon: Misbah Julien MD;  Location: [facility] OR       Prior to Admission Medications   Prior to Admission Medications   Prescriptions Last Dose Informant Patient Reported? Taking?   Vitamin D3 (CHOLECALCIFEROL) 25 mcg (1000 units) tablet 3/8/2025 Morning  Yes Yes   Sig: Take 1,000 Units by mouth every morning.   acetaminophen (TYLENOL) 325 MG tablet   No Yes   Sig: Take 2 tablets (650 mg) by mouth every 4 hours as needed for other (surgical pain)   albuterol (2.5 MG/3ML) 0.083% nebulizer solution   Yes Yes   Sig: Take 1 vial by nebulization  every 4 hours as needed for shortness of breath / dyspnea or wheezing   albuterol (PROAIR HFA/PROVENTIL HFA/VENTOLIN HFA) 108 (90 BASE) MCG/ACT Inhaler   Yes Yes   Sig: Inhale 2 puffs into the lungs every 4 hours as needed for shortness of breath   allopurinol (ZYLOPRIM) 100 MG tablet   Yes Yes   Sig: Take 100 mg by mouth 2 times daily as needed Gout flares   atorvastatin (LIPITOR) 40 MG tablet 3/8/2025 Morning  No Yes   Sig: Take 1 tablet (40 mg) by mouth daily   carvedilol (COREG) 3.125 MG tablet 3/8/2025 Morning  No Yes   Sig: Take 1 tablet (3.125 mg) by mouth 2 times daily (with meals).   cetirizine (ZYRTEC) 10 MG tablet   Yes Yes   Sig: Take 10 mg by mouth daily as needed for allergies   clopidogrel (PLAVIX) 75 MG tablet 3/8/2025 Morning  No Yes   Sig: Take 1 tablet (75 mg) by mouth daily Dose to start tomorrow.   fluticasone-salmeterol (ADVAIR) 250-50 MCG/DOSE diskus inhaler   Yes Yes   Sig: Inhale 1 puff into the lungs daily as needed.   furosemide (LASIX) 20 MG tablet 3/7/2025 Morning  Yes Yes   Sig: Take 20 mg by mouth every other day.   levothyroxine (SYNTHROID/LEVOTHROID) 50 MCG tablet 3/8/2025 Morning  Yes Yes   Sig: Take 50 mcg by mouth daily   montelukast (SINGULAIR) 10 MG tablet 3/7/2025 Evening  Yes Yes   Sig: Take 10 mg by mouth at bedtime   multivitamin w/minerals (THERA-VIT-M) tablet 3/8/2025 Morning  Yes Yes   Sig: Take 1 tablet by mouth every morning.   pantoprazole (PROTONIX) 40 MG EC tablet 3/8/2025 Morning  No Yes   Sig: Take 1 tablet (40 mg) by mouth 2 times daily.   polyethylene glycol (MIRALAX/GLYCOLAX) powder   Yes Yes   Sig: Take 17 g by mouth daily as needed for constipation   predniSONE (DELTASONE) 20 MG tablet   Yes Yes   Sig: Take 20 mg by mouth daily as needed (Self doses in winter for lung infections.)   valACYclovir (VALTREX) 1000 mg tablet   Yes Yes   Sig: Take 1,000 mg by mouth 2 times daily as needed (for one day)      Facility-Administered Medications: None        Review of  Systems    Denies any active chest pain shortness of breathThe 5 point Review of Systems is negative other than noted in the HPI or here.       Social History   I have reviewed this patient's social history and updated it with pertinent information if needed.  Social History     Tobacco Use    Smoking status: Never    Smokeless tobacco: Never    Tobacco comments:     smoked twice a week for a couple years while in college, less than 1/2 pack    Substance Use Topics    Alcohol use: Yes     Alcohol/week: 0.0 standard drinks of alcohol     Comment: Alcoholic Drinks/day: occasional    Drug use: No         Family History   I have reviewed this patient's family history and updated it with pertinent information if needed.  Family History   Problem Relation Age of Onset    Esophageal Cancer Mother     Colon Cancer Mother     Mental Illness Mother     Dementia Mother     Unknown/Adopted Mother     Asthma Mother     Diabetes Mother     Cerebrovascular Disease Mother     Thyroid Disease Mother     Congenital Anomalies Mother     Bleeding Disorder Mother     Migraines Mother     Muscular Disorder Mother     Parkinsonism Father     Mental Illness Father     Unknown/Adopted Father     Asthma Father     Cancer Father     Hypertension Father     Cerebrovascular Disease Father     Thyroid Disease Father     Congenital Anomalies Father     Bleeding Disorder Father     Migraines Father     Muscular Disorder Father     Mental Illness Sister     Dementia Sister     Unknown/Adopted Sister     Asthma Sister     Cerebrovascular Disease Sister     Thyroid Disease Sister     Congenital Anomalies Sister     Bleeding Disorder Sister     Migraines Sister     Muscular Disorder Sister     Cancer Mother         esophageal         Allergies   Allergies   Allergen Reactions    Amoxicillin Hives    Keflex [Cephalexin] Hives    Lisinopril Shortness Of Breath    Penicillins Hives        Physical Exam   Vital Signs: Temp: 97.9  F (36.6  C) Temp src:  Oral BP: 121/57 Pulse: 80   Resp: 18 SpO2: 93 %      Weight: 182 lbs 0 oz    Oriented vital stable no active distress but clinically no wheezing-dehydrated appearing  Alert awake  Vision Baseline  Neck supple  Oral mucosa moist  bilateral air entry heard,  S1-S2 normal  Abdomen is soft no tenderness  Extremities -trace visible swelling noted  No skin cyanosis  Neurologically- no new Gross deficits from baseline-Moving all 4 extremities  Psych-mood okay and appropriate to circumstances      Medical Decision Making       75 MINUTES SPENT BY ME on the date of service doing chart review, history, exam, documentation & further activities per the note.      Data     I have personally reviewed the following data over the past 24 hrs:    16.2 (H)  \   8.5 (L)   / 308     134 (L) 95 (L) 33.9 (H) /  118 (H)   3.4 22 3.15 (H) \     Trop: 45 (H) BNP: 4,176 (H)     TSH: 1.30 T4: N/A A1C: 5.7 (H)     Procal: 0.36 CRP: 195.40 (H) Lactic Acid: N/A         Imaging results reviewed over the past 24 hrs:   Recent Results (from the past 24 hours)   XR Chest Port 1 View    Narrative    EXAM: XR CHEST PORT 1 VIEW  LOCATION: [facility]  DATE: 3/8/2025    INDICATION: Weakness.  COMPARISON: CT chest 2/5/2025.      Impression    IMPRESSION: Possible small left pleural effusion. Mild basilar atelectasis. Remaining lungs are clear. No pleural effusion or pneumothorax. Normal heart size.

## 2025-03-09 NOTE — PROGRESS NOTES
OT Evaluation     03/09/25 1400   Appointment Info   Signing Clinician's Name / Credentials (OT) Brayden Flores, OTR/L   Living Environment   People in Home alone   Current Living Arrangements house   Home Accessibility stairs to enter home   Number of Stairs, Main Entrance 3   Stair Railings, Main Entrance railings safe and in good condition   Number of Stairs, Within Home, Primary greater than 10 stairs  (to basement)   Stair Railings, Within Home, Primary railings safe and in good condition   Living Environment Comments Bedroom and bathroom on main level, laundry in basement (which patient endorses she does not access). Bathroom setup includes: tub/shower combo with grab bars and shower chair, standard toilet with grab bars.   Self-Care   Usual Activity Tolerance good   Current Activity Tolerance moderate   Regular Exercise Yes   Activity/Exercise Type walking   Exercise Amount/Frequency daily   Equipment Currently Used at Home cane, straight;walker, rolling;grab bar, toilet;grab bar, tub/shower;shower chair   Fall history within last six months yes   Number of times patient has fallen within last six months 2   Activity/Exercise/Self-Care Comment Patient endorses IND with ADLs at baseline, endorses she started to use a cane around her home.   Instrumental Activities of Daily Living (IADL)   IADL Comments Patient IND with IADLs at baseline; patient drives.   General Information   Onset of Illness/Injury or Date of Surgery 03/08/25   Referring Physician Deniz Fine MD   Patient/Family Therapy Goal Statement (OT) To discharge to TCU to regain strength   Additional Occupational Profile Info/Pertinent History of Current Problem 88 y/o F presents today with lightheadedness and a fall.  Recently admitted for GI bleed and then readmitted with acute kidney injury.  Past medical history is significant for CKD stage III, asthma, CAD S/P PCI, GERD, hypertension.  Has had recurrent hospitalization in recent  months.   Existing Precautions/Restrictions fall   Cognitive Status Examination   Orientation Status orientation to person, place and time   Cognitive Status Comments No overt concerns. Will monitor and assess as appropriate   Visual Perception   Visual Impairment/Limitations corrective lenses full-time   Sensory   Sensory Quick Adds sensation intact   Pain Assessment   Patient Currently in Pain No   Posture   Posture not impaired   Range of Motion Comprehensive   General Range of Motion bilateral upper extremity ROM WNL   Strength Comprehensive (MMT)   Comment, General Manual Muscle Testing (MMT) Assessment Generalized weakness   Muscle Tone Assessment   Muscle Tone Quick Adds No deficits were identified   Coordination   Upper Extremity Coordination No deficits were identified   Bed Mobility   Bed Mobility supine-sit;sit-supine   Supine-Sit [place] (Bed Mobility) supervision   Sit-Supine [place] (Bed Mobility) supervision   Assistive Device (Bed Mobility) bed rails   Transfers   Transfers sit-stand transfer   Sit-Stand Transfer   Sit-Stand [place] (Transfers) supervision;contact guard   Assistive Device (Sit-Stand Transfers) walker, front-wheeled   Balance   Balance Assessment sitting dynamic balance;sit to stand dynamic balance;standing static balance   Sitting Balance: Dynamic supervision   Position, Sitting Balance unsupported;sitting edge of bed   Sit-to-Stand Balance supervision;contact guard   Standing Balance: Static supervision;contact guard   Position/Device Used, Standing Balance supported;walker, front-wheeled   Activities of Daily Living   BADL Assessment/Intervention lower body dressing   Lower Body Dressing Assessment/Training   Position (Lower Body Dressing) edge of bed sitting   [place] Level (Lower Body Dressing) don;doff;socks;independent   Clinical Impression   Criteria for Skilled Therapeutic Interventions Met (OT) Yes, treatment indicated   OT Diagnosis Decreased IND with  ADLs, transfers, mobility, and home management   OT Problem List-Impairments impacting ADL problems related to;activity tolerance impaired;balance;mobility;strength   Assessment of Occupational Performance 3-5 Performance Deficits   Identified Performance Deficits Bathing, Toileting, Transfers, Mobility, Home Management   Planned Therapy Interventions (OT) ADL retraining;balance training;strengthening;transfer training;home program guidelines;progressive activity/exercise;risk factor education   Clinical Decision Making Complexity (OT) detailed assessment/moderate complexity   Risk & Benefits of therapy have been explained care plan/treatment goals reviewed;participants voiced agreement with care plan;participants included;patient   OT Total Evaluation Time   OT Eval, Moderate Complexity Minutes (51266) 8   OT Goals   Therapy Frequency (OT) 5 times/week   OT Predicted Duration/Target Date for Goal Attainment 03/14/25   OT Goals Hygiene/Grooming;Lower Body Dressing;Toilet Transfer/Toileting;Transfers   OT: Hygiene/Grooming supervision/stand-by assist;while standing   OT: Lower Body Dressing Supervision/stand-by assist   OT: Transfer Supervision/stand-by assist  (to/from Tub/Shower with use of grab bars)   OT: Toilet Transfer/Toileting Supervision/stand-by assist   OT: Perform aerobic activity with stable cardiovascular response continuous activity;10 minutes   Interventions   Interventions Quick Adds Self-Care/Home Management   Self-Care/Home Management   Self-Care/Home Mgmt/ADL, Compensatory, Meal Prep Minutes (35819) 18   Symptoms Noted During/After Treatment (Meal Preparation/Planning Training) fatigue   Treatment Detail/Skilled Intervention Patient supine when approached, agreeable to OT eval and treat. Eval completed and treat initiated. Facilitated bed mobility by providing SBA for increased safety; however, patient is able to peform under her own power. Patient participated in aspects of LB dressing (socks)  and demonstrated doffing/donning process IND'ly. Facilitated transfers (to/from EOB) by providing SBA-CGA for increased safety; patient utilized FWW for increased steadying. Facilitated ambulation of household distances as to progress safety with mobility related ADL's. No rest breaks required, no losses of balance noted, and minimal shortness of breath noted. Patient ambulated ~60 ft. Session ended with patient supine in bed with call light on and bed alarm active and all questions answered.   OT Discharge Planning   OT Plan Next session: Progress transfers as able, LB dressing w/ brief/pants (as able), progress mobility as able, standing grooming, toileting process   OT Discharge Recommendation (DC Rec) Transitional Care Facility   OT Rationale for DC Rec Patient remains below baseline and would benefit from continued skilled OT services to progress self-care skills and to optimize safe return to daily activity.   OT Brief overview of current status SBA-CGA with ADLs, transfers, and mobility   OT Total Distance Amb During Session (feet) 60   Total Session Time   Timed Code Treatment Minutes 18   Total Session Time (sum of timed and untimed services) 26

## 2025-03-09 NOTE — PLAN OF CARE
Problem: Delirium  Goal: Improved Attention and Thought Clarity  Intervention: Maximize Cognitive Function  Recent Flowsheet Documentation  Taken 3/8/2025 2345 by Brenda Stock RN  Sensory Stimulation Regulation:   care clustered   quiet environment promoted  Reorientation Measures:   clock in view   calendar in view     Problem: Delirium  Goal: Improved Behavioral Control  Intervention: Prevent and Manage Agitation  Recent Flowsheet Documentation  Taken 3/8/2025 2345 by Brenda Stock RN  Environment Familiarity/Consistency: daily routine followed  Intervention: Minimize Safety Risk  Recent Flowsheet Documentation  Taken 3/8/2025 2345 by Brenda Stock, RN  Enhanced Safety Measures:   review medications for side effects with activity   pain management   patient/family teach back on injury risk  Trust Relationship/Rapport: care explained   Goal Outcome Evaluation:        Patient is alert and oriented X4;  denied pain, no nausea, vomiting, on RA.   Up to the toilet SBA with cane. Schedule Abx infused this shift; NS running at 100 ml/ hr.    Patient urine and stool sample were collected and sent to the lab.    Patient is placed on enteric, special precaution due to C-difficile.    Make needs known. Care plan ongoing.      Brenda VINES RN.

## 2025-03-10 LAB
ALBUMIN SERPL BCG-MCNC: 2.5 G/DL (ref 3.5–5.2)
ANA SER QL IF: NEGATIVE
ANION GAP SERPL CALCULATED.3IONS-SCNC: 8 MMOL/L (ref 7–15)
BUN SERPL-MCNC: 30.8 MG/DL (ref 8–23)
CALCIUM SERPL-MCNC: 7.8 MG/DL (ref 8.8–10.4)
CHLORIDE SERPL-SCNC: 105 MMOL/L (ref 98–107)
CREAT SERPL-MCNC: 2.24 MG/DL (ref 0.51–0.95)
EGFRCR SERPLBLD CKD-EPI 2021: 21 ML/MIN/1.73M2
ERYTHROCYTE [DISTWIDTH] IN BLOOD BY AUTOMATED COUNT: 16.4 % (ref 10–15)
FERRITIN SERPL-MCNC: 538 NG/ML (ref 11–328)
GLUCOSE BLDC GLUCOMTR-MCNC: 135 MG/DL (ref 70–99)
GLUCOSE SERPL-MCNC: 114 MG/DL (ref 70–99)
HCO3 SERPL-SCNC: 24 MMOL/L (ref 22–29)
HCT VFR BLD AUTO: 24.1 % (ref 35–47)
HGB BLD-MCNC: 7.6 G/DL (ref 11.7–15.7)
IRON BINDING CAPACITY (ROCHE): 96 UG/DL (ref 240–430)
IRON SATN MFR SERPL: 18 % (ref 15–46)
IRON SERPL-MCNC: 17 UG/DL (ref 37–145)
MCH RBC QN AUTO: 28.5 PG (ref 26.5–33)
MCHC RBC AUTO-ENTMCNC: 31.5 G/DL (ref 31.5–36.5)
MCV RBC AUTO: 90 FL (ref 78–100)
PHOSPHATE SERPL-MCNC: 3.5 MG/DL (ref 2.5–4.5)
PLATELET # BLD AUTO: 210 10E3/UL (ref 150–450)
POTASSIUM SERPL-SCNC: 3.7 MMOL/L (ref 3.4–5.3)
RBC # BLD AUTO: 2.67 10E6/UL (ref 3.8–5.2)
SODIUM SERPL-SCNC: 137 MMOL/L (ref 135–145)
WBC # BLD AUTO: 8.2 10E3/UL (ref 4–11)

## 2025-03-10 PROCEDURE — 83540 ASSAY OF IRON: CPT | Performed by: INTERNAL MEDICINE

## 2025-03-10 PROCEDURE — 99232 SBSQ HOSP IP/OBS MODERATE 35: CPT | Performed by: STUDENT IN AN ORGANIZED HEALTH CARE EDUCATION/TRAINING PROGRAM

## 2025-03-10 PROCEDURE — 85014 HEMATOCRIT: CPT | Performed by: STUDENT IN AN ORGANIZED HEALTH CARE EDUCATION/TRAINING PROGRAM

## 2025-03-10 PROCEDURE — 82728 ASSAY OF FERRITIN: CPT | Performed by: INTERNAL MEDICINE

## 2025-03-10 PROCEDURE — 250N000011 HC RX IP 250 OP 636: Performed by: STUDENT IN AN ORGANIZED HEALTH CARE EDUCATION/TRAINING PROGRAM

## 2025-03-10 PROCEDURE — 83550 IRON BINDING TEST: CPT | Performed by: INTERNAL MEDICINE

## 2025-03-10 PROCEDURE — 250N000013 HC RX MED GY IP 250 OP 250 PS 637: Performed by: STUDENT IN AN ORGANIZED HEALTH CARE EDUCATION/TRAINING PROGRAM

## 2025-03-10 PROCEDURE — 250N000011 HC RX IP 250 OP 636: Mod: JZ | Performed by: HOSPITALIST

## 2025-03-10 PROCEDURE — 120N000001 HC R&B MED SURG/OB

## 2025-03-10 PROCEDURE — 99232 SBSQ HOSP IP/OBS MODERATE 35: CPT | Performed by: INTERNAL MEDICINE

## 2025-03-10 PROCEDURE — 80069 RENAL FUNCTION PANEL: CPT | Performed by: HOSPITALIST

## 2025-03-10 PROCEDURE — 36415 COLL VENOUS BLD VENIPUNCTURE: CPT | Performed by: HOSPITALIST

## 2025-03-10 PROCEDURE — 250N000013 HC RX MED GY IP 250 OP 250 PS 637: Performed by: HOSPITALIST

## 2025-03-10 RX ORDER — LEVOFLOXACIN 5 MG/ML
250 INJECTION, SOLUTION INTRAVENOUS
Status: DISCONTINUED | OUTPATIENT
Start: 2025-03-10 | End: 2025-03-11

## 2025-03-10 RX ADMIN — VANCOMYCIN HYDROCHLORIDE 125 MG: 125 CAPSULE ORAL at 13:09

## 2025-03-10 RX ADMIN — VANCOMYCIN HYDROCHLORIDE 125 MG: 125 CAPSULE ORAL at 08:43

## 2025-03-10 RX ADMIN — ACETAMINOPHEN 975 MG: 325 TABLET ORAL at 20:58

## 2025-03-10 RX ADMIN — ATORVASTATIN CALCIUM 40 MG: 40 TABLET, FILM COATED ORAL at 08:43

## 2025-03-10 RX ADMIN — PANTOPRAZOLE SODIUM 40 MG: 40 TABLET, DELAYED RELEASE ORAL at 08:43

## 2025-03-10 RX ADMIN — METRONIDAZOLE 500 MG: 500 INJECTION, SOLUTION INTRAVENOUS at 16:26

## 2025-03-10 RX ADMIN — VANCOMYCIN HYDROCHLORIDE 125 MG: 125 CAPSULE ORAL at 20:58

## 2025-03-10 RX ADMIN — FLUTICASONE FUROATE AND VILANTEROL TRIFENATATE 1 PUFF: 100; 25 POWDER RESPIRATORY (INHALATION) at 08:41

## 2025-03-10 RX ADMIN — Medication 1 TABLET: at 13:09

## 2025-03-10 RX ADMIN — PANTOPRAZOLE SODIUM 40 MG: 40 TABLET, DELAYED RELEASE ORAL at 20:59

## 2025-03-10 RX ADMIN — MONTELUKAST 10 MG: 10 TABLET, FILM COATED ORAL at 20:58

## 2025-03-10 RX ADMIN — ACETAMINOPHEN 975 MG: 325 TABLET ORAL at 08:42

## 2025-03-10 RX ADMIN — LEVOFLOXACIN 250 MG: 5 INJECTION, SOLUTION INTRAVENOUS at 17:37

## 2025-03-10 RX ADMIN — VANCOMYCIN HYDROCHLORIDE 125 MG: 125 CAPSULE ORAL at 16:26

## 2025-03-10 RX ADMIN — CLOPIDOGREL 75 MG: 75 TABLET ORAL at 08:43

## 2025-03-10 RX ADMIN — METRONIDAZOLE 500 MG: 500 INJECTION, SOLUTION INTRAVENOUS at 04:25

## 2025-03-10 RX ADMIN — LEVOTHYROXINE SODIUM 50 MCG: 0.03 TABLET ORAL at 08:43

## 2025-03-10 RX ADMIN — VANCOMYCIN HYDROCHLORIDE 125 MG: 125 CAPSULE ORAL at 00:01

## 2025-03-10 RX ADMIN — ACETAMINOPHEN 975 MG: 325 TABLET ORAL at 13:08

## 2025-03-10 ASSESSMENT — ACTIVITIES OF DAILY LIVING (ADL)
ADLS_ACUITY_SCORE: 52
ADLS_ACUITY_SCORE: 55
ADLS_ACUITY_SCORE: 55
ADLS_ACUITY_SCORE: 52
ADLS_ACUITY_SCORE: 55
ADLS_ACUITY_SCORE: 52
ADLS_ACUITY_SCORE: 55
ADLS_ACUITY_SCORE: 52
ADLS_ACUITY_SCORE: 55
ADLS_ACUITY_SCORE: 52
ADLS_ACUITY_SCORE: 55
ADLS_ACUITY_SCORE: 55
ADLS_ACUITY_SCORE: 52
ADLS_ACUITY_SCORE: 55

## 2025-03-10 NOTE — PLAN OF CARE
Problem: Adult Inpatient Plan of Care  Goal: Absence of Hospital-Acquired Illness or Injury  Intervention: Identify and Manage Fall Risk  Recent Flowsheet Documentation  Taken 3/10/2025 1615 by Radha Beebe RN  Safety Promotion/Fall Prevention:   activity supervised   assistive device/personal items within reach   clutter free environment maintained   lighting adjusted   nonskid shoes/slippers when out of bed   room door open   room near nurse's station   safety round/check completed  Intervention: Prevent Skin Injury  Recent Flowsheet Documentation  Taken 3/10/2025 1615 by Radha Beebe RN  Skin Protection: incontinence pads utilized  Intervention: Prevent and Manage VTE (Venous Thromboembolism) Risk  Recent Flowsheet Documentation  Taken 3/10/2025 1615 by Radha Beebe RN  VTE Prevention/Management: SCDs off (sequential compression devices)  Intervention: Prevent Infection  Recent Flowsheet Documentation  Taken 3/10/2025 1615 by Radha Beebe RN  Infection Prevention:   single patient room provided   rest/sleep promoted   personal protective equipment utilized   hand hygiene promoted   equipment surfaces disinfected   environmental surveillance performed  Goal: Optimal Comfort and Wellbeing  Intervention: Provide Person-Centered Care  Recent Flowsheet Documentation  Taken 3/10/2025 1615 by Radha Beebe RN  Trust Relationship/Rapport: care explained     Problem: Delirium  Goal: Optimal Coping  Intervention: Optimize Psychosocial Adjustment to Delirium  Recent Flowsheet Documentation  Taken 3/10/2025 1615 by Radha Beebe RN  Family/Support System Care: involvement promoted  Goal: Improved Behavioral Control  Intervention: Minimize Safety Risk  Recent Flowsheet Documentation  Taken 3/10/2025 1615 by Radha Beebe RN  Trust Relationship/Rapport: care explained  Goal: Improved Attention and Thought Clarity  Intervention: Maximize Cognitive Function  Recent Flowsheet Documentation  Taken 3/10/2025 1615 by Concepción  MAYRA Garcia  Sensory Stimulation Regulation:   care clustered   quiet environment promoted  Reorientation Measures: clock in view     Problem: Diarrhea  Goal: Effective Diarrhea Management  Intervention: Manage Diarrhea  Recent Flowsheet Documentation  Taken 3/10/2025 1615 by Radha Beebe RN  Isolation Precautions: enteric precautions maintained  Medication Review/Management: medications reviewed     Problem: Comorbidity Management  Goal: Maintenance of COPD Symptom Control  Intervention: Maintain COPD Symptom Control  Recent Flowsheet Documentation  Taken 3/10/2025 1615 by Radha Beebe RN  Medication Review/Management: medications reviewed  Goal: Maintenance of Asthma Control  Intervention: Maintain Asthma Symptom Control  Recent Flowsheet Documentation  Taken 3/10/2025 1615 by Radha Beebe RN  Medication Review/Management: medications reviewed  Goal: Blood Pressure in Desired Range  Intervention: Maintain Blood Pressure Management  Recent Flowsheet Documentation  Taken 3/10/2025 1615 by Radha Beebe RN  Medication Review/Management: medications reviewed     Problem: Acute Kidney Injury/Impairment  Goal: Effective Renal Function  Intervention: Monitor and Support Renal Function  Recent Flowsheet Documentation  Taken 3/10/2025 1615 by Radha Beebe RN  Medication Review/Management: medications reviewed     Problem: Skin Injury Risk Increased  Goal: Skin Health and Integrity  Intervention: Plan: Nurse Driven Intervention: Friction and Shear  Recent Flowsheet Documentation  Taken 3/10/2025 1615 by Radha Beebe RN  Friction/Shear Interventions: HOB 30 degrees or less  Intervention: Optimize Skin Protection  Recent Flowsheet Documentation  Taken 3/10/2025 1615 by Radha Beebe RN  Skin Protection: incontinence pads utilized   Goal Outcome Evaluation:  Pt /63 this shift.  She got Levaquin and Flagyl IV and PO Vancomycin this shift.  Pt did have a loose brown BM this shift.  Denied nausea or GI distress.  She  had a fair intake for dinner.  Denies dyspnea at rest - was up to BR with assist of 1 without dyspnea.

## 2025-03-10 NOTE — PLAN OF CARE
"  Problem: Adult Inpatient Plan of Care  Goal: Plan of Care Review  Description: The Plan of Care Review/Shift note should be completed every shift.  The Outcome Evaluation is a brief statement about your assessment that the patient is improving, declining, or no change.  This information will be displayed automatically on your shift  note.  Outcome: Progressing  Goal: Patient-Specific Goal (Individualized)  Description: You can add care plan individualizations to a care plan. Examples of Individualization might be:  \"Parent requests to be called daily at 9am for status\", \"I have a hard time hearing out of my right ear\", or \"Do not touch me to wake me up as it startles  me\".  Outcome: Progressing  Goal: Absence of Hospital-Acquired Illness or Injury  Outcome: Progressing  Intervention: Prevent Skin Injury  Recent Flowsheet Documentation  Taken 3/10/2025 0900 by Chanelle Morales RN  Skin Protection: incontinence pads utilized  Intervention: Prevent and Manage VTE (Venous Thromboembolism) Risk  Recent Flowsheet Documentation  Taken 3/10/2025 0900 by Chanelle Morales RN  VTE Prevention/Management: SCDs off (sequential compression devices)  Intervention: Prevent Infection  Recent Flowsheet Documentation  Taken 3/10/2025 0900 by Chanelle Morales RN  Infection Prevention:   single patient room provided   rest/sleep promoted   personal protective equipment utilized   hand hygiene promoted   equipment surfaces disinfected   environmental surveillance performed  Goal: Optimal Comfort and Wellbeing  Outcome: Progressing  Goal: Readiness for Transition of Care  Outcome: Progressing   Goal Outcome Evaluation:       Pt is alert-a little forgetful. BP in am was 171/76-NS IV at 75mls/hr was stopped and BP improved to 137/63.Pt had 2 loose stools this shift. She is tolerating small amts of a regular diet. She was up to chair and into bathroom with walker an 1 assist. Pt will be discharging to TCU when bed is " avilable

## 2025-03-10 NOTE — PLAN OF CARE
Problem: Diarrhea  Goal: Effective Diarrhea Management  Outcome: Progressing  Intervention: Manage Diarrhea  Recent Flowsheet Documentation  Taken 3/10/2025 0000 by Heidi Holder RN  Isolation Precautions: enteric precautions maintained     Problem: Acute Kidney Injury/Impairment  Goal: Fluid and Electrolyte Balance  Outcome: Progressing     Problem: UTI (Urinary Tract Infection)  Goal: Improved Infection Symptoms  Outcome: Progressing     Goal Outcome Evaluation:    Alert and oriented x 3; forgetful. Continuous IVF NS 75 ml/hr. Enteric Precautions for C diff+. C diff & UTI managed with oral vancomycin, IV Levaquin and IV Flagyl. Up to commode with AO1; had x1 loose bowel movement. Denied pain.

## 2025-03-10 NOTE — CONSULTS
Nephrology IP Consult  Consult performed by: Adrian Souza MD  Consult ordered by: Dennis Marroquin MD        RENAL CONSULT NOTE    ASSESSMENT/PLAN:    Mary Corral is a 89 year old female w/ CKD3b c/b recurrent CARLYLE admitted 3/8/25 w/ first episode of C diff colitis.    Acute kidney injury. Most recent Cr prior to admission was 1.57, Ad Cr 3.15. Etiology is tubular injury related to diarrhea. Crystalloid support through today; diet is improving and diarrhea is slowing down, hopefully stop fluids after today. No biochemical indications for KRT.  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 Was seen by Dr Bravo in her previous admission; is going to follow up with ANC.  C diff colitis. On vancomycin. Patient reports it is symptomatically improving.  Hypertension. ARB held on previous admission. BP remains on softer side. Hold home carvedilol 3.125 bid for now.  HFpEF. Not grossly decompensated. Holding diuretics for now.    Please reach out via [address] Secure Chat with any questions or concerns.    Adrian Souza MD  [facility]    Subjective     Mary Corral is a 89 year old female w/ CKD3b c/b recurrent CARLYLE admitted 3/8/25 w/ first episode of C diff colitis.    Review of Systems    Past Medical History:   Diagnosis Date    Allergic rhinitis     Chronic sinusitis     Conductive hearing loss     COPD (chronic obstructive pulmonary disease) (H)     CRI (chronic renal insufficiency)     mild    Gastroesophageal reflux disease     Hearing problem     Hypertension     Mediastinal mass     Nasal polyps     Pulmonary nodule     Seasonal allergies     Uncomplicated asthma        Current Facility-Administered Medications   Medication Dose Route Frequency Provider Last Rate Last Admin    acetaminophen (TYLENOL) tablet 650 mg  650 mg Oral Q4H PRN Dennis Marroquin MD        acetaminophen (TYLENOL) tablet 975 mg  975 mg Oral TID Dennis Marroquin MD   975 mg at 03/09/25 1426    albuterol (PROVENTIL HFA/VENTOLIN  HFA) inhaler  2 puff Inhalation Q4H PRN Dennis Marroquin MD        albuterol (PROVENTIL) neb solution 2.5 mg  2.5 mg Nebulization Q4H PRN Dennis Marroquin MD        atorvastatin (LIPITOR) tablet 40 mg  40 mg Oral Daily Dennis Marroquin MD   40 mg at 03/09/25 0946    benzocaine-menthol (CHLORASEPTIC) 6-10 MG lozenge 1 lozenge  1 lozenge Buccal Q1H PRN Dennis Marroquin MD        calcium carbonate (TUMS) chewable tablet 1,000 mg  1,000 mg Oral 4x Daily PRN Dennis Marroquin MD        carvedilol (COREG) tablet 3.125 mg  3.125 mg Oral BID w/meals Dennis Marroquin MD   3.125 mg at 03/09/25 0944    cetirizine (zyrTEC) tablet 10 mg  10 mg Oral Daily PRN Dennis Marroquin MD        clopidogrel (PLAVIX) tablet 75 mg  75 mg Oral Daily Dennis Marroquin MD   75 mg at 03/09/25 0946    fluticasone-vilanterol (BREO ELLIPTA) 100-25 MCG/ACT inhaler 1 puff  1 puff Inhalation Daily Dennis Marroquin MD   1 puff at 03/09/25 0944    guaiFENesin (ROBITUSSIN) 20 mg/mL solution 200 mg  200 mg Oral Q4H PRN Dennis Marroquin MD        [START ON 3/11/2025] levofloxacin (LEVAQUIN) infusion 250 mg  250 mg Intravenous Q48H Dennis Marroquin MD        levothyroxine (SYNTHROID/LEVOTHROID) tablet 50 mcg  50 mcg Oral QAM AC Dennis Marroquin MD   50 mcg at 03/09/25 0946    lidocaine (LMX4) cream   Topical Q1H PRN Dennis Marroquin MD        lidocaine 1 % 0.1-1 mL  0.1-1 mL Other Q1H PRN Dennis Marroquin MD        melatonin tablet 1 mg  1 mg Oral At Bedtime PRN Dennis Marroquin MD        metroNIDAZOLE (FLAGYL) infusion 500 mg  500 mg Intravenous Q12H Dennis Marroquin MD   500 mg at 03/09/25 1626    montelukast (SINGULAIR) tablet 10 mg  10 mg Oral At Bedtime Dennis Marroquin MD   10 mg at 03/09/25 0016    multivitamin w/minerals (THERA-VIT-M) tablet 1 tablet  1 tablet Oral Daily with lunch Dennis Marroquin MD        ondansetron (ZOFRAN ODT) ODT tab 4 mg  4 mg Oral Q6H PRN Dennis Marroquin MD        Or    ondansetron (ZOFRAN) injection 4 mg  4 mg Intravenous Q6H PRN Lopez  MD Dennis        pantoprazole (PROTONIX) EC tablet 40 mg  40 mg Oral BID Dennis Marroquin MD   40 mg at 03/09/25 0945    polyethylene glycol (MIRALAX) powder 17 g  17 g Oral Daily PRN Dennis Marroquin MD senna-docusate (SENOKOT-S/PERICOLACE) 8.6-50 MG per tablet 1 tablet  1 tablet Oral BID PRN Dennis Marroquin MD        Or    senna-docusate (SENOKOT-S/PERICOLACE) 8.6-50 MG per tablet 2 tablet  2 tablet Oral BID PRN Dennis Marroquin MD        sodium chloride (PF) 0.9% PF flush 3 mL  3 mL Intracatheter Q8H Dennis Marroquin MD        sodium chloride (PF) 0.9% PF flush 3 mL  3 mL Intracatheter q1 min prn Dennis Marroquin MD        sodium chloride 0.9 % infusion   Intravenous Continuous Deniz Fine MD 75 mL/hr at 03/09/25 1434 Rate Verify at 03/09/25 1434    vancomycin (VANCOCIN) capsule 125 mg  125 mg Oral 4x Daily Deniz Fine MD   125 mg at 03/09/25 1426     Current Outpatient Medications   Medication Sig Dispense Refill    acetaminophen (TYLENOL) 325 MG tablet Take 2 tablets (650 mg) by mouth every 4 hours as needed for other (surgical pain) 100 tablet 0    albuterol (2.5 MG/3ML) 0.083% nebulizer solution Take 1 vial by nebulization every 4 hours as needed for shortness of breath / dyspnea or wheezing      albuterol (PROAIR HFA/PROVENTIL HFA/VENTOLIN HFA) 108 (90 BASE) MCG/ACT Inhaler Inhale 2 puffs into the lungs every 4 hours as needed for shortness of breath      allopurinol (ZYLOPRIM) 100 MG tablet Take 100 mg by mouth 2 times daily as needed Gout flares      atorvastatin (LIPITOR) 40 MG tablet Take 1 tablet (40 mg) by mouth daily 90 tablet 3    carvedilol (COREG) 3.125 MG tablet Take 1 tablet (3.125 mg) by mouth 2 times daily (with meals). 60 tablet 1    cetirizine (ZYRTEC) 10 MG tablet Take 10 mg by mouth daily as needed for allergies      clopidogrel (PLAVIX) 75 MG tablet Take 1 tablet (75 mg) by mouth daily Dose to start tomorrow. 90 tablet 3    fluticasone-salmeterol (ADVAIR) 250-50  MCG/DOSE diskus inhaler Inhale 1 puff into the lungs daily as needed.      furosemide (LASIX) 20 MG tablet Take 20 mg by mouth every other day.      levothyroxine (SYNTHROID/LEVOTHROID) 50 MCG tablet Take 50 mcg by mouth daily      montelukast (SINGULAIR) 10 MG tablet Take 10 mg by mouth at bedtime      multivitamin w/minerals (THERA-VIT-M) tablet Take 1 tablet by mouth every morning.      pantoprazole (PROTONIX) 40 MG EC tablet Take 1 tablet (40 mg) by mouth 2 times daily. 180 tablet 0    polyethylene glycol (MIRALAX/GLYCOLAX) powder Take 17 g by mouth daily as needed for constipation      predniSONE (DELTASONE) 20 MG tablet Take 20 mg by mouth daily as needed (Self doses in winter for lung infections.)      valACYclovir (VALTREX) 1000 mg tablet Take 1,000 mg by mouth 2 times daily as needed (for one day)      Vitamin D3 (CHOLECALCIFEROL) 25 mcg (1000 units) tablet Take 1,000 Units by mouth every morning.         acetaminophen (TYLENOL) 325 MG tablet  albuterol (2.5 MG/3ML) 0.083% nebulizer solution  albuterol (PROAIR HFA/PROVENTIL HFA/VENTOLIN HFA) 108 (90 BASE) MCG/ACT Inhaler  allopurinol (ZYLOPRIM) 100 MG tablet  atorvastatin (LIPITOR) 40 MG tablet  carvedilol (COREG) 3.125 MG tablet  cetirizine (ZYRTEC) 10 MG tablet  clopidogrel (PLAVIX) 75 MG tablet  fluticasone-salmeterol (ADVAIR) 250-50 MCG/DOSE diskus inhaler  furosemide (LASIX) 20 MG tablet  levothyroxine (SYNTHROID/LEVOTHROID) 50 MCG tablet  montelukast (SINGULAIR) 10 MG tablet  multivitamin w/minerals (THERA-VIT-M) tablet  pantoprazole (PROTONIX) 40 MG EC tablet  polyethylene glycol (MIRALAX/GLYCOLAX) powder  predniSONE (DELTASONE) 20 MG tablet  valACYclovir (VALTREX) 1000 mg tablet  Vitamin D3 (CHOLECALCIFEROL) 25 mcg (1000 units) tablet        Social History     Tobacco Use    Smoking status: Never    Smokeless tobacco: Never    Tobacco comments:     smoked twice a week for a couple years while in college, less than 1/2 pack    Substance Use Topics     Alcohol use: Yes     Alcohol/week: 0.0 standard drinks of alcohol     Comment: Alcoholic Drinks/day: occasional    Drug use: No            Objective     Vitals:    03/08/25 1919   Weight: 82.6 kg (182 lb)     Vital Signs with Ranges  Temp:  [97.6  F (36.4  C)-98.2  F (36.8  C)] 97.7  F (36.5  C)  Pulse:  [70-90] 70  Resp:  [16-22] 18  BP: (109-144)/(53-63) 109/53  SpO2:  [87 %-95 %] 94 %  I/O last 3 completed shifts:  In: 1360 [P.O.:360; I.V.:1000]  Out: -   Resp: 18    Physical Exam  Constitutional:       General: She is not in acute distress.        Recent Results (from the past 24 hours)   Extra Blue Top Tube    Collection Time: 03/08/25  7:56 PM   Result Value Ref Range    Hold Specimen JIC    Extra Red Top Tube    Collection Time: 03/08/25  7:56 PM   Result Value Ref Range    Hold Specimen JIC    Extra Green Top (Lithium Heparin) Tube    Collection Time: 03/08/25  7:56 PM   Result Value Ref Range    Hold Specimen JIC    Extra Purple Top Tube    Collection Time: 03/08/25  7:56 PM   Result Value Ref Range    Hold Specimen JIC    Basic metabolic panel    Collection Time: 03/08/25  7:56 PM   Result Value Ref Range    Sodium 134 (L) 135 - 145 mmol/L    Potassium 3.4 3.4 - 5.3 mmol/L    Chloride 95 (L) 98 - 107 mmol/L    Carbon Dioxide (CO2) 22 22 - 29 mmol/L    Anion Gap 17 (H) 7 - 15 mmol/L    Urea Nitrogen 33.9 (H) 8.0 - 23.0 mg/dL    Creatinine 3.15 (H) 0.51 - 0.95 mg/dL    GFR Estimate 14 (L) >60 mL/min/1.73m2    Calcium 7.9 (L) 8.8 - 10.4 mg/dL    Glucose 118 (H) 70 - 99 mg/dL   Magnesium    Collection Time: 03/08/25  7:56 PM   Result Value Ref Range    Magnesium 1.4 (L) 1.7 - 2.3 mg/dL   Troponin T, High Sensitivity    Collection Time: 03/08/25  7:56 PM   Result Value Ref Range    Troponin T, High Sensitivity 50 (H) <=14 ng/L   Nt probnp inpatient (BNP)    Collection Time: 03/08/25  7:56 PM   Result Value Ref Range    N terminal Pro BNP Inpatient 4,176 (H) 0 - 1,800 pg/mL   CBC with platelets and differential     Collection Time: 03/08/25  7:56 PM   Result Value Ref Range    WBC Count 16.2 (H) 4.0 - 11.0 10e3/uL    RBC Count 3.00 (L) 3.80 - 5.20 10e6/uL    Hemoglobin 8.5 (L) 11.7 - 15.7 g/dL    Hematocrit 27.1 (L) 35.0 - 47.0 %    MCV 90 78 - 100 fL    MCH 28.3 26.5 - 33.0 pg    MCHC 31.4 (L) 31.5 - 36.5 g/dL    RDW 16.5 (H) 10.0 - 15.0 %    Platelet Count 308 150 - 450 10e3/uL    % Neutrophils 86 %    % Lymphocytes 6 %    % Monocytes 7 %    % Eosinophils 1 %    % Basophils 0 %    % Immature Granulocytes 0 %    NRBCs per 100 WBC 0 <1 /100    Absolute Neutrophils 13.9 (H) 1.6 - 8.3 10e3/uL    Absolute Lymphocytes 1.0 0.8 - 5.3 10e3/uL    Absolute Monocytes 1.1 0.0 - 1.3 10e3/uL    Absolute Eosinophils 0.1 0.0 - 0.7 10e3/uL    Absolute Basophils 0.0 0.0 - 0.2 10e3/uL    Absolute Immature Granulocytes 0.1 <=0.4 10e3/uL    Absolute NRBCs 0.0 10e3/uL   CRP inflammation    Collection Time: 03/08/25  7:56 PM   Result Value Ref Range    CRP Inflammation 195.40 (H) <5.00 mg/L   Procalcitonin    Collection Time: 03/08/25  7:56 PM   Result Value Ref Range    Procalcitonin 0.36 <0.50 ng/mL   Hemoglobin A1c    Collection Time: 03/08/25  7:56 PM   Result Value Ref Range    Estimated Average Glucose 117 (H) <117 mg/dL    Hemoglobin A1C 5.7 (H) <5.7 %   Influenza A/B, RSV and SARS-CoV2 PCR (COVID-19) Nasopharyngeal    Collection Time: 03/08/25  9:00 PM    Specimen: Nasopharyngeal; Swab   Result Value Ref Range    Influenza A PCR Negative Negative    Influenza B PCR Negative Negative    RSV PCR Negative Negative    SARS CoV2 PCR Negative Negative   Blood gas venous    Collection Time: 03/08/25  9:02 PM   Result Value Ref Range    pH Venous 7.38 7.32 - 7.43    pCO2 Venous 42 40 - 50 mm Hg    pO2 Venous 36 25 - 47 mm Hg    Bicarbonate Venous 25 21 - 28 mmol/L    Base Excess/Deficit Venous -0.4 -3.0 - 3.0 mmol/L    FIO2 21     Oxyhemoglobin Venous 62 (L) 70 - 75 %    O2 Sat, Venous 63.1 (L) 70.0 - 75.0 %   Troponin T, High Sensitivity     Collection Time: 03/08/25 10:36 PM   Result Value Ref Range    Troponin T, High Sensitivity 45 (H) <=14 ng/L   TSH with free T4 reflex    Collection Time: 03/08/25 10:36 PM   Result Value Ref Range    TSH 1.30 0.30 - 4.20 uIU/mL   CK total    Collection Time: 03/08/25 10:36 PM   Result Value Ref Range     (H) 26 - 192 U/L   Enteric Bacteria and Virus Panel PCR    Collection Time: 03/09/25  1:33 AM    Specimen: Per Rectum; Stool   Result Value Ref Range    Campylobacter species Negative Negative    Salmonella species Negative Negative    Vibrio species Negative Negative    Vibrio cholerae Negative Negative    Yersinia enterocolitica Negative Negative    Enteropathogenic E. coli (EPEC) Negative Negative, NA    Shiga-like toxin-producing E. coli (STEC) Negative Negative    Shigella/Enteroinvasive E. coli (EIEC) Negative Negative    Cryptosporidium species Negative Negative    Giardia lamblia Negative Negative    Norovirus Gl/Gll Negative Negative    Rotavirus A Negative Negative    Plesiomonas shigelloides Negative Negative    Enteroaggregative E. coli (EAEC) Negative Negative    Enterotoxigenic E. coli (ETEC) Negative Negative    E. coli O157 NA Negative, NA    Cyclospora cayetanensis Negative Negative    Entamoeba histolytica Negative Negative    Adenovirus F40/41 Negative Negative    Astrovirus Negative Negative    Sapovirus Negative Negative   C. difficile Toxin B PCR with reflex to C. difficile EIA    Collection Time: 03/09/25  1:33 AM    Specimen: Per Rectum; Stool   Result Value Ref Range    C Difficile Toxin B by PCR Positive (A) Negative    Reflex EIA comment     C. difficile Antigen and Toxins A/B by Enzyme Immunoassay    Collection Time: 03/09/25  1:33 AM    Specimen: Per Rectum; Stool   Result Value Ref Range    C. difficile GDH Antigen Positive (A) Negative    C. difficile Toxin Negative Negative   UA with Microscopic reflex to Culture    Collection Time: 03/09/25  1:34 AM    Specimen: Urine,  Midstream   Result Value Ref Range    Color Urine Yellow Colorless, Straw, Light Yellow, Yellow    Appearance Urine Turbid (A) Clear    Glucose Urine Negative Negative mg/dL    Bilirubin Urine Negative Negative    Ketones Urine Negative Negative mg/dL    Specific Gravity Urine 1.019 1.001 - 1.030    Blood Urine Negative Negative    pH Urine 5.0 5.0 - 7.0    Protein Albumin Urine 30 (A) Negative mg/dL    Urobilinogen Urine 2.0 (A) <2.0 mg/dL    Nitrite Urine Negative Negative    Leukocyte Esterase Urine 500 Junior/uL (A) Negative    Bacteria Urine Many (A) None Seen /HPF    WBC Clumps Urine Present (A) None Seen /HPF    Mucus Urine Present (A) None Seen /LPF    RBC Urine 0 <=2 /HPF    WBC Urine >182 (H) <=5 /HPF    Squamous Epithelials Urine 2 (H) <=1 /HPF    Hyaline Casts Urine 124 (H) <=2 /LPF   Osmolality urine    Collection Time: 03/09/25  1:34 AM   Result Value Ref Range    Osmolality Urine 309 100 - 1,200 mmol/kg   Fractional Excretion of Sodium    Collection Time: 03/09/25  1:34 AM   Result Value Ref Range    Creatinine Urine mg/dL 309.1 mg/dL    Sodium Urine mmol/L 41 mmol/L    %FENA 0.3 %   Lactic acid whole blood    Collection Time: 03/09/25  1:50 AM   Result Value Ref Range    Lactic Acid 0.8 0.7 - 2.0 mmol/L   Blood Culture Arm, Right    Collection Time: 03/09/25  1:50 AM    Specimen: Arm, Right; Blood   Result Value Ref Range    Culture No growth after 12 hours    Basic metabolic panel    Collection Time: 03/09/25  1:50 AM   Result Value Ref Range    Sodium 136 135 - 145 mmol/L    Potassium 3.7 3.4 - 5.3 mmol/L    Chloride 99 98 - 107 mmol/L    Carbon Dioxide (CO2) 23 22 - 29 mmol/L    Anion Gap 14 7 - 15 mmol/L    Urea Nitrogen 34.4 (H) 8.0 - 23.0 mg/dL    Creatinine 3.07 (H) 0.51 - 0.95 mg/dL    GFR Estimate 14 (L) >60 mL/min/1.73m2    Calcium 7.8 (L) 8.8 - 10.4 mg/dL    Glucose 102 (H) 70 - 99 mg/dL   Extra Purple Top Tube    Collection Time: 03/09/25  1:58 AM   Result Value Ref Range    Hold Specimen  [facility]    CBC with platelets    Collection Time: 03/09/25  7:13 AM   Result Value Ref Range    WBC Count 11.6 (H) 4.0 - 11.0 10e3/uL    RBC Count 2.66 (L) 3.80 - 5.20 10e6/uL    Hemoglobin 7.8 (L) 11.7 - 15.7 g/dL    Hematocrit 24.4 (L) 35.0 - 47.0 %    MCV 92 78 - 100 fL    MCH 29.3 26.5 - 33.0 pg    MCHC 32.0 31.5 - 36.5 g/dL    RDW 16.6 (H) 10.0 - 15.0 %    Platelet Count 244 150 - 450 10e3/uL   Renal panel    Collection Time: 03/09/25  7:13 AM   Result Value Ref Range    Sodium 137 135 - 145 mmol/L    Potassium 4.0 3.4 - 5.3 mmol/L    Chloride 99 98 - 107 mmol/L    Carbon Dioxide (CO2) 23 22 - 29 mmol/L    Anion Gap 15 7 - 15 mmol/L    Glucose 89 70 - 99 mg/dL    Urea Nitrogen 32.1 (H) 8.0 - 23.0 mg/dL    Creatinine 2.82 (H) 0.51 - 0.95 mg/dL    GFR Estimate 16 (L) >60 mL/min/1.73m2    Calcium 7.7 (L) 8.8 - 10.4 mg/dL    Albumin 2.9 (L) 3.5 - 5.2 g/dL    Phosphorus 3.8 2.5 - 4.5 mg/dL   Magnesium    Collection Time: 03/09/25  7:13 AM   Result Value Ref Range    Magnesium 1.9 1.7 - 2.3 mg/dL

## 2025-03-10 NOTE — PROGRESS NOTES
RENAL PROGRESS NOTE    CC:  CARLYLE on CKD    ASSESSMENT & PLAN:   Acute kidney injury - Most recent Cr prior to admission was 1.57, admit Cr 3.15. Likely CARLYLE related to diarrhea and trending close to normal.  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  Was seen by Dr Bravo in her previous admission; is going to follow up with ANC in the future    C diff colitis. On vancomycin. Patient reports it is slowly improving.    Hypertension. ARB held on previous admission. BP remains on softer side although some higher measurements intermittently.  If stable, would go to carvedilol    HFpEF. Not grossly decompensated. Holding diuretics for now.    Anemia - hgb remains in the 7-8s after GI bleed in the last month.  Had been on oral iron before but stopped.  Given lack of improvement, will check iron studies.        SUBJECTIVE:  Seen in room. Slowly feeling better.  Has some good questions about why her hgb is still low.  Had 4 transfusions during prior admit.    OBJECTIVE:  Physical Exam   Temp: 97.9  F (36.6  C) Temp src: Oral BP: (!) 171/76 Pulse: 79   Resp: 16 SpO2: 98 % O2 Device: Nasal cannula Oxygen Delivery: 1 LPM  Vitals:    03/08/25 1919   Weight: 82.6 kg (182 lb)     Vital Signs with Ranges  Temp:  [97.5  F (36.4  C)-97.9  F (36.6  C)] 97.9  F (36.6  C)  Pulse:  [70-84] 79  Resp:  [16-20] 16  BP: (109-171)/(53-76) 171/76  SpO2:  [92 %-98 %] 98 %  I/O last 3 completed shifts:  In: 1600 [P.O.:600; I.V.:1000]  Out: -     @TMAXR(24)@    Patient Vitals for the past 72 hrs:   Weight   03/08/25 1919 82.6 kg (182 lb)     Intake/Output Summary (Last 24 hours) at 3/10/2025 1136  Last data filed at 3/10/2025 0830  Gross per 24 hour   Intake 1780 ml   Output --   Net 1780 ml       PHYSICAL EXAM:  General - Alert and oriented x3, appears comfortable, NAD  Cardiovascular - Regular rate and rhythm, no rub  Respiratory - Clear to  auscultation bilaterally, no crackles or wheezes  Abd: BS present, nontender  Extremities - No lower extremity edema bilaterally  Skin: dry, intact, no rash, good turgor  Neuro:  Grossly intact, no focal deficits  MSK:  Grossly intact  Psych:  Normal affect    LABORATORY STUDIES:     Recent Labs   Lab 03/10/25  0641 03/09/25  0713 03/08/25 1956   WBC 8.2 11.6* 16.2*   RBC 2.67* 2.66* 3.00*   HGB 7.6* 7.8* 8.5*   HCT 24.1* 24.4* 27.1*    244 308       Basic Metabolic Panel:  Recent Labs   Lab 03/10/25  0641 03/09/25  0713 03/09/25  0150 03/08/25 1956    137 136 134*   POTASSIUM 3.7 4.0 3.7 3.4   CHLORIDE 105 99 99 95*   CO2 24 23 23 22   BUN 30.8* 32.1* 34.4* 33.9*   CR 2.24* 2.82* 3.07* 3.15*   * 89 102* 118*   GREG 7.8* 7.7* 7.8* 7.9*       INRNo lab results found in last 7 days.     Recent Labs   Lab Test 03/10/25  0641 03/09/25  0713 02/06/25  1138 02/06/25  0731 02/05/25 2054 02/05/25  1012   INR  --   --   --  1.45*  --  1.20*   WBC 8.2 11.6*   < >  --    < > 16.3*   HGB 7.6* 7.8*   < >  --    < > 7.8*    244   < >  --    < > 209    < > = values in this interval not displayed.       Personally reviewed current labs      Emmanuel Correa  [facility]  [phone]

## 2025-03-10 NOTE — PLAN OF CARE
The patient arrived to the floor at 21:50. She was assisted to the commode. Denied dizziness. Small brown loose stool and bridget odorous urine noted. IV fluids infusing as ordered. Enteric precautions maintained.      Goal Outcome Evaluation:    Problem: Adult Inpatient Plan of Care  Goal: Absence of Hospital-Acquired Illness or Injury  Intervention: Prevent Skin Injury  Recent Flowsheet Documentation  Taken 3/9/2025 2156 by Rut Sharma RN  Skin Protection: incontinence pads utilized  Intervention: Prevent and Manage VTE (Venous Thromboembolism) Risk  Recent Flowsheet Documentation  Taken 3/9/2025 2156 by Rut Sharma RN  VTE Prevention/Management: SCDs off (sequential compression devices)  Intervention: Prevent Infection  Recent Flowsheet Documentation  Taken 3/9/2025 2156 by Rut Sharma RN  Infection Prevention:   single patient room provided   rest/sleep promoted   personal protective equipment utilized   hand hygiene promoted   equipment surfaces disinfected   environmental surveillance performed  Goal: Readiness for Transition of Care  Intervention: Mutually Develop Transition Plan  Recent Flowsheet Documentation  Taken 3/9/2025 2211 by Rut Sharma RN  Equipment Currently Used at Home:   cane, straight   walker, rolling   grab bar, toilet   grab bar, tub/shower   shower chair     Problem: Skin Injury Risk Increased  Goal: Skin Health and Integrity  Intervention: Plan: Nurse Driven Intervention: Friction and Shear  Recent Flowsheet Documentation  Taken 3/9/2025 2156 by Rut Sharma RN  Friction/Shear Interventions: HOB 30 degrees or less  Intervention: Optimize Skin Protection  Recent Flowsheet Documentation  Taken 3/9/2025 2156 by Rut Sharma RN  Skin Protection: incontinence pads utilized

## 2025-03-10 NOTE — PROGRESS NOTES
[facility]    Medicine Progress Note - Hospitalist Service    Date of Admission:  3/8/2025    Assessment & Plan   88 y/o F presents today with lightheadedness and a fall.  Recently admitted for GI bleed and then readmitted with acute kidney injury.  Past medical history is significant for CKD stage III, asthma, CAD S/P PCI, GERD, hypertension.  Has had recurrent hospitalization in recent months.     Generalized weakness  Multifocal colitis  C. difficile colitis  UTI  CT abdomen findings are consistent with nonspecific multifocal colitis.  UA result is also consistent with infection.  Culture growing gram-negative bacilli  C. difficile antigen and toxin are detected  Started on levofloxacin and metronidazole  Started on vancomycin 125 mg oral 4 times daily  GI consult appreciated.SO    Acute kidney injury superimposed on stage 3b chronic kidney disease (H)  Possibly prerenal from recent Lasix and volume loss due to  GI losses   Hold home Lasix and monitor and adjust volumes based on patient progression.  Avoid nephrotoxic meds  Cr 3.07-->2.82--> 2.24  Nephrology consult appreciated . Now SO      Hypomagnesemia  Replace per protocol     chronic anemia- anemia of chronic disease with kidney problems stage IIIb and chronic blood loss anemia  -hx recent GI bleed, addressed on admission 2/5- 2/13, resolved now.  -Hgb stable. No e/o active bleeding.  Monitor     GERD.    -last admission had EGD showed gastric ulcer with visible vessel  -continue PPI BID  -no s/s recurrent bleeding  -outpatient GI followup for repeat EGD in 1 months  -continue regular diet ad ga     Asthma , Chronic Obstructive Pulmonary Disease   -home Advair, Singulair  -Continue outpatient follow-up     Hypothyroidism, home Synthroid                Diet: Combination Diet Regular Diet Adult    DVT Prophylaxis: Pneumatic Compression Devices  Russo Catheter: Not present  Lines: None     Cardiac Monitoring: None  Code Status: Full  "Code      Clinically Significant Risk Factors         # Hyponatremia: Lowest Na = 134 mmol/L in last 2 days, will monitor as appropriate  # Hypochloremia: Lowest Cl = 95 mmol/L in last 2 days, will monitor as appropriate    # Hypomagnesemia: Lowest Mg = 1.4 mg/dL in last 2 days, will replace as needed   # Hypoalbuminemia: Lowest albumin = 2.9 g/dL at 3/9/2025  7:13 AM, will monitor as appropriate    # Acute Kidney Injury, unspecified: based on a >150% or 0.3 mg/dL increase in last creatinine compared to past 90 day average, will monitor renal function  # Hypertension: Noted on problem list  # Chronic heart failure with preserved ejection fraction: heart failure noted on problem list and last echo with EF >50%           # Overweight: Estimated body mass index is 28.51 kg/m  as calculated from the following:    Height as of this encounter: 1.702 m (5' 7\").    Weight as of this encounter: 82.6 kg (182 lb)., PRESENT ON ADMISSION     # Financial/Environmental Concerns: none  # Asthma: noted on problem list        Social Drivers of Health    Interpersonal Safety: Low Risk  (3/9/2025)    Interpersonal Safety     Do you feel physically and emotionally safe where you currently live?: Yes     Within the past 12 months, have you been hit, slapped, kicked or otherwise physically hurt by someone?: No     Within the past 12 months, have you been humiliated or emotionally abused in other ways by your partner or ex-partner?: No   Recent Concern: Interpersonal Safety - High Risk (2/6/2025)    Interpersonal Safety     Do you feel physically and emotionally safe where you currently live?: No     Within the past 12 months, have you been hit, slapped, kicked or otherwise physically hurt by someone?: No     Within the past 12 months, have you been humiliated or emotionally abused in other ways by your partner or ex-partner?: No          Disposition Plan     Medically Ready for Discharge: Anticipated in 2-4 Days             Miheret " MD Rody  Hospitalist Service  [facility]  Securely message with [employer] (more info)  Text page via [employer] Paging/Directory   ______________________________________________________________________    Interval History   No distress noted.  Patient states diarrhea is slowing down.  Management plan discussed with the patient and she expressed understanding.    Physical Exam   Vital Signs: Temp: 97.6  F (36.4  C) Temp src: Oral BP: 128/61 Pulse: 72   Resp: 18 SpO2: 94 % O2 Device: Nasal cannula Oxygen Delivery: 1 LPM  Weight: 182 lbs 0 oz    General Appearance:  No distress noted  Respiratory: Good air entry bilaterally  Cardiovascular: S1 and S2 heard, no murmur or gallop  GI: Soft abdomen, no tenderness, normoactive bowel sounds  Skin: Intact and warm    Medical Decision Making       40 MINUTES SPENT BY ME on the date of service doing chart review, history, exam, documentation & further activities per the note.      Data

## 2025-03-11 VITALS
HEIGHT: 67 IN | SYSTOLIC BLOOD PRESSURE: 145 MMHG | HEART RATE: 70 BPM | DIASTOLIC BLOOD PRESSURE: 64 MMHG | RESPIRATION RATE: 16 BRPM | WEIGHT: 182 LBS | BODY MASS INDEX: 28.56 KG/M2 | OXYGEN SATURATION: 96 % | TEMPERATURE: 98 F

## 2025-03-11 LAB
ALBUMIN SERPL BCG-MCNC: 2.7 G/DL (ref 3.5–5.2)
ANION GAP SERPL CALCULATED.3IONS-SCNC: 10 MMOL/L (ref 7–15)
BUN SERPL-MCNC: 27.7 MG/DL (ref 8–23)
CALCIUM SERPL-MCNC: 8.1 MG/DL (ref 8.8–10.4)
CHLORIDE SERPL-SCNC: 108 MMOL/L (ref 98–107)
CREAT SERPL-MCNC: 1.46 MG/DL (ref 0.51–0.95)
EGFRCR SERPLBLD CKD-EPI 2021: 34 ML/MIN/1.73M2
ERYTHROCYTE [DISTWIDTH] IN BLOOD BY AUTOMATED COUNT: 16.4 % (ref 10–15)
GLUCOSE SERPL-MCNC: 123 MG/DL (ref 70–99)
HCO3 SERPL-SCNC: 22 MMOL/L (ref 22–29)
HCT VFR BLD AUTO: 25.2 % (ref 35–47)
HGB BLD-MCNC: 8.2 G/DL (ref 11.7–15.7)
MAGNESIUM SERPL-MCNC: 1.7 MG/DL (ref 1.7–2.3)
MCH RBC QN AUTO: 29.1 PG (ref 26.5–33)
MCHC RBC AUTO-ENTMCNC: 32.5 G/DL (ref 31.5–36.5)
MCV RBC AUTO: 89 FL (ref 78–100)
PHOSPHATE SERPL-MCNC: 2.7 MG/DL (ref 2.5–4.5)
PLATELET # BLD AUTO: 252 10E3/UL (ref 150–450)
POTASSIUM SERPL-SCNC: 3.5 MMOL/L (ref 3.4–5.3)
RBC # BLD AUTO: 2.82 10E6/UL (ref 3.8–5.2)
SODIUM SERPL-SCNC: 140 MMOL/L (ref 135–145)
WBC # BLD AUTO: 8.1 10E3/UL (ref 4–11)

## 2025-03-11 PROCEDURE — 250N000011 HC RX IP 250 OP 636: Mod: JZ | Performed by: HOSPITALIST

## 2025-03-11 PROCEDURE — 83735 ASSAY OF MAGNESIUM: CPT | Performed by: INTERNAL MEDICINE

## 2025-03-11 PROCEDURE — 36415 COLL VENOUS BLD VENIPUNCTURE: CPT | Performed by: STUDENT IN AN ORGANIZED HEALTH CARE EDUCATION/TRAINING PROGRAM

## 2025-03-11 PROCEDURE — 99232 SBSQ HOSP IP/OBS MODERATE 35: CPT | Performed by: INTERNAL MEDICINE

## 2025-03-11 PROCEDURE — 250N000011 HC RX IP 250 OP 636: Performed by: INTERNAL MEDICINE

## 2025-03-11 PROCEDURE — 250N000013 HC RX MED GY IP 250 OP 250 PS 637: Performed by: STUDENT IN AN ORGANIZED HEALTH CARE EDUCATION/TRAINING PROGRAM

## 2025-03-11 PROCEDURE — 258N000003 HC RX IP 258 OP 636: Performed by: INTERNAL MEDICINE

## 2025-03-11 PROCEDURE — 250N000013 HC RX MED GY IP 250 OP 250 PS 637: Performed by: HOSPITALIST

## 2025-03-11 PROCEDURE — 250N000013 HC RX MED GY IP 250 OP 250 PS 637: Performed by: INTERNAL MEDICINE

## 2025-03-11 PROCEDURE — 85018 HEMOGLOBIN: CPT | Performed by: STUDENT IN AN ORGANIZED HEALTH CARE EDUCATION/TRAINING PROGRAM

## 2025-03-11 PROCEDURE — 99239 HOSP IP/OBS DSCHRG MGMT >30: CPT | Performed by: INTERNAL MEDICINE

## 2025-03-11 PROCEDURE — 82435 ASSAY OF BLOOD CHLORIDE: CPT | Performed by: HOSPITALIST

## 2025-03-11 RX ORDER — CARVEDILOL 3.12 MG/1
3.12 TABLET ORAL 2 TIMES DAILY WITH MEALS
Status: DISCONTINUED | OUTPATIENT
Start: 2025-03-11 | End: 2025-03-11 | Stop reason: HOSPADM

## 2025-03-11 RX ORDER — VANCOMYCIN HYDROCHLORIDE 125 MG/1
125 CAPSULE ORAL 4 TIMES DAILY
DISCHARGE
Start: 2025-03-11 | End: 2025-03-23

## 2025-03-11 RX ORDER — ALBUTEROL SULFATE 90 UG/1
1-2 INHALANT RESPIRATORY (INHALATION)
Status: DISCONTINUED | OUTPATIENT
Start: 2025-03-11 | End: 2025-03-11 | Stop reason: HOSPADM

## 2025-03-11 RX ORDER — LEVOFLOXACIN 5 MG/ML
250 INJECTION, SOLUTION INTRAVENOUS EVERY 24 HOURS
Status: DISCONTINUED | OUTPATIENT
Start: 2025-03-11 | End: 2025-03-11

## 2025-03-11 RX ORDER — ALBUTEROL SULFATE 0.83 MG/ML
2.5 SOLUTION RESPIRATORY (INHALATION)
Status: DISCONTINUED | OUTPATIENT
Start: 2025-03-11 | End: 2025-03-11 | Stop reason: HOSPADM

## 2025-03-11 RX ORDER — BACILLUS COAGULANS 1B CELL
1 CAPSULE ORAL EVERY OTHER DAY
DISCHARGE
Start: 2025-03-11

## 2025-03-11 RX ORDER — DOXYCYCLINE 100 MG/1
100 CAPSULE ORAL 2 TIMES DAILY
DISCHARGE
Start: 2025-03-11 | End: 2025-03-16

## 2025-03-11 RX ORDER — MEPERIDINE HYDROCHLORIDE 25 MG/ML
25 INJECTION INTRAMUSCULAR; INTRAVENOUS; SUBCUTANEOUS
Status: DISCONTINUED | OUTPATIENT
Start: 2025-03-11 | End: 2025-03-11 | Stop reason: HOSPADM

## 2025-03-11 RX ORDER — METHYLPREDNISOLONE SODIUM SUCCINATE 40 MG/ML
40 INJECTION INTRAMUSCULAR; INTRAVENOUS
Status: DISCONTINUED | OUTPATIENT
Start: 2025-03-11 | End: 2025-03-11 | Stop reason: HOSPADM

## 2025-03-11 RX ORDER — ACETAMINOPHEN 325 MG/1
975 TABLET ORAL 3 TIMES DAILY
DISCHARGE
Start: 2025-03-11

## 2025-03-11 RX ORDER — DIPHENHYDRAMINE HYDROCHLORIDE 50 MG/ML
25 INJECTION, SOLUTION INTRAMUSCULAR; INTRAVENOUS
Status: DISCONTINUED | OUTPATIENT
Start: 2025-03-11 | End: 2025-03-11 | Stop reason: HOSPADM

## 2025-03-11 RX ORDER — FERROUS SULFATE 325(65) MG
325 TABLET ORAL EVERY OTHER DAY
Status: DISCONTINUED | OUTPATIENT
Start: 2025-03-12 | End: 2025-03-11 | Stop reason: HOSPADM

## 2025-03-11 RX ORDER — DIPHENHYDRAMINE HYDROCHLORIDE 50 MG/ML
50 INJECTION, SOLUTION INTRAMUSCULAR; INTRAVENOUS
Status: DISCONTINUED | OUTPATIENT
Start: 2025-03-11 | End: 2025-03-11 | Stop reason: HOSPADM

## 2025-03-11 RX ADMIN — IRON SUCROSE 300 MG: 20 INJECTION, SOLUTION INTRAVENOUS at 09:57

## 2025-03-11 RX ADMIN — LEVOTHYROXINE SODIUM 50 MCG: 0.03 TABLET ORAL at 08:10

## 2025-03-11 RX ADMIN — Medication 1 TABLET: at 12:59

## 2025-03-11 RX ADMIN — PANTOPRAZOLE SODIUM 40 MG: 40 TABLET, DELAYED RELEASE ORAL at 08:10

## 2025-03-11 RX ADMIN — VANCOMYCIN HYDROCHLORIDE 125 MG: 125 CAPSULE ORAL at 08:10

## 2025-03-11 RX ADMIN — METRONIDAZOLE 500 MG: 500 INJECTION, SOLUTION INTRAVENOUS at 03:37

## 2025-03-11 RX ADMIN — CARVEDILOL 3.12 MG: 3.12 TABLET, FILM COATED ORAL at 09:57

## 2025-03-11 RX ADMIN — ATORVASTATIN CALCIUM 40 MG: 40 TABLET, FILM COATED ORAL at 08:10

## 2025-03-11 RX ADMIN — FLUTICASONE FUROATE AND VILANTEROL TRIFENATATE 1 PUFF: 100; 25 POWDER RESPIRATORY (INHALATION) at 08:09

## 2025-03-11 RX ADMIN — ACETAMINOPHEN 975 MG: 325 TABLET ORAL at 08:10

## 2025-03-11 RX ADMIN — CLOPIDOGREL 75 MG: 75 TABLET ORAL at 08:10

## 2025-03-11 RX ADMIN — VANCOMYCIN HYDROCHLORIDE 125 MG: 125 CAPSULE ORAL at 12:59

## 2025-03-11 ASSESSMENT — ACTIVITIES OF DAILY LIVING (ADL)
ADLS_ACUITY_SCORE: 55
ADLS_ACUITY_SCORE: 55
ADLS_ACUITY_SCORE: 50
ADLS_ACUITY_SCORE: 50
ADLS_ACUITY_SCORE: 55
ADLS_ACUITY_SCORE: 52
ADLS_ACUITY_SCORE: 52
ADLS_ACUITY_SCORE: 55
ADLS_ACUITY_SCORE: 52
ADLS_ACUITY_SCORE: 55
ADLS_ACUITY_SCORE: 55
ADLS_ACUITY_SCORE: 50
ADLS_ACUITY_SCORE: 50
ADLS_ACUITY_SCORE: 55

## 2025-03-11 NOTE — PLAN OF CARE
Occupational Therapy Discharge Summary    Reason for therapy discharge:    Discharged to TCU.    Progress towards therapy goal(s). See goals on Care Plan in [facility] electronic health record for goal details.  Progressing towards goals.    Therapy recommendation(s):    Recommend continued OT @ TCU.

## 2025-03-11 NOTE — PROGRESS NOTES
RENAL PROGRESS NOTE    CC:  CARLYLE on CKD    ASSESSMENT & PLAN:   Acute kidney injury - Most recent Cr prior to admission was 1.57, admit Cr 3.15. Likely CARLYLE related to diarrhea and trending back to normal today.  Recs:  - at baseline  - okay for discharge when ready from a renal perspective  - follow up in our clinic on 4/8/2025 at 11am with Olga Colmenares NP    CKD3b. Related to recurrent CARLYLE and HTN.  Was seen by Dr Bravo in her previous admission.      C diff colitis. On vancomycin. Patient reports it is slowly improving.    Hypertension. ARB held on previous admission. BP more consistently elevated.      HFpEF. Not grossly decompensated. Holding diuretics for now.  Was only on 20mg lasix every other day previously, watch for need to resume this    Anemia - hgb remains in the 7-8s after GI bleed in the last month.  Had been on oral iron before but stopped. Iron sat low but ferritin fairly elevated. I think could tolerate 300mg IV iron and then can resume oral iron every other day     Gout - on prn allopurinol which is not really an effective way to take this, no recent flares however        SUBJECTIVE:  Seen in room. Better today.  BP really quite elevated this am and coreg restarted.  Breathing okay.  Diarrhea slowly improving.  Discussed iron studies, would be reasonable to give some IV iron, okay to resume oral iron after that.  Sounds like she's going to a TCU.    OBJECTIVE:  Physical Exam   Temp: 98.7  F (37.1  C) Temp src: Oral BP: (!) 185/79 (RN notified) Pulse: 78   Resp: 20 SpO2: 96 % O2 Device: None (Room air)    Vitals:    03/08/25 1919   Weight: 82.6 kg (182 lb)     Vital Signs with Ranges  Temp:  [98.1  F (36.7  C)-98.7  F (37.1  C)] 98.7  F (37.1  C)  Pulse:  [75-80] 78  Resp:  [16-20] 20  BP: (137-185)/(63-79) 185/79  SpO2:  [92 %-96 %] 96 %  I/O last 3 completed shifts:  In: 500 [P.O.:500]  Out: -     @TMAXR(24)@    Patient Vitals for the past 72 hrs:   Weight   03/08/25 1919 82.6 kg (182 lb)      Intake/Output Summary (Last 24 hours) at 3/10/2025 1136  Last data filed at 3/10/2025 0830  Gross per 24 hour   Intake 1780 ml   Output --   Net 1780 ml       PHYSICAL EXAM:  General - Alert and oriented x3, appears comfortable, NAD  Cardiovascular - Regular rate and rhythm, no rub  Respiratory - Clear to auscultation bilaterally, no crackles or wheezes  Abd: BS present, nontender  Extremities - No lower extremity edema bilaterally  Skin: dry, intact, no rash, good turgor  Neuro:  Grossly intact, no focal deficits  MSK:  Grossly intact  Psych:  Normal affect    LABORATORY STUDIES:     Recent Labs   Lab 03/11/25  0510 03/10/25  0641 03/09/25  0713 03/08/25 1956   WBC 8.1 8.2 11.6* 16.2*   RBC 2.82* 2.67* 2.66* 3.00*   HGB 8.2* 7.6* 7.8* 8.5*   HCT 25.2* 24.1* 24.4* 27.1*    210 244 308       Basic Metabolic Panel:  Recent Labs   Lab 03/11/25  0510 03/10/25  2129 03/10/25  0641 03/09/25  0713 03/09/25  0150 03/08/25 1956     --  137 137 136 134*   POTASSIUM 3.5  --  3.7 4.0 3.7 3.4   CHLORIDE 108*  --  105 99 99 95*   CO2 22  --  24 23 23 22   BUN 27.7*  --  30.8* 32.1* 34.4* 33.9*   CR 1.46*  --  2.24* 2.82* 3.07* 3.15*   * 135* 114* 89 102* 118*   GREG 8.1*  --  7.8* 7.7* 7.8* 7.9*       INRNo lab results found in last 7 days.     Recent Labs   Lab Test 03/11/25  0510 03/10/25  0641 02/06/25  1138 02/06/25  0731 02/05/25 2054 02/05/25  1012   INR  --   --   --  1.45*  --  1.20*   WBC 8.1 8.2   < >  --    < > 16.3*   HGB 8.2* 7.6*   < >  --    < > 7.8*    210   < >  --    < > 209    < > = values in this interval not displayed.       Personally reviewed current labs      Emmanuellewis Correa  [facility]  [phone]

## 2025-03-11 NOTE — DISCHARGE SUMMARY
"[facility]  Hospitalist Discharge Summary      Date of Admission:  3/8/2025  Date of Discharge:  3/11/2025  Discharging Provider: Bin Hilton DO, DO  Discharge Service: Hospitalist Service    Discharge Diagnoses       Clinically Significant Risk Factors     # Overweight: Estimated body mass index is 28.51 kg/m  as calculated from the following:    Height as of this encounter: 1.702 m (5' 7\").    Weight as of this encounter: 82.6 kg (182 lb).       Follow-ups Needed After Discharge   Follow-up Appointments       Follow Up and recommended labs and tests      Follow up with Nursing home physician.  No follow up labs or test are needed.                Unresulted Labs Ordered in the Past 30 Days of this Admission       Date and Time Order Name Status Description    3/9/2025  4:44 AM Urine Culture Preliminary     3/9/2025 12:54 AM Blood Culture Arm, Right Preliminary         These results will be followed up by pcp    Discharge Disposition   Discharged to short-term care facility  Condition at discharge: Stable    Hospital Course     89 y/o F presents today with lightheadedness and a fall.  Recently admitted for GI bleed and then readmitted with acute kidney injury.  Past medical history is significant for CKD stage III, asthma, CAD S/P PCI, GERD, hypertension.  Has had recurrent hospitalization in recent months.     Generalized weakness  Multifocal colitis  C. difficile colitis  UTI  CT abdomen findings are consistent with nonspecific multifocal colitis.  UC showed GNB with final Cx pending on discharge.  Based prior UC of pansensitive E.Coli and now in setting of C. Diff after receiving 2 doses IV LQ he was changed to doxycyline on discharge.    -Final UC results to be follow-up by MD provider  Diarrhea improved  Stopped levofloxacin and metronidazole on day of discharge (started for UTI)  Oral vancomycin 125 mg oral 4 times daily as RX'd  GI consult appreciated.SO     Acute kidney " injury superimposed on stage 3b chronic kidney disease (H)  -Possibly prerenal from recent Lasix and volume loss due to  GI losses   -Cr returned to baseline  -Follow-up nephrology, 4/8/2025 at 11am with Olga Colmenares NP     Hypomagnesemia  Replaced     chronic anemia- anemia of chronic disease with kidney problems stage IIIb and chronic blood loss anemia  -hx recent GI bleed, addressed on admission 2/5- 2/13, resolved now.  -Hgb stable. No e/o active bleeding.       GERD.    -last admission had EGD showed gastric ulcer with visible vessel  -continue PPI BID  -no s/s recurrent bleeding  -outpatient GI followup for repeat EGD in 1 months     Asthma , Chronic Obstructive Pulmonary Disease   -home Advair, Singulair  -Continue outpatient follow-up     Hypothyroidism, home Synthroid    Consultations This Hospital Stay   PHYSICAL THERAPY ADULT IP CONSULT  OCCUPATIONAL THERAPY ADULT IP CONSULT  CARE MANAGEMENT / SOCIAL WORK IP CONSULT  NEPHROLOGY IP CONSULT  GASTROENTEROLOGY IP CONSULT  CARE MANAGEMENT / SOCIAL WORK IP CONSULT  PHYSICAL THERAPY ADULT IP CONSULT  OCCUPATIONAL THERAPY ADULT IP CONSULT    Code Status   Full Code    Time Spent on this Encounter   I, Bin Hilton DO, DO, personally saw the patient today and spent greater than 30 minutes discharging this patient.       Bin Hilton DO, DO  [address] [address]  Phone: [phone]  Fax: [phone]  ______________________________________________________________________    Physical Exam   Vital Signs: Temp: 98  F (36.7  C) Temp src: Oral BP: (!) 145/64 Pulse: 70   Resp: 16 SpO2: 96 % O2 Device: None (Room air)    Weight: 182 lbs 0 oz    Gen nad  Cv rrr  Lungs non-labored  Abd nd       Primary Care Physician   Jessica Fonseca    Discharge Orders      Primary Care - Care Coordination Referral      Follow Up (TCM)    Follow up with Olga Colmenares, at [address] [facility], [address], [facility],  [address], on 4/8/2025 at 11am for hospital follow- up. No follow up labs or test are needed.     General info for SNF    Length of Stay Estimate: Short Term Care: Estimated # of Days <30  Condition at Discharge: Stable  Level of care:skilled   Rehabilitation Potential: Good  Admission H&P remains valid and up-to-date: yes  Recent Chemotherapy: N/A  Use Nursing Home Standing Orders: Yes     Mantoux instructions    Give two-step Mantoux (PPD) Per Facility Policy Yes     Follow Up and recommended labs and tests    Follow up with Nursing home physician.  No follow up labs or test are needed.     Reason for your hospital stay    Refer to medical record     Intake and output    Every shift     Daily weights    Call Provider for weight gain of more than 2 pounds per day or 5 pounds per week.     Activity - Up with nursing assistance     Weight bearing status    W/ assist     Full Code     Physical Therapy Adult Consult    Evaluate and treat as clinically indicated.    Reason:  weakness     Occupational Therapy Adult Consult    Evaluate and treat as clinically indicated.    Reason:  weakness     Contact Isolation    enteric     Fall precautions     Diet    Follow this diet upon discharge: Current Diet:Orders Placed This Encounter      Combination Diet Regular Diet Adult       Significant Results and Procedures       Discharge Medications   Current Discharge Medication List        START taking these medications    Details   doxycycline hyclate (VIBRAMYCIN) 100 MG capsule Take 1 capsule (100 mg) by mouth 2 times daily for 5 days.    Associated Diagnoses: Infective urethritis      Elemental iron 65 mg Vitamin C 125 mg (VITRON-C)  MG TABS tablet Take 1 tablet by mouth every other day.    Associated Diagnoses: Iron deficiency      vancomycin (VANCOCIN) 125 MG capsule Take 1 capsule (125 mg) by mouth 4 times daily for 12 days.    Associated Diagnoses: C. difficile colitis           CONTINUE these medications which have CHANGED     Details   acetaminophen (TYLENOL) 325 MG tablet Take 3 tablets (975 mg) by mouth 3 times daily.    Associated Diagnoses: Pain           CONTINUE these medications which have NOT CHANGED    Details   albuterol (2.5 MG/3ML) 0.083% nebulizer solution Take 1 vial by nebulization every 4 hours as needed for shortness of breath / dyspnea or wheezing      albuterol (PROAIR HFA/PROVENTIL HFA/VENTOLIN HFA) 108 (90 BASE) MCG/ACT Inhaler Inhale 2 puffs into the lungs every 4 hours as needed for shortness of breath      atorvastatin (LIPITOR) 40 MG tablet Take 1 tablet (40 mg) by mouth daily  Qty: 90 tablet, Refills: 3    Associated Diagnoses: Presence of drug-eluting stent in left circumflex coronary artery      carvedilol (COREG) 3.125 MG tablet Take 1 tablet (3.125 mg) by mouth 2 times daily (with meals).  Qty: 60 tablet, Refills: 1    Associated Diagnoses: NSTEMI (non-ST elevated myocardial infarction) (H)      cetirizine (ZYRTEC) 10 MG tablet Take 10 mg by mouth daily as needed for allergies      clopidogrel (PLAVIX) 75 MG tablet Take 1 tablet (75 mg) by mouth daily Dose to start tomorrow.  Qty: 90 tablet, Refills: 3    Associated Diagnoses: Presence of drug-eluting stent in left circumflex coronary artery      fluticasone-salmeterol (ADVAIR) 250-50 MCG/DOSE diskus inhaler Inhale 1 puff into the lungs daily as needed.      levothyroxine (SYNTHROID/LEVOTHROID) 50 MCG tablet Take 50 mcg by mouth daily      montelukast (SINGULAIR) 10 MG tablet Take 10 mg by mouth at bedtime      multivitamin w/minerals (THERA-VIT-M) tablet Take 1 tablet by mouth every morning.      pantoprazole (PROTONIX) 40 MG EC tablet Take 1 tablet (40 mg) by mouth 2 times daily.  Qty: 180 tablet, Refills: 0    Associated Diagnoses: Gastrointestinal hemorrhage with melena      polyethylene glycol (MIRALAX/GLYCOLAX) powder Take 17 g by mouth daily as needed for constipation      predniSONE (DELTASONE) 20 MG tablet Take 20 mg by mouth daily as needed  (Self doses in winter for lung infections.)      valACYclovir (VALTREX) 1000 mg tablet Take 1,000 mg by mouth 2 times daily as needed (for one day)      Vitamin D3 (CHOLECALCIFEROL) 25 mcg (1000 units) tablet Take 1,000 Units by mouth every morning.           STOP taking these medications       allopurinol (ZYLOPRIM) 100 MG tablet Comments:   Reason for Stopping:         furosemide (LASIX) 20 MG tablet Comments:   Reason for Stopping:             Allergies   Allergies   Allergen Reactions    Amoxicillin Hives    Keflex [Cephalexin] Hives    Lisinopril Shortness Of Breath    Penicillins Hives

## 2025-03-11 NOTE — PROGRESS NOTES
Care Management Discharge Note    Discharge Date: 03/11/2025       Discharge Disposition:  [facility]    Discharge Services:  Rehab    Discharge DME:  walker    Discharge Transportation:  family     Private pay costs discussed: Not applicable    Does the patient's insurance plan have a 3 day qualifying hospital stay waiver?  No    PAS Confirmation Code:  [id number]  Patient/family educated on Medicare website which has current facility and service quality ratings:      Education Provided on the Discharge Plan:    Persons Notified of Discharge Plans: patient and heber Flores  Patient/Family in Agreement with the Plan:      Handoff Referral Completed: No, handoff not indicated or clinically appropriate    Additional Information:  Patient has a bed today at [facility]. [facility] updated patient at bedside and she is agreeable to accepting room. Carmen in admissions updated via EPIC.   Heber Flores updated by phone and also agreeable for discharge to [facility].   Orders placed and sent to facility.   Heber will transport around 3pm.     RACHEL GarzaSW

## 2025-03-11 NOTE — PLAN OF CARE
Physical Therapy Discharge Summary    Reason for therapy discharge:    Discharged to transitional care facility.    Progress towards therapy goal(s). See goals on Care Plan in [facility] electronic health record for goal details.  Goals partially met.  Barriers to achieving goals:   limited tolerance for therapy and discharge from facility.    Therapy recommendation(s):    Continued therapy is recommended.  Rationale/Recommendations:  PT goals not fully met.

## 2025-03-11 NOTE — PLAN OF CARE
"  Problem: Adult Inpatient Plan of Care  Goal: Plan of Care Review  Description: The Plan of Care Review/Shift note should be completed every shift.  The Outcome Evaluation is a brief statement about your assessment that the patient is improving, declining, or no change.  This information will be displayed automatically on your shift  note.  Outcome: Adequate for Care Transition  Goal: Patient-Specific Goal (Individualized)  Description: You can add care plan individualizations to a care plan. Examples of Individualization might be:  \"Parent requests to be called daily at 9am for status\", \"I have a hard time hearing out of my right ear\", or \"Do not touch me to wake me up as it startles  me\".  Outcome: Adequate for Care Transition  Goal: Absence of Hospital-Acquired Illness or Injury  Outcome: Adequate for Care Transition  Intervention: Identify and Manage Fall Risk  Recent Flowsheet Documentation  Taken 3/11/2025 0900 by Chanelle Morales RN  Safety Promotion/Fall Prevention: assistive device/personal items within reach  Intervention: Prevent Skin Injury  Recent Flowsheet Documentation  Taken 3/11/2025 0900 by Chanelle Morales RN  Skin Protection: incontinence pads utilized  Intervention: Prevent and Manage VTE (Venous Thromboembolism) Risk  Recent Flowsheet Documentation  Taken 3/11/2025 0900 by Chanelle Morales RN  VTE Prevention/Management: SCDs off (sequential compression devices)  Intervention: Prevent Infection  Recent Flowsheet Documentation  Taken 3/11/2025 0900 by Chanelle Morales RN  Infection Prevention:   single patient room provided   rest/sleep promoted   personal protective equipment utilized   hand hygiene promoted   equipment surfaces disinfected   environmental surveillance performed  Goal: Optimal Comfort and Wellbeing  Outcome: Adequate for Care Transition  Goal: Readiness for Transition of Care  Outcome: Adequate for Care Transition   Goal Outcome Evaluation:       Pt is alert and " orientedx4.Short term memory is somewhat poor.She has 2 stools this shift-1stool formed and 1 loose. Tolerating small amts of a regular diet. Up with SBA of 1 to bathroom. Denies pain. BP 170s this am coreg started-;last BP in the 140s. RA sat 95-96%. Using Incentive spirometer.Ready for transfer to [facility]  for TCU.Iron sucrose IV given an tolerated well.

## 2025-03-11 NOTE — PLAN OF CARE
Problem: Adult Inpatient Plan of Care  Goal: Plan of Care Review  Description: The Plan of Care Review/Shift note should be completed every shift.  The Outcome Evaluation is a brief statement about your assessment that the patient is improving, declining, or no change.  This information will be displayed automatically on your shift  note.  Outcome: Progressing     Problem: Adult Inpatient Plan of Care  Goal: Absence of Hospital-Acquired Illness or Injury  Intervention: Identify and Manage Fall Risk  Recent Flowsheet Documentation  Taken 3/11/2025 0050 by Yisel Cruz RN  Safety Promotion/Fall Prevention:   activity supervised   assistive device/personal items within reach   clutter free environment maintained   lighting adjusted   nonskid shoes/slippers when out of bed   room door open   room near nurse's station   safety round/check completed     Problem: Adult Inpatient Plan of Care  Goal: Optimal Comfort and Wellbeing  Outcome: Progressing   Goal Outcome Evaluation:    A & O x 4, VSS, on RA, denied pain. Up to the bathroom assist x 1. Pt still having loose stools. Pleasant and cooperative.

## 2025-03-12 LAB
BACTERIA UR CULT: ABNORMAL
BACTERIA UR CULT: ABNORMAL

## 2025-03-13 ENCOUNTER — LAB REQUISITION (OUTPATIENT)
Dept: LAB | Facility: CLINIC | Age: 89
End: 2025-03-13
Payer: COMMERCIAL

## 2025-03-13 DIAGNOSIS — E55.9 VITAMIN D DEFICIENCY, UNSPECIFIED: ICD-10-CM

## 2025-03-13 DIAGNOSIS — D64.9 ANEMIA, UNSPECIFIED: ICD-10-CM

## 2025-03-13 DIAGNOSIS — I10 ESSENTIAL (PRIMARY) HYPERTENSION: ICD-10-CM

## 2025-03-13 LAB
ATRIAL RATE - MUSE: 80 BPM
BACTERIA BLD CULT: NORMAL
DIASTOLIC BLOOD PRESSURE - MUSE: NORMAL MMHG
INTERPRETATION ECG - MUSE: NORMAL
P AXIS - MUSE: 80 DEGREES
PR INTERVAL - MUSE: 174 MS
QRS DURATION - MUSE: 130 MS
QT - MUSE: 420 MS
QTC - MUSE: 484 MS
R AXIS - MUSE: 52 DEGREES
SYSTOLIC BLOOD PRESSURE - MUSE: NORMAL MMHG
T AXIS - MUSE: 101 DEGREES
VENTRICULAR RATE- MUSE: 80 BPM

## 2025-03-14 LAB
ANION GAP SERPL CALCULATED.3IONS-SCNC: 12 MMOL/L (ref 7–15)
BUN SERPL-MCNC: 14.8 MG/DL (ref 8–23)
CALCIUM SERPL-MCNC: 7.9 MG/DL (ref 8.8–10.4)
CHLORIDE SERPL-SCNC: 112 MMOL/L (ref 98–107)
CREAT SERPL-MCNC: 1.02 MG/DL (ref 0.51–0.95)
EGFRCR SERPLBLD CKD-EPI 2021: 53 ML/MIN/1.73M2
ERYTHROCYTE [DISTWIDTH] IN BLOOD BY AUTOMATED COUNT: 17.2 % (ref 10–15)
GLUCOSE SERPL-MCNC: 112 MG/DL (ref 70–99)
HCO3 SERPL-SCNC: 22 MMOL/L (ref 22–29)
HCT VFR BLD AUTO: 28.1 % (ref 35–47)
HGB BLD-MCNC: 8.6 G/DL (ref 11.7–15.7)
MCH RBC QN AUTO: 28.7 PG (ref 26.5–33)
MCHC RBC AUTO-ENTMCNC: 30.6 G/DL (ref 31.5–36.5)
MCV RBC AUTO: 94 FL (ref 78–100)
PLATELET # BLD AUTO: 274 10E3/UL (ref 150–450)
POTASSIUM SERPL-SCNC: 3.2 MMOL/L (ref 3.4–5.3)
RBC # BLD AUTO: 3 10E6/UL (ref 3.8–5.2)
SODIUM SERPL-SCNC: 146 MMOL/L (ref 135–145)
WBC # BLD AUTO: 7.2 10E3/UL (ref 4–11)

## 2025-03-14 PROCEDURE — 82435 ASSAY OF BLOOD CHLORIDE: CPT | Performed by: FAMILY MEDICINE

## 2025-03-14 PROCEDURE — 80048 BASIC METABOLIC PNL TOTAL CA: CPT | Performed by: FAMILY MEDICINE

## 2025-03-14 PROCEDURE — 82306 VITAMIN D 25 HYDROXY: CPT | Performed by: FAMILY MEDICINE

## 2025-03-14 PROCEDURE — 85027 COMPLETE CBC AUTOMATED: CPT | Performed by: FAMILY MEDICINE

## 2025-03-14 PROCEDURE — 84295 ASSAY OF SERUM SODIUM: CPT | Performed by: FAMILY MEDICINE

## 2025-03-14 PROCEDURE — P9604 ONE-WAY ALLOW PRORATED TRIP: HCPCS | Performed by: FAMILY MEDICINE

## 2025-03-14 PROCEDURE — 36415 COLL VENOUS BLD VENIPUNCTURE: CPT | Performed by: FAMILY MEDICINE

## 2025-03-16 LAB — VIT D+METAB SERPL-MCNC: 36 NG/ML (ref 20–50)

## 2025-03-25 ENCOUNTER — TELEPHONE (OUTPATIENT)
Dept: PHARMACY | Facility: OTHER | Age: 89
End: 2025-03-25
Payer: COMMERCIAL

## 2025-03-25 NOTE — TELEPHONE ENCOUNTER
Entira patient    [facility] Recruitment: [facility] insurance     Referral outreach attempt #1 on March 25, 2025      Outcome: left voicemail- Call back number [phone] and MyChart message sent    See Parker MEJÍA   [phone]

## 2025-03-26 NOTE — PROGRESS NOTES
Medication Therapy Management (MTM) Encounter    ASSESSMENT:                            Medication Adherence/Access: No issues identified.    ***:   ***    PLAN:                            ***    Follow-up: Return in 20 days (on 4/16/2025) for Follow up with Dr. Fonseca.  Appointments in Next Year      Mar 27, 2025 3:00 PM  MTM New with Sonia Boston [facility] ([facility] ) [phone]     Apr 22, 2025 8:50 AM  (Arrive by 8:35 AM)  Return Cardiology with Ata Mann DO  [facility] ([facility] ) [phone]          SUBJECTIVE/OBJECTIVE:                          Mary Corral is a 89 year old female seen for a yearly medication review. She was referred to me from insurance plan.      Reason for visit: yearly med review.    Allergies/ADRs: Reviewed in chart  Past Medical History: Reviewed in chart  Tobacco: She reports that she has never smoked. She has never used smokeless tobacco.  Alcohol: none    Medication Adherence/Access: no issues reported.     PT and nursing weekly   Last took indomethacin one month ago            Ok to use tylenol as needed  Using advair 1-2 times daily  Continue taking all other medications as prescribed at this time.         Hypertension /CAD  ***  Clopidogrel Bisulfate 75 MG Tablet, 1 tablet Orally Once a day   Carvedilol 3.125 MG Tablet, 1 tablet Orally Twice a day with meals    hydrALAZINE HCl 10 MG Tablet, 1 tablet with food Orally as needed every 6 hours for SBP >180 or DBP >105 (used about four times in the last week)   Patient reports {side effects:950387}  Patient {HTNDoes/doesnot:394073}.  BP runs high in the morning (160/) PM runs 150/50     Hyperlipidemia   Atorvastatin 40 mg daily  Patient reports no significant myalgias or other side effects.       Allergy    Montelukast 10 mg tablet daily   Patient reports no current medication side effects.     Patient feels  that current therapy is effective.      COPD   ***  Albuterol Sulfate  (90 Base) MCG/ACT Aerosol Solution, Sig: INHALE 2 PUFFS BY INTO THE LUNGS AS NEEDED every 4 hours, as needed   Taking Albuterol Sulfate (2.5 MG/3ML) 0.083% Nebulization Solution, Sig: Inhale 3 ml 4 times a day as needed via nebulizer (using as needed)  Taking Fluticasone-Salmeterol 250-50 MCG/ACT Aerosol Powder Breath Activated, Si puff Inhalation Once daily  {Steroid inhaler -- Delete if patient does not use steroid:171165}    Patient reports {:745705}.    Patient reports the following symptoms: {COPD SYMPTOMS:041816}.  {COPDoptionsMTM:773467}     Constipation   ***  MiraLax 17 GM/SCOOP Powder, Si scoop mixed with 8 ounces of fluid as needed Orally Once a day  {constipationdetails:155724}  Patient feels that current therapy {IS NOT/IS:260065} effective.   Patient reports {:191671}.       GERD    Pantoprazole 40 mg twice daily   Patient reports no current symptoms.   Patient feels that current regimen is effective.       Hypothyroidism   Levothyroxine 50 mcg daily.   Patient is having the following symptoms: none.      Supplements   Vitron-C tablet every other day   Vitamin D3 25 MCG (1000 UT) Tablet daily    ICaps AREDS Formula twice daily    Multivitamin one tablet daily   Vitamin C 500 mg tablet daily   {supplement:747507}       Pain  Acetaminophen 325 MG Tablet, 3 tablets Orally three times a day          Today's Vitals: There were no vitals taken for this visit.  ----------------  Post Discharge Medication Reconciliation Status: {ACO Med Rec (Provider):064259}.    I spent 26 minutes with this patient today. All changes were made via collaborative practice agreement with Jessica Fonseca MD.     A summary of these recommendations was sent via Make My plate.    Sonia Boston PharmD  Medication Therapy Management Pharmacist     Telemedicine Visit Details  The patient's medications can be safely assessed via a telemedicine  encounter.  Type of service:  Telephone visit  Originating Location (pt. Location): Home  {PROVIDER LOCATION On-site should be selected for visits conducted from your clinic location or adjoining [facility] hospital, academic office, or other nearby [facility] building. Off-site should be selected for all other provider locations, including home:[address]}  Distant Location (provider location):  On-site  Start Time:  3:00 PM  End Time: 3:26 PM     Medication Therapy Recommendations  No medication therapy recommendations to display

## 2025-03-27 ENCOUNTER — VIRTUAL VISIT (OUTPATIENT)
Dept: PHARMACY | Facility: PHYSICIAN GROUP | Age: 89
End: 2025-03-27
Payer: COMMERCIAL

## 2025-03-27 DIAGNOSIS — J44.9 CHRONIC OBSTRUCTIVE PULMONARY DISEASE, UNSPECIFIED COPD TYPE (H): ICD-10-CM

## 2025-03-27 DIAGNOSIS — E78.2 MIXED HYPERLIPIDEMIA: ICD-10-CM

## 2025-03-27 DIAGNOSIS — Z90.89 S/P THYROIDECTOMY: ICD-10-CM

## 2025-03-27 DIAGNOSIS — Z98.890 S/P THYROIDECTOMY: ICD-10-CM

## 2025-03-27 DIAGNOSIS — K21.00 GASTROESOPHAGEAL REFLUX DISEASE WITH ESOPHAGITIS, UNSPECIFIED WHETHER HEMORRHAGE: ICD-10-CM

## 2025-03-27 DIAGNOSIS — I25.10 CORONARY ARTERY DISEASE INVOLVING NATIVE HEART WITHOUT ANGINA PECTORIS, UNSPECIFIED VESSEL OR LESION TYPE: ICD-10-CM

## 2025-03-27 DIAGNOSIS — I10 ESSENTIAL HYPERTENSION: Primary | ICD-10-CM

## 2025-03-27 DIAGNOSIS — K59.00 CONSTIPATION, UNSPECIFIED CONSTIPATION TYPE: ICD-10-CM

## 2025-03-27 DIAGNOSIS — R52 PAIN: ICD-10-CM

## 2025-03-27 DIAGNOSIS — Z78.9 TAKES DIETARY SUPPLEMENTS: ICD-10-CM

## 2025-03-27 DIAGNOSIS — J30.2 SEASONAL ALLERGIES: ICD-10-CM

## 2025-03-27 PROCEDURE — 99607 MTMS BY PHARM ADDL 15 MIN: CPT | Mod: 93 | Performed by: PHARMACIST

## 2025-03-27 PROCEDURE — 1111F DSCHRG MED/CURRENT MED MERGE: CPT | Mod: 93 | Performed by: PHARMACIST

## 2025-03-27 PROCEDURE — 99605 MTMS BY PHARM NP 15 MIN: CPT | Mod: 93 | Performed by: PHARMACIST

## 2025-03-27 RX ORDER — MULTIVIT-MIN/IRON/FOLIC ACID/K 18-600-40
1 CAPSULE ORAL DAILY
COMMUNITY

## 2025-03-27 NOTE — LETTER
"Recommended To-Do List      Prepared on: Mar 27, 2025       You can get the best results from your medications by completing the items on this \"To-Do List.\"      Bring your To-Do List when you go to your doctor. And, share it with your family or caregivers.    My To-Do List:  What we talked about: What I should do:   A medication that you may no longer need    It is ok to use tylenol (acetaminophen) as needed for mild pain.           What we talked about: What I should do:                     "

## 2025-03-27 NOTE — LETTER
March 31, 2025  Mary Corral  [address] [place] [address]    Dear Ms. Corral, [facility]     Thank you for talking with me on Mar 27, 2025 about your health and medications. As a follow-up to our conversation, I have included two documents:      Your Recommended To-Do List has steps you should take to get the best results from your medications.  Your Medication List will help you keep track of your medications and how to take them.    If you want to talk about these documents, please call Sonia Boston RPH at phone: [phone], Monday-Friday 8-4:30pm.    I look forward to working with you and your doctors to make sure your medications work well for you.    Sincerely,  Sonia Boston RPH  [facility] Pharmacist, [facility]

## 2025-03-27 NOTE — LETTER
_  Medication List        Prepared on: Mar 27, 2025     Bring your Medication List when you go to the doctor, hospital, or   emergency room. And, share it with your family or caregivers.     Note any changes to how you take your medications.  Cross out medications when you no longer use them.    Medication How I take it Why I use it Prescriber   acetaminophen (TYLENOL) 325 MG tablet Take 3 tablets (975 mg) by mouth 3 times daily. Pain Bin Hilton, DO   albuterol (2.5 MG/3ML) 0.083% nebulizer solution Take 1 vial by nebulization every 4 hours as needed for shortness of breath / dyspnea or wheezing  Shortness of breath Jessica Fonseca   albuterol (PROAIR HFA/PROVENTIL HFA/VENTOLIN HFA) 108 (90 BASE) MCG/ACT Inhaler Inhale 2 puffs into the lungs every 4 hours as needed for shortness of breath Shortness of breath Jessica Fonseca   Ascorbic Acid (VITAMIN C) 500 MG CAPS Take 1 capsule by mouth daily. General health Patient Reported   atorvastatin (LIPITOR) 40 MG tablet Take 1 tablet (40 mg) by mouth daily Presence of drug-eluting stent in left circumflex coronary artery Leslie Bailey CNP   carvedilol (COREG) 3.125 MG tablet Take 1 tablet (3.125 mg) by mouth 2 times daily (with meals). NSTEMI (Non-ST Elevated Myocardial Infarction) (H) MELBA Knott   clopidogrel (PLAVIX) 75 MG tablet Take 1 tablet (75 mg) by mouth daily Dose to start tomorrow. Presence of drug-eluting stent in left circumflex coronary artery Leslie Bailey CNP   Elemental iron 65 mg Vitamin C 125 mg (VITRON-C)  MG TABS tablet Take 1 tablet by mouth every other day. Iron Deficiency Bin Hilton DO   fluticasone-salmeterol (ADVAIR) 250-50 MCG/DOSE diskus inhaler Inhale 1 puff into the lungs daily as needed.  COPD Jessica Fonseca   levothyroxine (SYNTHROID/LEVOTHROID) 50 MCG tablet Take 50 mcg by mouth daily. Underactive Thyroid Jessica Fonseca MD   montelukast (SINGULAIR) 10 MG tablet  Take 10 mg by mouth at bedtime. Hayfever Jessica Fonseca MD   multivitamin w/minerals (THERA-VIT-M) tablet Take 1 tablet by mouth every morning.  General Health  Patient Reported   pantoprazole (PROTONIX) 40 MG EC tablet Take 1 tablet (40 mg) by mouth 2 times daily. Gastrointestinal hemorrhage with melena Kirby Ayala MD   polyethylene glycol (MIRALAX/GLYCOLAX) powder Take 17 g by mouth daily as needed for constipation. Constipation Patient Reported   predniSONE (DELTASONE) 20 MG tablet Take 20 mg by mouth daily as needed (Self doses in winter for lung infections.). Lung infection Jessica Fonseca MD   valACYclovir (VALTREX) 1000 mg tablet Take 1,000 mg by mouth 2 times daily as needed (for one day). Cold sores Jessica Fonseca MD   Vitamin D3 (CHOLECALCIFEROL) 25 mcg (1000 units) tablet Take 1,000 Units by mouth every morning. Vitamin D Deficiency Patient Reported         Add new medications, over-the-counter drugs, herbals, vitamins, or  minerals in the blank rows below.    Medication How I take it Why I use it Prescriber                                      Allergies:      - Amoxicillin - Hives  - Keflex [cephalexin] - Hives  - Lisinopril - Shortness Of Breath  - Penicillins - Hives        Side effects I have had:      Not on File        Other Information:              My notes and questions:

## 2025-04-01 ENCOUNTER — LAB REQUISITION (OUTPATIENT)
Dept: LAB | Facility: CLINIC | Age: 89
End: 2025-04-01
Payer: COMMERCIAL

## 2025-04-01 DIAGNOSIS — M1A.0320: ICD-10-CM

## 2025-04-01 PROCEDURE — 86140 C-REACTIVE PROTEIN: CPT | Mod: ORL | Performed by: FAMILY MEDICINE

## 2025-04-01 PROCEDURE — 84550 ASSAY OF BLOOD/URIC ACID: CPT | Mod: ORL | Performed by: FAMILY MEDICINE

## 2025-04-02 LAB
CRP SERPL-MCNC: 90.2 MG/L
URATE SERPL-MCNC: 11.4 MG/DL (ref 2.4–5.7)

## 2025-04-22 ENCOUNTER — OFFICE VISIT (OUTPATIENT)
Dept: CARDIOLOGY | Facility: CLINIC | Age: 89
End: 2025-04-22
Attending: STUDENT IN AN ORGANIZED HEALTH CARE EDUCATION/TRAINING PROGRAM
Payer: COMMERCIAL

## 2025-04-22 VITALS
HEART RATE: 72 BPM | SYSTOLIC BLOOD PRESSURE: 168 MMHG | RESPIRATION RATE: 24 BRPM | WEIGHT: 177.8 LBS | DIASTOLIC BLOOD PRESSURE: 72 MMHG | BODY MASS INDEX: 28.57 KG/M2 | HEIGHT: 66 IN

## 2025-04-22 DIAGNOSIS — E78.5 DYSLIPIDEMIA, GOAL LDL BELOW 70: ICD-10-CM

## 2025-04-22 DIAGNOSIS — I10 PRIMARY HYPERTENSION: Primary | ICD-10-CM

## 2025-04-22 DIAGNOSIS — I25.10 CORONARY ARTERY DISEASE INVOLVING NATIVE CORONARY ARTERY OF NATIVE HEART WITHOUT ANGINA PECTORIS: ICD-10-CM

## 2025-04-22 DIAGNOSIS — Z95.5 S/P DRUG ELUTING CORONARY STENT PLACEMENT: ICD-10-CM

## 2025-04-22 PROCEDURE — G2211 COMPLEX E/M VISIT ADD ON: HCPCS | Performed by: STUDENT IN AN ORGANIZED HEALTH CARE EDUCATION/TRAINING PROGRAM

## 2025-04-22 PROCEDURE — 99214 OFFICE O/P EST MOD 30 MIN: CPT | Performed by: STUDENT IN AN ORGANIZED HEALTH CARE EDUCATION/TRAINING PROGRAM

## 2025-04-22 PROCEDURE — 3077F SYST BP >= 140 MM HG: CPT | Performed by: STUDENT IN AN ORGANIZED HEALTH CARE EDUCATION/TRAINING PROGRAM

## 2025-04-22 PROCEDURE — 3078F DIAST BP <80 MM HG: CPT | Performed by: STUDENT IN AN ORGANIZED HEALTH CARE EDUCATION/TRAINING PROGRAM

## 2025-04-22 RX ORDER — CARVEDILOL 6.25 MG/1
6.25 TABLET ORAL 2 TIMES DAILY WITH MEALS
Qty: 180 TABLET | Refills: 3 | Status: SHIPPED | OUTPATIENT
Start: 2025-04-22

## 2025-04-22 RX ORDER — AMLODIPINE BESYLATE 5 MG/1
5 TABLET ORAL AT BEDTIME
Qty: 90 TABLET | Refills: 3 | Status: SHIPPED | OUTPATIENT
Start: 2025-04-22

## 2025-04-22 NOTE — LETTER
4/22/2025    Jessica Fonseca MD  [address]  [facility] [address]    RE: Mary Corral       Dear Colleague,     I had the pleasure of seeing Mary Corral in the [facility].    [facility]   [address]  [place] [address]   [email or web address]   OFFICE: [phone]     CARDIOLOGY CLINIC NOTE     Thank you, Dr. Fnoseca, Jessica Hawk, for asking the [facility] team to see Ms. Mary Corral to evaluate No chief complaint on file.          History of Present Illness   Ms. Mary Corral is a 89 year old female with a significant past history of CAD s/p NSTEMI Lcx 4/2024,  HTN, CKD, HLD, who presents for follow up.  She has had multiple admissions to the hospital recently for GIB, CARLYLE, infection.  After one of her hospitalizations her ARB was stopped and amlodipine was stopped.  BP at home have bene very high.  Typically when she wakes up overnight it is in the 200s.  She was given hydralazine by her PCP.  BP typically improves to 160s later on.  She has actually felt well.  No chest pain.           Medications  Allergies   Current Outpatient Medications   Medication Sig Dispense Refill     acetaminophen (TYLENOL) 325 MG tablet Take 3 tablets (975 mg) by mouth 3 times daily. (Patient taking differently: Take 325 mg by mouth every 8 hours as needed for pain.)       albuterol (2.5 MG/3ML) 0.083% nebulizer solution Take 1 vial by nebulization every 4 hours as needed for shortness of breath / dyspnea or wheezing       albuterol (PROAIR HFA/PROVENTIL HFA/VENTOLIN HFA) 108 (90 BASE) MCG/ACT Inhaler Inhale 2 puffs into the lungs every 4 hours as needed for shortness of breath       Ascorbic Acid (VITAMIN C) 500 MG CAPS Take 1 capsule by mouth daily.       atorvastatin (LIPITOR) 40 MG tablet Take 1 tablet (40 mg) by mouth daily 90 tablet 3     carvedilol (COREG) 3.125 MG tablet Take 1 tablet (3.125 mg) by mouth 2 times daily  (with meals). 60 tablet 1     clopidogrel (PLAVIX) 75 MG tablet Take 1 tablet (75 mg) by mouth daily Dose to start tomorrow. 90 tablet 3     Elemental iron 65 mg Vitamin C 125 mg (VITRON-C)  MG TABS tablet Take 1 tablet by mouth every other day. (Patient taking differently: Take 1 tablet by mouth every other day.)       fluticasone-salmeterol (ADVAIR) 250-50 MCG/DOSE diskus inhaler Inhale 1 puff into the lungs daily as needed.       levothyroxine (SYNTHROID/LEVOTHROID) 50 MCG tablet Take 50 mcg by mouth daily.       montelukast (SINGULAIR) 10 MG tablet Take 10 mg by mouth at bedtime.       multivitamin w/minerals (THERA-VIT-M) tablet Take 1 tablet by mouth every morning.       pantoprazole (PROTONIX) 40 MG EC tablet Take 1 tablet (40 mg) by mouth 2 times daily. 180 tablet 0     polyethylene glycol (MIRALAX/GLYCOLAX) powder Take 17 g by mouth daily as needed for constipation.       predniSONE (DELTASONE) 20 MG tablet Take 20 mg by mouth daily as needed (Self doses in winter for lung infections.).       valACYclovir (VALTREX) 1000 mg tablet Take 1,000 mg by mouth 2 times daily as needed (for one day).       Vitamin D3 (CHOLECALCIFEROL) 25 mcg (1000 units) tablet Take 1,000 Units by mouth every morning.        Allergies   Allergen Reactions     Amoxicillin Hives     Keflex [Cephalexin] Hives     Lisinopril Shortness Of Breath     Penicillins Hives        Physical Examination Review of Systems   Vitals: There were no vitals taken for this visit.  BMI= There is no height or weight on file to calculate BMI.  Wt Readings from Last 3 Encounters:   03/08/25 82.6 kg (182 lb)   02/17/25 83.9 kg (185 lb)   02/09/25 86.7 kg (191 lb 1.6 oz)       General: pleasant female. No acute distress.   Neck: No JVD  Lungs: clear to auscultation  COR:  regular rate and rhythm, No murmurs, rubs, or gallops  Extrem: No edema        Please refer above for cardiac ROS details.       Past History     Family History:   Family History    Problem Relation Age of Onset     Esophageal Cancer Mother      Colon Cancer Mother      Mental Illness Mother      Dementia Mother      Unknown/Adopted Mother      Asthma Mother      Diabetes Mother      Cerebrovascular Disease Mother      Thyroid Disease Mother      Congenital Anomalies Mother      Bleeding Disorder Mother      Migraines Mother      Muscular Disorder Mother      Parkinsonism Father      Mental Illness Father      Unknown/Adopted Father      Asthma Father      Cancer Father      Hypertension Father      Cerebrovascular Disease Father      Thyroid Disease Father      Congenital Anomalies Father      Bleeding Disorder Father      Migraines Father      Muscular Disorder Father      Mental Illness Sister      Dementia Sister      Unknown/Adopted Sister      Asthma Sister      Cerebrovascular Disease Sister      Thyroid Disease Sister      Congenital Anomalies Sister      Bleeding Disorder Sister      Migraines Sister      Muscular Disorder Sister      Cancer Mother         esophageal        Social History:   Social History     Socioeconomic History     Marital status: Single     Spouse name: Not on file     Number of children: Not on file     Years of education: Not on file     Highest education level: Not on file   Occupational History     Not on file   Tobacco Use     Smoking status: Never     Smokeless tobacco: Never     Tobacco comments:     smoked twice a week for a couple years while in college, less than 1/2 pack    Substance and Sexual Activity     Alcohol use: Yes     Alcohol/week: 0.0 standard drinks of alcohol     Comment: Alcoholic Drinks/day: occasional     Drug use: No     Sexual activity: Never   Other Topics Concern     Not on file   Social History Narrative     Not on file     Social Drivers of Health     Financial Resource Strain: Low Risk  (3/9/2025)    Financial Resource Strain      Within the past 12 months, have you or your family members you live with been unable to get  utilities (heat, electricity) when it was really needed?: No   Food Insecurity: Low Risk  (3/9/2025)    Food Insecurity      Within the past 12 months, did you worry that your food would run out before you got money to buy more?: No      Within the past 12 months, did the food you bought just not last and you didn t have money to get more?: No   Transportation Needs: Low Risk  (3/9/2025)    Transportation Needs      Within the past 12 months, has lack of transportation kept you from medical appointments, getting your medicines, non-medical meetings or appointments, work, or from getting things that you need?: No   Physical Activity: Not on file   Stress: Not on file   Social Connections: Not on file   Interpersonal Safety: Low Risk  (3/9/2025)    Interpersonal Safety      Do you feel physically and emotionally safe where you currently live?: Yes      Within the past 12 months, have you been hit, slapped, kicked or otherwise physically hurt by someone?: No      Within the past 12 months, have you been humiliated or emotionally abused in other ways by your partner or ex-partner?: No   Recent Concern: Interpersonal Safety - High Risk (2/6/2025)    Interpersonal Safety      Do you feel physically and emotionally safe where you currently live?: No      Within the past 12 months, have you been hit, slapped, kicked or otherwise physically hurt by someone?: No      Within the past 12 months, have you been humiliated or emotionally abused in other ways by your partner or ex-partner?: No   Housing Stability: Low Risk  (3/9/2025)    Housing Stability      Do you have housing? : Yes      Are you worried about losing your housing?: No            Lab Results    Chemistry/lipid CBC Cardiac Enzymes/BNP/TSH/INR   Lab Results   Component Value Date    CHOL 146 07/10/2024    HDL 54 07/10/2024    TRIG 100 07/10/2024    BUN 14.8 03/14/2025     (H) 03/14/2025    CO2 22 03/14/2025    Lab Results   Component Value Date    WBC 7.2  03/14/2025    HGB 8.6 (L) 03/14/2025    HCT 28.1 (L) 03/14/2025    MCV 94 03/14/2025     03/14/2025    Lab Results   Component Value Date    TROPONINI 0.02 02/18/2018    TSH 1.30 03/08/2025    INR 1.45 (H) 02/06/2025          Cardiac Problems and Cardiac Diagnostics     Most Recent Cardiac testing:  ECG Personal interpretation  3/8/2025  NSR  RBBB    ECHO 2/6/2025  Interpretation Summary     The left ventricle is normal in size. There is normal left ventricular wall  thickness.  Hyperdynamic left ventricular function The visual ejection fraction is 65-70%.  No regional wall motion abnormalities noted.  Grade I or early diastolic dysfunction.     The right ventricle is normal in size and function.  Normal left atrial size. Right atrial size is normal.  IVC diameter <2.1 cm collapsing >50% with sniff suggests a normal RA pressure  of 3 mmHg.  When compared to previous study from 5/8/2024 the LV systolic function is more  hyperdynamic.    Stress test 6/12/2024:      The regadenoson nuclear stress test is negative for inducible myocardial ischemia or infarction.     The left ventricular ejection fraction at stress is greater than 70%.     The patient is at a low risk of future cardiac ischemic events.     There is no prior study for comparison.    Cardiac cath 4/29/2024:  Findings:  LM:no obstruction  LAD:distal 60-70% narrowing  Lcx:mid-vessel acute 99% narrowing, UMESH 2 flow distally  RCA:dominant, mid-vessel 50% narrowing       Assessment/Recommendations   Assessment:    Ms. Mary Corral is a 89 year old female with a significant past history of CAD s/p NSTEMI Lcx 4/2024,  HTN, CKD, HLD, who presents for follow up.      HTN   - BP uncontrolled   - Restart amlodipine 5mg qbedtime.     - Increase Carvedilol to 6.25mg BID     - Send BP log in 2 weeks.  Avoiding ARB due to CKD.     CAD s/p NSTEMI Lcx 4/2024   - Cont plavix monotherapy   - Moderate residual LAD disease, neg for ischemia by NM MPI.  Not having any  symptoms.  Can defer PCI for now   - Continue lifestyle modification   - LDL 72.  Continue atorvastatin 40mg   - Cont BB and ARB   - Completed CR    RTC 3 months    The longitudinal plan of care for the diagnosis(es)/condition(s) as documented were addressed during this visit. Due to the added complexity in care, I will continue to support  in the subsequent management and with ongoing continuity of care.           Ata Mann DO [facility]  Non-invasive Cardiologist  [facility]         Thank you for allowing me to participate in the care of your patient.      Sincerely,     Ata Mann DO     [facility]  cc:   Ata Mann DO  [address]  [place] [address]

## 2025-04-22 NOTE — PROGRESS NOTES
[facility]   [address]  [place] [address]   [email or web address]   OFFICE: [phone]     CARDIOLOGY CLINIC NOTE     Thank you, Dr. Fonseca, Jessica Hawk, for asking the [facility] Heart Care team to see Ms. Mary Corral to evaluate No chief complaint on file.          History of Present Illness   Ms. Mary Corral is a 89 year old female with a significant past history of CAD s/p NSTEMI Lcx 4/2024,  HTN, CKD, HLD, who presents for follow up.  She has had multiple admissions to the hospital recently for GIB, CARLYLE, infection.  After one of her hospitalizations her ARB was stopped and amlodipine was stopped.  BP at home have bene very high.  Typically when she wakes up overnight it is in the 200s.  She was given hydralazine by her PCP.  BP typically improves to 160s later on.  She has actually felt well.  No chest pain.           Medications  Allergies   Current Outpatient Medications   Medication Sig Dispense Refill    acetaminophen (TYLENOL) 325 MG tablet Take 3 tablets (975 mg) by mouth 3 times daily. (Patient taking differently: Take 325 mg by mouth every 8 hours as needed for pain.)      albuterol (2.5 MG/3ML) 0.083% nebulizer solution Take 1 vial by nebulization every 4 hours as needed for shortness of breath / dyspnea or wheezing      albuterol (PROAIR HFA/PROVENTIL HFA/VENTOLIN HFA) 108 (90 BASE) MCG/ACT Inhaler Inhale 2 puffs into the lungs every 4 hours as needed for shortness of breath      Ascorbic Acid (VITAMIN C) 500 MG CAPS Take 1 capsule by mouth daily.      atorvastatin (LIPITOR) 40 MG tablet Take 1 tablet (40 mg) by mouth daily 90 tablet 3    carvedilol (COREG) 3.125 MG tablet Take 1 tablet (3.125 mg) by mouth 2 times daily (with meals). 60 tablet 1    clopidogrel (PLAVIX) 75 MG tablet Take 1 tablet (75 mg) by mouth daily Dose to start tomorrow. 90 tablet 3    Elemental iron 65 mg Vitamin C 125 mg (VITRON-C)  MG TABS tablet Take 1  tablet by mouth every other day. (Patient taking differently: Take 1 tablet by mouth every other day.)      fluticasone-salmeterol (ADVAIR) 250-50 MCG/DOSE diskus inhaler Inhale 1 puff into the lungs daily as needed.      levothyroxine (SYNTHROID/LEVOTHROID) 50 MCG tablet Take 50 mcg by mouth daily.      montelukast (SINGULAIR) 10 MG tablet Take 10 mg by mouth at bedtime.      multivitamin w/minerals (THERA-VIT-M) tablet Take 1 tablet by mouth every morning.      pantoprazole (PROTONIX) 40 MG EC tablet Take 1 tablet (40 mg) by mouth 2 times daily. 180 tablet 0    polyethylene glycol (MIRALAX/GLYCOLAX) powder Take 17 g by mouth daily as needed for constipation.      predniSONE (DELTASONE) 20 MG tablet Take 20 mg by mouth daily as needed (Self doses in winter for lung infections.).      valACYclovir (VALTREX) 1000 mg tablet Take 1,000 mg by mouth 2 times daily as needed (for one day).      Vitamin D3 (CHOLECALCIFEROL) 25 mcg (1000 units) tablet Take 1,000 Units by mouth every morning.        Allergies   Allergen Reactions    Amoxicillin Hives    Keflex [Cephalexin] Hives    Lisinopril Shortness Of Breath    Penicillins Hives        Physical Examination Review of Systems   Vitals: There were no vitals taken for this visit.  BMI= There is no height or weight on file to calculate BMI.  Wt Readings from Last 3 Encounters:   03/08/25 82.6 kg (182 lb)   02/17/25 83.9 kg (185 lb)   02/09/25 86.7 kg (191 lb 1.6 oz)       General: pleasant female. No acute distress.   Neck: No JVD  Lungs: clear to auscultation  COR:  regular rate and rhythm, No murmurs, rubs, or gallops  Extrem: No edema        Please refer above for cardiac ROS details.       Past History     Family History:   Family History   Problem Relation Age of Onset    Esophageal Cancer Mother     Colon Cancer Mother     Mental Illness Mother     Dementia Mother     Unknown/Adopted Mother     Asthma Mother     Diabetes Mother     Cerebrovascular Disease Mother      Thyroid Disease Mother     Congenital Anomalies Mother     Bleeding Disorder Mother     Migraines Mother     Muscular Disorder Mother     Parkinsonism Father     Mental Illness Father     Unknown/Adopted Father     Asthma Father     Cancer Father     Hypertension Father     Cerebrovascular Disease Father     Thyroid Disease Father     Congenital Anomalies Father     Bleeding Disorder Father     Migraines Father     Muscular Disorder Father     Mental Illness Sister     Dementia Sister     Unknown/Adopted Sister     Asthma Sister     Cerebrovascular Disease Sister     Thyroid Disease Sister     Congenital Anomalies Sister     Bleeding Disorder Sister     Migraines Sister     Muscular Disorder Sister     Cancer Mother         esophageal        Social History:   Social History     Socioeconomic History    Marital status: Single     Spouse name: Not on file    Number of children: Not on file    Years of education: Not on file    Highest education level: Not on file   Occupational History    Not on file   Tobacco Use    Smoking status: Never    Smokeless tobacco: Never    Tobacco comments:     smoked twice a week for a couple years while in college, less than 1/2 pack    Substance and Sexual Activity    Alcohol use: Yes     Alcohol/week: 0.0 standard drinks of alcohol     Comment: Alcoholic Drinks/day: occasional    Drug use: No    Sexual activity: Never   Other Topics Concern    Not on file   Social History Narrative    Not on file     Social Drivers of Health     Financial Resource Strain: Low Risk  (3/9/2025)    Financial Resource Strain     Within the past 12 months, have you or your family members you live with been unable to get utilities (heat, electricity) when it was really needed?: No   Food Insecurity: Low Risk  (3/9/2025)    Food Insecurity     Within the past 12 months, did you worry that your food would run out before you got money to buy more?: No     Within the past 12 months, did the food you bought just  not last and you didn t have money to get more?: No   Transportation Needs: Low Risk  (3/9/2025)    Transportation Needs     Within the past 12 months, has lack of transportation kept you from medical appointments, getting your medicines, non-medical meetings or appointments, work, or from getting things that you need?: No   Physical Activity: Not on file   Stress: Not on file   Social Connections: Not on file   Interpersonal Safety: Low Risk  (3/9/2025)    Interpersonal Safety     Do you feel physically and emotionally safe where you currently live?: Yes     Within the past 12 months, have you been hit, slapped, kicked or otherwise physically hurt by someone?: No     Within the past 12 months, have you been humiliated or emotionally abused in other ways by your partner or ex-partner?: No   Recent Concern: Interpersonal Safety - High Risk (2/6/2025)    Interpersonal Safety     Do you feel physically and emotionally safe where you currently live?: No     Within the past 12 months, have you been hit, slapped, kicked or otherwise physically hurt by someone?: No     Within the past 12 months, have you been humiliated or emotionally abused in other ways by your partner or ex-partner?: No   Housing Stability: Low Risk  (3/9/2025)    Housing Stability     Do you have housing? : Yes     Are you worried about losing your housing?: No            Lab Results    Chemistry/lipid CBC Cardiac Enzymes/BNP/TSH/INR   Lab Results   Component Value Date    CHOL 146 07/10/2024    HDL 54 07/10/2024    TRIG 100 07/10/2024    BUN 14.8 03/14/2025     (H) 03/14/2025    CO2 22 03/14/2025    Lab Results   Component Value Date    WBC 7.2 03/14/2025    HGB 8.6 (L) 03/14/2025    HCT 28.1 (L) 03/14/2025    MCV 94 03/14/2025     03/14/2025    Lab Results   Component Value Date    TROPONINI 0.02 02/18/2018    TSH 1.30 03/08/2025    INR 1.45 (H) 02/06/2025          Cardiac Problems and Cardiac Diagnostics     Most Recent Cardiac  testing:  ECG Personal interpretation  3/8/2025  NSR  RBBB    ECHO 2/6/2025  Interpretation Summary     The left ventricle is normal in size. There is normal left ventricular wall  thickness.  Hyperdynamic left ventricular function The visual ejection fraction is 65-70%.  No regional wall motion abnormalities noted.  Grade I or early diastolic dysfunction.     The right ventricle is normal in size and function.  Normal left atrial size. Right atrial size is normal.  IVC diameter <2.1 cm collapsing >50% with sniff suggests a normal RA pressure  of 3 mmHg.  When compared to previous study from 5/8/2024 the LV systolic function is more  hyperdynamic.    Stress test 6/12/2024:     The regadenoson nuclear stress test is negative for inducible myocardial ischemia or infarction.    The left ventricular ejection fraction at stress is greater than 70%.    The patient is at a low risk of future cardiac ischemic events.    There is no prior study for comparison.    Cardiac cath 4/29/2024:  Findings:  LM:no obstruction  LAD:distal 60-70% narrowing  Lcx:mid-vessel acute 99% narrowing, UMESH 2 flow distally  RCA:dominant, mid-vessel 50% narrowing       Assessment/Recommendations   Assessment:    Ms. Mary Corral is a 89 year old female with a significant past history of CAD s/p NSTEMI Lcx 4/2024,  HTN, CKD, HLD, who presents for follow up.      HTN   - BP uncontrolled   - Restart amlodipine 5mg qbedtime.     - Increase Carvedilol to 6.25mg BID     - Send BP log in 2 weeks.  Avoiding ARB due to CKD.     CAD s/p NSTEMI Lcx 4/2024   - Cont plavix monotherapy   - Moderate residual LAD disease, neg for ischemia by NM MPI.  Not having any symptoms.  Can defer PCI for now   - Continue lifestyle modification   - LDL 72.  Continue atorvastatin 40mg   - Cont BB and ARB   - Completed CR    RTC 3 months    The longitudinal plan of care for the diagnosis(es)/condition(s) as documented were addressed during this visit. Due to the added  complexity in care, I will continue to support  in the subsequent management and with ongoing continuity of care.           Ata Mann DO [facility]  Non-invasive Cardiologist  [facility]

## 2025-04-22 NOTE — PATIENT INSTRUCTIONS
It was a pleasure meeting you today    In summary:    We will increase the carvedilol 6.25mg twice a day and add amlodipine 5mg at night.  Please send me a BP log in 2 weeks.  We can make further adjustments after that.    Please call my nurse Lam Bravo at [phone] if you have any questions or issues.    We will schedule a follow up visit in  3 months      Ata Mann DO [facility]  Non-invasive Cardiologist  [facility]

## 2025-04-30 ENCOUNTER — LAB REQUISITION (OUTPATIENT)
Dept: LAB | Facility: CLINIC | Age: 89
End: 2025-04-30

## 2025-04-30 DIAGNOSIS — N18.32 CHRONIC KIDNEY DISEASE, STAGE 3B (H): ICD-10-CM

## 2025-04-30 LAB
ALBUMIN SERPL BCG-MCNC: 3.5 G/DL (ref 3.5–5.2)
ALP SERPL-CCNC: 90 U/L (ref 40–150)
ALT SERPL W P-5'-P-CCNC: 10 U/L (ref 0–50)
ANION GAP SERPL CALCULATED.3IONS-SCNC: 13 MMOL/L (ref 7–15)
AST SERPL W P-5'-P-CCNC: 13 U/L (ref 0–45)
BILIRUB SERPL-MCNC: 0.5 MG/DL
BUN SERPL-MCNC: 27 MG/DL (ref 8–23)
CALCIUM SERPL-MCNC: 8.7 MG/DL (ref 8.8–10.4)
CHLORIDE SERPL-SCNC: 109 MMOL/L (ref 98–107)
CREAT SERPL-MCNC: 1.18 MG/DL (ref 0.51–0.95)
EGFRCR SERPLBLD CKD-EPI 2021: 44 ML/MIN/1.73M2
GLUCOSE SERPL-MCNC: 117 MG/DL (ref 70–99)
HCO3 SERPL-SCNC: 22 MMOL/L (ref 22–29)
POTASSIUM SERPL-SCNC: 4.1 MMOL/L (ref 3.4–5.3)
PROT SERPL-MCNC: 6.3 G/DL (ref 6.4–8.3)
SODIUM SERPL-SCNC: 144 MMOL/L (ref 135–145)

## 2025-04-30 PROCEDURE — 82310 ASSAY OF CALCIUM: CPT | Performed by: FAMILY MEDICINE

## 2025-05-07 ENCOUNTER — LAB REQUISITION (OUTPATIENT)
Dept: LAB | Facility: CLINIC | Age: 89
End: 2025-05-07

## 2025-05-07 DIAGNOSIS — M1A.0320: ICD-10-CM

## 2025-05-07 PROCEDURE — 84550 ASSAY OF BLOOD/URIC ACID: CPT | Performed by: FAMILY MEDICINE

## 2025-05-08 LAB — URATE SERPL-MCNC: 6.7 MG/DL (ref 2.4–5.7)

## 2025-05-16 ENCOUNTER — TELEPHONE (OUTPATIENT)
Dept: CARDIOLOGY | Facility: CLINIC | Age: 89
End: 2025-05-16
Payer: COMMERCIAL

## 2025-05-16 DIAGNOSIS — I10 PRIMARY HYPERTENSION: ICD-10-CM

## 2025-05-16 NOTE — TELEPHONE ENCOUNTER
"Dr Long, BP increased due to illness, Albuterol useand prednisone for gout? Ok to continue to use PRN Hydralazine? An other recommendations? Emeli CROWELL   _____________________________________________  Sent in blood pressure readings after OV with TLS, rasided and changed medications.   Just these last 3 days, BP \"has been goofy\". All of the sudden Tu/Wed and today. Illness with a cold, with nebulizing. Prednisone for gout, for her left hand will be completed on Sunday. Albuterol nebulizer taken every 6 hours. Other OTC's taking at this time, Coricidin used.  Started Amlodipine as prescribed.   Increased Carvedilol.  PRN Hydralazine been taking when her BP is elevated. Hydralazine 10 mg BID since Tuesday.     5/16/25 176/93 upon arising today  5/16/25  Later in the am 161/84    5/15/25 180/93 upon arising  5/15/25 During day 181/97  5/15/25 Down to 188/77    5/15/25 1900 5/15/25 208/85, Hydralazine, 161/83     Encouraged pt to continue current treatment. Will seek advisement from provider and return call. Call the clinic over the weekend and speak to oncall provider. CMM,Rn   "

## 2025-05-16 NOTE — TELEPHONE ENCOUNTER
"Incoming call and VM from patient. Reporting medication adjustments at OV with TLS.   Reports that her BP \"all of the sudden\" is 180/93, 181/97. \"Extremely high\". Her GP Dr. Fonseca, previously gave her a PRN Hydralazine, but didn't help. Reports that after 4-5 hours in the afternoon her blood pressure is better. Concerned as meds were adjusted. Requesting call back. Currently she does have a really bad cold with her asthma. [place],Rn   "

## 2025-05-19 ENCOUNTER — TRANSFERRED RECORDS (OUTPATIENT)
Dept: HEALTH INFORMATION MANAGEMENT | Facility: CLINIC | Age: 89
End: 2025-05-19

## 2025-05-19 RX ORDER — AMLODIPINE BESYLATE 10 MG/1
10 TABLET ORAL AT BEDTIME
Qty: 90 TABLET | Refills: 1 | Status: SHIPPED | OUTPATIENT
Start: 2025-05-19

## 2025-05-19 NOTE — TELEPHONE ENCOUNTER
Ata Mann, DO to Me  (Selected Message)        5/16/25  9:29 PM  Can increase amlodipine to 10mg.  Can continue to use hydralazine prn.     PC to patient and review of provider response. Agreeable. Now off prednisone for gout. Cold symptoms continue, no fever. Negative at home Covid test. However still utilizing Neb treatment every 6 hours. Slight audible wheeze over the phone at time of call. Due for a Neb tx. Strongly encouraged to contact PCP to review current symptoms especially off prednisone, to evaluate and further treatment. Verbalized understanding. Call back and give update on BP readings after a week or so post-illness. Again, encouraged call to PCP today. CMM,Rn

## 2025-05-28 ENCOUNTER — LAB REQUISITION (OUTPATIENT)
Dept: LAB | Facility: CLINIC | Age: 89
End: 2025-05-28

## 2025-05-28 ENCOUNTER — TRANSFERRED RECORDS (OUTPATIENT)
Dept: HEALTH INFORMATION MANAGEMENT | Facility: CLINIC | Age: 89
End: 2025-05-28

## 2025-05-28 DIAGNOSIS — I10 ESSENTIAL (PRIMARY) HYPERTENSION: ICD-10-CM

## 2025-05-28 PROCEDURE — 80053 COMPREHEN METABOLIC PANEL: CPT | Performed by: FAMILY MEDICINE

## 2025-05-29 LAB
ALBUMIN SERPL BCG-MCNC: 3.6 G/DL (ref 3.5–5.2)
ALP SERPL-CCNC: 90 U/L (ref 40–150)
ALT SERPL W P-5'-P-CCNC: 12 U/L (ref 0–50)
ANION GAP SERPL CALCULATED.3IONS-SCNC: 14 MMOL/L (ref 7–15)
AST SERPL W P-5'-P-CCNC: 15 U/L (ref 0–45)
BILIRUB SERPL-MCNC: 0.5 MG/DL
BUN SERPL-MCNC: 28.6 MG/DL (ref 8–23)
CALCIUM SERPL-MCNC: 8.8 MG/DL (ref 8.8–10.4)
CHLORIDE SERPL-SCNC: 112 MMOL/L (ref 98–107)
CREAT SERPL-MCNC: 1.15 MG/DL (ref 0.51–0.95)
EGFRCR SERPLBLD CKD-EPI 2021: 46 ML/MIN/1.73M2
GLUCOSE SERPL-MCNC: 105 MG/DL (ref 70–99)
HCO3 SERPL-SCNC: 21 MMOL/L (ref 22–29)
POTASSIUM SERPL-SCNC: 3.8 MMOL/L (ref 3.4–5.3)
PROT SERPL-MCNC: 6.3 G/DL (ref 6.4–8.3)
SODIUM SERPL-SCNC: 147 MMOL/L (ref 135–145)

## 2025-08-27 ENCOUNTER — LAB REQUISITION (OUTPATIENT)
Dept: LAB | Facility: CLINIC | Age: 89
End: 2025-08-27

## 2025-08-27 DIAGNOSIS — N18.32 CHRONIC KIDNEY DISEASE, STAGE 3B (H): ICD-10-CM

## 2025-08-27 PROCEDURE — 84155 ASSAY OF PROTEIN SERUM: CPT | Performed by: FAMILY MEDICINE

## 2025-08-28 LAB
ALBUMIN SERPL BCG-MCNC: 3.9 G/DL (ref 3.5–5.2)
ALP SERPL-CCNC: 91 U/L (ref 40–150)
ALT SERPL W P-5'-P-CCNC: 13 U/L (ref 0–50)
ANION GAP SERPL CALCULATED.3IONS-SCNC: 15 MMOL/L (ref 7–15)
AST SERPL W P-5'-P-CCNC: 16 U/L (ref 0–45)
BILIRUB SERPL-MCNC: 0.6 MG/DL
BUN SERPL-MCNC: 29.8 MG/DL (ref 8–23)
CALCIUM SERPL-MCNC: 8.9 MG/DL (ref 8.8–10.4)
CHLORIDE SERPL-SCNC: 106 MMOL/L (ref 98–107)
CREAT SERPL-MCNC: 1.36 MG/DL (ref 0.51–0.95)
EGFRCR SERPLBLD CKD-EPI 2021: 37 ML/MIN/1.73M2
GLUCOSE SERPL-MCNC: 118 MG/DL (ref 70–99)
HCO3 SERPL-SCNC: 23 MMOL/L (ref 22–29)
POTASSIUM SERPL-SCNC: 3.7 MMOL/L (ref 3.4–5.3)
PROT SERPL-MCNC: 6.6 G/DL (ref 6.4–8.3)
SODIUM SERPL-SCNC: 144 MMOL/L (ref 135–145)

## (undated) DEVICE — SU SILK 4-0 TIE 12X30" A303H

## (undated) DEVICE — VESSEL LOOPS YELLOW MAXI 31145694

## (undated) DEVICE — SYR 01ML 27GA 0.5" NDL TBC 309623

## (undated) DEVICE — BLADE KNIFE SURG 10 371110

## (undated) DEVICE — BITE BLOCK SCOPE DISP 20 X 27 000429

## (undated) DEVICE — EXCHANGE WIRE .035 260 STAR/JFC/035/260/ M001491681

## (undated) DEVICE — KIT CATHETER INDIGO RX 140CM WITH LG LUMEN ASP TUBING

## (undated) DEVICE — SYR ANGIOGRAPHY MULTIUSE KIT ACIST 014612

## (undated) DEVICE — PACK NEURO MINOR UMMC SNE32MNMU4

## (undated) DEVICE — CUSTOM PACK CORONARY SAN5BCRHEA

## (undated) DEVICE — VALVE HEMOSTASIS .096" COPILOT MECH 1003331

## (undated) DEVICE — STPL ENDO RELOAD 45MM MEDIUM THICK PURPLE EGIA45AMT

## (undated) DEVICE — PROBE BIPOLAR HEMOSTASIS GOLD 10FRX210CM 6016 M00560160

## (undated) DEVICE — SPONGE RAY-TEC 4X8" 7318

## (undated) DEVICE — ESU PENCIL SMOKE EVAC W/ROCKER SWITCH 0703-047-000

## (undated) DEVICE — SUCTION SLEEVE NEPTUNE 2 165MM 0703-005-165

## (undated) DEVICE — SYR 03ML LL W/O NDL 309657

## (undated) DEVICE — CONNECTOR ONE-LINK INJECTION SITE LF 7N8399

## (undated) DEVICE — TUBE ENDOTRACHEAL NIM TRIVANTAGE 7.0MM 8229707

## (undated) DEVICE — ESU CORD BIPOLAR AND IRR TUBING AESCULAP US355

## (undated) DEVICE — SU SILK 0 TIE 6X18" A186H

## (undated) DEVICE — SWABCAP DISINFECTANT GREEN CFF10-250

## (undated) DEVICE — SPONGE DOUBLE 1.25" W/STRING 23275-690

## (undated) DEVICE — KIT CONNECTOR FOR OLYMPUS ENDOSCOPES DEFENDO 100310

## (undated) DEVICE — PREP POVIDONE IODINE SCRUB 7.5% 120ML

## (undated) DEVICE — RETRIEVAL ESCP 230CM 2.5MM RTHNT 360D 5X3CM BX00711197

## (undated) DEVICE — RETR ELASTIC STAYS LONE STAR BLUNT DUAL LEAD 3550-1G

## (undated) DEVICE — SUCTION CANISTER 1000ML 65651-510

## (undated) DEVICE — SU ETHIBOND 1 CTX 30" X865H

## (undated) DEVICE — TUBING SUCTION MEDI-VAC 1/4"X20' N620A - HE

## (undated) DEVICE — GUIDEWIRE VASCULAR STRAIGHT PTCA .014IN X 180CM

## (undated) DEVICE — CATH BALLOON EMERGE 2.0X12MM H7493918912200

## (undated) DEVICE — Device

## (undated) DEVICE — SOL WATER IRRIG 1000ML BOTTLE 2F7114

## (undated) DEVICE — VALVE BACK FLOW ENDOGATOR 100126

## (undated) DEVICE — DRSG TEGADERM 2 3/8X2 3/4" 1624W

## (undated) DEVICE — TUBING ENDOGATOR + H2O PORT CO

## (undated) DEVICE — CATH LAUNCHER 6FR EBU 30 LA6EBU30

## (undated) DEVICE — DRAIN JACKSON PRATT RESERVOIR 100ML SU130-1305

## (undated) DEVICE — CATH BALLOON EMERGE 2.5X12MM H7493918912250

## (undated) DEVICE — TUBING SUCTION 10'X3/16" N510

## (undated) DEVICE — CATH BALLOON EMERGE 1.5X12MM H7493918912150

## (undated) DEVICE — CATH SUCTION 14FR W/O CTRL DYND41962

## (undated) DEVICE — SUCTION MANIFOLD NEPTUNE 2 SYS 1 PORT 702-025-000

## (undated) DEVICE — ESU PENCIL W/COATED BLADE E2450H

## (undated) DEVICE — GOWN LG DISP 9515

## (undated) DEVICE — DEVICE INFLATION SYR W/ HEMOSTASIS VALVE 12IN EXT IN4904

## (undated) DEVICE — STPL ENDO RELOAD 60MM MEDIUM THICK PURPLE EGIA60AMT

## (undated) DEVICE — ESU GROUND PAD ADULT W/CORD E7507

## (undated) DEVICE — SUCTION TIP YANKAUER W/O VENT K86

## (undated) DEVICE — PREP SKIN SCRUB TRAY 4461A

## (undated) DEVICE — SU MONOCRYL 4-0 PS-2 18" UND Y496G

## (undated) DEVICE — MANIFOLD KIT ANGIO AUTOMATED 014613

## (undated) DEVICE — CATH ANGIO INFINITI JR4 4FRX100CM 538421

## (undated) DEVICE — CANISTER ENGINE FOR INDIGO SYSTEM

## (undated) DEVICE — CANNULA NASAL COMFORT SOFT 25FT 0537

## (undated) DEVICE — SU VICRYL 3-0 SH 8X18" UND J864D

## (undated) DEVICE — GUIDEWIRE FORTE FLOPPY J TOP 34949-05J

## (undated) DEVICE — SHTH INTRO 0.021IN ID 6FR DIA

## (undated) DEVICE — DECANTER VIAL 2006S

## (undated) DEVICE — SOL NACL 0.9% IRRIG 1000ML BOTTLE 2F7124

## (undated) DEVICE — ENDO VALVE BX EVIS MAJ-210

## (undated) DEVICE — CLIP HORIZON SM RED WIDE SLOT 001201

## (undated) DEVICE — DRAIN JACKSON PRATT 10MM FLAT 4/4 PERF SU130-1311

## (undated) DEVICE — SPONGE KITTNER 30-101

## (undated) DEVICE — CATH DIAGNOSTIC RADIAL 5FR TIG 4.0

## (undated) DEVICE — CLIP HORIZON MED BLUE 002200

## (undated) DEVICE — SU ETHILON 4-0 P-3 18" CLR  691G

## (undated) DEVICE — KIT SCOPE CLEANING ENDOSCOPY BX00719705

## (undated) DEVICE — ESU ELEC BLADE 2.75" COATED/INSULATED E1455

## (undated) DEVICE — TUBING SUCTION MEDI-VAC 1/4"X20' N620A

## (undated) DEVICE — SOL WATER IRRIG 500ML BOTTLE 2F7113

## (undated) DEVICE — CATH TRAY FOLEY SURESTEP 16FR W/URNE MTR STLK LATEX A303316A

## (undated) DEVICE — PROBE BIPOLAR HEMOSTASIS GOLD 07FRX210CM M00560150

## (undated) DEVICE — LINEN TOWEL PACK X30 5481

## (undated) DEVICE — SU SILK 2-0 TIE 12X30" A305H

## (undated) DEVICE — SU ETHILON 4-0 P-3 18" BLACK 699G

## (undated) DEVICE — CATH TURNPIKE LP 135CM

## (undated) DEVICE — PREP POVIDONE IODINE SOLUTION 10% 120ML

## (undated) DEVICE — ELECTRODE DEFIB CADENCE 22550R

## (undated) DEVICE — SU SILK 2-0 SH CR 5X18" C0125

## (undated) DEVICE — DEVICE EXCHANGE TRAPPER

## (undated) DEVICE — SU SILK 3-0 TIE 12X30" A304H

## (undated) DEVICE — NIM PROBE PRASS INCREMENTING TIP 8225825

## (undated) DEVICE — SPONGE TONSIL W/STRING MED 23275-680

## (undated) DEVICE — LINEN TOWEL PACK X6 WHITE 5487

## (undated) DEVICE — STIMULATOR NERVE NEURO-PULSE SURGICAL LOCATOR 30968-220

## (undated) DEVICE — DRSG TELFA 3X8" 1238

## (undated) DEVICE — KIT HAND CONTROL ACIST 014644 AR-P54

## (undated) DEVICE — ESU HARMONIC FOCUS SHEARS CVD 9CM HAR9F

## (undated) DEVICE — SLEEVE TR BAND RADIAL COMPRESSION DEVICE 24CM TRB24-REG

## (undated) DEVICE — PLATE GROUNDING ADULT W/CORD 9165L

## (undated) DEVICE — KIT IV START DYNDV1431

## (undated) RX ORDER — LIDOCAINE HYDROCHLORIDE 10 MG/ML
INJECTION, SOLUTION EPIDURAL; INFILTRATION; INTRACAUDAL; PERINEURAL
Status: DISPENSED
Start: 2025-02-06

## (undated) RX ORDER — GABAPENTIN 300 MG/1
CAPSULE ORAL
Status: DISPENSED
Start: 2017-03-14

## (undated) RX ORDER — ADENOSINE 3 MG/ML
INJECTION, SOLUTION INTRAVENOUS
Status: DISPENSED
Start: 2024-04-29

## (undated) RX ORDER — ALBUTEROL SULFATE 0.83 MG/ML
SOLUTION RESPIRATORY (INHALATION)
Status: DISPENSED
Start: 2017-01-31

## (undated) RX ORDER — DEXAMETHASONE SODIUM PHOSPHATE 10 MG/ML
INJECTION, SOLUTION INTRAMUSCULAR; INTRAVENOUS
Status: DISPENSED
Start: 2025-02-06

## (undated) RX ORDER — EPTIFIBATIDE 2 MG/ML
INJECTION, SOLUTION INTRAVENOUS
Status: DISPENSED
Start: 2024-04-29

## (undated) RX ORDER — CELECOXIB 200 MG/1
CAPSULE ORAL
Status: DISPENSED
Start: 2017-03-14

## (undated) RX ORDER — ONDANSETRON 2 MG/ML
INJECTION INTRAMUSCULAR; INTRAVENOUS
Status: DISPENSED
Start: 2025-02-07

## (undated) RX ORDER — ONDANSETRON 2 MG/ML
INJECTION INTRAMUSCULAR; INTRAVENOUS
Status: DISPENSED
Start: 2025-02-06

## (undated) RX ORDER — FENTANYL CITRATE 50 UG/ML
INJECTION, SOLUTION INTRAMUSCULAR; INTRAVENOUS
Status: DISPENSED
Start: 2024-04-29

## (undated) RX ORDER — ACETAMINOPHEN 325 MG/1
TABLET ORAL
Status: DISPENSED
Start: 2017-03-14

## (undated) RX ORDER — PROPOFOL 10 MG/ML
INJECTION, EMULSION INTRAVENOUS
Status: DISPENSED
Start: 2025-02-06

## (undated) RX ORDER — CLINDAMYCIN PHOSPHATE 900 MG/50ML
INJECTION, SOLUTION INTRAVENOUS
Status: DISPENSED
Start: 2017-03-14

## (undated) RX ORDER — FENTANYL CITRATE 50 UG/ML
INJECTION, SOLUTION INTRAMUSCULAR; INTRAVENOUS
Status: DISPENSED
Start: 2017-03-14

## (undated) RX ORDER — CLOPIDOGREL 300 MG/1
TABLET, FILM COATED ORAL
Status: DISPENSED
Start: 2024-04-29

## (undated) RX ORDER — PROTAMINE SULFATE 10 MG/ML
INJECTION, SOLUTION INTRAVENOUS
Status: DISPENSED
Start: 2024-04-29

## (undated) RX ORDER — CHLORHEXIDINE GLUCONATE ORAL RINSE 1.2 MG/ML
SOLUTION DENTAL
Status: DISPENSED
Start: 2017-03-14

## (undated) RX ORDER — PROPOFOL 10 MG/ML
INJECTION, EMULSION INTRAVENOUS
Status: DISPENSED
Start: 2025-02-07

## (undated) RX ORDER — LIDOCAINE HYDROCHLORIDE 10 MG/ML
INJECTION, SOLUTION EPIDURAL; INFILTRATION; INTRACAUDAL; PERINEURAL
Status: DISPENSED
Start: 2025-02-07